# Patient Record
Sex: FEMALE | Race: WHITE | NOT HISPANIC OR LATINO | Employment: OTHER | URBAN - METROPOLITAN AREA
[De-identification: names, ages, dates, MRNs, and addresses within clinical notes are randomized per-mention and may not be internally consistent; named-entity substitution may affect disease eponyms.]

---

## 2017-06-22 ENCOUNTER — HOSPITAL ENCOUNTER (OUTPATIENT)
Facility: MEDICAL CENTER | Age: 57
End: 2017-06-22
Attending: INTERNAL MEDICINE
Payer: COMMERCIAL

## 2017-06-22 ENCOUNTER — OFFICE VISIT (OUTPATIENT)
Dept: MEDICAL GROUP | Facility: MEDICAL CENTER | Age: 57
End: 2017-06-22
Payer: COMMERCIAL

## 2017-06-22 VITALS
DIASTOLIC BLOOD PRESSURE: 68 MMHG | WEIGHT: 293 LBS | TEMPERATURE: 96.9 F | BODY MASS INDEX: 50.02 KG/M2 | SYSTOLIC BLOOD PRESSURE: 136 MMHG | HEIGHT: 64 IN | OXYGEN SATURATION: 96 % | HEART RATE: 71 BPM

## 2017-06-22 DIAGNOSIS — F11.20 NARCOTIC DEPENDENCE (HCC): ICD-10-CM

## 2017-06-22 DIAGNOSIS — Z99.81 ON HOME OXYGEN THERAPY: ICD-10-CM

## 2017-06-22 DIAGNOSIS — Z79.01 CHRONIC ANTICOAGULATION: ICD-10-CM

## 2017-06-22 DIAGNOSIS — Z12.31 ENCOUNTER FOR SCREENING MAMMOGRAM FOR MALIGNANT NEOPLASM OF BREAST: ICD-10-CM

## 2017-06-22 DIAGNOSIS — J44.9 CHRONIC OBSTRUCTIVE PULMONARY DISEASE, UNSPECIFIED COPD TYPE (HCC): ICD-10-CM

## 2017-06-22 DIAGNOSIS — Z12.11 SCREENING FOR MALIGNANT NEOPLASM OF COLON: ICD-10-CM

## 2017-06-22 DIAGNOSIS — Z79.891 CHRONIC USE OF OPIATE FOR THERAPEUTIC PURPOSE: ICD-10-CM

## 2017-06-22 DIAGNOSIS — I10 ESSENTIAL HYPERTENSION: ICD-10-CM

## 2017-06-22 DIAGNOSIS — J96.11 CHRONIC RESPIRATORY FAILURE WITH HYPOXIA (HCC): ICD-10-CM

## 2017-06-22 DIAGNOSIS — Z00.00 HEALTH CARE MAINTENANCE: ICD-10-CM

## 2017-06-22 DIAGNOSIS — I48.91 ATRIAL FIBRILLATION WITH RAPID VENTRICULAR RESPONSE (HCC): ICD-10-CM

## 2017-06-22 DIAGNOSIS — I42.0 DCM (DILATED CARDIOMYOPATHY) (HCC): ICD-10-CM

## 2017-06-22 DIAGNOSIS — M17.0 PRIMARY OSTEOARTHRITIS OF BOTH KNEES: ICD-10-CM

## 2017-06-22 DIAGNOSIS — Z99.3 WHEELCHAIR BOUND: ICD-10-CM

## 2017-06-22 DIAGNOSIS — Z76.89 ENCOUNTER TO ESTABLISH CARE: ICD-10-CM

## 2017-06-22 DIAGNOSIS — I27.20 PULMONARY HYPERTENSION (HCC): ICD-10-CM

## 2017-06-22 DIAGNOSIS — I50.9 CHRONIC CONGESTIVE HEART FAILURE, UNSPECIFIED CONGESTIVE HEART FAILURE TYPE: ICD-10-CM

## 2017-06-22 PROBLEM — G47.33 OSA (OBSTRUCTIVE SLEEP APNEA): Status: ACTIVE | Noted: 2017-06-22

## 2017-06-22 PROBLEM — G47.00 INSOMNIA: Status: ACTIVE | Noted: 2017-06-22

## 2017-06-22 PROCEDURE — 80307 DRUG TEST PRSMV CHEM ANLYZR: CPT

## 2017-06-22 PROCEDURE — 99205 OFFICE O/P NEW HI 60 MIN: CPT | Performed by: INTERNAL MEDICINE

## 2017-06-22 RX ORDER — DIGOXIN 250 MCG
250 TABLET ORAL DAILY
COMMUNITY
End: 2017-06-22 | Stop reason: SDUPTHER

## 2017-06-22 RX ORDER — SPIRONOLACTONE 25 MG/1
25 TABLET ORAL DAILY
Qty: 90 TAB | Refills: 0 | Status: SHIPPED | OUTPATIENT
Start: 2017-06-22 | End: 2017-10-19 | Stop reason: SDUPTHER

## 2017-06-22 RX ORDER — GABAPENTIN 300 MG/1
300 CAPSULE ORAL 2 TIMES DAILY
Qty: 180 CAP | Refills: 0 | Status: SHIPPED | OUTPATIENT
Start: 2017-06-22 | End: 2017-10-19 | Stop reason: SDUPTHER

## 2017-06-22 RX ORDER — DIGOXIN 250 MCG
250 TABLET ORAL DAILY
Qty: 90 TAB | Refills: 0 | Status: SHIPPED | OUTPATIENT
Start: 2017-06-22 | End: 2017-10-19 | Stop reason: SDUPTHER

## 2017-06-22 RX ORDER — SPIRONOLACTONE 25 MG/1
25 TABLET ORAL DAILY
COMMUNITY
End: 2017-06-22 | Stop reason: SDUPTHER

## 2017-06-22 RX ORDER — TRAZODONE HYDROCHLORIDE 50 MG/1
50 TABLET ORAL NIGHTLY
COMMUNITY
End: 2018-08-02

## 2017-06-22 RX ORDER — METOPROLOL TARTRATE 50 MG/1
50 TABLET, FILM COATED ORAL 2 TIMES DAILY
Qty: 180 TAB | Refills: 0 | Status: SHIPPED | OUTPATIENT
Start: 2017-06-22 | End: 2017-10-19 | Stop reason: SDUPTHER

## 2017-06-22 RX ORDER — AMIODARONE HYDROCHLORIDE 200 MG/1
200 TABLET ORAL DAILY
Qty: 90 TAB | Refills: 0 | Status: SHIPPED | OUTPATIENT
Start: 2017-06-22 | End: 2017-10-19 | Stop reason: SDUPTHER

## 2017-06-22 RX ORDER — HYDROCODONE BITARTRATE AND ACETAMINOPHEN 7.5; 325 MG/1; MG/1
1-2 TABLET ORAL EVERY 6 HOURS PRN
COMMUNITY
End: 2018-08-02

## 2017-06-22 NOTE — PROGRESS NOTES
CHIEF COMPLAINT  Chief Complaint   Patient presents with   • Establish Care     med refills    HTN    HPI  Patient is a 57 y.o. female patient who presents today for the following     HYPERTENSION  Meds: metoprolol, 50 mg BID, spironolactone, 25 mg QD, carvedilol, 12.5 mg BID,, taking as prescribed.   Denies:  -  headaches, vision problems, tinnitus.                 -  chest pain/pressure, palpitations, irregular heart beats, exertional, dyspnea, peripheral edema.      - medication side effect: unusual fatigue, slow heartbeat, foot/leg swelling, cough.  Low salt diet: yes  Diet: regular  Exercise: in a wheelchair  BMI: Body mass index is 60.05 kg/(m^2).  FH of HTN: multiple    Afib, on anticoagulation, dilated cardiomyopathy (CHF)  Pulmonary hypertension  Onset: in 2013.    Medications: Amiodarone, 200 mg daily, digitoxin, 250 µg daily, spironolactone 25 mg daily Xarelto 20 mg daily.    Reviewed last echocardiography in our system, from 1/18/13, with EF of 50% and moderate pulmonary hypertension.  She was not followed by cardiology.    COPD, respiratory failure with hypoxia and home oxygen  The patient is a former smoker, has COPD and on home oxygen.  Denies current shortness of breath, cough, wheezing, chest tightness.  She was not seen recently by pulmonology.    Osteoarthritis of knees, wheelchair bound, chronic narcotic use and dependence  The patient has osteoarthritis of both knees for which she is in a wheelchair.  She has been on a Norco, followed up by pain management fire due to no shows.   Denies fever, chills, knee redness or swelling.    OBESITY, Body mass index is 60.05 kg/(m^2).  Onset: since  childhood  Diet: Regular  Exercise: In a wheelchair  No temperature intolerance. No change in hair/skin quality, BMs.   No HTN, buffalo hump, purple striae, flushing.  FH of obesity: multiple (sisters)    Reviewed PMH, PSH, FH, SH, ALL, HCM/IMM, no changes  Reviewed MEDS, no changes    Patient Active Problem  List    Diagnosis Date Noted   • Chronic anticoagulation 01/29/2013     Priority: High   • Atrial fibrillation with rapid ventricular response (CMS-HCC) 01/17/2013     Priority: High   • Migraine 01/22/2012     Priority: High   • CHF (congestive heart failure) (CMS-HCC) 01/29/2013     Priority: Medium   • DCM (dilated cardiomyopathy) (CMS-HCC) 01/29/2013     Priority: Medium   • Morbid obesity (CMS-HCC) 01/22/2012     Priority: Low   • Chronic pain 01/22/2012     Priority: Low   • Essential hypertension 06/22/2017   • Health care maintenance 06/22/2017     CURRENT MEDICATIONS  Current Outpatient Prescriptions   Medication Sig Dispense Refill   • hydrocodone-acetaminophen (NORCO) 7.5-325 MG per tablet Take 1-2 Tabs by mouth every 6 hours as needed.     • METOPROLOL TARTRATE PO Take  by mouth.     • spironolactone (ALDACTONE) 25 MG Tab Take 25 mg by mouth every day.     • digoxin (LANOXIN) 250 MCG Tab Take 250 mcg by mouth every day.     • trazodone (DESYREL) 50 MG Tab Take 50 mg by mouth every evening.     • amiodarone (CORDARONE) 200 MG TABS Take 1 Tab by mouth every day. 30 Tab 0   • gabapentin (NEURONTIN) 300 MG CAPS Take 1 Cap by mouth 2 Times a Day. 60 Cap 0   • albuterol (ACCUNEB) 1.25 MG/3ML nebulizer solution Inhale 6 mL by mouth every four hours as needed for Shortness of Breath. 60 Bullet 0   • alprazolam (XANAX) 0.5 MG TABS Take 1 Tab by mouth every 8 hours. 30 Each 0   • carvedilol (COREG) 12.5 MG TABS Take 1 Tab by mouth 2 times a day, with meals. 60 Tab 0   • digoxin (LANOXIN) 0.25 MG TABS Take 1 Tab by mouth every day at 6 PM. 30 Tab 0   • docusate sodium (COLACE) 100 MG CAPS Take 1 Cap by mouth 2 Times a Day. 60 Cap 0   • omeprazole (PRILOSEC) 20 MG CPDR Take 1 Cap by mouth 1/2 hour before dinner. 30 Tab 0   • rivaroxaban (XARELTO) 20 MG TABS tablet Take 1 Tab by mouth with dinner. 12 Tab 0   • therapeutic multivitamin-minerals (THERAGRAN-M) TABS Take 1 Tab by mouth every day with lunch. 30 Each   "    No current facility-administered medications for this visit.     ALLERGIES  Allergies: Review of patient's allergies indicates no known allergies.  PAST MEDICAL HISTORY  Past Medical History   Diagnosis Date   • Migraine    • Hypertension    • Chronic pain    • Arthritis    • COPD (chronic obstructive pulmonary disease) (CMS-[facility])      SURGICAL HISTORY  She  has past surgical history that includes primary c section; tracheostomy (2/4/2013); and gastrostomy laparoscopic (2/4/2013).  SOCIAL HISTORY  Social History   Substance Use Topics   • Smoking status: Former Smoker -- 1.00 packs/day for 20 years     Types: Cigarettes     Quit date: 01/17/2013   • Smokeless tobacco: Never Used      Comment: 1 ppd   • Alcohol Use: No     Social History     Social History Narrative     FAMILY HISTORY  Family History   Problem Relation Age of Onset   • Cancer Father      lung, smoker   • Diabetes Mother    • Diabetes Maternal Aunt      x4   • Heart Disease Sister    • Hypertension Mother    • Hypertension Sister    • Hyperlipidemia Mother    • Hyperlipidemia Sister    • Psychiatry Neg Hx    • Stroke Neg Hx    • Stroke Neg Hx    • Thyroid Sister      No family status information on file.     ROS   Constitutional: Negative for fever, chills.  HENT: Negative for congestion, sore throat.  Eyes: Negative for blurred vision.   Respiratory: Negative for cough, shortness of breath. And per HPI.  Cardiovascular: Negative for chest pain, palpitations. And per HPI.  Gastrointestinal: Negative for heartburn, nausea, abdominal pain.   Genitourinary: Negative for dysuria.  Musculoskeletal: As above.  Skin: Negative for rash and itching.   Neuro: Negative for dizziness, weakness and headaches.   Endo/Heme/Allergies: Does not bruise/bleed easily.   Psychiatric/Behavioral: Negative for depression, anxiety    PHYSICAL EXAM   Blood pressure 136/68, pulse 71, temperature 36.1 °C (96.9 °F), height 1.626 m (5' 4\"), weight 158.759 kg (350 lb), SpO2 96 " %. Body mass index is 60.05 kg/(m^2).  General:  NAD, well appearing. Obese.   O2 by NC.  HEENT:   NC/AT, PERRLA, EOMI, TMs are clear. Oropharyngeal mucosa is pink,  without lesions;  no cervical / supraclavicular  lymphadenopathy, no thyromegaly.    Cardiovascular: RRR.   No m/r/g. No carotid bruits .      Lungs:   CTAB, no w/r/r, no respiratory distress.  Abdomen: Soft, NT/ND + BS; could not palpate liver or spleen due to obesity  Extremities:  2+ DP and radial pulses bilaterally.  No c/c/e.   Skin:  Warm, dry.  No erythema. No rash.   Neurologic: Alert & oriented x 3. No focal deficits.  Psychiatric:  Affect normal, mood normal, judgment normal.    LABS     Reviewed her last labs:    Lab Results   Component Value Date/Time    CHOLESTEROL, 01/22/2012 06:00 AM     01/22/2012 06:00 AM    HDL 44 01/22/2012 06:00 AM    TRIGLYCERIDES 147 01/22/2012 06:00 AM       Lab Results   Component Value Date/Time    SODIUM 139 01/12/2015 02:00 PM    POTASSIUM 3.6 01/12/2015 02:00 PM    CHLORIDE 92* 01/12/2015 02:00 PM    CO2 37* 01/12/2015 02:00 PM    GLUCOSE 146* 01/12/2015 02:00 PM    BUN 7* 01/12/2015 02:00 PM    CREATININE 0.77 01/12/2015 02:00 PM     Lab Results   Component Value Date/Time    ALKALINE PHOSPHATASE 46 01/12/2015 02:00 PM    AST(SGOT) 28 01/12/2015 02:00 PM    ALT(SGPT) 42 01/12/2015 02:00 PM    TOTAL BILIRUBIN 0.8 01/12/2015 02:00 PM      No results found for: HBA1C  No results found for: TSH  Lab Results   Component Value Date/Time    FREE T-4 1.31 01/17/2013 07:05 AM    FREE T-4 1.01 01/21/2012 04:20 PM       Lab Results   Component Value Date/Time    WBC 9.1 01/12/2015 02:00 PM    RBC 4.23 01/12/2015 02:00 PM    HEMOGLOBIN 13.3 01/12/2015 02:00 PM    HEMATOCRIT 40.7 01/12/2015 02:00 PM    MCV 96.2 01/12/2015 02:00 PM    MCH 31.4 01/12/2015 02:00 PM    MCHC 32.7* 01/12/2015 02:00 PM    MPV 12.4 01/12/2015 02:00 PM    NEUTROPHILS-POLYS 66.5 01/12/2015 02:00 PM    LYMPHOCYTES 21.1* 01/12/2015  02:00 PM    MONOCYTES 8.7 01/12/2015 02:00 PM    EOSINOPHILS 2.2 01/12/2015 02:00 PM    BASOPHILS 0.9 01/12/2015 02:00 PM    HYPOCHROMIA 1+ 06/18/2013 03:58 PM      IMAGING     Reviewed echocardiography from 1/18/13:  Technically difficult study.   Left ventricular ejection fraction is 45% to 50%.  Severely dilated left atrium.  Right ventricular systolic pressure is estimated to be at least 50-55   mmHg consistent with moderate pulmonary hypertension.    ASSESMENT AND PLAN        1. Essential hypertension  Controlled, continue current treatment  - metoprolol (LOPRESSOR) 50 MG Tab; Take 1 Tab by mouth 2 times a day.  Dispense: 180 Tab; Refill: 0    2. Atrial fibrillation with rapid ventricular response (CMS-HCC)  Controlled, continue current treatment  - digoxin (LANOXIN) 250 MCG Tab; Take 1 Tab by mouth every day.  Dispense: 90 Tab; Refill: 0  - REFERRAL TO CARDIOLOGY  - amiodarone (CORDARONE) 200 MG Tab; Take 1 Tab by mouth every day.  Dispense: 90 Tab; Refill: 0  - metoprolol (LOPRESSOR) 50 MG Tab; Take 1 Tab by mouth 2 times a day.  Dispense: 180 Tab; Refill: 0    3. Chronic anticoagulation  - REFERRAL TO CARDIOLOGY    4. DCM (dilated cardiomyopathy) (CMS-[facility])  - REFERRAL TO CARDIOLOGY  - REFERRAL TO HEART FAILURE CLINIC  - digoxin (LANOXIN) 250 MCG Tab; Take 1 Tab by mouth every day.  Dispense: 90 Tab; Refill: 0  - spironolactone (ALDACTONE) 25 MG Tab; Take 1 Tab by mouth every day.  Dispense: 90 Tab; Refill: 0    5. Chronic congestive heart failure, unspecified congestive heart failure type (CMS-HCC)  - REFERRAL TO CARDIOLOGY  - REFERRAL TO HEART FAILURE CLINIC    6. Pulmonary hypertension (CMS-[facility])  As above    7. Chronic obstructive pulmonary disease, unspecified COPD type (CMS-[facility])  8. Chronic respiratory failure with hypoxia (CMS-[facility])  9. On home oxygen therapy  Continue current oxygen at 2 L/m    10. Primary osteoarthritis of both knees  She was not recently seen by orthopedics  - REFERRAL TO  ORTHOPEDICS  - PAIN MANAGEMENT PANEL, SCRN W/ RFLX TO QNT; Future  - gabapentin (NEURONTIN) 300 MG Cap; Take 1 Cap by mouth 2 Times a Day.  Dispense: 180 Cap; Refill: 0    11. Wheelchair bound  Discussed about safety    12. Chronic use of opiate for therapeutic purpose  - REFERRAL TO ORTHOPEDICS  - PAIN MANAGEMENT PANEL, SCRN W/ RFLX TO QNT; Future    13. Narcotic dependence (CMS-HCC)  - PAIN MANAGEMENT PANEL, SCRN W/ RFLX TO QNT; Future    14. BMI 60.0-69.9, adult (CMS-HCC)  - Patient identified as having weight management issue.  Appropriate orders and counseling given.    15. Encounter to establish care  Reviewed PMH, PSH, FH, SH, ALL, MEDS, HCM/IMM.   Advised healthy habits, diet, exercise.      16. Health care maintenance  Release form for old records: signed    17. Screening for malignant neoplasm of colon  - OCCULT BLOOD FECES IMMUNOASSAY; Future    18. Encounter for screening mammogram for malignant neoplasm of breast  - MA-SCREEN MAMMO W/CAD-BILAT    Counseling:   - Smoking:  Nonsmoker    Followup: Return in about 4 weeks (around 7/20/2017) for Short.    All questions are answered.    Time spent 60 minutes face to face, with > 50% spent counseling and coordinating care.    Please note that this dictation was created using voice recognition software, and that there might be errors of alma and possibly content.

## 2017-06-22 NOTE — Clinical Note
[facility]  Tammy Carpenter M.D.  [address]  [place] [address]  Fax: [phone]   Authorization for Release/Disclosure of   Protected Health Information   Name: ANNE BELTRANMORE : 1960 SSN: [id number]   Address:  [place] [address] Phone:    [phone] (home)    I authorize the entity listed below to release/disclose the PHI below to:   [employer] [facility]/Tammy Carpenter M.D. and Tammy Carpenter M.D.   Provider or Entity Name:  Dr Solomon [facility];s   Address   City, [facility], [facility]   Phone:      Fax:     Reason for request: continuity of care   Information to be released:    [  ] LAST COLONOSCOPY,  including any PATH REPORT and follow-up  [  ] LAST FIT/COLOGUARD RESULT [  ] LAST DEXA  [  ] LAST MAMMOGRAM  [  ] LAST PAP  [  ] LAST LABS [  ] RETINA EXAM REPORT  [  ] IMMUNIZATION RECORDS  [ XXX ] Release all info      [  ] Check here and initial the line next to each item to release ALL health information INCLUDING  _____ Care and treatment for drug and / or alcohol abuse  _____ HIV testing, infection status, or AIDS  _____ Genetic Testing    DATES OF SERVICE OR TIME PERIOD TO BE DISCLOSED: _____________  I understand and acknowledge that:  * This Authorization may be revoked at any time by you in writing, except if your health information has already been used or disclosed.  * Your health information that will be used or disclosed as a result of you signing this authorization could be re-disclosed by the recipient. If this occurs, your re-disclosed health information may no longer be protected by State or Federal laws.  * You may refuse to sign this Authorization. Your refusal will not affect your ability to obtain treatment.  * This Authorization becomes effective upon signing and will  on (date) __________.      If no date is indicated, this Authorization will  one (1) year from the signature date.    Name: Anne KEBEDE Blakemore    Signature:   Date:          6/22/2017       PLEASE FAX REQUESTED RECORDS BACK TO: [phone]

## 2017-06-22 NOTE — MR AVS SNAPSHOT
"        Anne YANDY Blakemore   2017 8:20 AM   Office Visit   MRN: [id number]    Department:  [place] [address]   Dept Phone:  [phone]    Description:  Female : 1960   Provider:  Tammy Carpenter M.D.           Reason for Visit     Establish Care med refills       Allergies as of 2017     No Known Allergies      You were diagnosed with     Essential hypertension   [7170113]       Atrial fibrillation with rapid ventricular response (CMS-HCC)   [750665]       Chronic anticoagulation   [036532]       DCM (dilated cardiomyopathy) (CMS-HCC)   [533250]       Chronic congestive heart failure, unspecified congestive heart failure type (CMS-HCC)   [7068893]       Encounter to establish care   [039392]       Health care maintenance   [280868]       BMI 60.0-69.9, adult (CMS-HCC)   [044643]       Encounter for screening mammogram for malignant neoplasm of breast   [188353]       Screening for malignant neoplasm of colon   [8891616]       Wheelchair bound   [345583]       Chronic respiratory failure with hypoxia (CMS-HCC)   [465771]       Chronic obstructive pulmonary disease, unspecified COPD type (CMS-HCC)   [7063218]       On home oxygen therapy   [408277]       Primary osteoarthritis of both knees   [559068]       Chronic use of opiate for therapeutic purpose   [8744339]       Narcotic dependence (CMS-HCC)   [065621]         Vital Signs     Blood Pressure Pulse Temperature Height Weight Body Mass Index    136/68 mmHg 71 36.1 °C (96.9 °F) 1.626 m (5' 4\") 158.759 kg (350 lb) 60.05 kg/m2    Oxygen Saturation Smoking Status                96% Former Smoker          Basic Information     Date Of Birth Sex Race Ethnicity Preferred Language    1960 Female White Non- English      Your appointments     2017  8:00 AM   Established Patient with Tammy Carpenter M.D.   [facility]    [address]  [address]  [place] [address]   "   [phone]           You will be receiving a confirmation call a few days before your appointment from our automated call confirmation system.              Problem List              ICD-10-CM Priority Class Noted - Resolved    Migraine  High  1/22/2012 - Present    Morbid obesity (CMS-HCC) E66.01 Low  1/22/2012 - Present    Chronic pain G89.29 Low  1/22/2012 - Present    Atrial fibrillation with rapid ventricular response (CMS-HCC) I48.91 High  1/17/2013 - Present    Chronic anticoagulation Z79.01 High  1/29/2013 - Present    CHF (congestive heart failure) (CMS-HCC) I50.9 Medium  1/29/2013 - Present    DCM (dilated cardiomyopathy) (CMS-HCC) I42.0 Medium  1/29/2013 - Present    Essential hypertension I10   6/22/2017 - Present    Health care maintenance Z00.00   6/22/2017 - Present    JUAN CARLOS (obstructive sleep apnea) G47.33   6/22/2017 - Present    Insomnia G47.00   6/22/2017 - Present      Health Maintenance        Date Due Completion Dates    IMM DTaP/Tdap/Td Vaccine (1 - Tdap) 2/23/1978 ---    IMM PNEUMOCOCCAL 19-64 (ADULT) MEDIUM RISK SERIES (1 of 1 - PPSV23) 2/23/1978 ---    PAP SMEAR 2/23/1980 ---    MAMMOGRAM 2/23/1999 ---    COLONOSCOPY 2/23/2009 ---            Current Immunizations     No immunizations on file.      Below and/or attached are the medications your provider expects you to take. Review all of your home medications and newly ordered medications with your provider and/or pharmacist. Follow medication instructions as directed by your provider and/or pharmacist. Please keep your medication list with you and share with your provider. Update the information when medications are discontinued, doses are changed, or new medications (including over-the-counter products) are added; and carry medication information at all times in the event of emergency situations     Allergies:  No Known Allergies          Medications  Valid as of: June 22, 2017 -  8:45 AM    Generic Name Brand Name Tablet Size  Instructions for use    Albuterol Sulfate ([employer]) ACCUNEB 1.25 MG/3ML Inhale 6 mL by mouth every four hours as needed for Shortness of Breath.        ALPRAZolam (Tab) XANAX 0.5 MG Take 1 Tab by mouth every 8 hours.        Amiodarone HCl (Tab) CORDARONE 200 MG Take 1 Tab by mouth every day.        Carvedilol (Tab) COREG 12.5 MG Take 1 Tab by mouth 2 times a day, with meals.        Digoxin (Tab) LANOXIN 250 MCG Take 1 Tab by mouth every day at 6 PM.        Digoxin (Tab) LANOXIN 250 MCG Take 250 mcg by mouth every day.        Docusate Sodium (Cap) COLACE 100 MG Take 1 Cap by mouth 2 Times a Day.        Gabapentin (Cap) NEURONTIN 300 MG Take 1 Cap by mouth 2 Times a Day.        Hydrocodone-Acetaminophen (Tab) NORCO 7.5-325 MG Take 1-2 Tabs by mouth every 6 hours as needed.        Multiple Vitamins-Minerals (Tab) THERAGRAN-M  Take 1 Tab by mouth every day with lunch.        Omeprazole (CAPSULE DELAYED RELEASE) PRILOSEC 20 MG Take 1 Cap by mouth 1/2 hour before dinner.        Rivaroxaban (Tab) XARELTO 20 MG Take 1 Tab by mouth with dinner.        Spironolactone (Tab) ALDACTONE 25 MG Take 25 mg by mouth every day.        TraZODone HCl (Tab) DESYREL 50 MG Take 50 mg by mouth every evening.        .                 Medicines prescribed today were sent to:     None      Medication refill instructions:       If your prescription bottle indicates you have medication refills left, it is not necessary to call your provider’s office. Please contact your pharmacy and they will refill your medication.    If your prescription bottle indicates you do not have any refills left, you may request refills at any time through one of the following ways: The online [employer] system (except Urgent Care), by calling your provider’s office, or by asking your pharmacy to contact your provider’s office with a refill request. Medication refills are processed only during regular business hours and may not be available until the next business day.  Your provider may request additional information or to have a follow-up visit with you prior to refilling your medication.   *Please Note: Medication refills are assigned a new Rx number when refilled electronically. Your pharmacy may indicate that no refills were authorized even though a new prescription for the same medication is available at the pharmacy. Please request the medicine by name with the pharmacy before contacting your provider for a refill.        Your To Do List     Future Labs/Procedures Complete By Expires    OCCULT BLOOD FECES IMMUNOASSAY  As directed 6/22/2018    PAIN MANAGEMENT PANEL, SCRN W/ RFLX TO QNT  As directed 6/22/2018    Comments:    Current Meds (name, sig, last dose):   Current outpatient prescriptions:   •  hydrocodone-acetaminophen, 1-2 Tab, Oral, Q6HRS PRN, 6/22/2017  •  METOPROLOL TARTRATE PO, Take  by mouth., 6/22/2017  •  spironolactone, 25 mg, Oral, DAILY  •  digoxin, 250 mcg, Oral, DAILY, 6/22/2017  •  trazodone, 50 mg, Oral, Nightly, 6/22/2017  •  amiodarone, 200 mg, Oral, DAILY, 6/22/2017 at am  •  gabapentin, 300 mg, Oral, BID, 6/22/2017  •  albuterol, 2.5 mg, Inhalation, Q4HRS PRN, not taking  •  alprazolam, 0.5 mg, Oral, Q8HRS, not taking  •  carvedilol, 12.5 mg, Oral, BID WITH MEALS, not taking  •  digoxin, 250 mcg, Oral, DAILY AT 1800  •  docusate sodium, 100 mg, Oral, BID, not taking  •  omeprazole, 20 mg, Oral, AC PM MEAL, not taking  •  rivaroxaban, 20 mg, Oral, PM MEAL, not taking  •  therapeutic multivitamin-minerals, 1 Tab, Oral, DAILY WITH LUNCH, not taking            Referral     A referral request has been sent to our patient care coordination department. Please allow 3-5 business days for us to process this request and contact you either by phone or mail. If you do not hear from us by the 5th business day, please call us at [phone].           MyChart Status: Patient Declined

## 2017-06-24 LAB

## 2017-10-19 DIAGNOSIS — M17.0 PRIMARY OSTEOARTHRITIS OF BOTH KNEES: ICD-10-CM

## 2017-10-19 DIAGNOSIS — I10 ESSENTIAL HYPERTENSION: ICD-10-CM

## 2017-10-19 DIAGNOSIS — I42.0 DCM (DILATED CARDIOMYOPATHY) (HCC): ICD-10-CM

## 2017-10-19 DIAGNOSIS — I48.91 ATRIAL FIBRILLATION WITH RAPID VENTRICULAR RESPONSE (HCC): ICD-10-CM

## 2017-10-19 RX ORDER — GABAPENTIN 300 MG/1
300 CAPSULE ORAL 2 TIMES DAILY
Qty: 180 CAP | Refills: 0 | Status: SHIPPED | OUTPATIENT
Start: 2017-10-19 | End: 2018-01-16 | Stop reason: SDUPTHER

## 2017-10-19 RX ORDER — DIGOXIN 250 MCG
250 TABLET ORAL DAILY
Qty: 90 TAB | Refills: 0 | Status: SHIPPED | OUTPATIENT
Start: 2017-10-19 | End: 2018-01-16 | Stop reason: SDUPTHER

## 2017-10-19 RX ORDER — SPIRONOLACTONE 25 MG/1
25 TABLET ORAL DAILY
Qty: 45 TAB | Refills: 0 | Status: SHIPPED | OUTPATIENT
Start: 2017-10-19 | End: 2017-12-04 | Stop reason: SDUPTHER

## 2017-10-19 RX ORDER — AMIODARONE HYDROCHLORIDE 200 MG/1
200 TABLET ORAL DAILY
Qty: 90 TAB | Refills: 0 | Status: SHIPPED | OUTPATIENT
Start: 2017-10-19 | End: 2018-01-16 | Stop reason: SDUPTHER

## 2017-10-19 RX ORDER — METOPROLOL TARTRATE 50 MG/1
50 TABLET, FILM COATED ORAL 2 TIMES DAILY
Qty: 180 TAB | Refills: 0 | Status: SHIPPED | OUTPATIENT
Start: 2017-10-19 | End: 2018-01-14 | Stop reason: SDUPTHER

## 2017-12-04 DIAGNOSIS — I42.0 DCM (DILATED CARDIOMYOPATHY) (HCC): ICD-10-CM

## 2017-12-04 DIAGNOSIS — I10 ESSENTIAL HYPERTENSION: ICD-10-CM

## 2017-12-04 RX ORDER — SPIRONOLACTONE 25 MG/1
TABLET ORAL
Qty: 30 TAB | Refills: 0 | Status: SHIPPED | OUTPATIENT
Start: 2017-12-04 | End: 2018-01-01 | Stop reason: SDUPTHER

## 2018-01-01 DIAGNOSIS — I42.0 DCM (DILATED CARDIOMYOPATHY) (HCC): ICD-10-CM

## 2018-01-02 RX ORDER — SPIRONOLACTONE 25 MG/1
TABLET ORAL
Qty: 30 TAB | Refills: 0 | Status: SHIPPED | OUTPATIENT
Start: 2018-01-02 | End: 2018-02-03 | Stop reason: SDUPTHER

## 2018-01-14 DIAGNOSIS — I10 ESSENTIAL HYPERTENSION: ICD-10-CM

## 2018-01-14 DIAGNOSIS — I48.91 ATRIAL FIBRILLATION WITH RAPID VENTRICULAR RESPONSE (HCC): ICD-10-CM

## 2018-01-15 RX ORDER — METOPROLOL TARTRATE 50 MG/1
TABLET, FILM COATED ORAL
Qty: 90 TAB | Refills: 0 | Status: SHIPPED | OUTPATIENT
Start: 2018-01-15 | End: 2018-03-19 | Stop reason: SDUPTHER

## 2018-01-16 DIAGNOSIS — M17.0 PRIMARY OSTEOARTHRITIS OF BOTH KNEES: ICD-10-CM

## 2018-01-16 DIAGNOSIS — I48.91 ATRIAL FIBRILLATION WITH RAPID VENTRICULAR RESPONSE (HCC): ICD-10-CM

## 2018-01-16 DIAGNOSIS — I42.0 DCM (DILATED CARDIOMYOPATHY) (HCC): ICD-10-CM

## 2018-01-16 RX ORDER — GABAPENTIN 300 MG/1
CAPSULE ORAL
Qty: 60 CAP | Refills: 0 | Status: SHIPPED | OUTPATIENT
Start: 2018-01-16 | End: 2018-02-17 | Stop reason: SDUPTHER

## 2018-01-16 RX ORDER — DIGOXIN 250 MCG
250 TABLET ORAL DAILY
Qty: 30 TAB | Refills: 0 | Status: SHIPPED | OUTPATIENT
Start: 2018-01-16 | End: 2018-02-17 | Stop reason: SDUPTHER

## 2018-01-16 RX ORDER — AMIODARONE HYDROCHLORIDE 200 MG/1
TABLET ORAL
Qty: 30 TAB | Refills: 0 | Status: SHIPPED | OUTPATIENT
Start: 2018-01-16 | End: 2018-02-17 | Stop reason: SDUPTHER

## 2018-02-03 DIAGNOSIS — I42.0 DCM (DILATED CARDIOMYOPATHY) (HCC): ICD-10-CM

## 2018-02-05 RX ORDER — SPIRONOLACTONE 25 MG/1
TABLET ORAL
Qty: 30 TAB | Refills: 0 | Status: SHIPPED | OUTPATIENT
Start: 2018-02-05 | End: 2018-03-19 | Stop reason: SDUPTHER

## 2018-02-17 DIAGNOSIS — M17.0 PRIMARY OSTEOARTHRITIS OF BOTH KNEES: ICD-10-CM

## 2018-02-17 DIAGNOSIS — I48.91 ATRIAL FIBRILLATION WITH RAPID VENTRICULAR RESPONSE (HCC): ICD-10-CM

## 2018-02-17 DIAGNOSIS — I42.0 DCM (DILATED CARDIOMYOPATHY) (HCC): ICD-10-CM

## 2018-02-20 RX ORDER — AMIODARONE HYDROCHLORIDE 200 MG/1
TABLET ORAL
Qty: 30 TAB | Refills: 0 | Status: SHIPPED | OUTPATIENT
Start: 2018-02-20 | End: 2018-03-19 | Stop reason: SDUPTHER

## 2018-02-20 RX ORDER — DIGOXIN 250 MCG
TABLET ORAL
Qty: 30 TAB | Refills: 0 | Status: SHIPPED | OUTPATIENT
Start: 2018-02-20 | End: 2018-03-19 | Stop reason: SDUPTHER

## 2018-02-20 RX ORDER — GABAPENTIN 300 MG/1
CAPSULE ORAL
Qty: 60 CAP | Refills: 0 | Status: SHIPPED | OUTPATIENT
Start: 2018-02-20 | End: 2018-03-19 | Stop reason: SDUPTHER

## 2018-03-19 DIAGNOSIS — M17.0 PRIMARY OSTEOARTHRITIS OF BOTH KNEES: ICD-10-CM

## 2018-03-19 DIAGNOSIS — I48.91 ATRIAL FIBRILLATION WITH RAPID VENTRICULAR RESPONSE (HCC): ICD-10-CM

## 2018-03-19 DIAGNOSIS — I10 ESSENTIAL HYPERTENSION: ICD-10-CM

## 2018-03-19 DIAGNOSIS — I42.0 DCM (DILATED CARDIOMYOPATHY) (HCC): ICD-10-CM

## 2018-03-19 RX ORDER — SPIRONOLACTONE 25 MG/1
TABLET ORAL
Qty: 30 TAB | Refills: 0 | Status: SHIPPED | OUTPATIENT
Start: 2018-03-19 | End: 2018-04-21 | Stop reason: SDUPTHER

## 2018-03-19 RX ORDER — GABAPENTIN 300 MG/1
CAPSULE ORAL
Qty: 60 CAP | Refills: 0 | Status: SHIPPED | OUTPATIENT
Start: 2018-03-19 | End: 2018-04-21 | Stop reason: SDUPTHER

## 2018-03-19 RX ORDER — DIGOXIN 250 MCG
TABLET ORAL
Qty: 30 TAB | Refills: 0 | Status: SHIPPED | OUTPATIENT
Start: 2018-03-19 | End: 2018-04-21 | Stop reason: SDUPTHER

## 2018-03-19 RX ORDER — METOPROLOL TARTRATE 50 MG/1
TABLET, FILM COATED ORAL
Qty: 60 TAB | Refills: 0 | Status: SHIPPED | OUTPATIENT
Start: 2018-03-19 | End: 2018-04-21 | Stop reason: SDUPTHER

## 2018-03-19 RX ORDER — AMIODARONE HYDROCHLORIDE 200 MG/1
TABLET ORAL
Qty: 30 TAB | Refills: 0 | Status: SHIPPED | OUTPATIENT
Start: 2018-03-19 | End: 2018-04-21 | Stop reason: SDUPTHER

## 2018-04-21 DIAGNOSIS — M17.0 PRIMARY OSTEOARTHRITIS OF BOTH KNEES: ICD-10-CM

## 2018-04-21 DIAGNOSIS — I42.0 DCM (DILATED CARDIOMYOPATHY) (HCC): ICD-10-CM

## 2018-04-21 DIAGNOSIS — I10 ESSENTIAL HYPERTENSION: ICD-10-CM

## 2018-04-21 DIAGNOSIS — I48.91 ATRIAL FIBRILLATION WITH RAPID VENTRICULAR RESPONSE (HCC): ICD-10-CM

## 2018-04-23 RX ORDER — GABAPENTIN 300 MG/1
CAPSULE ORAL
Qty: 60 CAP | Refills: 0 | Status: SHIPPED | OUTPATIENT
Start: 2018-04-23 | End: 2018-05-21 | Stop reason: SDUPTHER

## 2018-04-23 RX ORDER — AMIODARONE HYDROCHLORIDE 200 MG/1
TABLET ORAL
Qty: 30 TAB | Refills: 0 | Status: SHIPPED | OUTPATIENT
Start: 2018-04-23 | End: 2018-05-21 | Stop reason: SDUPTHER

## 2018-04-23 RX ORDER — DIGOXIN 250 MCG
TABLET ORAL
Qty: 30 TAB | Refills: 0 | Status: SHIPPED | OUTPATIENT
Start: 2018-04-23 | End: 2018-05-21 | Stop reason: SDUPTHER

## 2018-04-23 RX ORDER — SPIRONOLACTONE 25 MG/1
TABLET ORAL
Qty: 30 TAB | Refills: 0 | Status: SHIPPED | OUTPATIENT
Start: 2018-04-23 | End: 2018-05-21 | Stop reason: SDUPTHER

## 2018-04-23 RX ORDER — METOPROLOL TARTRATE 50 MG/1
TABLET, FILM COATED ORAL
Qty: 60 TAB | Refills: 0 | Status: SHIPPED | OUTPATIENT
Start: 2018-04-23 | End: 2018-05-21 | Stop reason: SDUPTHER

## 2018-05-21 DIAGNOSIS — I10 ESSENTIAL HYPERTENSION: ICD-10-CM

## 2018-05-21 DIAGNOSIS — M17.0 PRIMARY OSTEOARTHRITIS OF BOTH KNEES: ICD-10-CM

## 2018-05-21 DIAGNOSIS — I48.91 ATRIAL FIBRILLATION WITH RAPID VENTRICULAR RESPONSE (HCC): ICD-10-CM

## 2018-05-21 DIAGNOSIS — I42.0 DCM (DILATED CARDIOMYOPATHY) (HCC): ICD-10-CM

## 2018-05-21 RX ORDER — GABAPENTIN 300 MG/1
CAPSULE ORAL
Qty: 60 CAP | Refills: 0 | Status: SHIPPED | OUTPATIENT
Start: 2018-05-21 | End: 2018-07-24 | Stop reason: SDUPTHER

## 2018-05-21 RX ORDER — AMIODARONE HYDROCHLORIDE 200 MG/1
TABLET ORAL
Qty: 30 TAB | Refills: 0 | Status: SHIPPED | OUTPATIENT
Start: 2018-05-21 | End: 2018-08-02

## 2018-05-21 RX ORDER — METOPROLOL TARTRATE 50 MG/1
TABLET, FILM COATED ORAL
Qty: 60 TAB | Refills: 0 | Status: SHIPPED | OUTPATIENT
Start: 2018-05-21 | End: 2018-07-24 | Stop reason: SDUPTHER

## 2018-05-21 RX ORDER — DIGOXIN 250 MCG
TABLET ORAL
Qty: 30 TAB | Refills: 0 | Status: SHIPPED | OUTPATIENT
Start: 2018-05-21 | End: 2018-07-24 | Stop reason: SDUPTHER

## 2018-05-21 RX ORDER — SPIRONOLACTONE 25 MG/1
TABLET ORAL
Qty: 30 TAB | Refills: 0 | Status: SHIPPED | OUTPATIENT
Start: 2018-05-21 | End: 2018-07-24 | Stop reason: SDUPTHER

## 2018-05-22 RX ORDER — GABAPENTIN 300 MG/1
CAPSULE ORAL
Qty: 60 CAP | Refills: 0 | Status: SHIPPED | OUTPATIENT
Start: 2018-05-22 | End: 2018-08-02

## 2018-05-22 RX ORDER — DIGOXIN 250 MCG
TABLET ORAL
Qty: 30 TAB | Refills: 0 | Status: SHIPPED | OUTPATIENT
Start: 2018-05-22 | End: 2018-08-02

## 2018-05-22 RX ORDER — SPIRONOLACTONE 25 MG/1
TABLET ORAL
Qty: 30 TAB | Refills: 0 | Status: SHIPPED | OUTPATIENT
Start: 2018-05-22 | End: 2019-05-03

## 2018-05-22 RX ORDER — METOPROLOL TARTRATE 50 MG/1
TABLET, FILM COATED ORAL
Qty: 60 TAB | Refills: 0 | Status: SHIPPED | OUTPATIENT
Start: 2018-05-22 | End: 2019-02-26 | Stop reason: SDUPTHER

## 2018-05-22 RX ORDER — AMIODARONE HYDROCHLORIDE 200 MG/1
TABLET ORAL
Qty: 30 TAB | Refills: 0 | Status: SHIPPED | OUTPATIENT
Start: 2018-05-22 | End: 2018-07-24 | Stop reason: SDUPTHER

## 2018-07-24 DIAGNOSIS — I10 ESSENTIAL HYPERTENSION: ICD-10-CM

## 2018-07-24 DIAGNOSIS — I48.91 ATRIAL FIBRILLATION WITH RAPID VENTRICULAR RESPONSE (HCC): ICD-10-CM

## 2018-07-24 DIAGNOSIS — I42.0 DCM (DILATED CARDIOMYOPATHY) (HCC): ICD-10-CM

## 2018-07-24 DIAGNOSIS — M17.0 PRIMARY OSTEOARTHRITIS OF BOTH KNEES: ICD-10-CM

## 2018-07-24 RX ORDER — AMIODARONE HYDROCHLORIDE 200 MG/1
200 TABLET ORAL DAILY
Qty: 7 TAB | Refills: 0 | Status: SHIPPED | OUTPATIENT
Start: 2018-07-24 | End: 2018-08-02 | Stop reason: SDUPTHER

## 2018-07-24 RX ORDER — SPIRONOLACTONE 25 MG/1
25 TABLET ORAL
Qty: 7 TAB | Refills: 0 | Status: SHIPPED | OUTPATIENT
Start: 2018-07-24 | End: 2018-08-02 | Stop reason: SDUPTHER

## 2018-07-24 RX ORDER — GABAPENTIN 300 MG/1
300 CAPSULE ORAL 2 TIMES DAILY
Qty: 14 CAP | Refills: 0 | Status: SHIPPED | OUTPATIENT
Start: 2018-07-24 | End: 2018-08-02 | Stop reason: SDUPTHER

## 2018-07-24 RX ORDER — DIGOXIN 250 MCG
250 TABLET ORAL
Qty: 7 TAB | Refills: 0 | Status: SHIPPED | OUTPATIENT
Start: 2018-07-24 | End: 2018-08-02 | Stop reason: SDUPTHER

## 2018-07-24 RX ORDER — METOPROLOL TARTRATE 50 MG/1
50 TABLET, FILM COATED ORAL 2 TIMES DAILY
Qty: 14 TAB | Refills: 0 | Status: SHIPPED | OUTPATIENT
Start: 2018-07-24 | End: 2018-08-02 | Stop reason: SDUPTHER

## 2018-07-24 NOTE — TELEPHONE ENCOUNTER
Was the patient seen in the last year in this department? Yes     Does patient have an active prescription for medications requested? No     Received Request Via: Patient       Dr. Garcia,     Patient has appointment scheduled 08/02/2018. She is unable to come in sooner due to transportation. Is wondering if she can get one week supply on all medication until she can come in. Patient states she is out of meds. Please advise    Thank you    Alex Ambrose  Medical Assistant

## 2018-08-02 ENCOUNTER — OFFICE VISIT (OUTPATIENT)
Dept: MEDICAL GROUP | Facility: MEDICAL CENTER | Age: 58
End: 2018-08-02
Payer: COMMERCIAL

## 2018-08-02 VITALS
DIASTOLIC BLOOD PRESSURE: 64 MMHG | OXYGEN SATURATION: 90 % | TEMPERATURE: 96.3 F | WEIGHT: 293 LBS | HEART RATE: 70 BPM | HEIGHT: 64 IN | BODY MASS INDEX: 50.02 KG/M2 | SYSTOLIC BLOOD PRESSURE: 118 MMHG

## 2018-08-02 DIAGNOSIS — J96.10 CHRONIC RESPIRATORY FAILURE, UNSPECIFIED WHETHER WITH HYPOXIA OR HYPERCAPNIA (HCC): ICD-10-CM

## 2018-08-02 DIAGNOSIS — Z12.31 ENCOUNTER FOR SCREENING MAMMOGRAM FOR MALIGNANT NEOPLASM OF BREAST: ICD-10-CM

## 2018-08-02 DIAGNOSIS — M17.0 PRIMARY OSTEOARTHRITIS OF BOTH KNEES: ICD-10-CM

## 2018-08-02 DIAGNOSIS — Z99.81 ON HOME OXYGEN THERAPY: ICD-10-CM

## 2018-08-02 DIAGNOSIS — I48.91 ATRIAL FIBRILLATION WITH RAPID VENTRICULAR RESPONSE (HCC): ICD-10-CM

## 2018-08-02 DIAGNOSIS — J44.9 CHRONIC OBSTRUCTIVE PULMONARY DISEASE, UNSPECIFIED COPD TYPE (HCC): ICD-10-CM

## 2018-08-02 DIAGNOSIS — I10 ESSENTIAL HYPERTENSION: ICD-10-CM

## 2018-08-02 DIAGNOSIS — Z12.11 COLON CANCER SCREENING: ICD-10-CM

## 2018-08-02 DIAGNOSIS — Z00.00 HEALTH CARE MAINTENANCE: ICD-10-CM

## 2018-08-02 DIAGNOSIS — I42.0 DCM (DILATED CARDIOMYOPATHY) (HCC): ICD-10-CM

## 2018-08-02 DIAGNOSIS — Z99.3 WHEELCHAIR BOUND: ICD-10-CM

## 2018-08-02 DIAGNOSIS — I27.20 PULMONARY HYPERTENSION (HCC): ICD-10-CM

## 2018-08-02 DIAGNOSIS — Z79.899 HIGH RISK MEDICATION USE: ICD-10-CM

## 2018-08-02 DIAGNOSIS — Z79.899 LONG TERM CURRENT USE OF AMIODARONE: ICD-10-CM

## 2018-08-02 PROCEDURE — 99215 OFFICE O/P EST HI 40 MIN: CPT | Performed by: INTERNAL MEDICINE

## 2018-08-02 RX ORDER — AMIODARONE HYDROCHLORIDE 200 MG/1
200 TABLET ORAL DAILY
Qty: 30 TAB | Refills: 0 | Status: SHIPPED | OUTPATIENT
Start: 2018-08-02 | End: 2018-08-30 | Stop reason: SDUPTHER

## 2018-08-02 RX ORDER — GABAPENTIN 300 MG/1
300 CAPSULE ORAL 2 TIMES DAILY
Qty: 180 CAP | Refills: 0 | Status: SHIPPED | OUTPATIENT
Start: 2018-08-02 | End: 2018-09-30 | Stop reason: SDUPTHER

## 2018-08-02 RX ORDER — METOPROLOL TARTRATE 50 MG/1
50 TABLET, FILM COATED ORAL 2 TIMES DAILY
Qty: 90 TAB | Refills: 0 | Status: SHIPPED | OUTPATIENT
Start: 2018-08-02 | End: 2018-09-15 | Stop reason: SDUPTHER

## 2018-08-02 RX ORDER — SPIRONOLACTONE 25 MG/1
25 TABLET ORAL
Qty: 30 TAB | Refills: 0 | Status: SHIPPED | OUTPATIENT
Start: 2018-08-02 | End: 2018-08-30 | Stop reason: SDUPTHER

## 2018-08-02 RX ORDER — DIGOXIN 250 MCG
250 TABLET ORAL
Qty: 30 TAB | Refills: 0 | Status: SHIPPED | OUTPATIENT
Start: 2018-08-02 | End: 2018-08-30 | Stop reason: SDUPTHER

## 2018-08-02 ASSESSMENT — PATIENT HEALTH QUESTIONNAIRE - PHQ9
SUM OF ALL RESPONSES TO PHQ QUESTIONS 1-9: 9
5. POOR APPETITE OR OVEREATING: 1 - SEVERAL DAYS
CLINICAL INTERPRETATION OF PHQ2 SCORE: 6

## 2018-08-03 NOTE — PROGRESS NOTES
CHIEF COMPLAINT  Chief Complaint   Patient presents with   • Medication Refill     x 5, digozxin, amiodarone, spironolactone, metoprolol, gabapentin     HPI  Patient is a 58 y.o. female patient who presents today for the following     HYPERTENSION  Meds: metoprolol, 50 mg BID, spironolactone, 25 mg QD, taking as prescribed.   Denies: - headaches, vision problems, tinnitus.  - chest pain/pressure, palpitations, irregular heart beats, exertional, dyspnea, peripheral edema.              - medication side effect: unusual fatigue, slow heartbeat, foot/leg swelling.  Low salt diet: yes  Diet: regular  Exercise: in a wheelchair  BMI:  65 kg/(m^2).  FH of HTN: multiple     Afib, on anticoagulation, dilated cardiomyopathy (CHF)  Pulmonary hypertension  Onset: in 2013.   Medications: Amiodarone, 200 mg daily, digitoxin, 250 µg daily, spironolactone 25 mg daily Xarelto 20 mg daily.   Reviewed last echocardiography in our system, from 1/18/13, with EF of 50% and moderate pulmonary hypertension.  She was not followed by cardiology.     COPD, respiratory failure with hypoxia and home oxygen  The patient is a former smoker, has COPD and on home oxygen.  Denies current shortness of breath, cough, wheezing, chest tightness.  She was not seen recently by pulmonology.     Osteoarthritis of knees, wheelchair bound  The patient has osteoarthritis of both knees for which she is in a wheelchair.  She has been on a Norco, followed up by pain management fire due to no shows.   Denies fever, chills, knee redness or swelling.     OBESITY, BMI 65, was 60  Onset: since childhood  Diet: Regular  Exercise: In a wheelchair  No temperature intolerance. No change in hair/skin quality, BMs.   No HTN, buffalo hump, purple striae, flushing.  FH of obesity: multiple (sisters)    Reviewed PMH, PSH, FH, SH, ALL, HCM/IMM  Reviewed MEDS, updated    Patient Active Problem List    Diagnosis Date Noted   • Chronic anticoagulation 01/29/2013     Priority: High    • Atrial fibrillation with rapid ventricular response ([facility]) 01/17/2013     Priority: High   • Migraine 01/22/2012     Priority: High   • CHF (congestive heart failure) ([facility]) 01/29/2013     Priority: Medium   • DCM (dilated cardiomyopathy) ([facility]) 01/29/2013     Priority: Medium   • Morbid obesity ([facility]) 01/22/2012     Priority: Low   • Chronic pain 01/22/2012     Priority: Low   • Essential hypertension 06/22/2017   • Health care maintenance 06/22/2017   • JUAN CARLOS (obstructive sleep apnea) 06/22/2017   • Insomnia 06/22/2017     CURRENT MEDICATIONS  Current Outpatient Prescriptions   Medication Sig Dispense Refill   • amiodarone (CORDARONE) 200 MG Tab Take 1 Tab by mouth every day. 30 Tab 0   • digoxin (LANOXIN) 250 MCG Tab Take 1 Tab by mouth every day. 30 Tab 0   • spironolactone (ALDACTONE) 25 MG Tab Take 1 Tab by mouth every day. 30 Tab 0   • gabapentin (NEURONTIN) 300 MG Cap Take 1 Cap by mouth 2 Times a Day. 180 Cap 0   • metoprolol (LOPRESSOR) 50 MG Tab Take 1 Tab by mouth 2 times a day. 90 Tab 0   • spironolactone (ALDACTONE) 25 MG Tab TAKE 1 TABLET BY MOUTH EVERYDAY 30 Tab 0   • metoprolol (LOPRESSOR) 50 MG Tab TAKE 1 TABLET BY MOUTH TWICE DAILY 60 Tab 0     No current facility-administered medications for this visit.      ALLERGIES  Allergies: Patient has no known allergies.  PAST MEDICAL HISTORY  Past Medical History:   Diagnosis Date   • Arthritis    • Chronic pain    • COPD (chronic obstructive pulmonary disease) ([facility])    • Hypertension    • Migraine      SURGICAL HISTORY  She  has a past surgical history that includes primary c section; tracheostomy (2/4/2013); and gastrostomy laparoscopic (2/4/2013).  SOCIAL HISTORY  Social History   Substance Use Topics   • Smoking status: Former Smoker     Packs/day: 1.00     Years: 20.00     Types: Cigarettes     Quit date: 1/17/2013   • Smokeless tobacco: Never Used      Comment: 1 ppd   • Alcohol use No     Social History     Social History Narrative   • No narrative  "on file     FAMILY HISTORY  Family History   Problem Relation Age of Onset   • Cancer Father         lung, smoker   • Diabetes Mother    • Hypertension Mother    • Hyperlipidemia Mother    • Diabetes Maternal Aunt         x4   • Heart Disease Sister    • Hypertension Sister    • Hyperlipidemia Sister    • Thyroid Sister    • Psychiatry Neg Hx    • Stroke Neg Hx      Family Status   Relation Status   • Fa (Not Specified)   • Mo (Not Specified)   • MAunt (Not Specified)   • Sis (Not Specified)   • Sis (Not Specified)   • Sis (Not Specified)   • Sis (Not Specified)   • Neg Hx (Not Specified)     ROS   Constitutional: Negative for fever, chills.  HENT: Negative for congestion, sore throat.  Eyes: Negative for blurred vision.   Respiratory: Negative for cough.  Cardiovascular: As above.  Gastrointestinal: Negative for heartburn, nausea, abdominal pain.   Genitourinary: Negative for urinary problems.  Musculoskeletal: As above.  Skin: Negative for rash.   Neuro: Negative for dizziness, headaches.   Endo/Heme/Allergies: Does not bruise/bleed easily.   Psychiatric/Behavioral: Negative for depression.    PHYSICAL EXAM   Blood pressure 118/64, pulse 70, temperature (!) 35.7 °C (96.3 °F), height 1.626 m (5' 4\"), weight (!) 173.9 kg (383 lb 6.1 oz), SpO2 90 %. Body mass index is 65.81 kg/m².  General:  NAD, appears chronically sick, older than age, with oxygen by nasal cannula.  Morbid obesity with BMI 65  HEENT:   NC/AT, PERRLA, EOMI, TMs are clear. Oropharyngeal mucosa is pink,  without lesions;  no cervical / supraclavicular  lymphadenopathy, no thyromegaly.    Cardiovascular: RRR.   No m/r/g. No carotid bruits .      Lungs:   CTAB, no w/r/r, no respiratory distress.  Abdomen: Soft, NT/ND + BS; unable to palpate liver/spleen due to morbid obesity.   Extremities:  2+ DP and radial pulses bilaterally.  No c/c/e.   Skin:  Warm, dry.  No erythema. No rash.   Neurologic: Alert & oriented x 3. No focal deficits.  Psychiatric:  " Affect normal, mood normal, judgment normal.    LABS     Labs are reviewed and discussed with a patient  Lab Results   Component Value Date/Time    CHOLSTRLTOT 177 01/22/2012 06:00 AM     01/22/2012 06:00 AM    HDL 44 01/22/2012 06:00 AM    TRIGLYCERIDE 147 01/22/2012 06:00 AM       Lab Results   Component Value Date/Time    SODIUM 139 01/12/2015 02:00 PM    POTASSIUM 3.6 01/12/2015 02:00 PM    CHLORIDE 92 (L) 01/12/2015 02:00 PM    CO2 37 (H) 01/12/2015 02:00 PM    GLUCOSE 146 (H) 01/12/2015 02:00 PM    BUN 7 (L) 01/12/2015 02:00 PM    CREATININE 0.77 01/12/2015 02:00 PM     Lab Results   Component Value Date/Time    ALKPHOSPHAT 46 01/12/2015 02:00 PM    ASTSGOT 28 01/12/2015 02:00 PM    ALTSGPT 42 01/12/2015 02:00 PM    TBILIRUBIN 0.8 01/12/2015 02:00 PM      No results found for: HBA1C  No results found for: TSH  Lab Results   Component Value Date/Time    FREET4 1.31 01/17/2013 07:05 AM    FREET4 1.01 01/21/2012 04:20 PM       Lab Results   Component Value Date/Time    WBC 9.1 01/12/2015 02:00 PM    RBC 4.23 01/12/2015 02:00 PM    HEMOGLOBIN 13.3 01/12/2015 02:00 PM    HEMATOCRIT 40.7 01/12/2015 02:00 PM    MCV 96.2 01/12/2015 02:00 PM    MCH 31.4 01/12/2015 02:00 PM    MCHC 32.7 (L) 01/12/2015 02:00 PM    MPV 12.4 01/12/2015 02:00 PM    NEUTSPOLYS 66.5 01/12/2015 02:00 PM    LYMPHOCYTES 21.1 (L) 01/12/2015 02:00 PM    MONOCYTES 8.7 01/12/2015 02:00 PM    EOSINOPHILS 2.2 01/12/2015 02:00 PM    BASOPHILS 0.9 01/12/2015 02:00 PM    HYPOCHROMIA 1+ 06/18/2013 03:58 PM      IMAGING     Reviewed the last echocardiography from 1/18/13:  Technically difficult study.   Left ventricular ejection fraction is 45% to 50%.  Severely dilated left atrium.  Right ventricular systolic pressure is estimated to be at least 50-55   mmHg consistent with moderate pulmonary hypertension.    ASSESMENT AND PLAN        I saw the patient only once in June 2017; no labs since 2015.    1. Essential hypertension  Controlled, continue  current treatment  - metoprolol (LOPRESSOR) 50 MG Tab; Take 1 Tab by mouth 2 times a day.  Dispense: 90 Tab; Refill: 0  - COMP METABOLIC PANEL; Future    2. Atrial fibrillation with rapid ventricular response (HCC)  Asymptomatic, continue current treatment, given referral to cardiology.    - REFERRAL TO CARDIOLOGY  - amiodarone (CORDARONE) 200 MG Tab; Take 1 Tab by mouth every day.  Dispense: 30 Tab; Refill: 0  - digoxin (LANOXIN) 250 MCG Tab; Take 1 Tab by mouth every day.  Dispense: 30 Tab; Refill: 0  - metoprolol (LOPRESSOR) 50 MG Tab; Take 1 Tab by mouth 2 times a day.  Dispense: 90 Tab; Refill: 0    3. Long term current use of amiodarone  - REFERRAL TO CARDIOLOGY  - TSH; Future  - TSH; Future  4. High risk medication use  - REFERRAL TO CARDIOLOGY  Long-term amiodarone use, pending TSH study, referred to cardiology    5. DCM (dilated cardiomyopathy) ([facility])  Reviewed the last echocardiography; continue current treatment, and establish with cardiology  - digoxin (LANOXIN) 250 MCG Tab; Take 1 Tab by mouth every day.  Dispense: 30 Tab; Refill: 0  - spironolactone (ALDACTONE) 25 MG Tab; Take 1 Tab by mouth every day.  Dispense: 30 Tab; Refill: 0    6. Pulmonary hypertension (HCC)  As above    7. Chronic obstructive pulmonary disease, unspecified COPD type ([facility])  She has not been evaluated recently, no medications, on oxygen  - REFERRAL TO PULMONOLOGY  8. Chronic respiratory failure, unspecified whether with hypoxia or hypercapnia ([facility])  - REFERRAL TO PULMONOLOGY  9. On home oxygen therapy  As #7, #8  - REFERRAL TO PULMONOLOGY    10. Primary osteoarthritis of both knees  She is in a wheelchair.  Need further evaluation.  - gabapentin (NEURONTIN) 300 MG Cap; Take 1 Cap by mouth 2 Times a Day.  Dispense: 180 Cap; Refill: 0    11. Wheelchair bound  Discussed about safety advised low calorie diet weight loss    12. BMI 60.0-69.9, adult ([facility])  - Patient identified as having weight management issue.  Appropriate orders and  counseling given.    13. Encounter for screening mammogram for malignant neoplasm of breas  - MA-SCREEN MAMMO W/CAD-BILAT; Future    14. Colon cancer screening  - OCCULT BLOOD FECES IMMUNOASSAY (FIT); Future    15. Health care maintenance  As above    Counseling:   - Smoking:  Nonsmoker    Time spent 40 minutes face to face, with > 50% spent counseling and coordinating care.    Followup: Return in about 4 weeks (around 8/30/2018) for with labs.    All questions are answered.    Please note that this dictation was created using voice recognition software, and that there might be errors of alma and possibly content.

## 2018-08-20 ENCOUNTER — APPOINTMENT (OUTPATIENT)
Dept: RADIOLOGY | Facility: MEDICAL CENTER | Age: 58
End: 2018-08-20
Attending: INTERNAL MEDICINE
Payer: COMMERCIAL

## 2018-08-24 ENCOUNTER — APPOINTMENT (OUTPATIENT)
Dept: RADIOLOGY | Facility: MEDICAL CENTER | Age: 58
End: 2018-08-24
Attending: INTERNAL MEDICINE
Payer: COMMERCIAL

## 2018-08-30 DIAGNOSIS — I48.91 ATRIAL FIBRILLATION WITH RAPID VENTRICULAR RESPONSE (HCC): ICD-10-CM

## 2018-08-30 DIAGNOSIS — I42.0 DCM (DILATED CARDIOMYOPATHY) (HCC): ICD-10-CM

## 2018-08-30 RX ORDER — SPIRONOLACTONE 25 MG/1
TABLET ORAL
Qty: 90 TAB | Refills: 1 | Status: SHIPPED | OUTPATIENT
Start: 2018-08-30 | End: 2019-02-20 | Stop reason: SDUPTHER

## 2018-08-30 RX ORDER — AMIODARONE HYDROCHLORIDE 200 MG/1
TABLET ORAL
Qty: 90 TAB | Refills: 1 | Status: SHIPPED | OUTPATIENT
Start: 2018-08-30 | End: 2019-02-20 | Stop reason: SDUPTHER

## 2018-08-30 RX ORDER — DIGOXIN 250 MCG
TABLET ORAL
Qty: 90 TAB | Refills: 1 | Status: SHIPPED | OUTPATIENT
Start: 2018-08-30 | End: 2019-02-20 | Stop reason: SDUPTHER

## 2018-09-15 DIAGNOSIS — I10 ESSENTIAL HYPERTENSION: ICD-10-CM

## 2018-09-15 DIAGNOSIS — I48.91 ATRIAL FIBRILLATION WITH RAPID VENTRICULAR RESPONSE (HCC): ICD-10-CM

## 2018-09-17 RX ORDER — METOPROLOL TARTRATE 50 MG/1
TABLET, FILM COATED ORAL
Qty: 180 TAB | Refills: 1 | Status: SHIPPED | OUTPATIENT
Start: 2018-09-17 | End: 2019-05-03

## 2018-09-30 DIAGNOSIS — M17.0 PRIMARY OSTEOARTHRITIS OF BOTH KNEES: ICD-10-CM

## 2018-10-01 RX ORDER — GABAPENTIN 300 MG/1
CAPSULE ORAL
Qty: 180 CAP | Refills: 0 | Status: SHIPPED | OUTPATIENT
Start: 2018-10-01 | End: 2019-02-02 | Stop reason: SDUPTHER

## 2019-02-02 DIAGNOSIS — M17.0 PRIMARY OSTEOARTHRITIS OF BOTH KNEES: ICD-10-CM

## 2019-02-04 RX ORDER — GABAPENTIN 300 MG/1
CAPSULE ORAL
Qty: 180 CAP | Refills: 0 | Status: SHIPPED | OUTPATIENT
Start: 2019-02-04 | End: 2019-05-03 | Stop reason: SDUPTHER

## 2019-02-20 DIAGNOSIS — I42.0 DCM (DILATED CARDIOMYOPATHY) (HCC): ICD-10-CM

## 2019-02-20 DIAGNOSIS — I48.91 ATRIAL FIBRILLATION WITH RAPID VENTRICULAR RESPONSE (HCC): ICD-10-CM

## 2019-02-20 RX ORDER — SPIRONOLACTONE 25 MG/1
25 TABLET ORAL
Qty: 30 TAB | Refills: 0 | Status: SHIPPED | OUTPATIENT
Start: 2019-02-20 | End: 2019-03-28 | Stop reason: SDUPTHER

## 2019-02-20 RX ORDER — AMIODARONE HYDROCHLORIDE 200 MG/1
200 TABLET ORAL
Qty: 30 TAB | Refills: 0 | Status: SHIPPED | OUTPATIENT
Start: 2019-02-20 | End: 2019-03-28 | Stop reason: SDUPTHER

## 2019-02-20 RX ORDER — DIGOXIN 250 MCG
250 TABLET ORAL
Qty: 30 TAB | Refills: 0 | Status: SHIPPED | OUTPATIENT
Start: 2019-02-20 | End: 2019-03-28 | Stop reason: SDUPTHER

## 2019-02-26 DIAGNOSIS — I48.91 ATRIAL FIBRILLATION WITH RAPID VENTRICULAR RESPONSE (HCC): ICD-10-CM

## 2019-02-26 DIAGNOSIS — I10 ESSENTIAL HYPERTENSION: ICD-10-CM

## 2019-02-26 RX ORDER — METOPROLOL TARTRATE 50 MG/1
50 TABLET, FILM COATED ORAL 2 TIMES DAILY
Qty: 60 TAB | Refills: 0 | Status: SHIPPED | OUTPATIENT
Start: 2019-02-26 | End: 2019-03-28 | Stop reason: SDUPTHER

## 2019-03-28 DIAGNOSIS — I42.0 DCM (DILATED CARDIOMYOPATHY) (HCC): ICD-10-CM

## 2019-03-28 DIAGNOSIS — I48.91 ATRIAL FIBRILLATION WITH RAPID VENTRICULAR RESPONSE (HCC): ICD-10-CM

## 2019-03-28 DIAGNOSIS — I10 ESSENTIAL HYPERTENSION: ICD-10-CM

## 2019-03-28 RX ORDER — METOPROLOL TARTRATE 50 MG/1
TABLET, FILM COATED ORAL
Qty: 60 TAB | Refills: 0 | Status: SHIPPED | OUTPATIENT
Start: 2019-03-28 | End: 2019-05-03 | Stop reason: SDUPTHER

## 2019-03-28 RX ORDER — DIGOXIN 250 MCG
TABLET ORAL
Qty: 30 TAB | Refills: 0 | Status: SHIPPED | OUTPATIENT
Start: 2019-03-28 | End: 2019-05-03 | Stop reason: SDUPTHER

## 2019-03-28 RX ORDER — AMIODARONE HYDROCHLORIDE 200 MG/1
TABLET ORAL
Qty: 30 TAB | Refills: 0 | Status: SHIPPED | OUTPATIENT
Start: 2019-03-28 | End: 2019-05-03 | Stop reason: SDUPTHER

## 2019-03-28 RX ORDER — SPIRONOLACTONE 25 MG/1
TABLET ORAL
Qty: 30 TAB | Refills: 0 | Status: SHIPPED | OUTPATIENT
Start: 2019-03-28 | End: 2019-05-03 | Stop reason: SDUPTHER

## 2019-05-03 ENCOUNTER — OFFICE VISIT (OUTPATIENT)
Dept: MEDICAL GROUP | Facility: MEDICAL CENTER | Age: 59
End: 2019-05-03
Payer: MEDICARE

## 2019-05-03 VITALS
TEMPERATURE: 96.6 F | HEART RATE: 69 BPM | HEIGHT: 64 IN | SYSTOLIC BLOOD PRESSURE: 122 MMHG | DIASTOLIC BLOOD PRESSURE: 88 MMHG | RESPIRATION RATE: 16 BRPM | OXYGEN SATURATION: 90 % | BODY MASS INDEX: 50.02 KG/M2 | WEIGHT: 293 LBS

## 2019-05-03 DIAGNOSIS — I48.91 ATRIAL FIBRILLATION WITH RAPID VENTRICULAR RESPONSE (HCC): ICD-10-CM

## 2019-05-03 DIAGNOSIS — Z99.81 SUPPLEMENTAL OXYGEN DEPENDENT: ICD-10-CM

## 2019-05-03 DIAGNOSIS — E66.01 MORBID OBESITY WITH BMI OF 60.0-69.9, ADULT (HCC): ICD-10-CM

## 2019-05-03 DIAGNOSIS — J43.9 PULMONARY EMPHYSEMA, UNSPECIFIED EMPHYSEMA TYPE (HCC): ICD-10-CM

## 2019-05-03 DIAGNOSIS — I50.9 CONGESTIVE HEART FAILURE, UNSPECIFIED HF CHRONICITY, UNSPECIFIED HEART FAILURE TYPE (HCC): ICD-10-CM

## 2019-05-03 DIAGNOSIS — M72.0 DUPUYTREN'S CONTRACTURE OF LEFT HAND: ICD-10-CM

## 2019-05-03 DIAGNOSIS — G89.29 OTHER CHRONIC PAIN: ICD-10-CM

## 2019-05-03 DIAGNOSIS — Z12.31 SCREENING MAMMOGRAM, ENCOUNTER FOR: ICD-10-CM

## 2019-05-03 DIAGNOSIS — I10 ESSENTIAL HYPERTENSION: ICD-10-CM

## 2019-05-03 DIAGNOSIS — Z12.11 COLON CANCER SCREENING: ICD-10-CM

## 2019-05-03 DIAGNOSIS — I42.0 DCM (DILATED CARDIOMYOPATHY) (HCC): ICD-10-CM

## 2019-05-03 DIAGNOSIS — Z79.899 LONG TERM CURRENT USE OF AMIODARONE: ICD-10-CM

## 2019-05-03 DIAGNOSIS — M17.0 PRIMARY OSTEOARTHRITIS OF BOTH KNEES: ICD-10-CM

## 2019-05-03 PROBLEM — Z00.00 HEALTH CARE MAINTENANCE: Status: RESOLVED | Noted: 2017-06-22 | Resolved: 2019-05-03

## 2019-05-03 PROCEDURE — 99214 OFFICE O/P EST MOD 30 MIN: CPT | Performed by: PHYSICIAN ASSISTANT

## 2019-05-03 RX ORDER — AMIODARONE HYDROCHLORIDE 200 MG/1
200 TABLET ORAL
Qty: 90 TAB | Refills: 1 | Status: SHIPPED | OUTPATIENT
Start: 2019-05-03 | End: 2019-10-23 | Stop reason: SDUPTHER

## 2019-05-03 RX ORDER — METOPROLOL TARTRATE 50 MG/1
50 TABLET, FILM COATED ORAL 2 TIMES DAILY
Qty: 180 TAB | Refills: 1 | Status: SHIPPED | OUTPATIENT
Start: 2019-05-03 | End: 2019-10-23 | Stop reason: SDUPTHER

## 2019-05-03 RX ORDER — GABAPENTIN 300 MG/1
300 CAPSULE ORAL 2 TIMES DAILY
Qty: 180 CAP | Refills: 1 | Status: SHIPPED | OUTPATIENT
Start: 2019-05-03 | End: 2019-10-23 | Stop reason: SDUPTHER

## 2019-05-03 RX ORDER — DIGOXIN 250 MCG
250 TABLET ORAL
Qty: 90 TAB | Refills: 1 | Status: SHIPPED | OUTPATIENT
Start: 2019-05-03 | End: 2019-10-23 | Stop reason: SDUPTHER

## 2019-05-03 RX ORDER — ALBUTEROL SULFATE 2.5 MG/3ML
2.5 SOLUTION RESPIRATORY (INHALATION) EVERY 4 HOURS PRN
Qty: 30 BULLET | Refills: 5 | Status: SHIPPED | OUTPATIENT
Start: 2019-05-03 | End: 2020-08-23

## 2019-05-03 RX ORDER — SPIRONOLACTONE 25 MG/1
25 TABLET ORAL
Qty: 90 TAB | Refills: 1 | Status: SHIPPED | OUTPATIENT
Start: 2019-05-03 | End: 2019-10-23 | Stop reason: SDUPTHER

## 2019-05-03 ASSESSMENT — PATIENT HEALTH QUESTIONNAIRE - PHQ9: CLINICAL INTERPRETATION OF PHQ2 SCORE: 0

## 2019-05-03 NOTE — ASSESSMENT & PLAN NOTE
Chronic history of COPD.  Currently on 5 L of oxygen daily when she is active or 3 L when she is sitting.  Denies any recent exacerbation.  Requesting supply of nebulizer machine.  Also refill her nebulizer medication.  No recent exacerbation.

## 2019-05-03 NOTE — ASSESSMENT & PLAN NOTE
Has chronic pain in bilateral knees secondary to osteoarthritis.  The past she was on narcotics for her condition.  No data in .  We have not provided her any pain management medication.  She would like to establish with a new pain management group.  She would like to become more active.

## 2019-05-03 NOTE — ASSESSMENT & PLAN NOTE
This is a 59-year-old female who is here today to discuss several chronic medical conditions.  First for the past couple months she has been having pain of her left hand.  Has bumps on her palm.  Denies any issues with flexing or extending her fingers.  She states that her fingers are numb.

## 2019-05-03 NOTE — ASSESSMENT & PLAN NOTE
Also has history of CHF.  Last echocardiogram was in 2013.  Requesting refill of medication such as spironolactone and digoxin.

## 2019-05-03 NOTE — ASSESSMENT & PLAN NOTE
Chronic condition.  Looking forward to possibly be more active in the near future.  Would like to lose weight.

## 2019-05-03 NOTE — PROGRESS NOTES
Subjective:   Cheryl D Blakemore is a 59 y.o. female here today for left hand nodules for couple months, atrial fibrillation, CHF, pulmonary emphysema, chronic pain secondary to bilateral knee pain and morbid obesity.  Plus to discuss healthcare maintenance.    Dupuytren's contracture of left hand  This is a 59-year-old female who is here today to discuss several chronic medical conditions.  First for the past couple months she has been having pain of her left hand.  Has bumps on her palm.  Denies any issues with flexing or extending her fingers.  She states that her fingers are numb.    Atrial fibrillation with rapid ventricular response  Has a chronic history of atrial fibrillation.  Does not follow with cardiology.  Currently is on amiodarone 200 mg.  Takes the medication daily.  Has not run out of her medication.  Is delinquents seeing her PCP.  Requesting refill of medication.  Denies any chest pain or shortness of breath today.    CHF (congestive heart failure)  Also has history of CHF.  Last echocardiogram was in 2013.  Requesting refill of medication such as spironolactone and digoxin.    Pulmonary emphysema (HCC)  Chronic history of COPD.  Currently on 5 L of oxygen daily when she is active or 3 L when she is sitting.  Denies any recent exacerbation.  Requesting supply of nebulizer machine.  Also refill her nebulizer medication.  No recent exacerbation.    Chronic pain  Has chronic pain in bilateral knees secondary to osteoarthritis.  The past she was on narcotics for her condition.  No data in .  We have not provided her any pain management medication.  She would like to establish with a new pain management group.  She would like to become more active.    Morbid obesity with BMI of 60.0-69.9, adult (HCC)  Chronic condition.  Looking forward to possibly be more active in the near future.  Would like to lose weight.       Current medicines (including changes today)  Current Outpatient Prescriptions  "  Medication Sig Dispense Refill   • amiodarone (CORDARONE) 200 MG Tab Take 1 Tab by mouth every day. 90 Tab 1   • digoxin (LANOXIN) 250 MCG Tab Take 1 Tab by mouth every day. 90 Tab 1   • spironolactone (ALDACTONE) 25 MG Tab Take 1 Tab by mouth every day. 90 Tab 1   • metoprolol (LOPRESSOR) 50 MG Tab Take 1 Tab by mouth 2 times a day. TAKE 1 TABLET BY MOUTH TWICE A  Tab 1   • gabapentin (NEURONTIN) 300 MG Cap Take 1 Cap by mouth 2 Times a Day. 180 Cap 1   • Nebulizers (COMPRESSOR/NEBULIZER) Misc 1 Each by Does not apply route Once for 1 dose. 1 Each 0   • albuterol (PROVENTIL) 2.5mg/3ml Nebu Soln solution for nebulization 3 mL by Nebulization route every four hours as needed for Shortness of Breath. 30 Bullet 5     No current facility-administered medications for this visit.      She  has a past medical history of Arthritis; Chronic pain; COPD (chronic obstructive pulmonary disease) (HCC); Hypertension; and Migraine.    Social History and Family History were reviewed and updated.    ROS   No chest pain, no shortness of breath, no abdominal pain and all other systems were reviewed and are negative.       Objective:     /88 (BP Location: Right arm, Patient Position: Sitting, BP Cuff Size: Adult)   Pulse 69   Temp 35.9 °C (96.6 °F) (Temporal)   Resp 16   Ht 1.626 m (5' 4\")   Wt (!) 163.3 kg (360 lb)   SpO2 90%  Body mass index is 61.79 kg/m².   Physical Exam:  Constitutional: Alert, no distress.  Wearing nasal cannula.  Sitting in a wheelchair.  Obese.  Skin: Warm, dry, good turgor, no rashes in visible areas.  Eye: Equal, round and reactive, conjunctiva clear, lids normal.  ENMT: Lips without lesions, good dentition, oropharynx clear.  Neck: Trachea midline, no masses.   Lymph: No cervical or supraclavicular lymphadenopathy  Respiratory: Unlabored respiratory effort, lungs clear to auscultation, no wheezes, no ronchi.  Cardiovascular: Regular rate and rhythm, no edema.  Musculoskeletal: Left hand " with good range of motion.  On several of the palmar aspects there are hard lesions.  No tenderness.  Psych: Alert and oriented x3, normal affect and mood.        Assessment and Plan:   The following treatment plan was discussed    1. Dupuytren's contracture of left hand  Acute, new onset condition.  Appears to be nodules of the left hand but no contracture noted with the fingers.  Referred to hand surgery and renal orthopedic clinic for evaluation.  - REFERRAL TO HAND SURGERY    2. Atrial fibrillation with rapid ventricular response (HCC)  Chronic condition.  Stable.  Prescribed amiodarone, digoxin and metoprolol.  Referred to cardiology to establish care.  - REFERRAL TO CARDIOLOGY  - amiodarone (CORDARONE) 200 MG Tab; Take 1 Tab by mouth every day.  Dispense: 90 Tab; Refill: 1  - digoxin (LANOXIN) 250 MCG Tab; Take 1 Tab by mouth every day.  Dispense: 90 Tab; Refill: 1  - metoprolol (LOPRESSOR) 50 MG Tab; Take 1 Tab by mouth 2 times a day. TAKE 1 TABLET BY MOUTH TWICE A DAY  Dispense: 180 Tab; Refill: 1    3. Long term current use of amiodarone  Ordered TSH regarding her chronic use of amiodarone.  - TSH WITH REFLEX TO FT4; Future    4. Congestive heart failure, unspecified HF chronicity, unspecified heart failure type (HCC)  Chronic condition.  Stable.  No recent echocardiogram.  Referred to cardiology for evaluation.  - REFERRAL TO CARDIOLOGY    5. DCM (dilated cardiomyopathy) ([facility])  Chronic condition.  Stable.  Refer to cardiology for evaluation.  Prescribed digoxin and spironolactone.  - digoxin (LANOXIN) 250 MCG Tab; Take 1 Tab by mouth every day.  Dispense: 90 Tab; Refill: 1  - spironolactone (ALDACTONE) 25 MG Tab; Take 1 Tab by mouth every day.  Dispense: 90 Tab; Refill: 1    6. Essential hypertension  Chronic condition.  Stable.  Renewed metoprolol.  Referred to cardiology.  - REFERRAL TO CARDIOLOGY  - metoprolol (LOPRESSOR) 50 MG Tab; Take 1 Tab by mouth 2 times a day. TAKE 1 TABLET BY MOUTH TWICE A DAY   Dispense: 180 Tab; Refill: 1  - Comp Metabolic Panel; Future    7. Pulmonary emphysema, unspecified emphysema type (HCC)  Chronic condition.  Stable.  Referred to pulmonology to establish care.  We will send a prescription for nebulizer to [facility].  Prescribed albuterol nebulizer to use as directed.  - REFERRAL TO PULMONOLOGY  - Nebulizers (COMPRESSOR/NEBULIZER) Misc; 1 Each by Does not apply route Once for 1 dose.  Dispense: 1 Each; Refill: 0  - albuterol (PROVENTIL) 2.5mg/3ml Nebu Soln solution for nebulization; 3 mL by Nebulization route every four hours as needed for Shortness of Breath.  Dispense: 30 Bullet; Refill: 5    8. Supplemental oxygen dependent  Chronic use.  Continue oxygen supplementation.  Referred to pulmonology to establish care.  - REFERRAL TO PULMONOLOGY    9. Other chronic pain  Chronic condition secondary to arthritis bilateral knees.  Refer to pain management for evaluation and treatment.  - REFERRAL TO PAIN MANAGEMENT    10. Primary osteoarthritis of both knees  Chronic condition.  Renewed gabapentin as directed.  Refer to pain management for evaluation.  She is not a candidate for surgery.  Possibly may need orthopedic evaluation anyway.  - gabapentin (NEURONTIN) 300 MG Cap; Take 1 Cap by mouth 2 Times a Day.  Dispense: 180 Cap; Refill: 1    11. Morbid obesity with BMI of 60.0-69.9, adult (HCC)  Chronic condition.  Will monitor.  - obesity counseling performed    12. Colon cancer screening  Ordered FIT DNA test.  Signed requisition form.  - COLOGUARD (FIT DNA)    13. Screening mammogram, encounter for  Ordered mammogram.  - MA-SCREENING MAMMO BILAT W/TOMOSYNTHESIS W/CAD; Future      Followup: Return in about 3 months (around 8/3/2019) for Appointment made with PCP.    Please note that this dictation was created using voice recognition software. I have made every reasonable attempt to correct obvious errors, but I expect that there are errors of grammar and possibly content that I  did not discover before finalizing the note.

## 2019-05-03 NOTE — ASSESSMENT & PLAN NOTE
Has a chronic history of atrial fibrillation.  Does not follow with cardiology.  Currently is on amiodarone 200 mg.  Takes the medication daily.  Has not run out of her medication.  Is delinquents seeing her PCP.  Requesting refill of medication.  Denies any chest pain or shortness of breath today.

## 2019-05-24 ENCOUNTER — APPOINTMENT (OUTPATIENT)
Dept: RADIOLOGY | Facility: MEDICAL CENTER | Age: 59
End: 2019-05-24
Attending: PHYSICIAN ASSISTANT
Payer: COMMERCIAL

## 2019-10-23 DIAGNOSIS — I48.91 ATRIAL FIBRILLATION WITH RAPID VENTRICULAR RESPONSE (HCC): ICD-10-CM

## 2019-10-23 DIAGNOSIS — I10 ESSENTIAL HYPERTENSION: ICD-10-CM

## 2019-10-23 DIAGNOSIS — M17.0 PRIMARY OSTEOARTHRITIS OF BOTH KNEES: ICD-10-CM

## 2019-10-23 DIAGNOSIS — I42.0 DCM (DILATED CARDIOMYOPATHY) (HCC): ICD-10-CM

## 2019-10-23 RX ORDER — METOPROLOL TARTRATE 50 MG/1
TABLET, FILM COATED ORAL
Qty: 60 TAB | Refills: 0 | Status: SHIPPED | OUTPATIENT
Start: 2019-10-23 | End: 2019-11-22 | Stop reason: SDUPTHER

## 2019-10-23 RX ORDER — DIGOXIN 250 MCG
TABLET ORAL
Qty: 30 TAB | Refills: 0 | Status: SHIPPED | OUTPATIENT
Start: 2019-10-23 | End: 2019-11-27 | Stop reason: SDUPTHER

## 2019-10-23 RX ORDER — GABAPENTIN 300 MG/1
CAPSULE ORAL
Qty: 60 CAP | Refills: 0 | Status: SHIPPED | OUTPATIENT
Start: 2019-10-23 | End: 2019-11-22 | Stop reason: SDUPTHER

## 2019-10-23 RX ORDER — SPIRONOLACTONE 25 MG/1
TABLET ORAL
Qty: 30 TAB | Refills: 0 | Status: SHIPPED | OUTPATIENT
Start: 2019-10-23 | End: 2019-11-22 | Stop reason: SDUPTHER

## 2019-10-23 RX ORDER — AMIODARONE HYDROCHLORIDE 200 MG/1
TABLET ORAL
Qty: 30 TAB | Refills: 0 | Status: SHIPPED | OUTPATIENT
Start: 2019-10-23 | End: 2019-11-22 | Stop reason: SDUPTHER

## 2019-11-22 DIAGNOSIS — I42.0 DCM (DILATED CARDIOMYOPATHY) (HCC): ICD-10-CM

## 2019-11-22 DIAGNOSIS — I10 ESSENTIAL HYPERTENSION: ICD-10-CM

## 2019-11-22 DIAGNOSIS — I48.91 ATRIAL FIBRILLATION WITH RAPID VENTRICULAR RESPONSE (HCC): ICD-10-CM

## 2019-11-22 DIAGNOSIS — M17.0 PRIMARY OSTEOARTHRITIS OF BOTH KNEES: ICD-10-CM

## 2019-11-25 RX ORDER — METOPROLOL TARTRATE 50 MG/1
TABLET, FILM COATED ORAL
Qty: 60 TAB | Refills: 0 | Status: SHIPPED | OUTPATIENT
Start: 2019-11-25 | End: 2020-01-20

## 2019-11-25 RX ORDER — GABAPENTIN 300 MG/1
CAPSULE ORAL
Qty: 60 CAP | Refills: 0 | Status: SHIPPED | OUTPATIENT
Start: 2019-11-25 | End: 2019-12-19

## 2019-11-25 RX ORDER — SPIRONOLACTONE 25 MG/1
TABLET ORAL
Qty: 30 TAB | Refills: 0 | Status: SHIPPED | OUTPATIENT
Start: 2019-11-25 | End: 2020-01-02

## 2019-11-25 RX ORDER — AMIODARONE HYDROCHLORIDE 200 MG/1
TABLET ORAL
Qty: 30 TAB | Refills: 0 | Status: SHIPPED | OUTPATIENT
Start: 2019-11-25 | End: 2019-12-19

## 2019-11-27 DIAGNOSIS — I42.0 DCM (DILATED CARDIOMYOPATHY) (HCC): ICD-10-CM

## 2019-11-27 DIAGNOSIS — I48.91 ATRIAL FIBRILLATION WITH RAPID VENTRICULAR RESPONSE (HCC): ICD-10-CM

## 2019-11-27 RX ORDER — DIGOXIN 250 MCG
TABLET ORAL
Qty: 30 TAB | Refills: 0 | Status: SHIPPED | OUTPATIENT
Start: 2019-11-27 | End: 2019-12-19

## 2019-12-19 DIAGNOSIS — M17.0 PRIMARY OSTEOARTHRITIS OF BOTH KNEES: ICD-10-CM

## 2019-12-19 DIAGNOSIS — I48.91 ATRIAL FIBRILLATION WITH RAPID VENTRICULAR RESPONSE (HCC): ICD-10-CM

## 2019-12-19 DIAGNOSIS — I42.0 DCM (DILATED CARDIOMYOPATHY) (HCC): ICD-10-CM

## 2019-12-19 RX ORDER — AMIODARONE HYDROCHLORIDE 200 MG/1
TABLET ORAL
Qty: 30 TAB | Refills: 0 | Status: SHIPPED | OUTPATIENT
Start: 2019-12-19 | End: 2019-12-30

## 2019-12-19 RX ORDER — DIGOXIN 250 MCG
TABLET ORAL
Qty: 30 TAB | Refills: 0 | Status: SHIPPED | OUTPATIENT
Start: 2019-12-19 | End: 2020-01-27

## 2019-12-19 RX ORDER — GABAPENTIN 300 MG/1
CAPSULE ORAL
Qty: 60 CAP | Refills: 0 | Status: SHIPPED | OUTPATIENT
Start: 2019-12-19 | End: 2020-01-27

## 2019-12-30 DIAGNOSIS — I48.91 ATRIAL FIBRILLATION WITH RAPID VENTRICULAR RESPONSE (HCC): ICD-10-CM

## 2019-12-31 DIAGNOSIS — I42.0 DCM (DILATED CARDIOMYOPATHY) (HCC): ICD-10-CM

## 2019-12-31 RX ORDER — AMIODARONE HYDROCHLORIDE 200 MG/1
TABLET ORAL
Qty: 30 TAB | Refills: 0 | Status: SHIPPED | OUTPATIENT
Start: 2019-12-31 | End: 2020-02-26

## 2020-01-02 RX ORDER — SPIRONOLACTONE 25 MG/1
TABLET ORAL
Qty: 30 TAB | Refills: 0 | Status: SHIPPED | OUTPATIENT
Start: 2020-01-02 | End: 2020-01-24

## 2020-01-18 DIAGNOSIS — I48.91 ATRIAL FIBRILLATION WITH RAPID VENTRICULAR RESPONSE (HCC): ICD-10-CM

## 2020-01-18 DIAGNOSIS — I10 ESSENTIAL HYPERTENSION: ICD-10-CM

## 2020-01-20 RX ORDER — METOPROLOL TARTRATE 50 MG/1
TABLET, FILM COATED ORAL
Qty: 60 TAB | Refills: 0 | Status: SHIPPED | OUTPATIENT
Start: 2020-01-20 | End: 2020-02-26

## 2020-01-24 DIAGNOSIS — I42.0 DCM (DILATED CARDIOMYOPATHY) (HCC): ICD-10-CM

## 2020-01-24 RX ORDER — SPIRONOLACTONE 25 MG/1
TABLET ORAL
Qty: 15 TAB | Refills: 0 | Status: SHIPPED | OUTPATIENT
Start: 2020-01-24 | End: 2020-02-27

## 2020-01-27 DIAGNOSIS — M17.0 PRIMARY OSTEOARTHRITIS OF BOTH KNEES: ICD-10-CM

## 2020-01-27 DIAGNOSIS — I48.91 ATRIAL FIBRILLATION WITH RAPID VENTRICULAR RESPONSE (HCC): ICD-10-CM

## 2020-01-27 DIAGNOSIS — I42.0 DCM (DILATED CARDIOMYOPATHY) (HCC): ICD-10-CM

## 2020-01-27 RX ORDER — DIGOXIN 250 MCG
TABLET ORAL
Qty: 30 TAB | Refills: 0 | Status: SHIPPED | OUTPATIENT
Start: 2020-01-27 | End: 2020-02-26

## 2020-01-27 RX ORDER — GABAPENTIN 300 MG/1
CAPSULE ORAL
Qty: 60 CAP | Refills: 0 | Status: SHIPPED | OUTPATIENT
Start: 2020-01-27 | End: 2020-02-21

## 2020-02-21 DIAGNOSIS — M17.0 PRIMARY OSTEOARTHRITIS OF BOTH KNEES: ICD-10-CM

## 2020-02-21 RX ORDER — GABAPENTIN 300 MG/1
CAPSULE ORAL
Qty: 60 CAP | Refills: 0 | Status: SHIPPED | OUTPATIENT
Start: 2020-02-21 | End: 2020-04-03 | Stop reason: SDUPTHER

## 2020-02-26 DIAGNOSIS — I48.91 ATRIAL FIBRILLATION WITH RAPID VENTRICULAR RESPONSE (HCC): ICD-10-CM

## 2020-02-26 DIAGNOSIS — I42.0 DCM (DILATED CARDIOMYOPATHY) (HCC): ICD-10-CM

## 2020-02-26 DIAGNOSIS — I10 ESSENTIAL HYPERTENSION: ICD-10-CM

## 2020-02-26 RX ORDER — AMIODARONE HYDROCHLORIDE 200 MG/1
TABLET ORAL
Qty: 30 TAB | Refills: 0 | Status: SHIPPED | OUTPATIENT
Start: 2020-02-26 | End: 2020-04-03 | Stop reason: SDUPTHER

## 2020-02-26 RX ORDER — METOPROLOL TARTRATE 50 MG/1
TABLET, FILM COATED ORAL
Qty: 60 TAB | Refills: 0 | Status: SHIPPED | OUTPATIENT
Start: 2020-02-26 | End: 2020-04-03 | Stop reason: SDUPTHER

## 2020-02-26 RX ORDER — DIGOXIN 250 MCG
TABLET ORAL
Qty: 30 TAB | Refills: 0 | Status: SHIPPED | OUTPATIENT
Start: 2020-02-26 | End: 2020-04-03 | Stop reason: SDUPTHER

## 2020-02-27 DIAGNOSIS — I42.0 DCM (DILATED CARDIOMYOPATHY) (HCC): ICD-10-CM

## 2020-02-27 RX ORDER — SPIRONOLACTONE 25 MG/1
TABLET ORAL
Qty: 30 TAB | Refills: 0 | Status: SHIPPED | OUTPATIENT
Start: 2020-02-27 | End: 2020-03-18

## 2020-03-18 DIAGNOSIS — I42.0 DCM (DILATED CARDIOMYOPATHY) (HCC): ICD-10-CM

## 2020-03-18 DIAGNOSIS — I48.91 ATRIAL FIBRILLATION WITH RAPID VENTRICULAR RESPONSE (HCC): ICD-10-CM

## 2020-03-18 DIAGNOSIS — I10 ESSENTIAL HYPERTENSION: ICD-10-CM

## 2020-03-18 RX ORDER — SPIRONOLACTONE 25 MG/1
TABLET ORAL
Qty: 15 TAB | Refills: 0 | Status: SHIPPED | OUTPATIENT
Start: 2020-03-18 | End: 2020-04-01

## 2020-03-20 RX ORDER — METOPROLOL TARTRATE 50 MG/1
TABLET, FILM COATED ORAL
Qty: 60 TAB | Refills: 0 | OUTPATIENT
Start: 2020-03-20

## 2020-03-20 RX ORDER — AMIODARONE HYDROCHLORIDE 200 MG/1
TABLET ORAL
Qty: 30 TAB | Refills: 0 | OUTPATIENT
Start: 2020-03-20

## 2020-04-01 DIAGNOSIS — I42.0 DCM (DILATED CARDIOMYOPATHY) (HCC): ICD-10-CM

## 2020-04-01 RX ORDER — SPIRONOLACTONE 25 MG/1
TABLET ORAL
Qty: 15 TAB | Refills: 0 | Status: SHIPPED
Start: 2020-04-01 | End: 2020-04-03 | Stop reason: SDUPTHER

## 2020-04-02 DIAGNOSIS — M17.0 PRIMARY OSTEOARTHRITIS OF BOTH KNEES: ICD-10-CM

## 2020-04-02 RX ORDER — GABAPENTIN 300 MG/1
300 CAPSULE ORAL
Qty: 60 CAP | Refills: 0 | Status: CANCELLED | OUTPATIENT
Start: 2020-04-02

## 2020-04-02 NOTE — TELEPHONE ENCOUNTER
Phone Number Called: [phone] (home)     Call outcome: Spoke to patient regarding message below.    Message: Patient has scheduled a virtual visit for tomorrow

## 2020-04-03 ENCOUNTER — OFFICE VISIT (OUTPATIENT)
Dept: MEDICAL GROUP | Age: 60
End: 2020-04-03
Payer: MEDICARE

## 2020-04-03 VITALS
WEIGHT: 293 LBS | OXYGEN SATURATION: 90 % | HEART RATE: 72 BPM | HEIGHT: 64 IN | TEMPERATURE: 97.7 F | BODY MASS INDEX: 50.02 KG/M2

## 2020-04-03 DIAGNOSIS — R53.83 FATIGUE, UNSPECIFIED TYPE: ICD-10-CM

## 2020-04-03 DIAGNOSIS — I42.0 DCM (DILATED CARDIOMYOPATHY) (HCC): ICD-10-CM

## 2020-04-03 DIAGNOSIS — Z11.59 NEED FOR HEPATITIS C SCREENING TEST: ICD-10-CM

## 2020-04-03 DIAGNOSIS — J44.9 CHRONIC OBSTRUCTIVE PULMONARY DISEASE, UNSPECIFIED COPD TYPE (HCC): ICD-10-CM

## 2020-04-03 DIAGNOSIS — Z12.11 SCREENING FOR MALIGNANT NEOPLASM OF COLON: ICD-10-CM

## 2020-04-03 DIAGNOSIS — Z79.01 CHRONIC ANTICOAGULATION: ICD-10-CM

## 2020-04-03 DIAGNOSIS — I27.20 PULMONARY HYPERTENSION (HCC): ICD-10-CM

## 2020-04-03 DIAGNOSIS — I10 ESSENTIAL HYPERTENSION: ICD-10-CM

## 2020-04-03 DIAGNOSIS — R73.01 IFG (IMPAIRED FASTING GLUCOSE): ICD-10-CM

## 2020-04-03 DIAGNOSIS — I48.91 ATRIAL FIBRILLATION WITH RAPID VENTRICULAR RESPONSE (HCC): ICD-10-CM

## 2020-04-03 DIAGNOSIS — Z99.81 ON HOME OXYGEN THERAPY: ICD-10-CM

## 2020-04-03 DIAGNOSIS — E55.9 HYPOVITAMINOSIS D: ICD-10-CM

## 2020-04-03 DIAGNOSIS — J96.11 CHRONIC RESPIRATORY FAILURE WITH HYPOXIA (HCC): ICD-10-CM

## 2020-04-03 DIAGNOSIS — M17.0 PRIMARY OSTEOARTHRITIS OF BOTH KNEES: ICD-10-CM

## 2020-04-03 DIAGNOSIS — E78.5 DYSLIPIDEMIA: ICD-10-CM

## 2020-04-03 PROCEDURE — 99214 OFFICE O/P EST MOD 30 MIN: CPT | Mod: 95,CR | Performed by: INTERNAL MEDICINE

## 2020-04-03 RX ORDER — METOPROLOL TARTRATE 50 MG/1
TABLET, FILM COATED ORAL
Qty: 180 TAB | Refills: 0 | Status: SHIPPED | OUTPATIENT
Start: 2020-04-03 | End: 2020-06-28

## 2020-04-03 RX ORDER — AMIODARONE HYDROCHLORIDE 200 MG/1
200 TABLET ORAL
Qty: 90 TAB | Refills: 0 | Status: SHIPPED | OUTPATIENT
Start: 2020-04-03 | End: 2020-06-28

## 2020-04-03 RX ORDER — SPIRONOLACTONE 25 MG/1
25 TABLET ORAL
Qty: 90 TAB | Refills: 0 | Status: SHIPPED | OUTPATIENT
Start: 2020-04-03 | End: 2020-06-30 | Stop reason: SDUPTHER

## 2020-04-03 RX ORDER — GABAPENTIN 300 MG/1
CAPSULE ORAL
Qty: 180 CAP | Refills: 0 | Status: SHIPPED | OUTPATIENT
Start: 2020-04-03 | End: 2020-06-28

## 2020-04-03 RX ORDER — DIGOXIN 250 MCG
250 TABLET ORAL
Qty: 90 TAB | Refills: 0 | Status: SHIPPED | OUTPATIENT
Start: 2020-04-03 | End: 2020-06-28

## 2020-04-03 ASSESSMENT — PATIENT HEALTH QUESTIONNAIRE - PHQ9: CLINICAL INTERPRETATION OF PHQ2 SCORE: 0

## 2020-04-03 NOTE — PROGRESS NOTES
Telemedicine Visit: Established Patient     This encounter was conducted via Zoom .   Verbal consent was obtained. Patient's identity was verified.    Subjective:   CC: med refill  She has not gone outside of her house for almost a year    Cheryl D Blakemore is a 60 y.o. female presenting for evaluation and management of:    Hypertension  Meds: metoprolol, 50 mg BID, spironolactone, 25 mg QD, taking as prescribed.   Denies: - headaches, vision problems, tinnitus.  - chest pain/pressure, palpitations, irregular heart beats, exertional, dyspnea, peripheral edema.              - medication side effect: unusual fatigue, slow heartbeat, foot/leg swelling.  Low salt diet: yes  Diet: regular  Exercise: in a wheelchair  BMI:  64  FH of HTN: multiple    Imaging  CXR 1/12/2015  Stable cardiac enlargement, left hemidiaphragm elevation vascular congestion     Afib, on anticoagulation, dilated cardiomyopathy (CHF)  Pulmonary hypertension  Onset: in 2013.   Medications: Amiodarone, 200 mg daily, digitoxin, 250 µg daily, spironolactone 25 mg daily Xarelto 20 mg daily.   Reviewed last echocardiography in our system, from 1/18/13, with EF of 50% and moderate pulmonary hypertension.  She was not followed by cardiology.     COPD, respiratory failure with hypoxia and home oxygen  The patient is a former smoker, has COPD and on home oxygen.  Denies current shortness of breath, cough, wheezing, chest tightness.  She was not seen recently by pulmonology.     Osteoarthritis of knees, wheelchair bound  The patient has osteoarthritis of both knees for which she is in a wheelchair.  She has been on a Norco, followed up by pain management fire due to no shows.   Denies fever, chills, knee redness or swelling.    IFG  The patient had elevated FBG.  No polydipsia, polyphagia, polyuria.  No abdominal pain, weight loss, fatigue.    Dyslipidemia  The patient abnormal lipid panel, no medications.     Hypovitaminosis D  The patient had low vitamin D  level.  Vitamin D supplement: no.    OBESITY, BMI 64  Onset: since childhood  Diet: Regular  Exercise: In a wheelchair  No temperature intolerance. No change in hair/skin quality, BMs.   No HTN, buffalo hump, purple striae, flushing.  FH of obesity: multiple (sisters)    ROS:     - Constitutional:  Negative for fever, chills.    - HEENT:  Negative for headaches, vision changes, hearing changes.    - Respiratory: Negative for cough, sputum production, chest congestion.    - Cardiovascular:Negative for chest pain, palpitations.    - Gastrointestinal: Negative for heartburn, nausea, vomiting, abdominal pain.    - Genitourinary: Negative for dysuria.    - Musculoskeletal: Negative for significant back pain.    - Skin: Negative for rash.     - Neurological: Negative for dizziness, headaches.     - Endo/Heme/Allergies: Does not bruise/bleed easily.     - Psychiatric/Behavioral: Negative for depression.          - NOTE: All other systems reviewed and are negative, except as in HPI.      Patient Active Problem List    Diagnosis Date Noted   • Atrial fibrillation with rapid ventricular response ([facility]) 01/17/2013     Priority: High   • Migraine 01/22/2012     Priority: High   • CHF (congestive heart failure) ([facility]) 01/29/2013     Priority: Medium   • DCM (dilated cardiomyopathy) ([facility]) 01/29/2013     Priority: Medium   • Morbid obesity with BMI of 60.0-69.9, adult ([facility]) 01/22/2012     Priority: Low   • Chronic pain 01/22/2012     Priority: Low   • Dupuytren's contracture of left hand 05/03/2019   • Pulmonary emphysema ([facility]) 05/03/2019   • Supplemental oxygen dependent 05/03/2019   • Long term current use of amiodarone 05/03/2019   • Essential hypertension 06/22/2017   • JUAN CARLOS (obstructive sleep apnea) 06/22/2017   • Insomnia 06/22/2017      Allergies:Patient has no known allergies.    Current Outpatient Medications   Medication Sig Dispense Refill   • spironolactone (ALDACTONE) 25 MG Tab TAKE 1 TABLET BY MOUTH EVERY DAY 15 Tab 0  "  • digoxin (LANOXIN) 250 MCG Tab TAKE 1 TABLET BY MOUTH EVERY DAY 30 Tab 0   • metoprolol (LOPRESSOR) 50 MG Tab TAKE 1 TABLET BY MOUTH TWICE A DAY 60 Tab 0   • amiodarone (CORDARONE) 200 MG Tab TAKE 1 TABLET BY MOUTH EVERY DAY 30 Tab 0   • gabapentin (NEURONTIN) 300 MG Cap TAKE 1 CAPSULE BY MOUTH TWICE A DAY 60 Cap 0   • albuterol (PROVENTIL) 2.5mg/3ml Nebu Soln solution for nebulization 3 mL by Nebulization route every four hours as needed for Shortness of Breath. 30 Bullet 5     No current facility-administered medications for this visit.        Social History     Tobacco Use   • Smoking status: Former Smoker     Packs/day: 1.00     Years: 20.00     Pack years: 20.00     Types: Cigarettes     Last attempt to quit: 2013     Years since quittin.2   • Smokeless tobacco: Never Used   • Tobacco comment: 1 ppd   Substance Use Topics   • Alcohol use: No     Alcohol/week: 0.0 oz   • Drug use: Yes     Types: Marijuana     Social History     Social History Narrative   • Not on file       Family History   Problem Relation Age of Onset   • Cancer Father         lung, smoker   • Diabetes Mother    • Hypertension Mother    • Hyperlipidemia Mother    • Diabetes Maternal Aunt         x4   • Heart Disease Sister    • Hypertension Sister    • Hyperlipidemia Sister    • Thyroid Sister    • Psychiatric Illness Neg Hx    • Stroke Neg Hx           Objective:   Vitals obtained by patient:  Pulse 72   Temperature 36.5 °C (97.7 °F)   Height 1.626 m (5' 4\")   Weight (Abnormal) 170.1 kg (375 lb)   Oxygen Saturation 90%   Body Mass Index 64.37 kg/m²     Physical Exam:  Constitutional: Alert, no distress, well-groomed.  Skin: No rashes in visible areas.  Eye: Round. Conjunctiva clear, lids normal. No icterus.   ENMT: Lips pink without lesions, good dentition, moist mucous membranes. Phonation normal.  Neck: No masses, no thyromegaly. Moves freely without pain.  CV: Pulse as reported by patient  Respiratory: Unlabored " respiratory effort, no cough or audible wheeze  Psych: Alert and oriented x3, normal affect and mood.     Labs:     Reviewed and discussed:    Lab Results   Component Value Date/Time    CHOLSTRLTOT 177 01/22/2012 06:00 AM     01/22/2012 06:00 AM    HDL 44 01/22/2012 06:00 AM    TRIGLYCERIDE 147 01/22/2012 06:00 AM       Lab Results   Component Value Date/Time    SODIUM 139 01/12/2015 02:00 PM    POTASSIUM 3.6 01/12/2015 02:00 PM    CHLORIDE 92 (L) 01/12/2015 02:00 PM    CO2 37 (H) 01/12/2015 02:00 PM    GLUCOSE 146 (H) 01/12/2015 02:00 PM    BUN 7 (L) 01/12/2015 02:00 PM    CREATININE 0.77 01/12/2015 02:00 PM     Lab Results   Component Value Date/Time    ALKPHOSPHAT 46 01/12/2015 02:00 PM    ASTSGOT 28 01/12/2015 02:00 PM    ALTSGPT 42 01/12/2015 02:00 PM    TBILIRUBIN 0.8 01/12/2015 02:00 PM      Lab Results   Component Value Date/Time    FREET4 1.31 01/17/2013 07:05 AM    FREET4 1.01 01/21/2012 04:20 PM     Lab Results   Component Value Date/Time    WBC 9.1 01/12/2015 02:00 PM    RBC 4.23 01/12/2015 02:00 PM    HEMOGLOBIN 13.3 01/12/2015 02:00 PM    HEMATOCRIT 40.7 01/12/2015 02:00 PM    MCV 96.2 01/12/2015 02:00 PM    MCH 31.4 01/12/2015 02:00 PM    MCHC 32.7 (L) 01/12/2015 02:00 PM    MPV 12.4 01/12/2015 02:00 PM    NEUTSPOLYS 66.5 01/12/2015 02:00 PM    LYMPHOCYTES 21.1 (L) 01/12/2015 02:00 PM    MONOCYTES 8.7 01/12/2015 02:00 PM    EOSINOPHILS 2.2 01/12/2015 02:00 PM    BASOPHILS 0.9 01/12/2015 02:00 PM    HYPOCHROMIA 1+ 06/18/2013 03:58 PM      Imaging:     Reviewed and discussed per HPI.    Assessment and Plan:   The following treatment plan was discussed:     1. Essential hypertension  Controlled, continue current treatment  - metoprolol (LOPRESSOR) 50 MG Tab; TAKE 1 TABLET BY MOUTH TWICE A DAY  Dispense: 180 Tab; Refill: 0  2. Atrial fibrillation with rapid ventricular response (HCC)  - digoxin (LANOXIN) 250 MCG Tab; Take 1 Tab by mouth every day.  Dispense: 90 Tab; Refill: 0  - metoprolol  (LOPRESSOR) 50 MG Tab; TAKE 1 TABLET BY MOUTH TWICE A DAY  Dispense: 180 Tab; Refill: 0  - amiodarone (CORDARONE) 200 MG Tab; Take 1 Tab by mouth every day.  Dispense: 90 Tab; Refill: 0  3. Chronic anticoagulation  4. DCM (dilated cardiomyopathy) ([facility])  - spironolactone (ALDACTONE) 25 MG Tab; Take 1 Tab by mouth every day.  Dispense: 90 Tab; Refill: 0  - digoxin (LANOXIN) 250 MCG Tab; Take 1 Tab by mouth every day.  Dispense: 90 Tab; Refill: 0  5. Pulmonary hypertension ([facility])  As above    6. Chronic obstructive pulmonary disease, unspecified COPD type ([facility])  7. Chronic respiratory failure with hypoxia ([facility])  8. On home oxygen therapy  Stable, continue current treatment and pulmonology follow-up    9. Primary osteoarthritis of both knees  Controlled, continue current treatment  - gabapentin (NEURONTIN) 300 MG Cap; TAKE 1 CAPSULE BY MOUTH TWICE A DAY  Dispense: 180 Cap; Refill: 0    10. IFG (impaired fasting glucose)  Discussed about risk to develop DM.   Advised low carb diet, exercise, watch for WT.   - Comp Metabolic Panel; Future  - HEMOGLOBIN A1C; Future    11. Fatigue, unspecified type  Pending labs  - CBC WITH DIFFERENTIAL; Future  - TSH; Future    12. Dyslipidemia  Pending labs  - Comp Metabolic Panel; Future  - Lipid Profile; Future    13. Hypovitaminosis D  Advised 2000 units vitamin D daily, OTC  - VITAMIN D,25 HYDROXY; Future    14. BMI 60.0-69.9, adult ([facility])  - OBESITY COUNSELING (No Charge): Patient identified as having weight management issue.  Appropriate orders and counseling given.    15. Need for hepatitis C screening test  - HEP C VIRUS ANTIBODY; Future    16. Screening for malignant neoplasm of colon  - COLOGUARD (FIT DNA)    Follow-up: in 3 months with labs

## 2020-04-13 NOTE — TELEPHONE ENCOUNTER
Phone Number Called: [phone] (home)       Call outcome: Spoke to patient regarding message below.    Message: Pt stated she already received her medication and spoke with Dr. Garcia.

## 2020-06-28 DIAGNOSIS — I42.0 DCM (DILATED CARDIOMYOPATHY) (HCC): ICD-10-CM

## 2020-06-28 DIAGNOSIS — I10 ESSENTIAL HYPERTENSION: ICD-10-CM

## 2020-06-28 DIAGNOSIS — I48.91 ATRIAL FIBRILLATION WITH RAPID VENTRICULAR RESPONSE (HCC): ICD-10-CM

## 2020-06-28 DIAGNOSIS — M17.0 PRIMARY OSTEOARTHRITIS OF BOTH KNEES: ICD-10-CM

## 2020-06-28 RX ORDER — DIGOXIN 250 MCG
TABLET ORAL
Qty: 90 TAB | Refills: 0 | Status: SHIPPED | OUTPATIENT
Start: 2020-06-28 | End: 2020-09-27

## 2020-06-28 RX ORDER — AMIODARONE HYDROCHLORIDE 200 MG/1
TABLET ORAL
Qty: 90 TAB | Refills: 0 | Status: SHIPPED | OUTPATIENT
Start: 2020-06-28 | End: 2020-09-27

## 2020-06-28 RX ORDER — GABAPENTIN 300 MG/1
CAPSULE ORAL
Qty: 180 CAP | Refills: 0 | Status: SHIPPED | OUTPATIENT
Start: 2020-06-28 | End: 2020-09-27

## 2020-06-28 RX ORDER — METOPROLOL TARTRATE 50 MG/1
TABLET, FILM COATED ORAL
Qty: 180 TAB | Refills: 0 | Status: SHIPPED | OUTPATIENT
Start: 2020-06-28 | End: 2020-09-27

## 2020-06-30 DIAGNOSIS — I42.0 DCM (DILATED CARDIOMYOPATHY) (HCC): ICD-10-CM

## 2020-06-30 RX ORDER — SPIRONOLACTONE 25 MG/1
25 TABLET ORAL
Qty: 90 TAB | Refills: 0 | Status: SHIPPED | OUTPATIENT
Start: 2020-06-30 | End: 2020-09-27

## 2020-09-02 ENCOUNTER — TELEPHONE (OUTPATIENT)
Dept: MEDICAL GROUP | Age: 60
End: 2020-09-02

## 2020-09-02 NOTE — TELEPHONE ENCOUNTER
1. Caller Name: Anne                        Call Back Number: [phone]      How would the patient prefer to be contacted with a response: Phone call do NOT leave a detailed message    Patient changed insurances and needs a new script for her oxygen. The alarm keeps going off and they require a new script.

## 2020-09-03 ENCOUNTER — APPOINTMENT (OUTPATIENT)
Dept: MEDICAL GROUP | Age: 60
End: 2020-09-03
Payer: MEDICARE

## 2020-09-04 ENCOUNTER — TELEMEDICINE (OUTPATIENT)
Dept: MEDICAL GROUP | Age: 60
End: 2020-09-04
Payer: MEDICARE

## 2020-09-04 VITALS — HEIGHT: 64 IN | TEMPERATURE: 98.1 F | BODY MASS INDEX: 50.02 KG/M2 | WEIGHT: 293 LBS

## 2020-09-04 DIAGNOSIS — I48.91 ATRIAL FIBRILLATION WITH RAPID VENTRICULAR RESPONSE (HCC): ICD-10-CM

## 2020-09-04 DIAGNOSIS — I27.20 PULMONARY HYPERTENSION (HCC): ICD-10-CM

## 2020-09-04 DIAGNOSIS — Z99.81 ON HOME OXYGEN THERAPY: ICD-10-CM

## 2020-09-04 DIAGNOSIS — J44.9 CHRONIC OBSTRUCTIVE PULMONARY DISEASE, UNSPECIFIED COPD TYPE (HCC): ICD-10-CM

## 2020-09-04 DIAGNOSIS — I10 ESSENTIAL HYPERTENSION: ICD-10-CM

## 2020-09-04 DIAGNOSIS — Z79.01 CHRONIC ANTICOAGULATION: ICD-10-CM

## 2020-09-04 DIAGNOSIS — Z79.899 LONG TERM CURRENT USE OF AMIODARONE: ICD-10-CM

## 2020-09-04 DIAGNOSIS — E66.01 MORBID OBESITY WITH BMI OF 60.0-69.9, ADULT (HCC): ICD-10-CM

## 2020-09-04 DIAGNOSIS — I42.0 DCM (DILATED CARDIOMYOPATHY) (HCC): ICD-10-CM

## 2020-09-04 DIAGNOSIS — J96.11 CHRONIC RESPIRATORY FAILURE WITH HYPOXIA (HCC): ICD-10-CM

## 2020-09-04 PROCEDURE — 99214 OFFICE O/P EST MOD 30 MIN: CPT | Mod: 95,CR | Performed by: INTERNAL MEDICINE

## 2020-09-04 NOTE — PROGRESS NOTES
Telemedicine Visit: Established Patient     This Remote Face to Face encounter was conducted via Zoom. Given the importance of social distancing and other strategies recommended to reduce the risk of COVID-19 transmission, I am providing medical care to this patient via audio/video visit in place of an in person visit at the request of the patient. Verbal consent to telehealth, risks, benefits, and consequences were discussed. Patient retains the right to withdraw at any time. All existing confidentiality protections apply. The patient has access to all transmitted medical information. No dissemination of any patient images or information to other entities without further written consent.    CHIEF COMPLAINT     Chief Complaint   Patient presents with   • Other     oxygen machine COPD     HPI  Cheryl D Blakemore is a 60 y.o. female who presents today for the following     Hypertension  Meds: metoprolol, 50 mg BID, spironolactone, 25 mg QD, taking as prescribed.   Denies: - headaches, vision problems, tinnitus.  - chest pain/pressure, palpitations, irregular heart beats, exertional, dyspnea, peripheral edema.              - medication side effect: unusual fatigue, slow heartbeat, foot/leg swelling.  Low salt diet: yes  Diet: regular  Exercise: in a wheelchair  BMI:  61  FH of HTN: multiple     Imaging  CXR 1/12/2015  Stable cardiac enlargement, left hemidiaphragm elevation vascular congestion     Afib, on anticoagulation, dilated cardiomyopathy (CHF)  Pulmonary hypertension  Onset: in 2013.   Medications: Amiodarone, 200 mg daily, digitoxin, 250 µg daily, spironolactone 25 mg daily Xarelto 20 mg daily.   Reviewed last echocardiography in our system, from 1/18/13, with EF of 50% and moderate pulmonary hypertension.  She was not followed by cardiology.    Echocardiography, 1/18/2013  CONCLUSIONS   Technically difficult study.   Left ventricular ejection fraction is 45% to 50%.   Severely dilated left atrium.   Right  ventricular systolic pressure is estimated to be at least 50-55   mmHg consistent with moderate pulmonary hypertension.      COPD, respiratory failure with hypoxia and home oxygen  The patient is a former smoker, has COPD and on home oxygen.  Denies current shortness of breath, cough, wheezing, chest tightness.  She was not seen recently by pulmonology.     Osteoarthritis of knees, wheelchair bound  The patient has osteoarthritis of both knees for which she is in a wheelchair.  She has been on a Norco, followed up by pain management fire due to no shows.   Denies fever, chills, knee redness or swelling.     OBESITY, BMI 61  Onset: since childhood  Diet: Regular  Exercise: In a wheelchair    Reviewed PMH, PSH, FH, SH, ALL, HCM/IMM, no changes  Reviewed MEDS, no changes    Patient Active Problem List    Diagnosis Date Noted   • Atrial fibrillation with rapid ventricular response ([facility]) 01/17/2013     Priority: High   • Migraine 01/22/2012     Priority: High   • CHF (congestive heart failure) ([facility]) 01/29/2013     Priority: Medium   • DCM (dilated cardiomyopathy) ([facility]) 01/29/2013     Priority: Medium   • Morbid obesity with BMI of 60.0-69.9, adult ([facility]) 01/22/2012     Priority: Low   • Chronic pain 01/22/2012     Priority: Low   • Pulmonary hypertension ([facility]) 04/03/2020   • Chronic respiratory failure with hypoxia ([facility]) 04/03/2020   • Dupuytren's contracture of left hand 05/03/2019   • Chronic obstructive pulmonary disease ([facility]) 05/03/2019   • On home oxygen therapy 05/03/2019   • Long term current use of amiodarone 05/03/2019   • Essential hypertension 06/22/2017   • JUAN CARLOS (obstructive sleep apnea) 06/22/2017   • Insomnia 06/22/2017     CURRENT MEDICATIONS  Current Outpatient Medications   Medication Sig Dispense Refill   • albuterol (PROVENTIL) 2.5mg/3ml Nebu Soln solution for nebulization 3 ML BY NEBULIZATION ROUTE EVERY FOUR HOURS AS NEEDED FOR SHORTNESS OF BREATH. 75 mL 0   • spironolactone (ALDACTONE) 25 MG Tab Take  1 Tab by mouth every day. 90 Tab 0   • digoxin (LANOXIN) 250 MCG Tab TAKE 1 TABLET BY MOUTH EVERY DAY 90 Tab 0   • amiodarone (CORDARONE) 200 MG Tab TAKE 1 TABLET BY MOUTH EVERY DAY 90 Tab 0   • metoprolol (LOPRESSOR) 50 MG Tab TAKE 1 TABLET BY MOUTH TWICE A  Tab 0   • gabapentin (NEURONTIN) 300 MG Cap TAKE 1 CAPSULE BY MOUTH TWICE A  Cap 0     No current facility-administered medications for this visit.      ALLERGIES  Allergies: Patient has no known allergies.  PAST MEDICAL HISTORY  Past Medical History:   Diagnosis Date   • Arthritis    • Chronic pain    • COPD (chronic obstructive pulmonary disease) (HCC)    • Hypertension    • Migraine      SURGICAL HISTORY  She  has a past surgical history that includes primary c section; tracheostomy (2013); and gastrostomy laparoscopic (2013).  SOCIAL HISTORY  Social History     Tobacco Use   • Smoking status: Former Smoker     Packs/day: 1.00     Years: 20.00     Pack years: 20.00     Types: Cigarettes     Quit date: 2013     Years since quittin.6   • Smokeless tobacco: Never Used   • Tobacco comment: 1 ppd   Substance Use Topics   • Alcohol use: No     Alcohol/week: 0.0 oz   • Drug use: Yes     Types: Marijuana     Social History     Social History Narrative   • Not on file     FAMILY HISTORY  Family History   Problem Relation Age of Onset   • Cancer Father         lung, smoker   • Diabetes Mother    • Hypertension Mother    • Hyperlipidemia Mother    • Diabetes Maternal Aunt         x4   • Heart Disease Sister    • Hypertension Sister    • Hyperlipidemia Sister    • Thyroid Sister    • Psychiatric Illness Neg Hx    • Stroke Neg Hx      Family Status   Relation Name Status   • Fa  (Not Specified)   • Mo  Alive   • MAunt  (Not Specified)   • Sis  (Not Specified)   • Sis  (Not Specified)   • Sis  (Not Specified)   • Sis  (Not Specified)   • Neg Hx  (Not Specified)       ROS   Constitutional: Negative for fever, chills, fatigue.  HENT: Negative  "for congestion, sore throat.  Eyes: Negative for vision problems.   Respiratory: as above  Cardiovascular: Negative for chest pain, palpitations.   Gastrointestinal: Negative for heartburn, nausea, abdominal pain.   Genitourinary: Negative for dysuria.  Musculoskeletal: Negative for significant myalgia, back and joint pain.   Skin: Negative for rash.   Neuro: Negative for dizziness, weakness and headaches.   Endo/Heme/Allergies: Does not bruise/bleed easily.   Psychiatric/Behavioral: Negative for depression.    Objective   Vitals obtained by patient:  Temperature 36.7 °C (98.1 °F)   Height 1.626 m (5' 4\")   Weight (Abnormal) 163.3 kg (360 lb)   Body Mass Index 61.79 kg/m²   Physical Exam:  Constitutional: Alert, no distress, well-groomed.  Skin: No rash in visible areas.  Eye: Round. Conjunctiva clear, lids normal.  ENMT: Lips pink without lesions, good dentition. Phonation normal.  Neck: No visible masses or thyromegaly. Moves freely without pain.  CV: no peripheral cyanosis, tachycardia.  Respiratory: Unlabored respiratory effort, no cough or audible wheezing.  Psych: Alert and oriented x3, normal affect and mood.     Labs     Labs are reviewed and discussed with a patient  Lab Results   Component Value Date/Time    CHOLSTRLTOT 177 01/22/2012 06:00 AM     01/22/2012 06:00 AM    HDL 44 01/22/2012 06:00 AM    TRIGLYCERIDE 147 01/22/2012 06:00 AM       Lab Results   Component Value Date/Time    SODIUM 139 01/12/2015 02:00 PM    POTASSIUM 3.6 01/12/2015 02:00 PM    CHLORIDE 92 (L) 01/12/2015 02:00 PM    CO2 37 (H) 01/12/2015 02:00 PM    GLUCOSE 146 (H) 01/12/2015 02:00 PM    BUN 7 (L) 01/12/2015 02:00 PM    CREATININE 0.77 01/12/2015 02:00 PM     Lab Results   Component Value Date/Time    ALKPHOSPHAT 46 01/12/2015 02:00 PM    ASTSGOT 28 01/12/2015 02:00 PM    ALTSGPT 42 01/12/2015 02:00 PM    TBILIRUBIN 0.8 01/12/2015 02:00 PM      No results found for: HBA1C  No results found for: TSH  Lab Results "   Component Value Date/Time    FREET4 1.31 01/17/2013 07:05 AM    FREET4 1.01 01/21/2012 04:20 PM       Lab Results   Component Value Date/Time    WBC 9.1 01/12/2015 02:00 PM    RBC 4.23 01/12/2015 02:00 PM    HEMOGLOBIN 13.3 01/12/2015 02:00 PM    HEMATOCRIT 40.7 01/12/2015 02:00 PM    MCV 96.2 01/12/2015 02:00 PM    MCH 31.4 01/12/2015 02:00 PM    MCHC 32.7 (L) 01/12/2015 02:00 PM    MPV 12.4 01/12/2015 02:00 PM    NEUTSPOLYS 66.5 01/12/2015 02:00 PM    LYMPHOCYTES 21.1 (L) 01/12/2015 02:00 PM    MONOCYTES 8.7 01/12/2015 02:00 PM    EOSINOPHILS 2.2 01/12/2015 02:00 PM    BASOPHILS 0.9 01/12/2015 02:00 PM    HYPOCHROMIA 1+ 06/18/2013 03:58 PM      Imaging      Reviewed and discussed per HPI    Assessment and Plan     Cheryl D Blakemore is a 60 y.o. female    1. Essential hypertension  2. Atrial fibrillation with rapid ventricular response (HCC)  3. Chronic anticoagulation  4. Long term current use of amiodarone  5. DCM (dilated cardiomyopathy) ([facility])  6. Pulmonary hypertension (HCC)  - REFERRAL TO PULMONARY AND SLEEP MEDICINE General Pulmonary  Stable cardiovascular conditions, continue current treatment and cardiology follow-up    7. Chronic obstructive pulmonary disease, unspecified COPD type ([facility])  Continue oxygen at home, referred to pulmonology  - REFERRAL TO PULMONARY AND SLEEP MEDICINE General Pulmonary  8. Chronic respiratory failure with hypoxia ([facility])  - REFERRAL TO PULMONARY AND SLEEP MEDICINE General Pulmonary  9. On home oxygen therapy  - REFERRAL TO PULMONARY AND SLEEP MEDICINE General Pulmonary    10. Morbid obesity with BMI of 60.0-69.9, adult ([facility])  Advised low-calorie diet, exercise as much as possible      Follow-up: As needed

## 2020-09-25 DIAGNOSIS — I42.0 DCM (DILATED CARDIOMYOPATHY) (HCC): ICD-10-CM

## 2020-09-25 DIAGNOSIS — I48.91 ATRIAL FIBRILLATION WITH RAPID VENTRICULAR RESPONSE (HCC): ICD-10-CM

## 2020-09-25 DIAGNOSIS — I10 ESSENTIAL HYPERTENSION: ICD-10-CM

## 2020-09-25 DIAGNOSIS — M17.0 PRIMARY OSTEOARTHRITIS OF BOTH KNEES: ICD-10-CM

## 2020-09-27 RX ORDER — DIGOXIN 250 MCG
TABLET ORAL
Qty: 90 TAB | Refills: 0 | Status: SHIPPED | OUTPATIENT
Start: 2020-09-27 | End: 2020-12-26

## 2020-09-27 RX ORDER — GABAPENTIN 300 MG/1
CAPSULE ORAL
Qty: 180 CAP | Refills: 0 | Status: SHIPPED | OUTPATIENT
Start: 2020-09-27 | End: 2020-12-26

## 2020-09-27 RX ORDER — METOPROLOL TARTRATE 50 MG/1
TABLET, FILM COATED ORAL
Qty: 180 TAB | Refills: 0 | Status: SHIPPED | OUTPATIENT
Start: 2020-09-27 | End: 2020-12-26

## 2020-09-27 RX ORDER — AMIODARONE HYDROCHLORIDE 200 MG/1
TABLET ORAL
Qty: 90 TAB | Refills: 0 | Status: SHIPPED | OUTPATIENT
Start: 2020-09-27 | End: 2020-12-26

## 2020-09-27 RX ORDER — SPIRONOLACTONE 25 MG/1
TABLET ORAL
Qty: 90 TAB | Refills: 0 | Status: SHIPPED | OUTPATIENT
Start: 2020-09-27 | End: 2020-12-26

## 2020-12-25 DIAGNOSIS — I48.91 ATRIAL FIBRILLATION WITH RAPID VENTRICULAR RESPONSE (HCC): ICD-10-CM

## 2020-12-25 DIAGNOSIS — I10 ESSENTIAL HYPERTENSION: ICD-10-CM

## 2020-12-25 DIAGNOSIS — I42.0 DCM (DILATED CARDIOMYOPATHY) (HCC): ICD-10-CM

## 2020-12-26 DIAGNOSIS — M17.0 PRIMARY OSTEOARTHRITIS OF BOTH KNEES: ICD-10-CM

## 2020-12-26 RX ORDER — SPIRONOLACTONE 25 MG/1
TABLET ORAL
Qty: 90 TAB | Refills: 4 | Status: SHIPPED | OUTPATIENT
Start: 2020-12-26 | End: 2021-10-29

## 2020-12-26 RX ORDER — GABAPENTIN 300 MG/1
CAPSULE ORAL
Qty: 180 CAP | Refills: 4 | Status: SHIPPED | OUTPATIENT
Start: 2020-12-26 | End: 2021-10-29

## 2020-12-26 RX ORDER — METOPROLOL TARTRATE 50 MG/1
TABLET, FILM COATED ORAL
Qty: 180 TAB | Refills: 4 | Status: SHIPPED | OUTPATIENT
Start: 2020-12-26 | End: 2021-10-29

## 2020-12-26 RX ORDER — AMIODARONE HYDROCHLORIDE 200 MG/1
TABLET ORAL
Qty: 90 TAB | Refills: 4 | Status: SHIPPED | OUTPATIENT
Start: 2020-12-26 | End: 2021-10-29

## 2020-12-26 RX ORDER — DIGOXIN 250 MCG
TABLET ORAL
Qty: 90 TAB | Refills: 4 | Status: SHIPPED | OUTPATIENT
Start: 2020-12-26 | End: 2021-10-29

## 2020-12-27 DIAGNOSIS — J43.9 PULMONARY EMPHYSEMA, UNSPECIFIED EMPHYSEMA TYPE (HCC): ICD-10-CM

## 2020-12-28 RX ORDER — ALBUTEROL SULFATE 2.5 MG/3ML
SOLUTION RESPIRATORY (INHALATION)
Qty: 75 ML | Refills: 0 | Status: SHIPPED | OUTPATIENT
Start: 2020-12-28 | End: 2021-10-15 | Stop reason: SDUPTHER

## 2020-12-28 NOTE — TELEPHONE ENCOUNTER
Received request via: Pharmacy    Was the patient seen in the last year in this department? Yes    Does the patient have an active prescription (recently filled or refills available) for medication(s) requested? No     Last OV 9/4/20.

## 2021-03-15 DIAGNOSIS — Z23 NEED FOR VACCINATION: ICD-10-CM

## 2021-10-15 DIAGNOSIS — J43.9 PULMONARY EMPHYSEMA, UNSPECIFIED EMPHYSEMA TYPE (HCC): ICD-10-CM

## 2021-10-15 RX ORDER — ALBUTEROL SULFATE 2.5 MG/3ML
SOLUTION RESPIRATORY (INHALATION)
Qty: 75 ML | Refills: 0 | Status: SHIPPED | OUTPATIENT
Start: 2021-10-15 | End: 2021-10-29

## 2021-10-28 ENCOUNTER — HOSPITAL ENCOUNTER (INPATIENT)
Facility: MEDICAL CENTER | Age: 61
LOS: 11 days | DRG: 291 | End: 2021-11-09
Attending: EMERGENCY MEDICINE | Admitting: INTERNAL MEDICINE
Payer: MEDICARE

## 2021-10-28 ENCOUNTER — APPOINTMENT (OUTPATIENT)
Dept: RADIOLOGY | Facility: MEDICAL CENTER | Age: 61
DRG: 291 | End: 2021-10-28
Attending: EMERGENCY MEDICINE
Payer: MEDICARE

## 2021-10-28 DIAGNOSIS — J96.22 ACUTE ON CHRONIC RESPIRATORY FAILURE WITH HYPOXIA AND HYPERCAPNIA (HCC): ICD-10-CM

## 2021-10-28 DIAGNOSIS — J96.21 ACUTE ON CHRONIC RESPIRATORY FAILURE WITH HYPOXIA AND HYPERCAPNIA (HCC): ICD-10-CM

## 2021-10-28 DIAGNOSIS — R33.9 URINARY RETENTION: ICD-10-CM

## 2021-10-28 DIAGNOSIS — I50.9 ACUTE CONGESTIVE HEART FAILURE, UNSPECIFIED HEART FAILURE TYPE (HCC): ICD-10-CM

## 2021-10-28 DIAGNOSIS — J96.22 ACUTE ON CHRONIC RESPIRATORY FAILURE WITH HYPERCAPNIA (HCC): ICD-10-CM

## 2021-10-28 DIAGNOSIS — I50.9 ACUTE ON CHRONIC CONGESTIVE HEART FAILURE, UNSPECIFIED HEART FAILURE TYPE (HCC): ICD-10-CM

## 2021-10-28 DIAGNOSIS — E66.01 MORBID OBESITY WITH BMI OF 60.0-69.9, ADULT (HCC): ICD-10-CM

## 2021-10-28 DIAGNOSIS — I48.91 ATRIAL FIBRILLATION, UNSPECIFIED TYPE (HCC): ICD-10-CM

## 2021-10-28 DIAGNOSIS — J96.11 CHRONIC RESPIRATORY FAILURE WITH HYPOXIA (HCC): ICD-10-CM

## 2021-10-28 DIAGNOSIS — G89.29 OTHER CHRONIC PAIN: ICD-10-CM

## 2021-10-28 LAB
ALBUMIN SERPL BCP-MCNC: 4.2 G/DL (ref 3.2–4.9)
ALBUMIN/GLOB SERPL: 1.1 G/DL
ALP SERPL-CCNC: 66 U/L (ref 30–99)
ALT SERPL-CCNC: 13 U/L (ref 2–50)
ANION GAP SERPL CALC-SCNC: 10 MMOL/L (ref 7–16)
AST SERPL-CCNC: 10 U/L (ref 12–45)
BASOPHILS # BLD AUTO: 0.3 % (ref 0–1.8)
BASOPHILS # BLD: 0.03 K/UL (ref 0–0.12)
BILIRUB SERPL-MCNC: 0.9 MG/DL (ref 0.1–1.5)
BUN SERPL-MCNC: 12 MG/DL (ref 8–22)
CALCIUM SERPL-MCNC: 9 MG/DL (ref 8.4–10.2)
CHLORIDE SERPL-SCNC: 80 MMOL/L (ref 96–112)
CO2 SERPL-SCNC: 36 MMOL/L (ref 20–33)
CREAT SERPL-MCNC: 0.82 MG/DL (ref 0.5–1.4)
EKG IMPRESSION: NORMAL
EOSINOPHIL # BLD AUTO: 0.04 K/UL (ref 0–0.51)
EOSINOPHIL NFR BLD: 0.4 % (ref 0–6.9)
ERYTHROCYTE [DISTWIDTH] IN BLOOD BY AUTOMATED COUNT: 44.2 FL (ref 35.9–50)
FLUAV RNA SPEC QL NAA+PROBE: NEGATIVE
FLUBV RNA SPEC QL NAA+PROBE: NEGATIVE
GLOBULIN SER CALC-MCNC: 3.9 G/DL (ref 1.9–3.5)
GLUCOSE SERPL-MCNC: 192 MG/DL (ref 65–99)
HCT VFR BLD AUTO: 40.2 % (ref 37–47)
HGB BLD-MCNC: 12.3 G/DL (ref 12–16)
IMM GRANULOCYTES # BLD AUTO: 0.04 K/UL (ref 0–0.11)
IMM GRANULOCYTES NFR BLD AUTO: 0.4 % (ref 0–0.9)
LACTATE BLD-SCNC: 2.5 MMOL/L (ref 0.5–2)
LYMPHOCYTES # BLD AUTO: 2 K/UL (ref 1–4.8)
LYMPHOCYTES NFR BLD: 17.8 % (ref 22–41)
MCH RBC QN AUTO: 29.7 PG (ref 27–33)
MCHC RBC AUTO-ENTMCNC: 30.6 G/DL (ref 33.6–35)
MCV RBC AUTO: 97.1 FL (ref 81.4–97.8)
MONOCYTES # BLD AUTO: 0.98 K/UL (ref 0–0.85)
MONOCYTES NFR BLD AUTO: 8.7 % (ref 0–13.4)
NEUTROPHILS # BLD AUTO: 8.15 K/UL (ref 2–7.15)
NEUTROPHILS NFR BLD: 72.4 % (ref 44–72)
NRBC # BLD AUTO: 0 K/UL
NRBC BLD-RTO: 0 /100 WBC
NT-PROBNP SERPL IA-MCNC: 1361 PG/ML (ref 0–125)
PLATELET # BLD AUTO: 157 K/UL (ref 164–446)
PMV BLD AUTO: 12 FL (ref 9–12.9)
POTASSIUM SERPL-SCNC: 4.7 MMOL/L (ref 3.6–5.5)
PROT SERPL-MCNC: 8.1 G/DL (ref 6–8.2)
RBC # BLD AUTO: 4.14 M/UL (ref 4.2–5.4)
RSV RNA SPEC QL NAA+PROBE: NEGATIVE
SARS-COV-2 RNA RESP QL NAA+PROBE: NOTDETECTED
SODIUM SERPL-SCNC: 126 MMOL/L (ref 135–145)
SPECIMEN SOURCE: NORMAL
TROPONIN T SERPL-MCNC: 12 NG/L (ref 6–19)
WBC # BLD AUTO: 11.2 K/UL (ref 4.8–10.8)

## 2021-10-28 PROCEDURE — 94660 CPAP INITIATION&MGMT: CPT

## 2021-10-28 PROCEDURE — 93005 ELECTROCARDIOGRAM TRACING: CPT

## 2021-10-28 PROCEDURE — 84484 ASSAY OF TROPONIN QUANT: CPT

## 2021-10-28 PROCEDURE — 84145 PROCALCITONIN (PCT): CPT

## 2021-10-28 PROCEDURE — 87077 CULTURE AEROBIC IDENTIFY: CPT

## 2021-10-28 PROCEDURE — 85025 COMPLETE CBC W/AUTO DIFF WBC: CPT

## 2021-10-28 PROCEDURE — 93005 ELECTROCARDIOGRAM TRACING: CPT | Performed by: EMERGENCY MEDICINE

## 2021-10-28 PROCEDURE — 87040 BLOOD CULTURE FOR BACTERIA: CPT

## 2021-10-28 PROCEDURE — 80053 COMPREHEN METABOLIC PANEL: CPT

## 2021-10-28 PROCEDURE — 87186 SC STD MICRODIL/AGAR DIL: CPT

## 2021-10-28 PROCEDURE — 83880 ASSAY OF NATRIURETIC PEPTIDE: CPT

## 2021-10-28 PROCEDURE — 0241U HCHG SARS-COV-2 COVID-19 NFCT DS RESP RNA 4 TRGT MIC: CPT

## 2021-10-28 PROCEDURE — 71045 X-RAY EXAM CHEST 1 VIEW: CPT

## 2021-10-28 PROCEDURE — C9803 HOPD COVID-19 SPEC COLLECT: HCPCS | Performed by: EMERGENCY MEDICINE

## 2021-10-28 PROCEDURE — 94760 N-INVAS EAR/PLS OXIMETRY 1: CPT

## 2021-10-28 PROCEDURE — 99285 EMERGENCY DEPT VISIT HI MDM: CPT

## 2021-10-28 PROCEDURE — 87150 DNA/RNA AMPLIFIED PROBE: CPT

## 2021-10-28 PROCEDURE — 83605 ASSAY OF LACTIC ACID: CPT

## 2021-10-28 ASSESSMENT — PULMONARY FUNCTION TESTS: EPAP_CMH2O: 18

## 2021-10-29 PROBLEM — E87.1 HYPONATREMIA: Status: ACTIVE | Noted: 2021-10-29

## 2021-10-29 PROBLEM — J96.21 ACUTE ON CHRONIC RESPIRATORY FAILURE WITH HYPOXIA AND HYPERCAPNIA (HCC): Status: ACTIVE | Noted: 2021-10-29

## 2021-10-29 PROBLEM — D72.829 LEUCOCYTOSIS: Status: ACTIVE | Noted: 2021-10-29

## 2021-10-29 PROBLEM — J44.1 CHRONIC OBSTRUCTIVE PULMONARY DISEASE WITH ACUTE EXACERBATION (HCC): Status: ACTIVE | Noted: 2019-05-03

## 2021-10-29 PROBLEM — J96.22 ACUTE ON CHRONIC RESPIRATORY FAILURE WITH HYPOXIA AND HYPERCAPNIA (HCC): Status: ACTIVE | Noted: 2021-10-29

## 2021-10-29 PROBLEM — R73.9 HYPERGLYCEMIA: Status: ACTIVE | Noted: 2021-10-29

## 2021-10-29 LAB
ALBUMIN SERPL BCP-MCNC: 3.9 G/DL (ref 3.2–4.9)
ALBUMIN/GLOB SERPL: 1 G/DL
ALP SERPL-CCNC: 54 U/L (ref 30–99)
ALT SERPL-CCNC: 15 U/L (ref 2–50)
ANION GAP SERPL CALC-SCNC: 11 MMOL/L (ref 7–16)
APPEARANCE UR: ABNORMAL
AST SERPL-CCNC: 23 U/L (ref 12–45)
BACTERIA #/AREA URNS HPF: ABNORMAL /HPF
BASE EXCESS BLDA CALC-SCNC: 14 MMOL/L (ref -4–3)
BILIRUB SERPL-MCNC: 0.9 MG/DL (ref 0.1–1.5)
BILIRUB UR QL STRIP.AUTO: NEGATIVE
BODY TEMPERATURE: ABNORMAL CENTIGRADE
BUN SERPL-MCNC: 13 MG/DL (ref 8–22)
CALCIUM SERPL-MCNC: 9.1 MG/DL (ref 8.4–10.2)
CHLORIDE SERPL-SCNC: 80 MMOL/L (ref 96–112)
CO2 SERPL-SCNC: 38 MMOL/L (ref 20–33)
COLOR UR: YELLOW
CREAT SERPL-MCNC: 0.75 MG/DL (ref 0.5–1.4)
DIGOXIN SERPL-MCNC: 1 NG/ML (ref 0.8–2)
EPI CELLS #/AREA URNS HPF: ABNORMAL /HPF
GLOBULIN SER CALC-MCNC: 4 G/DL (ref 1.9–3.5)
GLUCOSE BLD-MCNC: 127 MG/DL (ref 65–99)
GLUCOSE BLD-MCNC: 132 MG/DL (ref 65–99)
GLUCOSE BLD-MCNC: 146 MG/DL (ref 65–99)
GLUCOSE BLD-MCNC: 165 MG/DL (ref 65–99)
GLUCOSE SERPL-MCNC: 142 MG/DL (ref 65–99)
GLUCOSE UR STRIP.AUTO-MCNC: NEGATIVE MG/DL
HCO3 BLDA-SCNC: 45 MMOL/L (ref 17–25)
KETONES UR STRIP.AUTO-MCNC: NEGATIVE MG/DL
LACTATE BLD-SCNC: 2.2 MMOL/L (ref 0.5–2)
LACTATE BLD-SCNC: 3.2 MMOL/L (ref 0.5–2)
LEUKOCYTE ESTERASE UR QL STRIP.AUTO: NEGATIVE
MICRO URNS: ABNORMAL
NITRITE UR QL STRIP.AUTO: NEGATIVE
PCO2 BLDA: 100.5 MMHG (ref 26–37)
PH BLDA: 7.27 [PH] (ref 7.4–7.5)
PH UR STRIP.AUTO: 6 [PH] (ref 5–8)
PO2 BLDA: 156.9 MMHG (ref 64–87)
POTASSIUM SERPL-SCNC: 5.5 MMOL/L (ref 3.6–5.5)
PROCALCITONIN SERPL-MCNC: 0.03 NG/ML
PROCALCITONIN SERPL-MCNC: 0.05 NG/ML
PROT SERPL-MCNC: 7.9 G/DL (ref 6–8.2)
PROT UR QL STRIP: 30 MG/DL
RBC # URNS HPF: ABNORMAL /HPF
RBC UR QL AUTO: ABNORMAL
SAO2 % BLDA: 98.7 % (ref 93–99)
SODIUM SERPL-SCNC: 129 MMOL/L (ref 135–145)
SP GR UR STRIP.AUTO: 1.01
WBC #/AREA URNS HPF: ABNORMAL /HPF

## 2021-10-29 PROCEDURE — 303105 HCHG CATHETER EXTRA

## 2021-10-29 PROCEDURE — 82962 GLUCOSE BLOOD TEST: CPT | Mod: 91

## 2021-10-29 PROCEDURE — 51702 INSERT TEMP BLADDER CATH: CPT

## 2021-10-29 PROCEDURE — 87086 URINE CULTURE/COLONY COUNT: CPT

## 2021-10-29 PROCEDURE — A9270 NON-COVERED ITEM OR SERVICE: HCPCS | Performed by: INTERNAL MEDICINE

## 2021-10-29 PROCEDURE — 94660 CPAP INITIATION&MGMT: CPT

## 2021-10-29 PROCEDURE — 96375 TX/PRO/DX INJ NEW DRUG ADDON: CPT

## 2021-10-29 PROCEDURE — 700101 HCHG RX REV CODE 250: Performed by: INTERNAL MEDICINE

## 2021-10-29 PROCEDURE — 770020 HCHG ROOM/CARE - TELE (206)

## 2021-10-29 PROCEDURE — 96372 THER/PROPH/DIAG INJ SC/IM: CPT

## 2021-10-29 PROCEDURE — 36415 COLL VENOUS BLD VENIPUNCTURE: CPT

## 2021-10-29 PROCEDURE — 96374 THER/PROPH/DIAG INJ IV PUSH: CPT

## 2021-10-29 PROCEDURE — 83605 ASSAY OF LACTIC ACID: CPT

## 2021-10-29 PROCEDURE — 84145 PROCALCITONIN (PCT): CPT

## 2021-10-29 PROCEDURE — 99291 CRITICAL CARE FIRST HOUR: CPT | Mod: AI | Performed by: INTERNAL MEDICINE

## 2021-10-29 PROCEDURE — 94760 N-INVAS EAR/PLS OXIMETRY 1: CPT

## 2021-10-29 PROCEDURE — 700102 HCHG RX REV CODE 250 W/ 637 OVERRIDE(OP): Performed by: INTERNAL MEDICINE

## 2021-10-29 PROCEDURE — 80162 ASSAY OF DIGOXIN TOTAL: CPT

## 2021-10-29 PROCEDURE — 700111 HCHG RX REV CODE 636 W/ 250 OVERRIDE (IP): Performed by: EMERGENCY MEDICINE

## 2021-10-29 PROCEDURE — 82803 BLOOD GASES ANY COMBINATION: CPT

## 2021-10-29 PROCEDURE — 99233 SBSQ HOSP IP/OBS HIGH 50: CPT | Performed by: INTERNAL MEDICINE

## 2021-10-29 PROCEDURE — 36600 WITHDRAWAL OF ARTERIAL BLOOD: CPT

## 2021-10-29 PROCEDURE — 81001 URINALYSIS AUTO W/SCOPE: CPT

## 2021-10-29 PROCEDURE — 700111 HCHG RX REV CODE 636 W/ 250 OVERRIDE (IP): Performed by: INTERNAL MEDICINE

## 2021-10-29 PROCEDURE — 96376 TX/PRO/DX INJ SAME DRUG ADON: CPT

## 2021-10-29 PROCEDURE — 80053 COMPREHEN METABOLIC PANEL: CPT

## 2021-10-29 RX ORDER — FUROSEMIDE 10 MG/ML
40 INJECTION INTRAMUSCULAR; INTRAVENOUS ONCE
Status: COMPLETED | OUTPATIENT
Start: 2021-10-29 | End: 2021-10-29

## 2021-10-29 RX ORDER — DEXTROSE MONOHYDRATE 25 G/50ML
50 INJECTION, SOLUTION INTRAVENOUS
Status: DISCONTINUED | OUTPATIENT
Start: 2021-10-29 | End: 2021-11-09 | Stop reason: HOSPADM

## 2021-10-29 RX ORDER — CEFAZOLIN SODIUM IN 0.9 % NACL 2 G/100 ML
2 PLASTIC BAG, INJECTION (ML) INTRAVENOUS EVERY 8 HOURS
Status: DISCONTINUED | OUTPATIENT
Start: 2021-10-29 | End: 2021-10-31

## 2021-10-29 RX ORDER — METOPROLOL TARTRATE 50 MG/1
50 TABLET, FILM COATED ORAL 2 TIMES DAILY
Status: ON HOLD | COMMUNITY
End: 2021-11-09

## 2021-10-29 RX ORDER — GUAIFENESIN/DEXTROMETHORPHAN 100-10MG/5
10 SYRUP ORAL EVERY 6 HOURS PRN
Status: DISCONTINUED | OUTPATIENT
Start: 2021-10-29 | End: 2021-11-09 | Stop reason: HOSPADM

## 2021-10-29 RX ORDER — DIGOXIN 250 MCG
250 TABLET ORAL
Status: DISCONTINUED | OUTPATIENT
Start: 2021-10-29 | End: 2021-11-09 | Stop reason: HOSPADM

## 2021-10-29 RX ORDER — IPRATROPIUM BROMIDE AND ALBUTEROL SULFATE 2.5; .5 MG/3ML; MG/3ML
3 SOLUTION RESPIRATORY (INHALATION)
Status: DISCONTINUED | OUTPATIENT
Start: 2021-10-29 | End: 2021-10-29

## 2021-10-29 RX ORDER — FUROSEMIDE 10 MG/ML
40 INJECTION INTRAMUSCULAR; INTRAVENOUS
Status: DISCONTINUED | OUTPATIENT
Start: 2021-10-29 | End: 2021-11-03

## 2021-10-29 RX ORDER — AZITHROMYCIN 250 MG/1
500 TABLET, FILM COATED ORAL DAILY
Status: COMPLETED | OUTPATIENT
Start: 2021-10-29 | End: 2021-10-31

## 2021-10-29 RX ORDER — SPIRONOLACTONE 25 MG/1
25 TABLET ORAL DAILY
Status: ON HOLD | COMMUNITY
End: 2021-11-16

## 2021-10-29 RX ORDER — ONDANSETRON 4 MG/1
4 TABLET, ORALLY DISINTEGRATING ORAL EVERY 4 HOURS PRN
Status: DISCONTINUED | OUTPATIENT
Start: 2021-10-29 | End: 2021-11-09 | Stop reason: HOSPADM

## 2021-10-29 RX ORDER — LABETALOL HYDROCHLORIDE 5 MG/ML
10 INJECTION, SOLUTION INTRAVENOUS EVERY 4 HOURS PRN
Status: DISCONTINUED | OUTPATIENT
Start: 2021-10-29 | End: 2021-11-09 | Stop reason: HOSPADM

## 2021-10-29 RX ORDER — AMIODARONE HYDROCHLORIDE 200 MG/1
200 TABLET ORAL DAILY
Status: ON HOLD | COMMUNITY
End: 2021-11-16

## 2021-10-29 RX ORDER — SPIRONOLACTONE 25 MG/1
25 TABLET ORAL
Status: DISCONTINUED | OUTPATIENT
Start: 2021-10-29 | End: 2021-11-05

## 2021-10-29 RX ORDER — POTASSIUM CHLORIDE 20 MEQ/1
20 TABLET, EXTENDED RELEASE ORAL 2 TIMES DAILY
Status: DISCONTINUED | OUTPATIENT
Start: 2021-10-29 | End: 2021-11-03

## 2021-10-29 RX ORDER — IPRATROPIUM BROMIDE AND ALBUTEROL SULFATE 2.5; .5 MG/3ML; MG/3ML
3 SOLUTION RESPIRATORY (INHALATION)
Status: DISCONTINUED | OUTPATIENT
Start: 2021-10-30 | End: 2021-11-01

## 2021-10-29 RX ORDER — ENALAPRILAT 1.25 MG/ML
1.25 INJECTION INTRAVENOUS EVERY 6 HOURS PRN
Status: DISCONTINUED | OUTPATIENT
Start: 2021-10-29 | End: 2021-11-09 | Stop reason: HOSPADM

## 2021-10-29 RX ORDER — GABAPENTIN 300 MG/1
300 CAPSULE ORAL 2 TIMES DAILY
Status: ON HOLD | COMMUNITY
End: 2021-11-16

## 2021-10-29 RX ORDER — SENNOSIDES 8.6 MG
650 CAPSULE ORAL 2 TIMES DAILY
COMMUNITY

## 2021-10-29 RX ORDER — ONDANSETRON 2 MG/ML
4 INJECTION INTRAMUSCULAR; INTRAVENOUS EVERY 4 HOURS PRN
Status: DISCONTINUED | OUTPATIENT
Start: 2021-10-29 | End: 2021-11-09 | Stop reason: HOSPADM

## 2021-10-29 RX ORDER — ACETAMINOPHEN 325 MG/1
650 TABLET ORAL EVERY 6 HOURS PRN
Status: DISCONTINUED | OUTPATIENT
Start: 2021-10-29 | End: 2021-11-09 | Stop reason: HOSPADM

## 2021-10-29 RX ORDER — CLONIDINE HYDROCHLORIDE 0.1 MG/1
0.1 TABLET ORAL EVERY 6 HOURS PRN
Status: DISCONTINUED | OUTPATIENT
Start: 2021-10-29 | End: 2021-11-09 | Stop reason: HOSPADM

## 2021-10-29 RX ORDER — DIGOXIN 250 MCG
250 TABLET ORAL DAILY
Status: ON HOLD | COMMUNITY
End: 2021-11-16

## 2021-10-29 RX ORDER — ALBUTEROL SULFATE 2.5 MG/3ML
2.5 SOLUTION RESPIRATORY (INHALATION) EVERY 4 HOURS PRN
Status: ON HOLD | COMMUNITY
End: 2021-11-09

## 2021-10-29 RX ORDER — AMIODARONE HYDROCHLORIDE 200 MG/1
200 TABLET ORAL
Status: DISCONTINUED | OUTPATIENT
Start: 2021-10-29 | End: 2021-11-09 | Stop reason: HOSPADM

## 2021-10-29 RX ORDER — AMOXICILLIN 250 MG
2 CAPSULE ORAL 2 TIMES DAILY
Status: DISCONTINUED | OUTPATIENT
Start: 2021-10-29 | End: 2021-11-09 | Stop reason: HOSPADM

## 2021-10-29 RX ORDER — METOPROLOL TARTRATE 50 MG/1
50 TABLET, FILM COATED ORAL TWICE DAILY
Status: DISCONTINUED | OUTPATIENT
Start: 2021-10-29 | End: 2021-11-05

## 2021-10-29 RX ORDER — BISACODYL 10 MG
10 SUPPOSITORY, RECTAL RECTAL
Status: DISCONTINUED | OUTPATIENT
Start: 2021-10-29 | End: 2021-11-09 | Stop reason: HOSPADM

## 2021-10-29 RX ORDER — METHYLPREDNISOLONE SODIUM SUCCINATE 40 MG/ML
40 INJECTION, POWDER, LYOPHILIZED, FOR SOLUTION INTRAMUSCULAR; INTRAVENOUS EVERY 6 HOURS
Status: DISCONTINUED | OUTPATIENT
Start: 2021-10-29 | End: 2021-10-29

## 2021-10-29 RX ORDER — POLYETHYLENE GLYCOL 3350 17 G/17G
1 POWDER, FOR SOLUTION ORAL
Status: DISCONTINUED | OUTPATIENT
Start: 2021-10-29 | End: 2021-11-09 | Stop reason: HOSPADM

## 2021-10-29 RX ADMIN — METHYLPREDNISOLONE SODIUM SUCCINATE 40 MG: 40 INJECTION, POWDER, FOR SOLUTION INTRAMUSCULAR; INTRAVENOUS at 06:53

## 2021-10-29 RX ADMIN — FUROSEMIDE 40 MG: 10 INJECTION, SOLUTION INTRAMUSCULAR; INTRAVENOUS at 17:09

## 2021-10-29 RX ADMIN — METOPROLOL TARTRATE 50 MG: 50 TABLET, FILM COATED ORAL at 17:09

## 2021-10-29 RX ADMIN — CEFAZOLIN 2 G: 1 INJECTION, POWDER, FOR SOLUTION INTRAVENOUS at 22:51

## 2021-10-29 RX ADMIN — METHYLPREDNISOLONE SODIUM SUCCINATE 40 MG: 40 INJECTION, POWDER, FOR SOLUTION INTRAMUSCULAR; INTRAVENOUS at 17:09

## 2021-10-29 RX ADMIN — IPRATROPIUM BROMIDE AND ALBUTEROL SULFATE 3 ML: .5; 2.5 SOLUTION RESPIRATORY (INHALATION) at 13:39

## 2021-10-29 RX ADMIN — ENOXAPARIN SODIUM 40 MG: 40 INJECTION SUBCUTANEOUS at 06:53

## 2021-10-29 RX ADMIN — POTASSIUM CHLORIDE 20 MEQ: 20 TABLET, EXTENDED RELEASE ORAL at 06:54

## 2021-10-29 RX ADMIN — AZITHROMYCIN MONOHYDRATE 500 MG: 250 TABLET ORAL at 06:54

## 2021-10-29 RX ADMIN — POTASSIUM CHLORIDE 20 MEQ: 20 TABLET, EXTENDED RELEASE ORAL at 17:09

## 2021-10-29 RX ADMIN — FUROSEMIDE 40 MG: 10 INJECTION, SOLUTION INTRAMUSCULAR; INTRAVENOUS at 01:06

## 2021-10-29 RX ADMIN — METHYLPREDNISOLONE SODIUM SUCCINATE 40 MG: 40 INJECTION, POWDER, FOR SOLUTION INTRAMUSCULAR; INTRAVENOUS at 13:40

## 2021-10-29 RX ADMIN — INSULIN HUMAN 2 UNITS: 100 INJECTION, SOLUTION PARENTERAL at 20:20

## 2021-10-29 ASSESSMENT — FIBROSIS 4 INDEX
FIB4 SCORE: 2.35
FIB4 SCORE: 2.35

## 2021-10-29 ASSESSMENT — COGNITIVE AND FUNCTIONAL STATUS - GENERAL
SUGGESTED CMS G CODE MODIFIER MOBILITY: CL
MOVING FROM LYING ON BACK TO SITTING ON SIDE OF FLAT BED: A LOT
DRESSING REGULAR UPPER BODY CLOTHING: A LOT
TOILETING: A LITTLE
DRESSING REGULAR LOWER BODY CLOTHING: A LOT
WALKING IN HOSPITAL ROOM: A LOT
SUGGESTED CMS G CODE MODIFIER DAILY ACTIVITY: CK
MOBILITY SCORE: 11
MOVING TO AND FROM BED TO CHAIR: A LOT
DAILY ACTIVITIY SCORE: 15
EATING MEALS: A LITTLE
STANDING UP FROM CHAIR USING ARMS: A LOT
TURNING FROM BACK TO SIDE WHILE IN FLAT BAD: A LOT
CLIMB 3 TO 5 STEPS WITH RAILING: TOTAL
PERSONAL GROOMING: A LITTLE
HELP NEEDED FOR BATHING: A LOT

## 2021-10-29 ASSESSMENT — LIFESTYLE VARIABLES
HAVE PEOPLE ANNOYED YOU BY CRITICIZING YOUR DRINKING: NO
HAVE YOU EVER FELT YOU SHOULD CUT DOWN ON YOUR DRINKING: NO
TOTAL SCORE: 0
EVER FELT BAD OR GUILTY ABOUT YOUR DRINKING: NO
TOTAL SCORE: 0
HOW MANY TIMES IN THE PAST YEAR HAVE YOU HAD 5 OR MORE DRINKS IN A DAY: 0
EVER HAD A DRINK FIRST THING IN THE MORNING TO STEADY YOUR NERVES TO GET RID OF A HANGOVER: NO
AVERAGE NUMBER OF DAYS PER WEEK YOU HAVE A DRINK CONTAINING ALCOHOL: 0
ON A TYPICAL DAY WHEN YOU DRINK ALCOHOL HOW MANY DRINKS DO YOU HAVE: 0
ALCOHOL_USE: NO
TOTAL SCORE: 0
CONSUMPTION TOTAL: NEGATIVE

## 2021-10-29 ASSESSMENT — ENCOUNTER SYMPTOMS
FOCAL WEAKNESS: 1
SHORTNESS OF BREATH: 1
SPUTUM PRODUCTION: 1
COUGH: 1

## 2021-10-29 ASSESSMENT — PULMONARY FUNCTION TESTS
EPAP_CMH2O: 18

## 2021-10-29 ASSESSMENT — COPD QUESTIONNAIRES
COPD SCREENING SCORE: 7
DURING THE PAST 4 WEEKS HOW MUCH DID YOU FEEL SHORT OF BREATH: MOST  OR ALL OF THE TIME
HAVE YOU SMOKED AT LEAST 100 CIGARETTES IN YOUR ENTIRE LIFE: YES
DO YOU EVER COUGH UP ANY MUCUS OR PHLEGM?: NO/ONLY WITH OCCASIONAL COLDS OR INFECTIONS

## 2021-10-29 ASSESSMENT — PATIENT HEALTH QUESTIONNAIRE - PHQ9
1. LITTLE INTEREST OR PLEASURE IN DOING THINGS: NOT AT ALL
SUM OF ALL RESPONSES TO PHQ9 QUESTIONS 1 AND 2: 0
2. FEELING DOWN, DEPRESSED, IRRITABLE, OR HOPELESS: NOT AT ALL

## 2021-10-29 NOTE — ASSESSMENT & PLAN NOTE
-Multifactorial in patient with acute on chronic systolic heart failure as well as COPD exacerbation  -Covid was negative  -continue bipap at night  Echocardiogram reviewed, EF is noted to be 55% no mention of diastolic function.  Continue lasix

## 2021-10-29 NOTE — PROGRESS NOTES
Pulmonary Progress Note    Date of admission  10/28/2021    Chief Complaint  61 y.o. female admitted 10/28/2021 with brought in via [facility] on cpap, was found to be at 70% RA with tacypenia. PT states that it started about two hours ago     Hospital Course  62 yo female with afib, dilated cardiomyopathy last ECHO in 2013 with moderate pulmonary hypertension and on amiodarone, hypoxic resp failure unclear cause - chart has COPD but no PFTS and no emphysema on CT, OA of both knees and wheel chair bound as unable to walk due to her knees, morbid obesity with BMI of 61.  Presented with worsenin SOB for 2 days  + cough with yellow sputum  COVID negative   Elevated pro BNP  CXR unable to see LLL but that is also area of atelectasis seen before and she also has pulm edema  Lasix given and also placed on CPAP  Mckeon placed with 1600 cc output with improvement  1/4 blood culture + staph likely contaminant  Initially admitted to icu due to acute CHF but this resolved with diuresis while in the ER    Interval Problem Update  Reviewed last 24 hour events:  As above    Review of Systems  Review of Systems   Constitutional: Positive for malaise/fatigue.   Respiratory: Positive for cough, sputum production and shortness of breath.    Neurological: Positive for focal weakness.        Vital Signs for last 24 hours   Temp:  [36.2 °C (97.2 °F)-36.3 °C (97.3 °F)] 36.2 °C (97.2 °F)  Pulse:  [] 87  Resp:  [14-28] 18  BP: (113-183)/() 149/79  SpO2:  [90 %-100 %] 99 %    Hemodynamic parameters for last 24 hours       Respiratory Information for the last 24 hours       Physical Exam   Physical Exam  Constitutional:       Appearance: She is obese. She is ill-appearing.   HENT:      Head: Normocephalic and atraumatic.      Mouth/Throat:      Mouth: Mucous membranes are dry.   Eyes:      Extraocular Movements: Extraocular movements intact.      Pupils: Pupils are equal, round, and reactive to light.   Cardiovascular:      Rate and  Rhythm: Normal rate.   Pulmonary:      Comments: Diminished but = BS  Abdominal:      General: Bowel sounds are normal. There is distension.      Palpations: Abdomen is soft.      Comments: Enlarged and swollen panus   Musculoskeletal:      Right lower leg: Edema present.      Left lower leg: Edema present.   Neurological:      General: No focal deficit present.      Mental Status: She is alert and oriented to person, place, and time.   Psychiatric:         Mood and Affect: Mood normal.         Behavior: Behavior normal.         Medications  Current Facility-Administered Medications   Medication Dose Route Frequency Provider Last Rate Last Admin   • amiodarone (Cordarone) tablet 200 mg  200 mg Oral QDAY Rudi Morales D.O.       • digoxin (LANOXIN) tablet 250 mcg  250 mcg Oral QDAY Rudi Morales D.O.       • metoprolol tartrate (LOPRESSOR) tablet 50 mg  50 mg Oral TWICE DAILY BRAYDON ContiO.   50 mg at 10/29/21 1709   • spironolactone (ALDACTONE) tablet 25 mg  25 mg Oral QDAY Rudi Morales D.O.       • senna-docusate (PERICOLACE or SENOKOT S) 8.6-50 MG per tablet 2 Tablet  2 Tablet Oral BID Rudi Morales D.O.        And   • polyethylene glycol/lytes (MIRALAX) PACKET 1 Packet  1 Packet Oral QDAY PRN Rudi Morales D.O.        And   • magnesium hydroxide (MILK OF MAGNESIA) suspension 30 mL  30 mL Oral QDAY PRN Rudi Morales D.O.        And   • bisacodyl (DULCOLAX) suppository 10 mg  10 mg Rectal QDAY PRN Rudi Morales D.O.       • enoxaparin (LOVENOX) inj 40 mg  40 mg Subcutaneous DAILY BRAYDON ContiO.   40 mg at 10/29/21 0653   • acetaminophen (Tylenol) tablet 650 mg  650 mg Oral Q6HRS PRN Rudi Morales D.O.       • cloNIDine (CATAPRES) tablet 0.1 mg  0.1 mg Oral Q6HRS PRN Rudi Morales D.O.       • enalaprilat (Vasotec) injection 1.25 mg 1 mL  1.25 mg Intravenous Q6HRS PRN BRAYDON ContiO.       • labetalol (NORMODYNE/TRANDATE) injection 10 mg  10 mg Intravenous Q4HRS PRN  Rudi Morales D.O.       • ondansetron (ZOFRAN) syringe/vial injection 4 mg  4 mg Intravenous Q4HRS PRN Rudi Morales D.O.       • ondansetron (ZOFRAN ODT) dispertab 4 mg  4 mg Oral Q4HRS PRN Rudi Morales D.O.       • guaiFENesin dextromethorphan (ROBITUSSIN DM) 100-10 MG/5ML syrup 10 mL  10 mL Oral Q6HRS PRN Rudi Morales D.O.       • insulin regular (HumuLIN R,NovoLIN R) injection  2-9 Units Subcutaneous 4X/DAY ACHS Rudi Morales D.O.        And   • glucose 4 g chewable tablet 16 g  16 g Oral Q15 MIN PRN Rudi Morales D.O.        And   • dextrose 50% (D50W) injection 50 mL  50 mL Intravenous Q15 MIN PRN Rudi Morales D.O.       • furosemide (LASIX) injection 40 mg  40 mg Intravenous BID DIURETIC BRAYDON ContiOMaranda   40 mg at 10/29/21 1709   • potassium chloride SA (Kdur) tablet 20 mEq  20 mEq Oral BID BRAYDON ContiOMaranda   20 mEq at 10/29/21 1709   • Respiratory Therapy Consult   Nebulization Continuous RT Rudi Morales D.O.       • ipratropium-albuterol (DUONEB) nebulizer solution  3 mL Nebulization Q4HRS (RT) BRAYDON ContiOMaranda   3 mL at 10/29/21 1339   • azithromycin (ZITHROMAX) tablet 500 mg  500 mg Oral DAILY BRAYDON ContiO.   500 mg at 10/29/21 0654       Fluids    Intake/Output Summary (Last 24 hours) at 10/29/2021 1730  Last data filed at 10/29/2021 0800  Gross per 24 hour   Intake --   Output 600 ml   Net -600 ml       Laboratory  Recent Labs     10/29/21  0030   [id number] 7.27*   [id number] 100.5*   [id number] 156.9*   [id number] 98.7   ARTHCO3 45*   ARTBE 14*         Recent Labs     10/28/21  2143 10/29/21  1303   SODIUM 126* 129*   POTASSIUM 4.7 5.5   CHLORIDE 80* 80*   CO2 36* 38*   BUN 12 13   CREATININE 0.82 0.75   CALCIUM 9.0 9.1     Recent Labs     10/28/21  2143 10/29/21  1303   ALTSGPT 13 15   ASTSGOT 10* 23   ALKPHOSPHAT 66 54   TBILIRUBIN 0.9 0.9   GLUCOSE 192* 142*     Recent Labs     10/28/21  2143 10/29/21  1303   WBC 11.2*  --    NEUTSPOLYS 72.40*  --     LYMPHOCYTES 17.80*  --    MONOCYTES 8.70  --    EOSINOPHILS 0.40  --    BASOPHILS 0.30  --    ASTSGOT 10* 23   ALTSGPT 13 15   ALKPHOSPHAT 66 54   TBILIRUBIN 0.9 0.9     Recent Labs     10/28/21  2143   RBC 4.14*   HEMOGLOBIN 12.3   HEMATOCRIT 40.2   PLATELETCT 157*       Imaging  CXR personally reviewed LLL atelectasis vs infiltrate. Enlarged heart and def. Cardiomegaly and pulm edema    Assessment/Plan  Acute on chronic congestive heart failure (HCC)- (present on admission)  Assessment & Plan  Repeat ECHO  Mckeon for strict I and O  Fluid restriction  Cardiac diet  Troponin negative  Lasix bid, spironolactone    Leucocytosis  Assessment & Plan  Likely reactive but continue azithromycin  LLL opacity likely atelectasis which she has had before  Follow up final of blood cxs  1/4 + suggestive of contaminant    Chronic obstructive pulmonary disease with acute exacerbation (HCC)- (present on admission)  Assessment & Plan  Ex smoker  Presented with hypercapneic resp failure more consistent with obesity hypoventilation syndrome   No PFTs  Unclear if actual dx  As heart failure seems more significant will stop steroids    Essential hypertension- (present on admission)  Assessment & Plan  Restart home meds    AF (atrial fibrillation) (HCC)- (present on admission)  Assessment & Plan  On digoxin and amiodarone  Assume not anticoagulated due to high risk of fall       VTE:  Lovenox  Ulcer: Not Indicated  Lines: Mckeon Catheter  Ongoing indication addressed    I have performed a physical exam and reviewed and updated ROS and Plan today (10/29/2021). In review of yesterday's note (10/28/2021), there are no changes except as documented above.     Discussed patient condition and risk of morbidity and/or mortality with  Bedside RN and patient.

## 2021-10-29 NOTE — ASSESSMENT & PLAN NOTE
-No recent echocardiogram, most recent was in 2013 with an ejection fraction of 45%   --on I/Os, daily weight, fluid restriction

## 2021-10-29 NOTE — FLOWSHEET NOTE
10/28/21 2144   Events/Summary/Plan   Events/Summary/Plan BIPAP 23/18 started   Skin Inspection Respiratory Device Intact   Vital Signs   Pulse 73  (afib)   Respiration (!) 21   Pulse Oximetry 90 %   $ Pulse Oximetry (Spot Check) Yes   Respiratory Assessment   Respiratory Pattern Within Normal Limits   Level of Consciousness Alert   Chest Exam   Work Of Breathing / Effort Accessory Muscle Use;Tachypnea   Breath Sounds   RUL Breath Sounds Crackles;Diminished   RML Breath Sounds Crackles;Diminished   RLL Breath Sounds Diminished   CECILE Breath Sounds Crackles;Diminished   LLL Breath Sounds Diminished   Oxygen   FiO2% 50 %   O2 Delivery Device BIPAP;CPAP   Non-Invasive Ventilation [employer]   Nocturnal CPAP or BIPAP BIPAP - [facility]   $ System Evaluation Yes   Settings (If Known) 23/18   FiO2 or LPM 50

## 2021-10-29 NOTE — FLOWSHEET NOTE
10/28/21 2230   Events/Summary/Plan   Events/Summary/Plan Off BIPAP and on nasal cannula   Skin Inspection Respiratory Device Intact   Vital Signs   $ Pulse Oximetry (Spot Check) Yes   Respiratory Assessment   Level of Consciousness Alert   Chest Exam   Work Of Breathing / Effort Within Normal Limits   Secretions   Cough Non Productive   Oxygen   O2 (LPM) 5   O2 Delivery Device Nasal Cannula   Non-Invasive Ventilation [employer]   FiO2 or LPM 5

## 2021-10-29 NOTE — ED PROVIDER NOTES
ED Provider Note    CHIEF COMPLAINT  Chief Complaint   Patient presents with   • Respiratory Distress     brought in via [facility] on cpap, was found to be at 70% RA with tacypenia. PT states that it started about two hours ago        HPI  Cheryl D Blakemore is a 61 y.o. female with a past medical history of A. fib,  emphysema, CHF, and chronic pain who presents for evaluation of respiratory distress.  Patient's daughter-in-law called the ambulance around 8 PM tonight after patient was noted to be short of breath since approximately 1 PM this afternoon.  Patient normally uses an oxygen concentrator 24 hours a day and anywhere from 3 to 5 L however today she felt like it needed to be higher.  She is found apparently at 70% on room air with tachypnea by EMS however it is unclear why she was on room air.  Patient notes that she is unable to sleep flat due to increasing respiratory distress in that position.    REVIEW OF SYSTEMS  Constitutional: No fevers or chills  Skin: No cyanosis.  HEENT: No sore throat, runny nose, sores, trouble swallowing, trouble speaking.  Neck: No neck pain, stiffness, or masses.  Chest: No pain   Pulm: No cough, wheezing, stridor, or pain with inspiration/expiration  Gastrointestinal: No nausea, vomiting, diarrhea, or abdominal pain.  Genitourinary: No dysuria or hematuria  Musculoskeletal: No pain, or new weakness  Neurologic: No sensory or motor changes. No confusion or disorientation.  Heme: No bleeding or bruising problems.   Immuno: No hx of recurrent infections    PAST FAM HISTORY  Family History   Problem Relation Age of Onset   • Cancer Father         lung, smoker   • Diabetes Mother    • Hypertension Mother    • Hyperlipidemia Mother    • Diabetes Maternal Aunt         x4   • Heart Disease Sister    • Hypertension Sister    • Hyperlipidemia Sister    • Thyroid Sister    • Psychiatric Illness Neg Hx    • Stroke Neg Hx        PAST MEDICAL HISTORY   has a past medical history of Arthritis,  "Chronic pain, COPD (chronic obstructive pulmonary disease) (HCC), Hypertension, and Migraine.    SOCIAL HISTORY  Social History     Tobacco Use   • Smoking status: Former Smoker     Packs/day: 1.00     Years: 20.00     Pack years: 20.00     Types: Cigarettes     Quit date: 2013     Years since quittin.7   • Smokeless tobacco: Never Used   • Tobacco comment: 1 ppd   Substance and Sexual Activity   • Alcohol use: No     Alcohol/week: 0.0 oz   • Drug use: Yes     Types: Marijuana   • Sexual activity: Not on file       SURGICAL HISTORY   has a past surgical history that includes primary c section; tracheostomy (2013); and gastrostomy laparoscopic (2013).    CURRENT MEDICATIONS  Digoxin 250 mcg tablet daily, amiodarone 200 mg tablet daily, spironolactone 25 mg tablet daily, metoprolol tartrate 50 mg tablet twice a day, gabapentin 300 mg capsule twice a day  ALLERGIES  No Known Allergies    PHYSICAL EXAM  VITAL SIGNS: /73   Pulse 85   Temp 36.3 °C (97.3 °F) (Axillary)   Resp 18   Ht 1.626 m (5' 4\")   Wt (!) 163 kg (360 lb)   SpO2 98%   BMI 61.79 kg/m²    Gen: Appears tired.  On BiPAP.    HEENT: No signs of trauma, Bilateral external ears normal, Nose normal. Conjunctiva normal, Non-icteric.    Cardiovascular: Regular rate and rhythm.  Capillary refill less than 3 seconds to all extremities, 2+ distal pulses.  Bilateral 3+ pitting edema to the pretibial, foot dorsum, and ankle region.  Thorax & Lungs: Distant lung sounds bilaterally.  Mild tachypnea, otherwise no increased work of breathing, No auscultate able wheezing. Bilateral chest rise  Abdomen: Morbidly obese with large pannus. bowel sounds distant, soft, No apparent tenderness  Skin: Warm, Dry  Extremities: Intact distal pulses to dorsalis pedis and radial arteries.  Neurologic: Alert , no facial droop      LABS  Results for orders placed or performed during the hospital encounter of 10/28/21   Troponin   Result Value Ref Range    " Troponin T 12 6 - 19 ng/L   CBC with Differential   Result Value Ref Range    WBC 11.2 (H) 4.8 - 10.8 K/uL    RBC 4.14 (L) 4.20 - 5.40 M/uL    Hemoglobin 12.3 12.0 - 16.0 g/dL    Hematocrit 40.2 37.0 - 47.0 %    MCV 97.1 81.4 - 97.8 fL    MCH 29.7 27.0 - 33.0 pg    MCHC 30.6 (L) 33.6 - 35.0 g/dL    RDW 44.2 35.9 - 50.0 fL    Platelet Count 157 (L) 164 - 446 K/uL    MPV 12.0 9.0 - 12.9 fL    Neutrophils-Polys 72.40 (H) 44.00 - 72.00 %    Lymphocytes 17.80 (L) 22.00 - 41.00 %    Monocytes 8.70 0.00 - 13.40 %    Eosinophils 0.40 0.00 - 6.90 %    Basophils 0.30 0.00 - 1.80 %    Immature Granulocytes 0.40 0.00 - 0.90 %    Nucleated RBC 0.00 /100 WBC    Neutrophils (Absolute) 8.15 (H) 2.00 - 7.15 K/uL    Lymphs (Absolute) 2.00 1.00 - 4.80 K/uL    Monos (Absolute) 0.98 (H) 0.00 - 0.85 K/uL    Eos (Absolute) 0.04 0.00 - 0.51 K/uL    Baso (Absolute) 0.03 0.00 - 0.12 K/uL    Immature Granulocytes (abs) 0.04 0.00 - 0.11 K/uL    NRBC (Absolute) 0.00 K/uL   Comp Metabolic Panel   Result Value Ref Range    Sodium 126 (L) 135 - 145 mmol/L    Potassium 4.7 3.6 - 5.5 mmol/L    Chloride 80 (L) 96 - 112 mmol/L    Co2 36 (H) 20 - 33 mmol/L    Anion Gap 10.0 7.0 - 16.0    Glucose 192 (H) 65 - 99 mg/dL    Bun 12 8 - 22 mg/dL    Creatinine 0.82 0.50 - 1.40 mg/dL    Calcium 9.0 8.4 - 10.2 mg/dL    AST(SGOT) 10 (L) 12 - 45 U/L    ALT(SGPT) 13 2 - 50 U/L    Alkaline Phosphatase 66 30 - 99 U/L    Total Bilirubin 0.9 0.1 - 1.5 mg/dL    Albumin 4.2 3.2 - 4.9 g/dL    Total Protein 8.1 6.0 - 8.2 g/dL    Globulin 3.9 (H) 1.9 - 3.5 g/dL    A-G Ratio 1.1 g/dL   Lactic acid (lactate): Repeat if initial lactic acid result is greater than 2   Result Value Ref Range    Lactic Acid 3.2 (H) 0.5 - 2.0 mmol/L   URINALYSIS    Specimen: Urine   Result Value Ref Range    Color Yellow     Character Hazy (A)     Specific Gravity 1.015 <1.035    Ph 6.0 5.0 - 8.0    Glucose Negative Negative mg/dL    Ketones Negative Negative mg/dL    Protein 30 (A) Negative mg/dL     Bilirubin Negative Negative    Nitrite Negative Negative    Leukocyte Esterase Negative Negative    Occult Blood Trace (A) Negative    Micro Urine Req Microscopic    proBrain Natriuretic Peptide, NT   Result Value Ref Range    NT-proBNP 1361 (H) 0 - 125 pg/mL   LACTIC ACID   Result Value Ref Range    Lactic Acid 2.5 (H) 0.5 - 2.0 mmol/L   ESTIMATED GFR   Result Value Ref Range    GFR If African American >60 >60 mL/min/1.73 m 2    GFR If Non African American >60 >60 mL/min/1.73 m 2   COV-2, FLU A/B, AND RSV BY PCR (2-4 HOURS CEPHEID): Collect NP swab in VTM    Specimen: Respirate   Result Value Ref Range    Influenza virus A RNA Negative Negative    Influenza virus B, PCR Negative Negative    RSV, PCR Negative Negative    SARS-CoV-2 by PCR NotDetected     SARS-CoV-2 Source Nasal Swab    ABG - LAB   Result Value Ref Range    Ph 7.27 (LL) 7.40 - 7.50    Pco2 100.5 (HH) 26.0 - 37.0 mmHg    Po2 156.9 (H) 64.0 - 87.0 mmHg    O2 Saturation 98.7 93.0 - 99.0 %    Hco3 45 (H) 17 - 25 mmol/L    Base Excess 14 (H) -4 - 3 mmol/L    Body Temp see below Centigrade   DIGOXIN   Result Value Ref Range    Digoxin 1.0 0.8 - 2.0 ng/mL   PROCALCITONIN   Result Value Ref Range    Procalcitonin 0.03 <0.25 ng/mL   URINE MICROSCOPIC (W/UA)   Result Value Ref Range    WBC 0-2 /hpf    RBC 2-5 (A) /hpf    Bacteria Few (A) None /hpf    Epithelial Cells Few Few /hpf   EKG   Result Value Ref Range    Report       [facility] Emergency Dept.    Test Date:  2021-10-28  Pt Name:    CHERYL BLAKEMORE             Department: [facility]  MRN:        [id number]                      Room:       -ROOM 1  Gender:     Female                       Technician: SIMEON  :        [id number]                   Requested By:ER TRIAGE PROTOCOL  Order #:    [id number]                    Konrad MD:    Measurements  Intervals                                Axis  Rate:       74                           P:  KS:                                      QRS:         -38  QRSD:       97                           T:          134  QT:         380  QTc:        422    Interpretive Statements  Atrial fibrillation  Abnormal R-wave progression, late transition  Inferior infarct, old  Lateral leads are also involved  Baseline wander in lead(s) V6  Compared to ECG 01/12/2015 15:20:28  Myocardial infarct finding now present  T-wave abnormality no longer present  Possible ischemia no longer present  Ventricular premature  complex(es) no longer present         RADIOLOGY  DX-CHEST-PORTABLE (1 VIEW)   Final Result         1.  Pulmonary edema and/or infiltrates.   2.  Cardiomegaly      EC-ECHOCARDIOGRAM COMPLETE W/O CONT    (Results Pending)     Critical Care Note  Upon my evaluation, this patient had high probability of imminent and life-threatening deterioration due to hypoxemic and hypercarbic acute on chronic respiratory failure, congestive heart failure with pulmonary edema, which required my direct attention, intervention, and personal management. I personally provided 85 minutes of critical care time exclusive of time spent on separately billable procedures. Time includes review of laboratory data, radiology results, discussion with consultants, and monitoring for potential decompensation.     COURSE & MEDICAL DECISION MAKING  Patient arrives for evaluation of what appears to be acute on chronic respiratory failure.  Unclear why patient was on room air at home as she is on several liters of pulmonary oxygen 24 hours a day.  She notes no recent fevers or chills and no chest pain.  She is noted to be in atrial fibrillation and she notes that she is always in this rhythm.    12 AM  Patient appears very somnolent and is difficult to wake only waking for brief amounts of time opening her eyes and looking at the speaker.  She mumbles and reply.  We will get an ABG out of concern for possible hypercarbia.    12:59 AM  Patient's ABG is concerning for hypercarbic respiratory failure which  appears to be acute given her acidosis.  I did reevaluate the patient at bedside and she does have her eyes open and is speaking but still appears very sedated.  She states understanding of the need for BiPAP at this point.  We will also give Lasix empirically for what looks to be fluid overload given her hyponatremia and lower extremity edema in the setting of pulmonary edema.  Patient was discussed with the hospitalist will admit for further treatment and evaluation.      FINAL IMPRESSION  1. Acute congestive heart failure, unspecified heart failure type (HCC)    2. Acute on chronic respiratory failure with hypercapnia (HCC)    3. Chronic respiratory failure with hypoxia (HCC)        Electronically signed by: Darwin Boland M.D., 10/28/2021 10:16 PM

## 2021-10-29 NOTE — ED NOTES
Assumed care of pt-awake, w/o c/o. In no apparent distress on bipap 40%, moniter continues afib, pure wick in place, call light in reach, denies needs. reoriented to place and plan of care

## 2021-10-29 NOTE — ED NOTES
Labs drawn,Mckeon cath inserted- with output. Pt partial linen chg due to urinary in continence with subsequent transeint need for bipap. Returned to 4L n/c with treatment and steriod given as previously ordered, rt to bedside

## 2021-10-29 NOTE — ED NOTES
Pt switched to hospital bed call light in reach. Pt voided and needed to be cleaned. Pts linens changed, and pt was repositioned. Call light returned to Pt.     Report to Ashley

## 2021-10-29 NOTE — ED NOTES
Med rec completed per Pt's pharmacies [facility] ([phone]) and [facility] (called 24 hour location at [phone], Pt fills at [facility] on [address] [facility]).  Pt unable to participate in interview at this time. Attempted to contact Pt's daughter-in-law Sherron at number listed in emergency contacts ([phone]) and received no answer; left voicemail with request for call back. Unable to assess Pt's allergies. Unable to verify times of last doses or recent over-the-counter medication usage.  Per [facility] and [facility], no oral antibiotics dispensed within last 30 days.

## 2021-10-29 NOTE — ASSESSMENT & PLAN NOTE
-Continue RT protocol, breathing treatment.   --  Never had PFT in the past   --  Continue RT protocol and breathing treatment, melvin

## 2021-10-29 NOTE — ASSESSMENT & PLAN NOTE
-Currently rate controlled   --home amiodarone, digoxin and metoprolol  Not on anti-coagulation, understand the risk and benefit of not being anticoagulated, including stroke

## 2021-10-29 NOTE — FLOWSHEET NOTE
10/29/21 1336   Events/Summary/Plan   Events/Summary/Plan SVN given   Vital Signs   Pulse 99  (post 120)   Respiration 20  (post 20)   Pulse Oximetry 92 %   $ Pulse Oximetry (Spot Check) Yes   Respiratory Assessment   Level of Consciousness Alert   Chest Exam   Work Of Breathing / Effort Shallow   Breath Sounds   RUL Breath Sounds Diminished  (expiratory wheezes after svn)   RML Breath Sounds Diminished   RLL Breath Sounds Diminished   CECILE Breath Sounds Diminished   LLL Breath Sounds Diminished   Oxygen   O2 (LPM) 4   O2 Delivery Device Nasal Cannula

## 2021-10-29 NOTE — ED NOTES
Aiden from Lab called with critical result of + blood culture for gram + cocci at 1514. Critical lab result read back to Aiden.   Dr. Smith notified of critical lab result at 1534.  Critical lab result read back by Dr. Smith.

## 2021-10-29 NOTE — FLOWSHEET NOTE
10/29/21 1217   Events/Summary/Plan   Events/Summary/Plan bipap off   Skin Inspection Respiratory Device Intact   Vital Signs   $ Pulse Oximetry (Spot Check) Yes   Oxygen   O2 (LPM) 3   O2 Delivery Device Nasal Cannula

## 2021-10-29 NOTE — ASSESSMENT & PLAN NOTE
-Continue home metoprolol and aldactone   -Start as needed labetalol, clonidine and enalapril  -Adjust as needed

## 2021-10-29 NOTE — RESPIRATORY CARE
Respiratory Therapy Update    Interdisciplinary Plan of Care-Goals (Indications)  Bronchodilator Indications: History / Diagnosis (10/29/21 0200)      Cough: Non Productive (10/28/21 2230)       Results for BLAKEMORE, CHERYL D (MRN [id number]) as of 10/29/2021 02:58   Ref. Range 10/29/2021 00:30   Ph Latest Ref Range: 7.40 - 7.50  7.27 (LL)   Pco2 Latest Ref Range: 26.0 - 37.0 mmHg 100.5 (HH)   Po2 Latest Ref Range: 64.0 - 87.0 mmHg 156.9 (H)   Hco3 Latest Ref Range: 17 - 25 mmol/L 45 (H)   Base Excess Latest Ref Range: -4 - 3 mmol/L 14 (H)   O2 Saturation Latest Ref Range: 93.0 - 99.0 % 98.7   Body Temp Latest Units: Centigrade see below       FiO2%: 45 % (10/29/21 0111)  O2 (LPM): 5 (10/29/21 0030)   Patient was on bipap from 2145 to 2230 w/ABG @ 0030 on nasal cannula then placed back on bipap @ 0111    Breath Sounds  RUL Breath Sounds: Crackles;Diminished (10/28/21 2144)  RML Breath Sounds: Crackles;Diminished (10/28/21 2144)  RLL Breath Sounds: Diminished (10/28/21 2144)  CECILE Breath Sounds: Crackles;Diminished (10/28/21 2144)  LLL Breath Sounds: Diminished (10/28/21 2144)    BIPAP @ 23/18 with rate 12 Events/Summary/Plan  Assessing for bronchodilator Q4, COPD/CHF exacerbation

## 2021-10-29 NOTE — ED NOTES
0550- pt resting with eyes closed, pt roused to tactile stimuli. Pt states she doesn't think she can take oral meds at this time.   0600-Alerted that pt had attempted to get out of bed, found sitting at the end of bed on the floor, denies injury. 4 person lift to get pt back to bed, pt re-oriented to pure-wick, call light in reach. Pt educated on using call light for assistance.

## 2021-10-29 NOTE — ED NOTES
Pt becoming increasing lethargic. ABG completed. Results were critical CO2. ERP updated, pt placed back on Bipap. Pt was also provided with IV lasix. Pure-wick put in place for urination at this time. Pt denies any other questions or concerns at this time.

## 2021-10-29 NOTE — ASSESSMENT & PLAN NOTE
At 122, it could be secondary to hypervolemia, will continue Lasix  --Urine studies reviewed, call nephrology  Dr Shen for assistance as patient sodium is 132 on 11/5/2021

## 2021-10-30 ENCOUNTER — APPOINTMENT (OUTPATIENT)
Dept: CARDIOLOGY | Facility: MEDICAL CENTER | Age: 61
DRG: 291 | End: 2021-10-30
Attending: INTERNAL MEDICINE
Payer: MEDICARE

## 2021-10-30 PROBLEM — B95.8 BACTEREMIA DUE TO STAPHYLOCOCCUS: Status: ACTIVE | Noted: 2021-10-30

## 2021-10-30 PROBLEM — R78.81 BACTEREMIA DUE TO STAPHYLOCOCCUS: Status: ACTIVE | Noted: 2021-10-30

## 2021-10-30 LAB
ANION GAP SERPL CALC-SCNC: 5 MMOL/L (ref 7–16)
BASOPHILS # BLD AUTO: 0.1 % (ref 0–1.8)
BASOPHILS # BLD: 0.01 K/UL (ref 0–0.12)
BUN SERPL-MCNC: 16 MG/DL (ref 8–22)
CALCIUM SERPL-MCNC: 9.6 MG/DL (ref 8.4–10.2)
CHLORIDE SERPL-SCNC: 83 MMOL/L (ref 96–112)
CO2 SERPL-SCNC: 46 MMOL/L (ref 20–33)
CREAT SERPL-MCNC: 0.92 MG/DL (ref 0.5–1.4)
EOSINOPHIL # BLD AUTO: 0 K/UL (ref 0–0.51)
EOSINOPHIL NFR BLD: 0 % (ref 0–6.9)
ERYTHROCYTE [DISTWIDTH] IN BLOOD BY AUTOMATED COUNT: 45.1 FL (ref 35.9–50)
GLUCOSE BLD-MCNC: 110 MG/DL (ref 65–99)
GLUCOSE BLD-MCNC: 112 MG/DL (ref 65–99)
GLUCOSE BLD-MCNC: 129 MG/DL (ref 65–99)
GLUCOSE BLD-MCNC: 150 MG/DL (ref 65–99)
GLUCOSE SERPL-MCNC: 149 MG/DL (ref 65–99)
HCT VFR BLD AUTO: 38.7 % (ref 37–47)
HGB BLD-MCNC: 12 G/DL (ref 12–16)
IMM GRANULOCYTES # BLD AUTO: 0.04 K/UL (ref 0–0.11)
IMM GRANULOCYTES NFR BLD AUTO: 0.4 % (ref 0–0.9)
LV EJECT FRACT  99904: 55
LV EJECT FRACT MOD 4C 99902: 25.06
LYMPHOCYTES # BLD AUTO: 0.91 K/UL (ref 1–4.8)
LYMPHOCYTES NFR BLD: 10.2 % (ref 22–41)
MCH RBC QN AUTO: 29.9 PG (ref 27–33)
MCHC RBC AUTO-ENTMCNC: 31 G/DL (ref 33.6–35)
MCV RBC AUTO: 96.5 FL (ref 81.4–97.8)
MONOCYTES # BLD AUTO: 0.41 K/UL (ref 0–0.85)
MONOCYTES NFR BLD AUTO: 4.6 % (ref 0–13.4)
NEUTROPHILS # BLD AUTO: 7.56 K/UL (ref 2–7.15)
NEUTROPHILS NFR BLD: 84.7 % (ref 44–72)
NRBC # BLD AUTO: 0 K/UL
NRBC BLD-RTO: 0 /100 WBC
NT-PROBNP SERPL IA-MCNC: 1003 PG/ML (ref 0–125)
PLATELET # BLD AUTO: 155 K/UL (ref 164–446)
PMV BLD AUTO: 13.1 FL (ref 9–12.9)
POTASSIUM SERPL-SCNC: 4.5 MMOL/L (ref 3.6–5.5)
RBC # BLD AUTO: 4.01 M/UL (ref 4.2–5.4)
SODIUM SERPL-SCNC: 134 MMOL/L (ref 135–145)
WBC # BLD AUTO: 8.9 K/UL (ref 4.8–10.8)

## 2021-10-30 PROCEDURE — 700111 HCHG RX REV CODE 636 W/ 250 OVERRIDE (IP): Performed by: INTERNAL MEDICINE

## 2021-10-30 PROCEDURE — 93306 TTE W/DOPPLER COMPLETE: CPT | Mod: 26 | Performed by: INTERNAL MEDICINE

## 2021-10-30 PROCEDURE — 87040 BLOOD CULTURE FOR BACTERIA: CPT | Mod: 91

## 2021-10-30 PROCEDURE — A9270 NON-COVERED ITEM OR SERVICE: HCPCS | Performed by: INTERNAL MEDICINE

## 2021-10-30 PROCEDURE — 3E02340 INTRODUCTION OF INFLUENZA VACCINE INTO MUSCLE, PERCUTANEOUS APPROACH: ICD-10-PCS | Performed by: HOSPITALIST

## 2021-10-30 PROCEDURE — 85025 COMPLETE CBC W/AUTO DIFF WBC: CPT

## 2021-10-30 PROCEDURE — 90686 IIV4 VACC NO PRSV 0.5 ML IM: CPT | Performed by: INTERNAL MEDICINE

## 2021-10-30 PROCEDURE — 94640 AIRWAY INHALATION TREATMENT: CPT

## 2021-10-30 PROCEDURE — 80048 BASIC METABOLIC PNL TOTAL CA: CPT

## 2021-10-30 PROCEDURE — 94760 N-INVAS EAR/PLS OXIMETRY 1: CPT

## 2021-10-30 PROCEDURE — 90471 IMMUNIZATION ADMIN: CPT

## 2021-10-30 PROCEDURE — 700101 HCHG RX REV CODE 250: Performed by: INTERNAL MEDICINE

## 2021-10-30 PROCEDURE — 99233 SBSQ HOSP IP/OBS HIGH 50: CPT | Performed by: INTERNAL MEDICINE

## 2021-10-30 PROCEDURE — 770020 HCHG ROOM/CARE - TELE (206)

## 2021-10-30 PROCEDURE — 700102 HCHG RX REV CODE 250 W/ 637 OVERRIDE(OP): Performed by: INTERNAL MEDICINE

## 2021-10-30 PROCEDURE — 83880 ASSAY OF NATRIURETIC PEPTIDE: CPT

## 2021-10-30 PROCEDURE — 82962 GLUCOSE BLOOD TEST: CPT

## 2021-10-30 PROCEDURE — 93306 TTE W/DOPPLER COMPLETE: CPT

## 2021-10-30 RX ORDER — NYSTATIN 100000 [USP'U]/G
POWDER TOPICAL 3 TIMES DAILY
Status: COMPLETED | OUTPATIENT
Start: 2021-10-30 | End: 2021-11-06

## 2021-10-30 RX ADMIN — IPRATROPIUM BROMIDE AND ALBUTEROL SULFATE 3 ML: .5; 2.5 SOLUTION RESPIRATORY (INHALATION) at 14:36

## 2021-10-30 RX ADMIN — SENNOSIDES AND DOCUSATE SODIUM 2 TABLET: 50; 8.6 TABLET ORAL at 05:27

## 2021-10-30 RX ADMIN — METOPROLOL TARTRATE 50 MG: 50 TABLET, FILM COATED ORAL at 18:11

## 2021-10-30 RX ADMIN — CEFAZOLIN 2 G: 1 INJECTION, POWDER, FOR SOLUTION INTRAVENOUS at 21:21

## 2021-10-30 RX ADMIN — POTASSIUM CHLORIDE 20 MEQ: 20 TABLET, EXTENDED RELEASE ORAL at 05:26

## 2021-10-30 RX ADMIN — CEFAZOLIN 2 G: 1 INJECTION, POWDER, FOR SOLUTION INTRAVENOUS at 05:25

## 2021-10-30 RX ADMIN — SENNOSIDES AND DOCUSATE SODIUM 2 TABLET: 50; 8.6 TABLET ORAL at 18:08

## 2021-10-30 RX ADMIN — SPIRONOLACTONE 25 MG: 25 TABLET ORAL at 01:46

## 2021-10-30 RX ADMIN — AMIODARONE HYDROCHLORIDE 200 MG: 200 TABLET ORAL at 01:46

## 2021-10-30 RX ADMIN — INFLUENZA A VIRUS A/VICTORIA/2570/2019 IVR-215 (H1N1) ANTIGEN (FORMALDEHYDE INACTIVATED), INFLUENZA A VIRUS A/TASMANIA/503/2020 IVR-221 (H3N2) ANTIGEN (FORMALDEHYDE INACTIVATED), INFLUENZA B VIRUS B/PHUKET/3073/2013 ANTIGEN (FORMALDEHYDE INACTIVATED), AND INFLUENZA B VIRUS B/WASHINGTON/02/2019 ANTIGEN (FORMALDEHYDE INACTIVATED) 0.5 ML: 15; 15; 15; 15 INJECTION, SUSPENSION INTRAMUSCULAR at 22:04

## 2021-10-30 RX ADMIN — IPRATROPIUM BROMIDE AND ALBUTEROL SULFATE 3 ML: .5; 2.5 SOLUTION RESPIRATORY (INHALATION) at 07:02

## 2021-10-30 RX ADMIN — NYSTATIN: 100000 POWDER TOPICAL at 18:07

## 2021-10-30 RX ADMIN — METOPROLOL TARTRATE 50 MG: 50 TABLET, FILM COATED ORAL at 05:27

## 2021-10-30 RX ADMIN — IPRATROPIUM BROMIDE AND ALBUTEROL SULFATE 3 ML: .5; 2.5 SOLUTION RESPIRATORY (INHALATION) at 20:50

## 2021-10-30 RX ADMIN — IPRATROPIUM BROMIDE AND ALBUTEROL SULFATE 3 ML: .5; 2.5 SOLUTION RESPIRATORY (INHALATION) at 09:59

## 2021-10-30 RX ADMIN — ENOXAPARIN SODIUM 40 MG: 40 INJECTION SUBCUTANEOUS at 05:27

## 2021-10-30 RX ADMIN — NYSTATIN: 100000 POWDER TOPICAL at 14:42

## 2021-10-30 RX ADMIN — DIGOXIN 250 MCG: 250 TABLET ORAL at 01:46

## 2021-10-30 RX ADMIN — FUROSEMIDE 40 MG: 10 INJECTION, SOLUTION INTRAMUSCULAR; INTRAVENOUS at 05:22

## 2021-10-30 RX ADMIN — CEFAZOLIN 2 G: 1 INJECTION, POWDER, FOR SOLUTION INTRAVENOUS at 14:44

## 2021-10-30 RX ADMIN — POTASSIUM CHLORIDE 20 MEQ: 20 TABLET, EXTENDED RELEASE ORAL at 18:07

## 2021-10-30 RX ADMIN — AZITHROMYCIN MONOHYDRATE 500 MG: 250 TABLET ORAL at 05:26

## 2021-10-30 RX ADMIN — FUROSEMIDE 40 MG: 10 INJECTION, SOLUTION INTRAMUSCULAR; INTRAVENOUS at 16:24

## 2021-10-30 ASSESSMENT — ENCOUNTER SYMPTOMS
SHORTNESS OF BREATH: 1
ORTHOPNEA: 1
WEAKNESS: 1

## 2021-10-30 ASSESSMENT — PAIN DESCRIPTION - PAIN TYPE: TYPE: CHRONIC PAIN

## 2021-10-30 NOTE — PROGRESS NOTES
Report received from Maddy PORTILLO. Pt up to unit, AOx4 on 5L NC. Admit profile completed by Deisy PHELPS RN.

## 2021-10-30 NOTE — ASSESSMENT & PLAN NOTE
Repeat ECHO  Mckeon for strict I and O  Fluid restriction  Cardiac diet  Troponin negative  Lasix bid, spironolactone

## 2021-10-30 NOTE — ASSESSMENT & PLAN NOTE
Likely reactive but continue azithromycin  LLL opacity likely atelectasis which she has had before  Follow up final of blood cxs  1/4 + suggestive of contaminant

## 2021-10-30 NOTE — PROGRESS NOTES
"Hospital Medicine Daily Progress Note    Date of Service  10/30/2021    Chief Complaint  Cheryl D Blakemore is a 61 y.o. female admitted 10/28/2021 with shortness of breath    Hospital Course  Per notes, \"62 yo female with afib, dilated cardiomyopathy last ECHO in 2013 with moderate pulmonary hypertension and on amiodarone, hypoxic resp failure unclear cause - chart has COPD but no PFTS and no emphysema on CT, OA of both knees and wheel chair bound as unable to walk due to her knees, morbid obesity with BMI of 61.  Presented with worsenin SOB for 2 days  + cough with yellow sputum  COVID negative   Elevated pro BNP  CXR unable to see LLL but that is also area of atelectasis seen before and she also has pulm edema  Lasix given and also placed on CPAP  Mckeon placed with 1600 cc output with improvement  On admission patient had 1 out of 4 blood cultures positive for staph, thought to be a contaminant.  Beta found to have 2-2 blood cultures positive.  Started on cefazolin.  Echocardiogram ordered.  Infectious disease consulted. Initially admitted to icu due to acute CHF but this resolved with diuresis while in the ER, so patient was transferred to telemetry service after.\"      Interval Problem Update  Still not back to baseline, respiratory status has improved.    We will get PT/OT and concerns for patient significant weakness.  Is wheelchair-bound at home.  Blood cultures +2/2, repeat cultures today.  Discussed with ID.     I have personally seen and examined the patient at bedside. I discussed the plan of care with patient, bedside RN and charge RN.    Consultants/Specialty  critical care and infectious disease    Code Status  Full Code    Disposition  Patient is not medically cleared.   Anticipate discharge to to home with close outpatient follow-up.  I have placed the appropriate orders for post-discharge needs.    Review of Systems  Review of Systems   Constitutional: Positive for malaise/fatigue.   Respiratory: " Positive for shortness of breath.    Cardiovascular: Positive for orthopnea and leg swelling.   Neurological: Positive for weakness.   All other systems reviewed and are negative.       Physical Exam  Temp:  [36.7 °C (98 °F)-37 °C (98.6 °F)] 36.8 °C (98.2 °F)  Pulse:  [] 84  Resp:  [14-27] 18  BP: (112-153)/() 112/64  SpO2:  [90 %-99 %] 90 %    Physical Exam  Vitals and nursing note reviewed.   Constitutional:       Appearance: Normal appearance. She is obese. She is not ill-appearing.   Cardiovascular:      Rate and Rhythm: Normal rate and regular rhythm.      Pulses: Normal pulses.      Heart sounds: Normal heart sounds.   Pulmonary:      Effort: Pulmonary effort is normal.      Breath sounds: Normal breath sounds.   Abdominal:      General: Abdomen is flat. Bowel sounds are normal.      Palpations: Abdomen is soft.   Musculoskeletal:      Right lower leg: No edema.      Left lower leg: No edema.   Neurological:      General: No focal deficit present.      Mental Status: She is alert and oriented to person, place, and time. Mental status is at baseline.         Fluids    Intake/Output Summary (Last 24 hours) at 10/30/2021 1337  Last data filed at 10/30/2021 1300  Gross per 24 hour   Intake 560 ml   Output 5900 ml   Net -5340 ml       Laboratory  Recent Labs     10/28/21  2143 10/30/21  0216   WBC 11.2* 8.9   RBC 4.14* 4.01*   HEMOGLOBIN 12.3 12.0   HEMATOCRIT 40.2 38.7   MCV 97.1 96.5   MCH 29.7 29.9   MCHC 30.6* 31.0*   RDW 44.2 45.1   PLATELETCT 157* 155*   MPV 12.0 13.1*     Recent Labs     10/28/21  2143 10/29/21  1303 10/30/21  0441   SODIUM 126* 129* 134*   POTASSIUM 4.7 5.5 4.5   CHLORIDE 80* 80* 83*   CO2 36* 38* 46*   GLUCOSE 192* 142* 149*   BUN 12 13 16   CREATININE 0.82 0.75 0.92   CALCIUM 9.0 9.1 9.6                   Imaging  DX-CHEST-PORTABLE (1 VIEW)   Final Result         1.  Pulmonary edema and/or infiltrates.   2.  Cardiomegaly      EC-ECHOCARDIOGRAM COMPLETE W/O CONT    (Results  Pending)        Assessment/Plan  Bacteremia due to Staphylococcus- (present on admission)  Assessment & Plan  Blood cultures positive on admission, initially thought to be contaminant.  2-2 cultures positive now, MRSA negative by PCR, repeat cultures on 10/30  ID consulted, follow-up on recommendations  Follow-up echocardiogram  Continue cefazolin    Hyperglycemia- (present on admission)  Assessment & Plan  -I am unsure how much the patient will be eating especially since she will be on BiPAP overnight however she will also be started on IV steroids I do anticipate her glucose to worsen, therefore I will start insulin sliding scale  -Adjust as needed    Hyponatremia- (present on admission)  Assessment & Plan  -Mild, due to fluid overload  -Asymptomatic, continue monitor    Acute on chronic respiratory failure with hypoxia and hypercapnia (HCC)- (present on admission)  Assessment & Plan  -Multifactorial in patient with acute on chronic systolic heart failure as well as COPD exacerbation  -Covid was negative  -Patient is requiring BiPAP, she does have acute encephalopathy when not on BiPAP, continue BiPAP overnight, reevaluate in the morning  -Continue IV Lasix, patient is not on Lasix at home  -FU on echocardiogram  -Continue IV steroids  -Heart respiratory care per protocol  -I did discuss CODE STATUS with family who was at bedside, they do not know of any advanced directive or desire other than full code.  Patient may also benefit from palliative care consult.  -PT/OT    Chronic obstructive pulmonary disease with acute exacerbation (HCC)- (present on admission)  Assessment & Plan  -Patient did have a productive cough, obtain procalcitonin  -For now, start azithromycin, depending on procalcitonin results may need to adjust antibiotics  -Start IV steroids  -Continue BiPAP  -Start respiratory care per protocol    Essential hypertension- (present on admission)  Assessment & Plan  -Continue home metoprolol  -Start as  needed labetalol, clonidine and enalapril  -Adjust as needed    Acute on chronic congestive heart failure (HCC)- (present on admission)  Assessment & Plan  -No recent echocardiogram, most recent was in 2013 with an ejection fraction of 45%  -Would anticipate this having worsened  -Continue with IV Lasix, monitor daily  -Obtain echocardiogram  -BNP is greater than 1300, no previous BNP  -Troponin is normal, I do not think this is an acute cardiac event    AF (atrial fibrillation) ([facility])- (present on admission)  Assessment & Plan  -Currently rate controlled  -Continue home amiodarone, digoxin and metoprolol  -Monitor on telemetry    Morbid obesity with BMI of 60.0-69.9, adult (HCC)- (present on admission)  Assessment & Plan  -Chronic, would certainly benefit from diet and exercise adjustment at discharge       VTE prophylaxis: enoxaparin ppx    I have performed a physical exam and reviewed and updated ROS and Plan today (10/30/2021). In review of yesterday's note (10/29/2021), there are no changes except as documented above.       Normal rate, regular rhythm.  Heart sounds S1, S2.  No murmurs, rubs or gallops.

## 2021-10-30 NOTE — PROGRESS NOTES
4 Eyes Skin Assessment Completed by BANDAR Brown and BANDAR Carl.    Head WDL  Ears WDL  Nose WDL  Mouth WDL  Neck WDL  Breast/Chest Redness, Blanching, Rash and Excoriation  Shoulder Blades WDL  Spine WDL  (R) Arm/Elbow/Hand WDL  (L) Arm/Elbow/Hand WDL  Abdomen Redness, Blanching and Rash  Groin Redness and Excoriation  Scrotum/Coccyx/Buttocks WDL  (R) Leg WDL  (L) Leg WDL  (R) Heel/Foot/Toe WDL  (L) Heel/Foot/Toe WDL          Devices In Places Tele Box      Interventions In Place InterDry    Possible Skin Injury No    Pictures Uploaded Into Epic N/A  Wound Consult Placed N/A  RN Wound Prevention Protocol Ordered No

## 2021-10-30 NOTE — PROGRESS NOTES
Received report from BANDAR Mcgovern. Safety precautions in place. Bed locked and low. Offered assist. Call light and belongings within reach.

## 2021-10-30 NOTE — DIETARY
NUTRITION SERVICES: BMI - Pt with BMI >40 (=Body mass index is 66.72 kg/m².), Class III obesity. Weight taken via bed scale and is -4L fluids per I/O.  Weight loss counseling not appropriate in acute care setting. RECOMMEND - If appropriate at DC please refer to outpatient nutrition services for weight management.

## 2021-10-30 NOTE — PROGRESS NOTES
Skin care provided to pt. All skin folds cleansed and dried - interdry in place. Wound consult ordered.

## 2021-10-30 NOTE — PROGRESS NOTES
Telemetry Shift Summary     Rhythm a-fib/flutter  HR Range 70s-80s  Ectopy R-PVC/bigem/couplet  Measurements ---/0.08/---           Normal Values  Rhythm SR  HR Range    Measurements 0.12-0.20 / 0.06-0.10  / 0.30-0.52

## 2021-10-30 NOTE — HOSPITAL COURSE
"Per notes, \"60 yo female with afib, dilated cardiomyopathy last ECHO in 2013 with moderate pulmonary hypertension and on amiodarone, hypoxic resp failure unclear cause - chart has COPD but no PFTS and no emphysema on CT, OA of both knees and wheel chair bound as unable to walk due to her knees, morbid obesity with BMI of 61.  Presented with worsenin SOB for 2 days  + ed pro BNP  CXR unacough with yellow sputum  COVID negative   Elevatble to see LLL but that is also area of atelectasis seen before and she also has pulm edema  Lasix given and also placed on CPAP  Mckeon placed with 1600 cc output with improvement  1/4 blood culture + staph likely contaminant  Initially admitted to icu due to acute CHF but this resolved with diuresis while in the ER\"    This is a 61-year-old female with a past medical history significant for obstructive sleep apnea on BiPAP, atrial fibrillation, secondary hypercoagulable state, hypertension, morbid obesity with Body mass index is 64.07 kg/m²., chronic respiratory failure on 3 to 4 L of oxygen, COPD and systolic CHF with EF of 45 to 50%, wheelchair bound  brought into ER on 10/20/2021 by EMS after transported to be hypoxic.    She is being admitted in the hospital for acute hypoxic and hypercarbic respiratory failure. She was recently admitted to the ICU, requiring BiPAP, in the hospital she has been treated for COPD and CHF exacerbation. Blood culture on 10/28 growing staph epi, thought to be contaminant, repeat blood culture on 10/30/2021 no growth to date. ID was consulted, thought to be a contamination; thus antibiotics were discontinued.    "

## 2021-10-30 NOTE — WOUND TEAM
Wound team consulted for MASD to pannus and underneath breasts. Picture reviewed. Nystatin powder ordered for application. Interdry cloths also ordered for application. No advanced wound care needed otherwise. Wound team signing off.

## 2021-10-30 NOTE — FLOWSHEET NOTE
10/30/21 0707   Events/Summary/Plan   Events/Summary/Plan tx given   Skin Inspection Respiratory Device Intact   Vital Signs   Pulse 88   Respiration 16   Pulse Oximetry 97 %   $ Pulse Oximetry (Spot Check) Yes   Respiratory Assessment   Level of Consciousness Alert   Breath Sounds   RUL Breath Sounds Clear   RML Breath Sounds Clear;Diminished   RLL Breath Sounds Diminished   CECILE Breath Sounds Clear   LLL Breath Sounds Diminished   Oxygen   O2 (LPM) 2   O2 Delivery Device Silicone Nasal Cannula

## 2021-10-30 NOTE — ASSESSMENT & PLAN NOTE
Ex smoker  Presented with hypercapneic resp failure more consistent with obesity hypoventilation syndrome   No PFTs  Unclear if actual dx  As heart failure seems more significant will stop steroids

## 2021-10-30 NOTE — CARE PLAN
The patient is Watcher - Medium risk of patient condition declining or worsening    Shift Goals  Clinical Goals: decrease O2 needs to baseline  Patient Goals: IS, deep breathing  Family Goals: N/A    Progress made toward(s) clinical / shift goals:    Problem: Care Map:  Admission Optimal Outcome for the Heart Failure Patient  Goal: Admission:  Optimal Care of the heart failure patient  Outcome: Progressing     Problem: Knowledge Deficit - Standard  Goal: Patient and family/care givers will demonstrate understanding of plan of care, disease process/condition, diagnostic tests and medications  Outcome: Progressing     Problem: Knowledge Deficit - COPD  Goal: Patient/significant other demonstrates understanding of disease process, utilization of the Action Plan, medications and discharge instruction  Outcome: Progressing     Problem: Risk for Infection - COPD  Goal: Patient will remain free from signs and symptoms of infection  Outcome: Progressing     Problem: Ineffective Airway Clearance  Goal: Patient will maintain patent airway with clear/clearing breath sounds  Outcome: Progressing       Patient is not progressing towards the following goals:      Problem: Skin Integrity  Goal: Skin integrity is maintained or improved  Outcome: Not Progressing   Redness and rash under skin folds. Interdry placed.      Problem: Infection - Standard  Goal: Patient will remain free from infection  Outcome: Not Progressing   2nd bottle blood culture positive. IV Ancef ordered.

## 2021-10-30 NOTE — FLOWSHEET NOTE
10/30/21 1001   Events/Summary/Plan   Events/Summary/Plan tx given   Vital Signs   Pulse Oximetry 97 %   $ Pulse Oximetry (Spot Check) Yes   Respiratory Assessment   Level of Consciousness Alert   Oxygen   O2 (LPM) 2   O2 Delivery Device Silicone Nasal Cannula

## 2021-10-30 NOTE — ASSESSMENT & PLAN NOTE
Likely contamination , echocardiogram showed EF of 55%, no vegetation noted  Unable to determine diastolic funsction

## 2021-10-31 LAB
ANION GAP SERPL CALC-SCNC: 11 MMOL/L (ref 7–16)
BACTERIA UR CULT: NORMAL
BUN SERPL-MCNC: 22 MG/DL (ref 8–22)
CALCIUM SERPL-MCNC: 9.3 MG/DL (ref 8.4–10.2)
CHLORIDE SERPL-SCNC: 86 MMOL/L (ref 96–112)
CO2 SERPL-SCNC: 42 MMOL/L (ref 20–33)
CREAT SERPL-MCNC: 1.15 MG/DL (ref 0.5–1.4)
ERYTHROCYTE [DISTWIDTH] IN BLOOD BY AUTOMATED COUNT: 47.4 FL (ref 35.9–50)
GLUCOSE BLD-MCNC: 113 MG/DL (ref 65–99)
GLUCOSE BLD-MCNC: 124 MG/DL (ref 65–99)
GLUCOSE BLD-MCNC: 156 MG/DL (ref 65–99)
GLUCOSE BLD-MCNC: 94 MG/DL (ref 65–99)
GLUCOSE SERPL-MCNC: 102 MG/DL (ref 65–99)
HCT VFR BLD AUTO: 41.3 % (ref 37–47)
HGB BLD-MCNC: 12.5 G/DL (ref 12–16)
MAGNESIUM SERPL-MCNC: 2.2 MG/DL (ref 1.5–2.5)
MCH RBC QN AUTO: 29.8 PG (ref 27–33)
MCHC RBC AUTO-ENTMCNC: 30.3 G/DL (ref 33.6–35)
MCV RBC AUTO: 98.3 FL (ref 81.4–97.8)
PLATELET # BLD AUTO: 144 K/UL (ref 164–446)
PMV BLD AUTO: 12 FL (ref 9–12.9)
POTASSIUM SERPL-SCNC: 4.3 MMOL/L (ref 3.6–5.5)
RBC # BLD AUTO: 4.2 M/UL (ref 4.2–5.4)
SIGNIFICANT IND 70042: NORMAL
SITE SITE: NORMAL
SODIUM SERPL-SCNC: 139 MMOL/L (ref 135–145)
SOURCE SOURCE: NORMAL
WBC # BLD AUTO: 11.3 K/UL (ref 4.8–10.8)

## 2021-10-31 PROCEDURE — 97162 PT EVAL MOD COMPLEX 30 MIN: CPT

## 2021-10-31 PROCEDURE — 700101 HCHG RX REV CODE 250: Performed by: INTERNAL MEDICINE

## 2021-10-31 PROCEDURE — A9270 NON-COVERED ITEM OR SERVICE: HCPCS | Performed by: INTERNAL MEDICINE

## 2021-10-31 PROCEDURE — 700111 HCHG RX REV CODE 636 W/ 250 OVERRIDE (IP): Performed by: INTERNAL MEDICINE

## 2021-10-31 PROCEDURE — 99232 SBSQ HOSP IP/OBS MODERATE 35: CPT | Performed by: INTERNAL MEDICINE

## 2021-10-31 PROCEDURE — 700102 HCHG RX REV CODE 250 W/ 637 OVERRIDE(OP): Performed by: INTERNAL MEDICINE

## 2021-10-31 PROCEDURE — 82962 GLUCOSE BLOOD TEST: CPT | Mod: 91

## 2021-10-31 PROCEDURE — 99223 1ST HOSP IP/OBS HIGH 75: CPT | Performed by: INTERNAL MEDICINE

## 2021-10-31 PROCEDURE — 94640 AIRWAY INHALATION TREATMENT: CPT

## 2021-10-31 PROCEDURE — 94760 N-INVAS EAR/PLS OXIMETRY 1: CPT

## 2021-10-31 PROCEDURE — 770001 HCHG ROOM/CARE - MED/SURG/GYN PRIV*

## 2021-10-31 PROCEDURE — 80048 BASIC METABOLIC PNL TOTAL CA: CPT

## 2021-10-31 PROCEDURE — 83735 ASSAY OF MAGNESIUM: CPT

## 2021-10-31 PROCEDURE — 700105 HCHG RX REV CODE 258: Performed by: INTERNAL MEDICINE

## 2021-10-31 PROCEDURE — 85027 COMPLETE CBC AUTOMATED: CPT

## 2021-10-31 RX ADMIN — VANCOMYCIN HYDROCHLORIDE 3 G: 500 INJECTION, POWDER, LYOPHILIZED, FOR SOLUTION INTRAVENOUS at 11:55

## 2021-10-31 RX ADMIN — METOPROLOL TARTRATE 50 MG: 50 TABLET, FILM COATED ORAL at 06:12

## 2021-10-31 RX ADMIN — FUROSEMIDE 40 MG: 10 INJECTION, SOLUTION INTRAMUSCULAR; INTRAVENOUS at 06:12

## 2021-10-31 RX ADMIN — FUROSEMIDE 40 MG: 10 INJECTION, SOLUTION INTRAMUSCULAR; INTRAVENOUS at 17:22

## 2021-10-31 RX ADMIN — POTASSIUM CHLORIDE 20 MEQ: 20 TABLET, EXTENDED RELEASE ORAL at 17:21

## 2021-10-31 RX ADMIN — AMIODARONE HYDROCHLORIDE 200 MG: 200 TABLET ORAL at 02:27

## 2021-10-31 RX ADMIN — NYSTATIN: 100000 POWDER TOPICAL at 17:21

## 2021-10-31 RX ADMIN — NYSTATIN: 100000 POWDER TOPICAL at 11:55

## 2021-10-31 RX ADMIN — ENOXAPARIN SODIUM 40 MG: 40 INJECTION SUBCUTANEOUS at 06:12

## 2021-10-31 RX ADMIN — IPRATROPIUM BROMIDE AND ALBUTEROL SULFATE 3 ML: .5; 2.5 SOLUTION RESPIRATORY (INHALATION) at 06:44

## 2021-10-31 RX ADMIN — DIGOXIN 250 MCG: 250 TABLET ORAL at 02:27

## 2021-10-31 RX ADMIN — SPIRONOLACTONE 25 MG: 25 TABLET ORAL at 02:28

## 2021-10-31 RX ADMIN — IPRATROPIUM BROMIDE AND ALBUTEROL SULFATE 3 ML: .5; 2.5 SOLUTION RESPIRATORY (INHALATION) at 11:16

## 2021-10-31 RX ADMIN — INSULIN HUMAN 2 UNITS: 100 INJECTION, SOLUTION PARENTERAL at 20:27

## 2021-10-31 RX ADMIN — AZITHROMYCIN MONOHYDRATE 500 MG: 250 TABLET ORAL at 06:12

## 2021-10-31 RX ADMIN — POTASSIUM CHLORIDE 20 MEQ: 20 TABLET, EXTENDED RELEASE ORAL at 06:12

## 2021-10-31 RX ADMIN — METOPROLOL TARTRATE 50 MG: 50 TABLET, FILM COATED ORAL at 17:22

## 2021-10-31 RX ADMIN — CEFAZOLIN 2 G: 1 INJECTION, POWDER, FOR SOLUTION INTRAVENOUS at 06:21

## 2021-10-31 RX ADMIN — NYSTATIN: 100000 POWDER TOPICAL at 06:15

## 2021-10-31 ASSESSMENT — COGNITIVE AND FUNCTIONAL STATUS - GENERAL
CLIMB 3 TO 5 STEPS WITH RAILING: TOTAL
MOBILITY SCORE: 9
TURNING FROM BACK TO SIDE WHILE IN FLAT BAD: UNABLE
MOVING FROM LYING ON BACK TO SITTING ON SIDE OF FLAT BED: UNABLE
SUGGESTED CMS G CODE MODIFIER MOBILITY: CM
WALKING IN HOSPITAL ROOM: A LOT
STANDING UP FROM CHAIR USING ARMS: A LITTLE
MOVING TO AND FROM BED TO CHAIR: UNABLE

## 2021-10-31 ASSESSMENT — GAIT ASSESSMENTS
DISTANCE (FEET): 2
ASSISTIVE DEVICE: FRONT WHEEL WALKER
GAIT LEVEL OF ASSIST: MINIMAL ASSIST

## 2021-10-31 ASSESSMENT — ENCOUNTER SYMPTOMS
WEAKNESS: 1
ORTHOPNEA: 1
SHORTNESS OF BREATH: 1

## 2021-10-31 NOTE — THERAPY
Physical Therapy   Initial Evaluation     Patient Name: Cheryl D Blakemore  Age:  61 y.o., Sex:  female  Medical Record #: [id number]  Today's Date: 10/31/2021     Precautions  Precautions: Fall Risk    Assessment  Patient is 61 y.o. female presenting with shortness of breath and acute on chronic respiratory failure; PMH significant for COPD. PT mobilized as detailed below in chart, demonstrating limited activity tolerance, functional balance/strength, and limited safe mobility. Pt requiring Rick for bed mobility and transfers and fatigues quickly. At this time pt would benefit from post acute rehab placement prior to returning home. Will continue to follow.   Plan    Recommend Physical Therapy 3 times per week until therapy goals are met for the following treatments:  Bed Mobility, Equipment, Gait Training, Manual Therapy, Neuro Re-Education / Balance, Self Care/Home Evaluation, Therapeutic Activities and Therapeutic Exercises    DC Equipment Recommendations: Unable to determine at this time  Discharge Recommendations: Recommend post-acute placement for additional physical therapy services prior to discharge home       Subjective    Pt greeted in bed and agreeable to PT session .     Objective       10/31/21 0850   Prior Living Situation   Prior Services Home-Independent   Housing / Facility Mobile Home   Steps Into Home 0  (ramp)   Steps In Home 0   Equipment Owned 4-Wheel Walker;Front-Wheel Walker;Wheelchair  (manual w/c )   Lives with - Patient's Self Care Capacity Other (Comments)  (currently lives with a roommate, but they will be moving out)   Comments pts son and DIL to move in with pt and they will be able to assist as needed   Prior Level of Functional Mobility   Bed Mobility Independent   Transfer Status Independent   Ambulation Independent   Distance Ambulation (Feet)   (household with walker, wheelchair for community)   Assistive Devices Used Front-Wheel Walker   Wheelchair Independent   History of Falls    History of Falls Yes   Cognition    Cognition / Consciousness WDL   Level of Consciousness Alert   Comments flat affect, requires increased time and encouragement for mobility    Passive ROM Lower Body   Passive ROM Lower Body WDL   Active ROM Lower Body    Active ROM Lower Body  WDL   Strength Lower Body   Lower Body Strength  X   Comments BLEs grossly deconditioned   Sensation Lower Body   Lower Extremity Sensation   WDL   Lower Body Muscle Tone   Lower Body Muscle Tone  WDL   Neurological Concerns   Neurological Concerns No   Coordination Lower Body    Coordination Lower Body  WDL   Balance Assessment   Sitting Balance (Static) Fair   Sitting Balance (Dynamic) Fair -   Standing Balance (Static) Fair -   Standing Balance (Dynamic) Fair -   Weight Shift Sitting Fair   Weight Shift Standing Fair   Comments w/ FWW   Gait Analysis   Gait Level Of Assist Minimal Assist   Assistive Device Front Wheel Walker   Distance (Feet) 2   # of Times Distance was Traveled 1   Deviation Bradykinetic;Step To;Increased Base Of Support   # of Stairs Climbed 0   Weight Bearing Status FWB   Comments limited by fatigue    Bed Mobility    Supine to Sit Minimal Assist   Sit to Supine   (up on commode post session )   Scooting Minimal Assist   Rolling Minimal Assist to Rt.   Comments w/ bed features    Functional Mobility   Sit to Stand Minimal Assist   Bed, Chair, Wheelchair Transfer Minimal Assist   Toilet Transfers Minimal Assist   Transfer Method Stand Step   Mobility bed mobility > sitting EOB > transfer to commode    Comments w/ FWW   Activity Tolerance   Sitting in Chair up on commode post session   Sitting Edge of Bed 10mins   Standing <5mins    Comments limited by fatigue    Patient / Family Goals    Patient / Family Goal #1 None stated today.    Short Term Goals    Short Term Goal # 1 Pt will transition supine <> sit without bed features and spv within 6 visits in order to improve functional mobility.    Short Term Goal # 2 Pt  will transition sit <> stand with FWW and spv within 6 visits in order to progress functional mobility.    Short Term Goal # 3 Pt will ambulate 100 ft with FWW and spv within 6 visits so she can access her home environment.    Education Group   Education Provided Role of Physical Therapist;Gait Training   Role of Physical Therapist Patient Response Patient;Acceptance;Explanation;Demonstration;Verbal Demonstration   Gait Training Patient Response Patient;Acceptance;Explanation;Demonstration;Verbal Demonstration;Action Demonstration   Problem List    Problems Impaired Bed Mobility;Impaired Transfers;Impaired Ambulation;Impaired Balance;Decreased Activity Tolerance

## 2021-10-31 NOTE — PROGRESS NOTES
Telemetry Shift Summary    Rhythm: afib   HR: 70-80  Ectopy:  f-pvc, r-coup, r-big    Measurements for strip printed 10/31/2021 at 1149  HR 75  - / 0.08 / -    Normal Values  Rhythm: SR  HR:   Measurements: 0.12-0.20 / 0.06-0.10 / 0.30-0.52

## 2021-10-31 NOTE — PROGRESS NOTES
Bedside report received from Shaniqua PORTILLO. Patient is in bed and stable. Bed alarm is on. Bed locked and in lowest position, upper side rails up, call light in reach, whiteboard updated. POC discussed and pt verbalizes understanding.    COVID-19 surge in effect.

## 2021-10-31 NOTE — PROGRESS NOTES
Telemetry Shift Summary    Rhythm: afib/aflutter  HR: 70-90  Ectopy: o-pvc, r-coup, r-big    Measurements for strip printed 10/30/2021 at 1410  HR 91  - / 0.08 / -    Normal Values  Rhythm: SR  HR:   Measurements: 0.12-0.20 / 0.06-0.10 / 0.30-0.52
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
## 2021-10-31 NOTE — CONSULTS
INFECTIOUS DISEASES INPATIENT CONSULT NOTE     Date of Service: 10/31/2021    Consult Requested By: Sina Moreno M.D.    Reason for Consultation: Staph bacteremia    History of Present Illness:   Cheryl D Blakemore is a 61 y.o. femal  admitted 10/28/2021 for increased dyspnea. Known O2-dependent COPD and CHF. BNP over 1000   Baseline O2 3-5L depending on activity level. No sick contacts or new animal exposures. No travel. No PM or AICD  Symptoms started couple of days prior to admission. Started on BiPAP and was initially with AMS/nonverbal. + somnolent when removed. + cough  Productive of yellow sputum  Started on vanco/ancef. Completed 3 days azithro  Infectious Diseases consulted for antibiotic recommendation and management    Review Of Systems:  No current fever or cough. O2 back to baseline. AMS resolved  All other systems are reviewed and are negative     PMH:   Past Medical History:   Diagnosis Date   • Arthritis    • Chronic pain    • COPD (chronic obstructive pulmonary disease) (HCC)    • Hypertension    • Migraine          PSH:  Past Surgical History:   Procedure Laterality Date   • TRACHEOSTOMY  2013    Performed by Flavio Vela M.D. at [facility]   • GASTROSTOMY LAPAROSCOPIC  2013    Performed by Flavio Vela M.D. at [facility]   • PRIMARY C SECTION             FAMILY HX:  Family History   Problem Relation Age of Onset   • Cancer Father         lung, smoker   • Diabetes Mother    • Hypertension Mother    • Hyperlipidemia Mother    • Diabetes Maternal Aunt         x4   • Heart Disease Sister    • Hypertension Sister    • Hyperlipidemia Sister    • Thyroid Sister    • Psychiatric Illness Neg Hx    • Stroke Neg Hx        SOCIAL HX:  Social History     Socioeconomic History   • Marital status: Single     Spouse name: Not on file   • Number of children: Not on file   • Years of education: Not on file   • Highest education level: Not on file    Occupational History   • Not on file   Tobacco Use   • Smoking status: Former Smoker     Packs/day: 1.00     Years: 20.00     Pack years: 20.00     Types: Cigarettes     Quit date: 2013     Years since quittin.7   • Smokeless tobacco: Never Used   • Tobacco comment: 1 ppd   Substance and Sexual Activity   • Alcohol use: No     Alcohol/week: 0.0 oz   • Drug use: Yes     Types: Marijuana   • Sexual activity: Not on file   Other Topics Concern   • Not on file   Social History Narrative   • Not on file     Social Determinants of Health     Financial Resource Strain:    • Difficulty of Paying Living Expenses:    Food Insecurity:    • Worried About Running Out of Food in the Last Year:    • Ran Out of Food in the Last Year:    Transportation Needs:    • Lack of Transportation (Medical):    • Lack of Transportation (Non-Medical):    Physical Activity:    • Days of Exercise per Week:    • Minutes of Exercise per Session:    Stress:    • Feeling of Stress :    Social Connections:    • Frequency of Communication with Friends and Family:    • Frequency of Social Gatherings with Friends and Family:    • Attends [employer] Services:    • Active Member of Clubs or Organizations:    • Attends Club or Organization Meetings:    • Marital Status:    Intimate Partner Violence:    • Fear of Current or Ex-Partner:    • Emotionally Abused:    • Physically Abused:    • Sexually Abused:      Social History     Tobacco Use   Smoking Status Former Smoker   • Packs/day: 1.00   • Years: 20.00   • Pack years: 20.00   • Types: Cigarettes   • Quit date: 2013   • Years since quittin.7   Smokeless Tobacco Never Used   Tobacco Comment    1 ppd     Social History     Substance and Sexual Activity   Alcohol Use No   • Alcohol/week: 0.0 oz   2 [place] terriers    Allergies/Intolerances:  No Known Allergies      Other Current Medications:    Current Facility-Administered Medications:   •  nystatin (MYCOSTATIN) powder, , Topical, TID,  Sina Moreno M.D., Given at 10/31/21 1155  •  amiodarone (Cordarone) tablet 200 mg, 200 mg, Oral, QDAY, BRAYDON ContiO., 200 mg at 10/31/21 0227  •  digoxin (LANOXIN) tablet 250 mcg, 250 mcg, Oral, QDAY, BRAYDON ContiO., 250 mcg at 10/31/21 0227  •  metoprolol tartrate (LOPRESSOR) tablet 50 mg, 50 mg, Oral, TWICE DAILY, BRAYDON ContiO., 50 mg at 10/31/21 0612  •  spironolactone (ALDACTONE) tablet 25 mg, 25 mg, Oral, QDAY, YANDY Conti.O., 25 mg at 10/31/21 0228  •  senna-docusate (PERICOLACE or SENOKOT S) 8.6-50 MG per tablet 2 Tablet, 2 Tablet, Oral, BID, 2 Tablet at 10/30/21 1808 **AND** polyethylene glycol/lytes (MIRALAX) PACKET 1 Packet, 1 Packet, Oral, QDAY PRN **AND** magnesium hydroxide (MILK OF MAGNESIA) suspension 30 mL, 30 mL, Oral, QDAY PRN **AND** bisacodyl (DULCOLAX) suppository 10 mg, 10 mg, Rectal, QDAY PRN, YANDY Conti.O.  •  enoxaparin (LOVENOX) inj 40 mg, 40 mg, Subcutaneous, DAILY, BRAYDON ContiO., 40 mg at 10/31/21 0612  •  acetaminophen (Tylenol) tablet 650 mg, 650 mg, Oral, Q6HRS PRN, BRAYDON ContiO.  •  cloNIDine (CATAPRES) tablet 0.1 mg, 0.1 mg, Oral, Q6HRS PRN, BRAYDON ContiO.  •  enalaprilat (Vasotec) injection 1.25 mg 1 mL, 1.25 mg, Intravenous, Q6HRS PRN, BRAYDON ContiO.  •  labetalol (NORMODYNE/TRANDATE) injection 10 mg, 10 mg, Intravenous, Q4HRS PRN, BRAYDON ContiO.  •  ondansetron (ZOFRAN) syringe/vial injection 4 mg, 4 mg, Intravenous, Q4HRS PRN, BRAYDON ContiO.  •  ondansetron (ZOFRAN ODT) dispertab 4 mg, 4 mg, Oral, Q4HRS PRN, BRAYDON ContiO.  •  guaiFENesin dextromethorphan (ROBITUSSIN DM) 100-10 MG/5ML syrup 10 mL, 10 mL, Oral, Q6HRS PRN, BRAYDON ContiO.  •  insulin regular (HumuLIN R,NovoLIN R) injection, 2-9 Units, Subcutaneous, 4X/DAY ACHS, 2 Units at 10/29/21 2020 **AND** POC blood glucose manual result, , , Q AC AND BEDTIME(S) **AND** NOTIFY MD and PharmD, , , Once **AND** glucose 4 g chewable  "tablet 16 g, 16 g, Oral, Q15 MIN PRN **AND** dextrose 50% (D50W) injection 50 mL, 50 mL, Intravenous, Q15 MIN PRN, Rudi Morales D.O.  •  furosemide (LASIX) injection 40 mg, 40 mg, Intravenous, BID DIURETIC, Rudi Morales D.O., 40 mg at 10/31/21 0612  •  potassium chloride SA (Kdur) tablet 20 mEq, 20 mEq, Oral, BID, Rudi Morales D.O., 20 mEq at 10/31/21 0612  •  Respiratory Therapy Consult, , Nebulization, Continuous RT, Rudi Morales D.O.  •  ipratropium-albuterol (DUONEB) nebulizer solution, 3 mL, Nebulization, 4X/DAY (RT), Grace Grissom M.D., 3 mL at 10/31/21 1116      Most Recent Vital Signs:  /70   Pulse 73   Temp 36.7 °C (98.1 °F) (Oral)   Resp 20   Ht 1.626 m (5' 4\")   Wt (!) 176 kg (388 lb 10.7 oz)   SpO2 97%   BMI 66.72 kg/m²   Temp  Av.8 °C (98.2 °F)  Min: 36.2 °C (97.2 °F)  Max: 37 °C (98.6 °F)    Physical Exam:  General: well-appearing, well nourished no acute distress Pale  HEENT: NCAT, PERRLA, sclera anicteric, PERRL, EOMI,  no oral lesions  Neck: supple, no JVD  Chest: CTAB, unlabored. Decreased No audible wheezing  Cardiac: RRR,  no m/r/g   Abdomen: + bowel sounds, soft, non-tender, non-distended  Extremities: No cyanosis, clubbing. + edema  Skin: no rashes +ecchymosis  Neuro: Alert and oriented times 3, Speech fluent CN intact CANDELARIO  Psych: Mood normal Affect normal    Pertinent Lab Results:  Recent Labs     10/28/21  2143 10/30/21  0216 10/31/21  0226   WBC 11.2* 8.9 11.3*      Recent Labs     10/28/21  2143 10/30/21  0216 10/31/21  0226   HEMOGLOBIN 12.3 12.0 12.5   HEMATOCRIT 40.2 38.7 41.3   MCV 97.1 96.5 98.3*   MCH 29.7 29.9 29.8   PLATELETCT 157* 155* 144*         Recent Labs     10/29/21  1303 10/30/21  0441 10/31/21  0226   SODIUM 129* 134* 139   POTASSIUM 5.5 4.5 4.3   CHLORIDE 80* 83* 86*   CO2 38* 46* 42*   CREATININE 0.75 0.92 1.15        Recent Labs     10/28/21  2143 10/29/21  1303   ALBUMIN 4.2 3.9        Pertinent Micro:  Results     Procedure " "Component Value Units Date/Time    BLOOD CULTURE [[id number]]  (Abnormal) Collected: 10/28/21 2143    Order Status: Completed Specimen: Blood from Peripheral Updated: 10/31/21 1117     Significant Indicator POS     Source BLD     Site PERIPHERAL     Culture Result Growth detected by Bactec instrument. 10/29/2021  19:03      Coagulase-negative Staphylococcus species  Susceptibilities in progress      Narrative:      CALL  [facility]  [facility] tel. [phone],  CALLED  MED tel. [phone] 10/29/2021, 19:13, RB PERF. RESULTS CALLED TO:RN  [id number]  Per Hospital Policy: Only change Specimen Src: to \"Line\" if  specified by physician order.  Left Forearm/Arm    BLOOD CULTURE [[id number]]  (Abnormal) Collected: 10/28/21 2302    Order Status: Completed Specimen: Blood from Peripheral Updated: 10/31/21 1112     Significant Indicator POS     Source BLD     Site PERIPHERAL     Culture Result Growth detected by Bactec instrument. 10/29/2021  15:14  Blood culture testing and Gram stain, if indicated, are  performed at [facility], [address] [place] [address].  Positive blood cultures are  sent to [facility], [address] [place] [address], for organism identification and  susceptibility testing.  Negative for Staphylococcus aureus and MRSA by PCR. Correlate  ongoing need for antibiotics with clinical condition.  Further report to follow.        Staphylococcus epidermidis  Susceptibilities in progress      Narrative:      CALL  [facility]  [facility] tel. [phone],  CALLED  [facility] tel. [phone] 10/29/2021, 15:22, RB PERF. RESULTS CALLED TO:  [id number]  Per Hospital Policy: Only change Specimen Src: to \"Line\" if  specified by physician order.  Left AC    BLOOD CULTURE [[id number]] Collected: 10/30/21 1602    Order Status: Completed Specimen: Blood from Peripheral Updated: 10/31/21 0827     Significant Indicator NEG     Source BLD     Site PERIPHERAL     Culture Result No Growth  Note: Blood cultures are " "incubated for 5 days and  are monitored continuously.Positive blood cultures  are called to the RN and reported as soon as  they are identified.      Narrative:      Per Hospital Policy: Only change Specimen Src: to \"Line\" if  specified by physician order.  Left Hand    BLOOD CULTURE [[id number]] Collected: 10/30/21 1602    Order Status: Completed Specimen: Blood from Peripheral Updated: 10/31/21 0827     Significant Indicator NEG     Source BLD     Site PERIPHERAL     Culture Result No Growth  Note: Blood cultures are incubated for 5 days and  are monitored continuously.Positive blood cultures  are called to the RN and reported as soon as  they are identified.      Narrative:      Per Hospital Policy: Only change Specimen Src: to \"Line\" if  specified by physician order.  Right Hand    URINE CULTURE(NEW) [[id number]] Collected: 10/29/21 0332    Order Status: Completed Specimen: Urine Updated: 10/31/21 0728     Significant Indicator NEG     Source UR     Site -     Culture Result Usual urogenital saira >100,000 cfu/mL    Narrative:      Indication for culture:->Evaluation for sepsis without a  clear source of infection  Indication for culture:->Evaluation for sepsis without a    URINALYSIS [[id number]]  (Abnormal) Collected: 10/29/21 0332    Order Status: Completed Specimen: Urine Updated: 10/29/21 0352     Color Yellow     Character Hazy     Specific [place] 1.015     Ph 6.0     Glucose Negative mg/dL      Ketones Negative mg/dL      Protein 30 mg/dL      Bilirubin Negative     Nitrite Negative     Leukocyte Esterase Negative     Occult Blood Trace     Micro Urine Req Microscopic    Narrative:      Indication for culture:->Evaluation for sepsis without a  clear source of infection    COV-2, FLU A/B, AND RSV BY PCR (2-4 HOURS CEPHEID): Collect NP swab in VTM [[id number]] Collected: 10/28/21 2256    Order Status: Completed Specimen: Respirate Updated: 10/28/21 2340     Influenza virus A RNA Negative     Influenza virus B, " "PCR Negative     RSV, PCR Negative     SARS-CoV-2 by PCR NotDetected     Comment: PATIENTS: Important information regarding your results and instructions can  be found at [email or web address]   \"After your  Covid-19 Test\"    [facility] providers: PLEASE REFER TO DE-ESCALATION AND RETESTING PROTOCOL  on [email or web address]    **The [employer] GeneXpert Xpress SARS-CoV-2 RT-PCR Test has been made  available for use under the Emergency Use Authorization (EUA) only.          SARS-CoV-2 Source Nasal Swab    Narrative:      Have you been in close contact with a person who is suspected  or known to be positive for COVID-19 within the last 30 days  (e.g. last seen that person < 30 days ago)->Unknown        Blood Culture   Date Value Ref Range Status   01/31/2013   Final    Blood culture testing and Gram stain, if indicated, are  performed at [facility], [address] [place] [address].  Positive blood cultures are  sent to [facility], [address] [place] [address], for organism identification and  susceptibility testing.  No growth after 5 days of incubation.    Procal 0.05    Studies:  CXR hazy bilateral infiltrates-no significant change as compared to CXR from 2013-poor inspiration  IMPRESSION:     CHF exacerbation  COPD exacerbation  Pseudobacteremia      PLAN:   Pseudobacteremia  Initial blood cxs with staph epi  Repeat neg  No cardiac device or central line  Repeat cxs neg  DC vanco/cefazolin    COPD exacerbation  Bronchitis  Back to baseline  COVID and flu neg  Completed course azithro    CHF exacerbation  Diuresis per PCT  Elevated BNP    WIll sign off. Please reconsult if needed      Plan of care discussed with GEE Moreno M.D.. Will continue to follow    Jerrica Cueto M.D.    "

## 2021-10-31 NOTE — PROGRESS NOTES
Telemetry Shift Summary     Rhythm Atrial Fibrillation/Flutter  HR Range 63-80  Ectopy  r-oPVCs  Measurements .-/.08/.-              Normal Values  Rhythm SR  HR Range    Measurements 0.12-0.20 / 0.06-0.10  / 0.30-0.52

## 2021-10-31 NOTE — FLOWSHEET NOTE
10/31/21 1116   Events/Summary/Plan   Events/Summary/Plan SVN given   Vital Signs   Pulse 66   Respiration (!) 24   Respiratory Assessment   Level of Consciousness Alert   Breath Sounds   RUL Breath Sounds Diminished;Clear   RML Breath Sounds Diminished;Clear   RLL Breath Sounds Diminished   CECILE Breath Sounds Diminished;Clear   LLL Breath Sounds Diminished   Oxygen   O2 (LPM) 2   O2 Delivery Device Silicone Nasal Cannula

## 2021-10-31 NOTE — PROGRESS NOTES
"Hospital Medicine Daily Progress Note    Date of Service  10/31/2021    Chief Complaint  Cheryl D Blakemore is a 61 y.o. female admitted 10/28/2021 with shortness of breath    Hospital Course  Per notes, \"62 yo female with afib, dilated cardiomyopathy last ECHO in 2013 with moderate pulmonary hypertension and on amiodarone, hypoxic resp failure unclear cause - chart has COPD but no PFTS and no emphysema on CT, OA of both knees and wheel chair bound as unable to walk due to her knees, morbid obesity with BMI of 61.  Presented with worsenin SOB for 2 days  + cough with yellow sputum  COVID negative   Elevated pro BNP  CXR unable to see LLL but that is also area of atelectasis seen before and she also has pulm edema  Lasix given and also placed on CPAP  Mckeon placed with 1600 cc output with improvement  On admission patient had 1 out of 4 blood cultures positive for staph, thought to be a contaminant.  Beta found to have 2-2 blood cultures positive.  Started on cefazolin.  Echocardiogram ordered.  Infectious disease consulted. Initially admitted to icu due to acute CHF but this resolved with diuresis while in the ER, so patient was transferred to telemetry service after.\"      Interval Problem Update  10/30: we will get PT/OT and concerns for patient significant weakness.  Is wheelchair-bound at home.  Blood cultures +2/2, repeat cultures today.  Discussed with ID.     10/31: PT/OT recommending postacute care placement.  Blood cultures appear to be a contaminant 2 out of 2 with staph epidermidis, repeat cultures negative.  ID has signed off, no need for antibiotics.  Patient is medically cleared for SNF, discussed with case management in IDT rounds.    I have personally seen and examined the patient at bedside. I discussed the plan of care with patient, bedside RN and charge RN.    Consultants/Specialty  critical care and infectious disease    Code Status  Full Code    Disposition  Patient is medically cleared. "   Anticipate discharge to to skilled nursing facility.  I have placed the appropriate orders for post-discharge needs.    Review of Systems  Review of Systems   Constitutional: Positive for malaise/fatigue.   Respiratory: Positive for shortness of breath.    Cardiovascular: Positive for orthopnea and leg swelling.   Neurological: Positive for weakness.   All other systems reviewed and are negative.       Physical Exam  Temp:  [36.7 °C (98.1 °F)-36.9 °C (98.5 °F)] 36.7 °C (98.1 °F)  Pulse:  [62-98] 73  Resp:  [18-24] 20  BP: (109-133)/(46-93) 133/70  SpO2:  [94 %-98 %] 97 %    Physical Exam  Vitals and nursing note reviewed.   Constitutional:       Appearance: Normal appearance. She is obese. She is not ill-appearing.   Cardiovascular:      Rate and Rhythm: Normal rate and regular rhythm.      Pulses: Normal pulses.      Heart sounds: Normal heart sounds.   Pulmonary:      Effort: Pulmonary effort is normal.      Breath sounds: Normal breath sounds.   Abdominal:      General: Abdomen is flat. Bowel sounds are normal.      Palpations: Abdomen is soft.   Musculoskeletal:      Right lower leg: No edema.      Left lower leg: No edema.   Neurological:      General: No focal deficit present.      Mental Status: She is alert and oriented to person, place, and time. Mental status is at baseline.         Fluids    Intake/Output Summary (Last 24 hours) at 10/31/2021 1440  Last data filed at 10/31/2021 1335  Gross per 24 hour   Intake 600 ml   Output 3750 ml   Net -3150 ml       Laboratory  Recent Labs     10/28/21  2143 10/30/21  0216 10/31/21  0226   WBC 11.2* 8.9 11.3*   RBC 4.14* 4.01* 4.20   HEMOGLOBIN 12.3 12.0 12.5   HEMATOCRIT 40.2 38.7 41.3   MCV 97.1 96.5 98.3*   MCH 29.7 29.9 29.8   MCHC 30.6* 31.0* 30.3*   RDW 44.2 45.1 47.4   PLATELETCT 157* 155* 144*   MPV 12.0 13.1* 12.0     Recent Labs     10/29/21  1303 10/30/21  0441 10/31/21  0226   SODIUM 129* 134* 139   POTASSIUM 5.5 4.5 4.3   CHLORIDE 80* 83* 86*   CO2 38*  46* 42*   GLUCOSE 142* 149* 102*   BUN 13 16 22   CREATININE 0.75 0.92 1.15   CALCIUM 9.1 9.6 9.3                   Imaging  EC-ECHOCARDIOGRAM COMPLETE W/O CONT   Final Result      DX-CHEST-PORTABLE (1 VIEW)   Final Result         1.  Pulmonary edema and/or infiltrates.   2.  Cardiomegaly           Assessment/Plan  Bacteremia due to Staphylococcus- (present on admission)  Assessment & Plan  Blood cultures positive on admission, initially thought to be contaminant.  2-2 cultures positive now with staph epi repeat cultures negative.  Likely contaminant  ID consulted, no need for antibiotics  Follow-up echocardiogram    Hyperglycemia- (present on admission)  Assessment & Plan  -I am unsure how much the patient will be eating especially since she will be on BiPAP overnight however she will also be started on IV steroids I do anticipate her glucose to worsen, therefore I will start insulin sliding scale  -Adjust as needed    Hyponatremia- (present on admission)  Assessment & Plan  -Mild, due to fluid overload  -Asymptomatic, continue monitor    Acute on chronic respiratory failure with hypoxia and hypercapnia (HCC)- (present on admission)  Assessment & Plan  -Multifactorial in patient with acute on chronic systolic heart failure as well as COPD exacerbation  -Covid was negative  -Patient is requiring BiPAP, she does have acute encephalopathy when not on BiPAP, continue BiPAP overnight, reevaluate in the morning  -Continue IV Lasix, patient is not on Lasix at home  -FU on echocardiogram  -Continue IV steroids  -Heart respiratory care per protocol  -I did discuss CODE STATUS with family who was at bedside, they do not know of any advanced directive or desire other than full code.  Patient may also benefit from palliative care consult.  -PT/OT    Chronic obstructive pulmonary disease with acute exacerbation (HCC)- (present on admission)  Assessment & Plan  -Patient did have a productive cough, obtain procalcitonin  -For  now, start azithromycin, depending on procalcitonin results may need to adjust antibiotics  -Start IV steroids  -Continue BiPAP  -Start respiratory care per protocol    Essential hypertension- (present on admission)  Assessment & Plan  -Continue home metoprolol  -Start as needed labetalol, clonidine and enalapril  -Adjust as needed    Acute on chronic congestive heart failure (HCC)- (present on admission)  Assessment & Plan  -No recent echocardiogram, most recent was in 2013 with an ejection fraction of 45%  -Would anticipate this having worsened  -Continue with IV Lasix, monitor daily  -Obtain echocardiogram  -BNP is greater than 1300, no previous BNP  -Troponin is normal, I do not think this is an acute cardiac event    AF (atrial fibrillation) (HCC)- (present on admission)  Assessment & Plan  -Currently rate controlled  -Continue home amiodarone, digoxin and metoprolol  -Monitor on telemetry    Morbid obesity with BMI of 60.0-69.9, adult (HCC)- (present on admission)  Assessment & Plan  -Chronic, would certainly benefit from diet and exercise adjustment at discharge       VTE prophylaxis: enoxaparin ppx    I have performed a physical exam and reviewed and updated ROS and Plan today (10/31/2021). In review of yesterday's note (10/30/2021), there are no changes except as documented above.

## 2021-10-31 NOTE — FLOWSHEET NOTE
10/31/21 0644   Events/Summary/Plan   Events/Summary/Plan SVN given   Vital Signs   Pulse 87   Respiration 20   Pulse Oximetry 98 %   $ Pulse Oximetry (Spot Check) Yes   Respiratory Assessment   Level of Consciousness Alert   Chest Exam   Work Of Breathing / Effort Within Normal Limits   Breath Sounds   RUL Breath Sounds Diminished;Fine Crackles   RML Breath Sounds Diminished;Fine Crackles   RLL Breath Sounds Diminished   CECILE Breath Sounds Diminished;Fine Crackles   LLL Breath Sounds Diminished   Oxygen   O2 (LPM) 2   O2 Delivery Device Silicone Nasal Cannula

## 2021-10-31 NOTE — PROGRESS NOTES
"Pharmacy Vancomycin Kinetics Note for 10/31/2021     61 y.o. female on Vancomycin day #  1     Vancomycin Indication (AUC Dosing): Bacteremia  Vancomycin Indication (Two level/Trough based Dosing):      Provider specified end date: 11/14/21    Active Antibiotics (From admission, onward)    Ordered     Ordering Provider       Sun Oct 31, 2021  9:52 AM    10/31/21 0952  vancomycin (VANCOCIN) 1,500 mg in  mL IVPB  (vancomycin (VANCOCIN) IV (LD + Maintenance))  EVERY 18 HOURS         Jerrica Cueto M.D.    10/31/21 0952  vancomycin (VANCOCIN) 3 g in  mL IVPB  (vancomycin (VANCOCIN) IV (LD + Maintenance))  ONCE         ALONDRA Jerome Oct 31, 2021  9:17 AM    10/31/21 0917  MD Alert...Vancomycin per Pharmacy  PHARMACY TO DOSE        Question:  Indication(s) for vancomycin?  Answer:  Staphylococcus aureus bacteremia    Jerrica Cueto M.D.          Dosing Weight: 176 kg (388 lb 0.2 oz)      Admission History: Admitted on 10/28/2021 for Acute on chronic respiratory failure with hypoxia and hypercapnia (HCC) [J96.21, J96.22]  Pertinent history: Morbidly obese patient wheelchair bound admitted with staph bacteremia, recent cultures (+) poss staph sp. Negative for MRSA by PCR, Vancomycin ordered by ID    Allergies:     Patient has no known allergies.     Pertinent cultures to date:     Results     Procedure Component Value Units Date/Time    BLOOD CULTURE [[id number]] Collected: 10/30/21 1602    Order Status: Completed Specimen: Blood from Peripheral Updated: 10/31/21 0827     Significant Indicator NEG     Source BLD     Site PERIPHERAL     Culture Result No Growth  Note: Blood cultures are incubated for 5 days and  are monitored continuously.Positive blood cultures  are called to the RN and reported as soon as  they are identified.      Narrative:      Per Hospital Policy: Only change Specimen Src: to \"Line\" if  specified by physician order.  Left Hand    BLOOD CULTURE [[id number]] " "Collected: 10/30/21 1602    Order Status: Completed Specimen: Blood from Peripheral Updated: 10/31/21 0827     Significant Indicator NEG     Source BLD     Site PERIPHERAL     Culture Result No Growth  Note: Blood cultures are incubated for 5 days and  are monitored continuously.Positive blood cultures  are called to the RN and reported as soon as  they are identified.      Narrative:      Per Hospital Policy: Only change Specimen Src: to \"Line\" if  specified by physician order.  Right Hand    URINE CULTURE(NEW) [[id number]] Collected: 10/29/21 0332    Order Status: Completed Specimen: Urine Updated: 10/31/21 0728     Significant Indicator NEG     Source UR     Site -     Culture Result Usual urogenital saira >100,000 cfu/mL    Narrative:      Indication for culture:->Evaluation for sepsis without a  clear source of infection  Indication for culture:->Evaluation for sepsis without a    BLOOD CULTURE [[id number]]  (Abnormal) Collected: 10/28/21 2302    Order Status: Completed Specimen: Blood from Peripheral Updated: 10/29/21 2126     Significant Indicator POS     Source BLD     Site PERIPHERAL     Culture Result Growth detected by Bactec instrument. 10/29/2021  15:14  Gram Stain: Gram positive cocci: Possible Staphylococcus sp.  Blood culture testing and Gram stain, if indicated, are  performed at [facility], [address] [place] [address].  Positive blood cultures are  sent to [facility], [address] [place] [address], for organism identification and  susceptibility testing.  Negative for Staphylococcus aureus and MRSA by PCR. Correlate  ongoing need for antibiotics with clinical condition.  Further report to follow.      Narrative:      CALL  [facility]  [facility] tel. [phone],  CALLED  [facility] tel. [phone] 10/29/2021, 15:22, RB PERF. RESULTS CALLED TO:  [phone]  Per Hospital Policy: Only change Specimen Src: to \"Line\" if  specified by physician order.  Left AC    BLOOD " "CULTURE [[id number]]  (Abnormal) Collected: 10/28/21 2143    Order Status: Completed Specimen: Blood from Peripheral Updated: 10/29/21 1913     Significant Indicator POS     Source BLD     Site PERIPHERAL     Culture Result Growth detected by Bactec instrument. 10/29/2021  19:03  Gram Stain:  Gram positive cocci: Possible Staphylococcus  sp.      Narrative:      CALL  [facility]  [facility] tel. [phone],  CALLED  MED tel. [phone] 10/29/2021, 19:13, RB PERF. RESULTS CALLED TO:RN  [phone]  Per Hospital Policy: Only change Specimen Src: to \"Line\" if  specified by physician order.  Left Forearm/Arm    URINALYSIS [[id number]]  (Abnormal) Collected: 10/29/21 0332    Order Status: Completed Specimen: Urine Updated: 10/29/21 0352     Color Yellow     Character Hazy     Specific [place] 1.015     Ph 6.0     Glucose Negative mg/dL      Ketones Negative mg/dL      Protein 30 mg/dL      Bilirubin Negative     Nitrite Negative     Leukocyte Esterase Negative     Occult Blood Trace     Micro Urine Req Microscopic    Narrative:      Indication for culture:->Evaluation for sepsis without a  clear source of infection    COV-2, FLU A/B, AND RSV BY PCR (2-4 HOURS CEPHEID): Collect NP swab in VTM [[id number]] Collected: 10/28/21 2256    Order Status: Completed Specimen: Respirate Updated: 10/28/21 2340     Influenza virus A RNA Negative     Influenza virus B, PCR Negative     RSV, PCR Negative     SARS-CoV-2 by PCR NotDetected     Comment: PATIENTS: Important information regarding your results and instructions can  be found at [email or web address]   \"After your  Covid-19 Test\"    [facility] providers: PLEASE REFER TO DE-ESCALATION AND RETESTING PROTOCOL  on [email or web address]    **The [employer] GeneXpert Xpress SARS-CoV-2 RT-PCR Test has been made  available for use under the Emergency Use Authorization (EUA) only.          SARS-CoV-2 Source Nasal Swab    Narrative:      Have you been in close contact with a person who is " "suspected  or known to be positive for COVID-19 within the last 30 days  (e.g. last seen that person < 30 days ago)->Unknown          Labs:     Estimated Creatinine Clearance: 82.5 mL/min (by C-G formula based on SCr of 1.15 mg/dL).  Recent Labs     10/28/21  2143 10/30/21  0216 10/31/21  0226   WBC 11.2* 8.9 11.3*   NEUTSPOLYS 72.40* 84.70*  --      Recent Labs     10/28/21  2143 10/29/21  1303 10/30/21  0441 10/31/21  0226   BUN 12 13 16 22   CREATININE 0.82 0.75 0.92 1.15   ALBUMIN 4.2 3.9  --   --        Intake/Output Summary (Last 24 hours) at 10/31/2021 0954  Last data filed at 10/31/2021 0905  Gross per 24 hour   Intake 600 ml   Output 6050 ml   Net -5450 ml      /69   Pulse 62   Temp 36.9 °C (98.4 °F) (Oral)   Resp 18   Ht 1.626 m (5' 4\")   Wt (!) 176 kg (388 lb 10.7 oz)   SpO2 97%  Temp (24hrs), Av.9 °C (98.4 °F), Min:36.8 °C (98.2 °F), Max:36.9 °C (98.5 °F)      List concerns for Vancomycin clearance:     Nephrotoxic drugs;Obesity    Pharmacokinetics    AUC kinetics:   Ke (hr ^-1): 0.0729 hr^-1  Half life: 9.51 hr  Clearance: 4.442  Estimated TDD: Vancomycin clearance could not be calculated.  Select an AUC 24hr goal.  Estimated Dose: TDD could not be calculated.  See above for more information.  Estimated interval: 11.5    Two level kinetics:   Ke (hr ^-1):    Half life:    Vd:    Calculated AUC:   mg·hr/L    Trough kinetics:   No results for input(s): VANCOTROUGH, VANCOPEAK, VANCORANDOM in the last 72 hours.    A/P:     -  Vancomycin dose: LD 3000 mg IV x 1 followed by 1500 mg IV every 18 hours.    -  Next vancomycin level(s):    -  @ 1400   -11/3 Vt @ 0430     -  Predicted vancomycin AUC from initial AUC test calculator: 450 mg·hr/L                Narendra Reilly, RP  "

## 2021-10-31 NOTE — DISCHARGE PLANNING
Received Choice form at 1437  Agency/Facility Name: Local [place] [facility]  Referral sent per Choice form @ 1447

## 2021-10-31 NOTE — PROGRESS NOTES
With pt permission, spoke to pt's son Eduard via phone. Provided Eduard with updates regarding POC, including IV abx and diuresis. Also discussed with Eduard discharge plan to send pt to SNF vs. Rehab. Eduard verbalized understanding. All questions answered at this time.

## 2021-11-01 LAB
ANION GAP SERPL CALC-SCNC: 12 MMOL/L (ref 7–16)
BACTERIA BLD CULT: ABNORMAL
BUN SERPL-MCNC: 21 MG/DL (ref 8–22)
CALCIUM SERPL-MCNC: 9.1 MG/DL (ref 8.4–10.2)
CHLORIDE SERPL-SCNC: 83 MMOL/L (ref 96–112)
CO2 SERPL-SCNC: 40 MMOL/L (ref 20–33)
CREAT SERPL-MCNC: 1.11 MG/DL (ref 0.5–1.4)
ERYTHROCYTE [DISTWIDTH] IN BLOOD BY AUTOMATED COUNT: 47.7 FL (ref 35.9–50)
GLUCOSE BLD-MCNC: 117 MG/DL (ref 65–99)
GLUCOSE BLD-MCNC: 124 MG/DL (ref 65–99)
GLUCOSE BLD-MCNC: 87 MG/DL (ref 65–99)
GLUCOSE BLD-MCNC: 99 MG/DL (ref 65–99)
GLUCOSE SERPL-MCNC: 100 MG/DL (ref 65–99)
HCT VFR BLD AUTO: 41.5 % (ref 37–47)
HGB BLD-MCNC: 12.4 G/DL (ref 12–16)
MAGNESIUM SERPL-MCNC: 2 MG/DL (ref 1.5–2.5)
MCH RBC QN AUTO: 29.5 PG (ref 27–33)
MCHC RBC AUTO-ENTMCNC: 29.9 G/DL (ref 33.6–35)
MCV RBC AUTO: 98.8 FL (ref 81.4–97.8)
PLATELET # BLD AUTO: 123 K/UL (ref 164–446)
PMV BLD AUTO: 11.8 FL (ref 9–12.9)
POTASSIUM SERPL-SCNC: 4.4 MMOL/L (ref 3.6–5.5)
RBC # BLD AUTO: 4.2 M/UL (ref 4.2–5.4)
SIGNIFICANT IND 70042: ABNORMAL
SIGNIFICANT IND 70042: ABNORMAL
SITE SITE: ABNORMAL
SITE SITE: ABNORMAL
SODIUM SERPL-SCNC: 135 MMOL/L (ref 135–145)
SOURCE SOURCE: ABNORMAL
SOURCE SOURCE: ABNORMAL
WBC # BLD AUTO: 9.6 K/UL (ref 4.8–10.8)

## 2021-11-01 PROCEDURE — 94640 AIRWAY INHALATION TREATMENT: CPT

## 2021-11-01 PROCEDURE — 94760 N-INVAS EAR/PLS OXIMETRY 1: CPT

## 2021-11-01 PROCEDURE — 700101 HCHG RX REV CODE 250: Performed by: INTERNAL MEDICINE

## 2021-11-01 PROCEDURE — 97535 SELF CARE MNGMENT TRAINING: CPT

## 2021-11-01 PROCEDURE — A9270 NON-COVERED ITEM OR SERVICE: HCPCS | Performed by: INTERNAL MEDICINE

## 2021-11-01 PROCEDURE — 83735 ASSAY OF MAGNESIUM: CPT

## 2021-11-01 PROCEDURE — 770001 HCHG ROOM/CARE - MED/SURG/GYN PRIV*

## 2021-11-01 PROCEDURE — 700111 HCHG RX REV CODE 636 W/ 250 OVERRIDE (IP): Performed by: INTERNAL MEDICINE

## 2021-11-01 PROCEDURE — 99232 SBSQ HOSP IP/OBS MODERATE 35: CPT | Performed by: INTERNAL MEDICINE

## 2021-11-01 PROCEDURE — 80048 BASIC METABOLIC PNL TOTAL CA: CPT

## 2021-11-01 PROCEDURE — 700102 HCHG RX REV CODE 250 W/ 637 OVERRIDE(OP): Performed by: INTERNAL MEDICINE

## 2021-11-01 PROCEDURE — 97165 OT EVAL LOW COMPLEX 30 MIN: CPT

## 2021-11-01 PROCEDURE — 85027 COMPLETE CBC AUTOMATED: CPT

## 2021-11-01 PROCEDURE — 82962 GLUCOSE BLOOD TEST: CPT | Mod: 91

## 2021-11-01 RX ORDER — IPRATROPIUM BROMIDE AND ALBUTEROL SULFATE 2.5; .5 MG/3ML; MG/3ML
3 SOLUTION RESPIRATORY (INHALATION)
Status: DISCONTINUED | OUTPATIENT
Start: 2021-11-01 | End: 2021-11-09 | Stop reason: HOSPADM

## 2021-11-01 RX ADMIN — NYSTATIN: 100000 POWDER TOPICAL at 18:14

## 2021-11-01 RX ADMIN — METOPROLOL TARTRATE 50 MG: 50 TABLET, FILM COATED ORAL at 18:27

## 2021-11-01 RX ADMIN — NYSTATIN: 100000 POWDER TOPICAL at 12:06

## 2021-11-01 RX ADMIN — POTASSIUM CHLORIDE 20 MEQ: 20 TABLET, EXTENDED RELEASE ORAL at 06:42

## 2021-11-01 RX ADMIN — AMIODARONE HYDROCHLORIDE 200 MG: 200 TABLET ORAL at 01:53

## 2021-11-01 RX ADMIN — DIGOXIN 250 MCG: 250 TABLET ORAL at 01:53

## 2021-11-01 RX ADMIN — METOPROLOL TARTRATE 50 MG: 50 TABLET, FILM COATED ORAL at 06:43

## 2021-11-01 RX ADMIN — IPRATROPIUM BROMIDE AND ALBUTEROL SULFATE 3 ML: .5; 2.5 SOLUTION RESPIRATORY (INHALATION) at 07:26

## 2021-11-01 RX ADMIN — SENNOSIDES AND DOCUSATE SODIUM 2 TABLET: 50; 8.6 TABLET ORAL at 18:15

## 2021-11-01 RX ADMIN — FUROSEMIDE 40 MG: 10 INJECTION, SOLUTION INTRAMUSCULAR; INTRAVENOUS at 16:46

## 2021-11-01 RX ADMIN — ENOXAPARIN SODIUM 40 MG: 40 INJECTION SUBCUTANEOUS at 06:43

## 2021-11-01 RX ADMIN — SPIRONOLACTONE 25 MG: 25 TABLET ORAL at 01:53

## 2021-11-01 RX ADMIN — POTASSIUM CHLORIDE 20 MEQ: 20 TABLET, EXTENDED RELEASE ORAL at 18:15

## 2021-11-01 RX ADMIN — NYSTATIN: 100000 POWDER TOPICAL at 06:45

## 2021-11-01 RX ADMIN — GUAIFENESIN SYRUP AND DEXTROMETHORPHAN 10 ML: 100; 10 SYRUP ORAL at 22:52

## 2021-11-01 RX ADMIN — FUROSEMIDE 40 MG: 10 INJECTION, SOLUTION INTRAMUSCULAR; INTRAVENOUS at 06:43

## 2021-11-01 ASSESSMENT — COGNITIVE AND FUNCTIONAL STATUS - GENERAL
SUGGESTED CMS G CODE MODIFIER DAILY ACTIVITY: CK
TOILETING: A LOT
DRESSING REGULAR LOWER BODY CLOTHING: A LOT
DAILY ACTIVITIY SCORE: 17
HELP NEEDED FOR BATHING: A LOT
DRESSING REGULAR UPPER BODY CLOTHING: A LITTLE

## 2021-11-01 ASSESSMENT — ENCOUNTER SYMPTOMS
WEAKNESS: 1
ORTHOPNEA: 1
SHORTNESS OF BREATH: 1

## 2021-11-01 ASSESSMENT — PATIENT HEALTH QUESTIONNAIRE - PHQ9
SUM OF ALL RESPONSES TO PHQ9 QUESTIONS 1 AND 2: 0
1. LITTLE INTEREST OR PLEASURE IN DOING THINGS: NOT AT ALL
SUM OF ALL RESPONSES TO PHQ9 QUESTIONS 1 AND 2: 0
1. LITTLE INTEREST OR PLEASURE IN DOING THINGS: NOT AT ALL
2. FEELING DOWN, DEPRESSED, IRRITABLE, OR HOPELESS: NOT AT ALL

## 2021-11-01 ASSESSMENT — ACTIVITIES OF DAILY LIVING (ADL): TOILETING: INDEPENDENT

## 2021-11-01 ASSESSMENT — PAIN DESCRIPTION - PAIN TYPE: TYPE: CHRONIC PAIN

## 2021-11-01 ASSESSMENT — GAIT ASSESSMENTS: DISTANCE (FEET): 10

## 2021-11-01 NOTE — PROGRESS NOTES
COVID-19 surge in effect.    Bedside report received from Shaniqua PORTILLO. Patient is in bed and stable. Bed locked and in lowest position, upper side rails up, call light in reach, whiteboard updated. POC discussed and pt verbalizes understanding.

## 2021-11-01 NOTE — CONSULTS
"Reason for PC Consult: Advance Care Planning    Consulted by: Dr. Moreno    Assessment:  General: 62 y/o female from home with worsening SOB. Pt with known hx of dilated cardiomyopathy, pulmonary HTN, COPD, OA of both knees, morbid obesity with BMI 61. New ECHO with EF 55%, but overall difficult study to interpret. Pt initially required BiPap but had improvement with aggressive diuresis, now on 2LPM O2. Plan is for SNF prior to returning home.     Prior PC Consults: n/a    Social: Pt resides with her son Jin and his wife Sherron. His friend Tex also lives in the home and helps with cooking and housekeeping. She tells PC she manages all ADLs like bathing/hygiene. She has another son Haresh who is incarcerated locally and a son Kane who is in [place]. Her mom Jammie is also in [place]. She has several grandchildren.    Consults: ID    Dyspnea: No- improved from admission  Last BM: 11/01/21-    Pain: No-    Depression: No-    Dementia: No;       Spiritual:  Is Adventism or spirituality important for coping with this illness? No-    Has a  or spiritual provider visit been requested? No    Palliative Performance Scale: 60%    Advance Directive: None- completed today noting her wishes 2, 3, and 5   DPOA: No-  She appointed her son Jin first and mom Jammie second (Jammie added to the Kardex)  POLST: No- Completed reflecting her wishes for DNR, selective focused treatment and trial TF only if aimed at getting her better     Code Status: Full-  Discussed in detail and she states that she \"never want to have the tube down my throat again.\" She shared with this RN her prior experience, and long course of rehabilitation. PC explained the role of CPR/intubation and she tells PC that she would not want either of those if needed.    Outcome:  PC RN met with Anne at bedside and explained the role of PC to help with discussions about the current medical situation and goals moving forward.. She shared with this RN her " "family/social history, as well as discussed her prior healthcare experience following her hospitalization for \"pneumonia\" in 2013. She spoke of having to learn how to \"walk and eat again\" and the length of time it took for this to take place. She denied questions about her current hospitalization and medical situation and eager to move on to SNF for therapies. She denies other services in the home like HH or MOW and also denied needing any other equipment/care at home.     Explored pt's values, beliefs, and preferences in order to identify GOC. She tells PC that she has not completed an AD but does have some thoughts and wishes related to her medical care. Her AD was completed reflecting her wishes for a focus on her QoL, choosing statements 2, 3, and 5. She appointed her son Jin first and mom Jammie second. In the course of discussion, Anne expressed not wanting intubation. This was discussed in detail including the process of CPR/intubation, etc. A POLST was completed reflecting her wishes for DNR, selective focused treatment and trial TF as noted above. The documents were reviewed with Anne and initialed/signed in the appropriate areas. PC explained the notary process and she expressed understanding. Anne denied any questions at this time.    While talking to Anne, PC RN provided therapeutic communication, including open ended questions, reflective listening, and normalization of thought and feelings throughout encounter, as well as reinforced her goals of care. PC contact information provided to Anne and encouraged to call with any questions or concerns. PC offered to call Jin to provide updates but she states that she'll provide him with this RN's number to call should he have any questions.     MD reviewed and signed POLST; original to pt's chart for time of DC. Notary request sent.     Recommendations: I do not recommend an ethics or hospice consult at this time because not warranted at this " time.    Updated: MD    Plan: document completion; to SNF when medically cleared    Thank you for allowing Palliative Care to support this patient and family. Contact [phone] for additional assistance, change in patient status, or with any questions/concerns.

## 2021-11-01 NOTE — FLOWSHEET NOTE
11/01/21 0727   Events/Summary/Plan   Events/Summary/Plan SVN given   Vital Signs   Pulse 74   Respiration 20   Pulse Oximetry 97 %   $ Pulse Oximetry (Spot Check) Yes   Respiratory Assessment   Respiratory Pattern Within Normal Limits   Level of Consciousness Alert   Chest Exam   Work Of Breathing / Effort Shallow   Breath Sounds   RUL Breath Sounds Diminished;Fine Crackles   RML Breath Sounds Diminished;Clear   RLL Breath Sounds Diminished   CECILE Breath Sounds Diminished;Expiratory Wheezes   LLL Breath Sounds Diminished   Secretions   Cough Weak;Non Productive   How Sputum Obtained Cough on Request   Oxygen   O2 (LPM) 2   O2 Delivery Device Silicone Nasal Cannula

## 2021-11-01 NOTE — DISCHARGE PLANNING
Agency[facility]Facility Name: [facility]   Outcome: DPA left a voicemail for admissions regarding referral.    Agency/Facility Name: [facility]   Spoke To: Stella   Outcome: Per Stella she will review referral and call back DPA.     Agency/Facility Name: [facility]   Spoke To: Stella   Outcome: Per Stella shw is having issues with her computer and is unable to open referral that were sent from yesterday. As soon as she's able to review she will update DPA.

## 2021-11-02 LAB
ANION GAP SERPL CALC-SCNC: 13 MMOL/L (ref 7–16)
BUN SERPL-MCNC: 22 MG/DL (ref 8–22)
CALCIUM SERPL-MCNC: 9.2 MG/DL (ref 8.4–10.2)
CHLORIDE SERPL-SCNC: 86 MMOL/L (ref 96–112)
CO2 SERPL-SCNC: 36 MMOL/L (ref 20–33)
CREAT SERPL-MCNC: 1.13 MG/DL (ref 0.5–1.4)
ERYTHROCYTE [DISTWIDTH] IN BLOOD BY AUTOMATED COUNT: 46.5 FL (ref 35.9–50)
GLUCOSE BLD-MCNC: 103 MG/DL (ref 65–99)
GLUCOSE BLD-MCNC: 113 MG/DL (ref 65–99)
GLUCOSE BLD-MCNC: 118 MG/DL (ref 65–99)
GLUCOSE BLD-MCNC: 79 MG/DL (ref 65–99)
GLUCOSE BLD-MCNC: 91 MG/DL (ref 65–99)
GLUCOSE SERPL-MCNC: 102 MG/DL (ref 65–99)
HCT VFR BLD AUTO: 39.9 % (ref 37–47)
HGB BLD-MCNC: 12.5 G/DL (ref 12–16)
MCH RBC QN AUTO: 30.2 PG (ref 27–33)
MCHC RBC AUTO-ENTMCNC: 31.3 G/DL (ref 33.6–35)
MCV RBC AUTO: 96.4 FL (ref 81.4–97.8)
PLATELET # BLD AUTO: 129 K/UL (ref 164–446)
PMV BLD AUTO: 12.2 FL (ref 9–12.9)
POTASSIUM SERPL-SCNC: 4.5 MMOL/L (ref 3.6–5.5)
RBC # BLD AUTO: 4.14 M/UL (ref 4.2–5.4)
SODIUM SERPL-SCNC: 135 MMOL/L (ref 135–145)
WBC # BLD AUTO: 8.9 K/UL (ref 4.8–10.8)

## 2021-11-02 PROCEDURE — 85027 COMPLETE CBC AUTOMATED: CPT

## 2021-11-02 PROCEDURE — 99232 SBSQ HOSP IP/OBS MODERATE 35: CPT | Performed by: HOSPITALIST

## 2021-11-02 PROCEDURE — A9270 NON-COVERED ITEM OR SERVICE: HCPCS | Performed by: INTERNAL MEDICINE

## 2021-11-02 PROCEDURE — 700102 HCHG RX REV CODE 250 W/ 637 OVERRIDE(OP): Performed by: INTERNAL MEDICINE

## 2021-11-02 PROCEDURE — 770001 HCHG ROOM/CARE - MED/SURG/GYN PRIV*

## 2021-11-02 PROCEDURE — 82962 GLUCOSE BLOOD TEST: CPT

## 2021-11-02 PROCEDURE — 700111 HCHG RX REV CODE 636 W/ 250 OVERRIDE (IP): Performed by: INTERNAL MEDICINE

## 2021-11-02 PROCEDURE — 80048 BASIC METABOLIC PNL TOTAL CA: CPT

## 2021-11-02 PROCEDURE — 36415 COLL VENOUS BLD VENIPUNCTURE: CPT

## 2021-11-02 RX ORDER — BENZONATATE 100 MG/1
100 CAPSULE ORAL 3 TIMES DAILY PRN
Status: DISCONTINUED | OUTPATIENT
Start: 2021-11-02 | End: 2021-11-09 | Stop reason: HOSPADM

## 2021-11-02 RX ADMIN — FUROSEMIDE 40 MG: 10 INJECTION, SOLUTION INTRAMUSCULAR; INTRAVENOUS at 05:55

## 2021-11-02 RX ADMIN — GUAIFENESIN SYRUP AND DEXTROMETHORPHAN 10 ML: 100; 10 SYRUP ORAL at 07:21

## 2021-11-02 RX ADMIN — FUROSEMIDE 40 MG: 10 INJECTION, SOLUTION INTRAMUSCULAR; INTRAVENOUS at 17:40

## 2021-11-02 RX ADMIN — POTASSIUM CHLORIDE 20 MEQ: 20 TABLET, EXTENDED RELEASE ORAL at 17:40

## 2021-11-02 RX ADMIN — POTASSIUM CHLORIDE 20 MEQ: 20 TABLET, EXTENDED RELEASE ORAL at 05:55

## 2021-11-02 RX ADMIN — GUAIFENESIN SYRUP AND DEXTROMETHORPHAN 10 ML: 100; 10 SYRUP ORAL at 19:38

## 2021-11-02 RX ADMIN — NYSTATIN: 100000 POWDER TOPICAL at 12:00

## 2021-11-02 RX ADMIN — DIGOXIN 250 MCG: 250 TABLET ORAL at 01:59

## 2021-11-02 RX ADMIN — METOPROLOL TARTRATE 50 MG: 50 TABLET, FILM COATED ORAL at 17:40

## 2021-11-02 RX ADMIN — METOPROLOL TARTRATE 50 MG: 50 TABLET, FILM COATED ORAL at 06:04

## 2021-11-02 RX ADMIN — SENNOSIDES AND DOCUSATE SODIUM 2 TABLET: 50; 8.6 TABLET ORAL at 05:55

## 2021-11-02 RX ADMIN — NYSTATIN: 100000 POWDER TOPICAL at 21:58

## 2021-11-02 RX ADMIN — SPIRONOLACTONE 25 MG: 25 TABLET ORAL at 02:00

## 2021-11-02 RX ADMIN — ENOXAPARIN SODIUM 40 MG: 40 INJECTION SUBCUTANEOUS at 06:02

## 2021-11-02 RX ADMIN — AMIODARONE HYDROCHLORIDE 200 MG: 200 TABLET ORAL at 02:00

## 2021-11-02 RX ADMIN — NYSTATIN: 100000 POWDER TOPICAL at 05:55

## 2021-11-02 ASSESSMENT — ENCOUNTER SYMPTOMS
BLURRED VISION: 0
FEVER: 0
NAUSEA: 0
ABDOMINAL PAIN: 0
MYALGIAS: 1
ORTHOPNEA: 1
PALPITATIONS: 1
COUGH: 0
WEAKNESS: 1
PHOTOPHOBIA: 0
HEMOPTYSIS: 0
CHILLS: 0
SHORTNESS OF BREATH: 1
VOMITING: 0
NERVOUS/ANXIOUS: 1
HEADACHES: 0
SORE THROAT: 0
HEARTBURN: 0

## 2021-11-02 ASSESSMENT — PATIENT HEALTH QUESTIONNAIRE - PHQ9
SUM OF ALL RESPONSES TO PHQ9 QUESTIONS 1 AND 2: 0
2. FEELING DOWN, DEPRESSED, IRRITABLE, OR HOPELESS: NOT AT ALL
1. LITTLE INTEREST OR PLEASURE IN DOING THINGS: NOT AT ALL

## 2021-11-02 ASSESSMENT — PAIN DESCRIPTION - PAIN TYPE
TYPE: ACUTE PAIN
TYPE: ACUTE PAIN
TYPE: CHRONIC PAIN

## 2021-11-02 NOTE — CARE PLAN
Problem: Care Map:  Day Before Discharge Optimal Outcome for the Heart Failure Patient  Goal: Day Before Discharge:  Optimal Care of the heart failure patient  Outcome: Progressing     Problem: Care Map:  Day of Discharge Optimal Outcome for the Heart Failure Patient  Goal: Day of Discharge:  Optimal Care of the heart failure patient  Outcome: Progressing     Problem: Knowledge Deficit - Standard  Goal: Patient and family/care givers will demonstrate understanding of plan of care, disease process/condition, diagnostic tests and medications  Outcome: Progressing     Problem: Knowledge Deficit - COPD  Goal: Patient/significant other demonstrates understanding of disease process, utilization of the Action Plan, medications and discharge instruction  Outcome: Progressing     Problem: Risk for Infection - COPD  Goal: Patient will remain free from signs and symptoms of infection  Outcome: Progressing     Problem: Nutrition - Advanced  Goal: Patient will display progressive weight gain toward goal have adequate food and fluid intake  Outcome: Progressing     Problem: Ineffective Airway Clearance  Goal: Patient will maintain patent airway with clear/clearing breath sounds  Outcome: Progressing     Problem: Impaired Gas Exchange  Goal: Patient will demonstrate improved ventilation and adequate oxygenation and participate in treatment regimen within the level of ability/situation.  Outcome: Progressing     Problem: Risk for Aspiration  Goal: Patient's risk for aspiration will be absent or decrease  Outcome: Progressing     Problem: Self Care  Goal: Patient will have the ability to perform ADLs independently or with assistance (bathe, groom, dress, toilet and feed)  Outcome: Progressing     Problem: Skin Integrity  Goal: Skin integrity is maintained or improved  Outcome: Progressing     Problem: Fall Risk  Goal: Patient will remain free from falls  Outcome: Progressing     Problem: Infection - Standard  Goal: Patient will  remain free from infection  Outcome: Progressing         The patient is Stable - Low risk of patient condition declining or worsening    Shift Goals  Clinical Goals: Monitor for s/s of fluid overload  Patient Goals: Sleep we;;  Family Goals: no family present    Progress made toward(s) clinical / shift goals:      Patient is not progressing towards the following goals:

## 2021-11-02 NOTE — DISCHARGE PLANNING
Agency/Facility Name: [facility]   Spoke To: Sada   Outcome: Per Sada pt is accepted. Pt will need a biaratric bed and at the moment not one is available. Per Sada she is not sure when one will be available.   LSW Notified

## 2021-11-02 NOTE — DISCHARGE PLANNING
Anticipated Discharge Disposition: SNF    Action: Patient discussed in IDDR. Patient is medically clear to dc to SNF. Patient has only been accepted at [facility] but they do not know when a bed will be available. All other SNFs hae declined due to insurance and due to patient needing a ronaldo bed. LSW escalated patient to [facility] leadership.     Barriers to Discharge: bed availability, ronaldo bed    Plan: LSW to f/u on bed availability

## 2021-11-02 NOTE — PROGRESS NOTES
"Hospital Medicine Daily Progress Note    Date of Service  11/1/2021    Chief Complaint  Cheryl D Blakemore is a 61 y.o. female admitted 10/28/2021 with shortness of breath    Hospital Course  Per notes, \"62 yo female with afib, dilated cardiomyopathy last ECHO in 2013 with moderate pulmonary hypertension and on amiodarone, hypoxic resp failure unclear cause - chart has COPD but no PFTS and no emphysema on CT, OA of both knees and wheel chair bound as unable to walk due to her knees, morbid obesity with BMI of 61.  Presented with worsenin SOB for 2 days  + cough with yellow sputum  COVID negative   Elevated pro BNP  CXR unable to see LLL but that is also area of atelectasis seen before and she also has pulm edema  Lasix given and also placed on CPAP  Mckeon placed with 1600 cc output with improvement  On admission patient had 1 out of 4 blood cultures positive for staph, thought to be a contaminant.  Beta found to have 2-2 blood cultures positive.  Started on cefazolin.  Echocardiogram ordered.  Infectious disease consulted. Initially admitted to icu due to acute CHF but this resolved with diuresis while in the ER, so patient was transferred to telemetry service after.\"      Interval Problem Update  10/30: we will get PT/OT and concerns for patient significant weakness.  Is wheelchair-bound at home.  Blood cultures +2/2, repeat cultures today.  Discussed with ID.     10/31: PT/OT recommending postacute care placement.  Blood cultures appear to be a contaminant 2 out of 2 with staph epidermidis, repeat cultures negative.  ID has signed off, no need for antibiotics.  Patient is medically cleared for SNF, discussed with case management in IDT rounds.    11/1: No complaints at time of examination.  Medically cleared, pending placement, discussed with case management.  Palliative care met with patient, CODE STATUS adjusted.    I have personally seen and examined the patient at bedside. I discussed the plan of care with " patient, bedside RN and charge RN.    Consultants/Specialty  critical care and infectious disease    Code Status  DNAR/DNI    Disposition  Patient is medically cleared.   Anticipate discharge to to skilled nursing facility.  I have placed the appropriate orders for post-discharge needs.    Review of Systems  Review of Systems   Constitutional: Positive for malaise/fatigue.   Respiratory: Positive for shortness of breath.    Cardiovascular: Positive for orthopnea and leg swelling.   Neurological: Positive for weakness.   All other systems reviewed and are negative.       Physical Exam  Temp:  [36.3 °C (97.4 °F)-36.8 °C (98.2 °F)] 36.8 °C (98.2 °F)  Pulse:  [74-83] 76  Resp:  [18-20] 18  BP: (105-136)/(48-76) 118/71  SpO2:  [93 %-97 %] 94 %    Physical Exam  Vitals and nursing note reviewed.   Constitutional:       Appearance: Normal appearance. She is obese. She is not ill-appearing.   Cardiovascular:      Rate and Rhythm: Normal rate and regular rhythm.      Pulses: Normal pulses.      Heart sounds: Normal heart sounds.   Pulmonary:      Effort: Pulmonary effort is normal.      Breath sounds: Normal breath sounds.   Abdominal:      General: Abdomen is flat. Bowel sounds are normal.      Palpations: Abdomen is soft.   Musculoskeletal:      Right lower leg: No edema.      Left lower leg: No edema.   Neurological:      General: No focal deficit present.      Mental Status: She is alert and oriented to person, place, and time. Mental status is at baseline.         Fluids    Intake/Output Summary (Last 24 hours) at 11/1/2021 1952  Last data filed at 11/1/2021 1800  Gross per 24 hour   Intake 1200 ml   Output 2150 ml   Net -950 ml       Laboratory  Recent Labs     10/30/21  0216 10/31/21  0226 11/01/21  0426   WBC 8.9 11.3* 9.6   RBC 4.01* 4.20 4.20   HEMOGLOBIN 12.0 12.5 12.4   HEMATOCRIT 38.7 41.3 41.5   MCV 96.5 98.3* 98.8*   MCH 29.9 29.8 29.5   MCHC 31.0* 30.3* 29.9*   RDW 45.1 47.4 47.7   PLATELETCT 155* 144* 123*    MPV 13.1* 12.0 11.8     Recent Labs     10/30/21  0441 10/31/21  0226 11/01/21  0426   SODIUM 134* 139 135   POTASSIUM 4.5 4.3 4.4   CHLORIDE 83* 86* 83*   CO2 46* 42* 40*   GLUCOSE 149* 102* 100*   BUN 16 22 21   CREATININE 0.92 1.15 1.11   CALCIUM 9.6 9.3 9.1                   Imaging  EC-ECHOCARDIOGRAM COMPLETE W/O CONT   Final Result      DX-CHEST-PORTABLE (1 VIEW)   Final Result         1.  Pulmonary edema and/or infiltrates.   2.  Cardiomegaly           Assessment/Plan  Bacteremia due to Staphylococcus- (present on admission)  Assessment & Plan  Blood cultures positive on admission, initially thought to be contaminant.  2-2 cultures positive now with staph epi repeat cultures negative.  Likely contaminant  ID consulted, no need for antibiotics  Follow-up echocardiogram    Hyperglycemia- (present on admission)  Assessment & Plan  -I am unsure how much the patient will be eating especially since she will be on BiPAP overnight however she will also be started on IV steroids I do anticipate her glucose to worsen, therefore I will start insulin sliding scale  -Adjust as needed    Hyponatremia- (present on admission)  Assessment & Plan  -Mild, due to fluid overload  -Asymptomatic, continue monitor    Acute on chronic respiratory failure with hypoxia and hypercapnia (HCC)- (present on admission)  Assessment & Plan  -Multifactorial in patient with acute on chronic systolic heart failure as well as COPD exacerbation  -Covid was negative  -Patient is requiring BiPAP, she does have acute encephalopathy when not on BiPAP, continue BiPAP overnight, reevaluate in the morning  -Continue IV Lasix, patient is not on Lasix at home  -FU on echocardiogram  -Heart respiratory care per protocol  Palliative care met with patient, CODE STATUS changed to DNR/DNI.  POLST in chart.     Chronic obstructive pulmonary disease with acute exacerbation (HCC)- (present on admission)  Assessment & Plan  -Patient did have a productive cough,  obtain procalcitonin  -For now, start azithromycin, depending on procalcitonin results may need to adjust antibiotics  -Start IV steroids  -Continue BiPAP  -Start respiratory care per protocol    Essential hypertension- (present on admission)  Assessment & Plan  -Continue home metoprolol  -Start as needed labetalol, clonidine and enalapril  -Adjust as needed    Acute on chronic congestive heart failure (HCC)- (present on admission)  Assessment & Plan  -No recent echocardiogram, most recent was in 2013 with an ejection fraction of 45%  -Would anticipate this having worsened  -Continue with IV Lasix, monitor daily  -Obtain echocardiogram  -BNP is greater than 1300, no previous BNP  -Troponin is normal, I do not think this is an acute cardiac event  -Stable    AF (atrial fibrillation) (HCC)- (present on admission)  Assessment & Plan  -Currently rate controlled  -Continue home amiodarone, digoxin and metoprolol  -Monitor on telemetry    Morbid obesity with BMI of 60.0-69.9, adult (HCC)- (present on admission)  Assessment & Plan  -Chronic, would certainly benefit from diet and exercise adjustment at discharge       VTE prophylaxis: enoxaparin ppx    I have performed a physical exam and reviewed and updated ROS and Plan today (11/1/2021). In review of yesterday's note (10/31/2021), there are no changes except as documented above.

## 2021-11-02 NOTE — PROGRESS NOTES
Bedside report received from Екатерина PORTILLO and chapo PORTILLO and  assumed Pt's cares. Call light within reach. Safety measures in place.

## 2021-11-02 NOTE — THERAPY
Occupational Therapy   Initial Evaluation     Patient Name: Cheryl D Blakemore  Age:  61 y.o., Sex:  female  Medical Record #: [id number]  Today's Date: 11/1/2021     Precautions  Precautions: (P) Fall Risk    Assessment  Patient is 61 y.o. female admitted with respiratory distress. Hx of Afib,morbid obesity,CHF,chronic pain, home O2. Pt presents A&Ox3, agreeable to OOB activity. O2@2L NC, pete in place. Shows general weakness, impaired balance, decreased activity tolerance for ADL's. Walks from bed to chair with fww, close Spv. Seated grooming, feeding with Spv, set-up. Lives in mobile home; roommate is son's friend. Son and dtr-in-law live in [facility]. Pt is not at her baseline level; OT will continue to follow while in house.          Plan  Recommend Occupational Therapy 3 times per week until therapy goals are met for the following treatments:  Neuro Re-Education / Balance, Self Care/Activities of Daily Living and Therapeutic Activities.    DC Equipment Recommendations: (P) Unable to determine at this time  Discharge Recommendations: (P) Recommend post-acute placement for additional occupational therapy services prior to discharge home      11/01/21 1240   Prior Living Situation   Prior Services Home-Independent   Housing / Facility Mobile Home   Steps Into Home 0   Steps In Home 0   Bathroom Set up Bathtub / Shower Combination;Shower Chair;Grab Bars   Equipment Owned Front-Wheel Walker;4-Wheel Walker;Wheelchair;Tub / Shower Seat;Grab Bar(s) In Tub / Shower;Oxygen;Ramp   Lives with - Patient's Self Care Capacity Unrelated Adult  (son's friend)   Comments son and dtr-in-law in town   Prior Level of ADL Function   Self Feeding Independent   Grooming / Hygiene Independent   Bathing Independent   Dressing Independent   Toileting Independent   Prior Level of IADL Function   Medication Management Independent   Laundry Requires Assist   Kitchen Mobility Independent   Finances Unable To Determine At This Time   Home  Management Requires Assist   Shopping Requires Assist   Prior Level Of Mobility Independent With Device in Home   Driving / Transportation Relatives / Others Provide Transportation   Occupation (Pre-Hospital Vocational) Retired Due To Disability   Leisure Interests Television   History of Falls   History of Falls Yes   Precautions   Precautions Fall Risk   Vitals   O2 (LPM) 2   O2 Delivery Device Silicone Nasal Cannula   Pain 0 - 10 Group   Therapist Pain Assessment Nurse Notified;0   Cognition    Cognition / Consciousness WDL   Level of Consciousness Alert   Passive ROM Upper Body   Passive ROM Upper Body WDL   Active ROM Upper Body   Active ROM Upper Body  WDL   Strength Upper Body   Upper Body Strength  X   Comments generalized weakness    Sensation Upper Body   Upper Extremity Sensation  WDL   Upper Body Muscle Tone   Upper Body Muscle Tone  WDL   Coordination Upper Body   Coordination WDL   Balance Assessment   Sitting Balance (Static) Fair +   Sitting Balance (Dynamic) Fair   Standing Balance (Static) Fair   Standing Balance (Dynamic) Fair -   Weight Shift Sitting Fair   Weight Shift Standing Fair   Comments fww   Bed Mobility    Supine to Sit Supervised   Scooting Supervised   ADL Assessment   Eating Independent   Grooming Supervision;Seated   Upper Body Dressing Minimal Assist   Toileting Total Assist   How much help from another person does the patient currently need...   Putting on and taking off regular lower body clothing? 2   Bathing (including washing, rinsing, and drying)? 2   Toileting, which includes using a toilet, bedpan, or urinal? 2   Putting on and taking off regular upper body clothing? 3   Taking care of personal grooming such as brushing teeth? 4   Eating meals? 4   6 Clicks Daily Activity Score 17   Functional Mobility   Sit to Stand Supervised   Bed, Chair, Wheelchair Transfer Minimal Assist   Transfer Method Stand Step   Mobility EOB>chair   Distance (Feet) 10   # of Times Distance was  "Traveled 1   Activity Tolerance   Sitting in Chair >1hr   Sitting Edge of Bed 12   Standing 6   Patient / Family Goals   Patient / Family Goal #1 to home after rehab    Short Term Goals   Short Term Goal # 1 pt will perform ADL transfers with Sup within 5 days   Short Term Goal # 2 pt will perform toileting in br with Brady within 5 days    Short Term Goal # 3 pt will groom at sink, standing 6-8\", with Sup within 5 days      "

## 2021-11-02 NOTE — PROGRESS NOTES
"Received bedside report from Night RN. Awake and alert. On nasal cannula at 2 lpm. Mckeon in place for urinary retention. /54   Pulse 91   Temp 36.6 °C (97.9 °F) (Oral)   Resp 20   Ht 1.626 m (5' 4\")   Wt (!) 176 kg (388 lb 10.7 oz)   SpO2 92%   BMI 66.72 kg/m²     "

## 2021-11-03 LAB
ANION GAP SERPL CALC-SCNC: 2 MMOL/L (ref 7–16)
BUN SERPL-MCNC: 19 MG/DL (ref 8–22)
CALCIUM SERPL-MCNC: 8.8 MG/DL (ref 8.4–10.2)
CHLORIDE SERPL-SCNC: 85 MMOL/L (ref 96–112)
CO2 SERPL-SCNC: 43 MMOL/L (ref 20–33)
CREAT SERPL-MCNC: 0.93 MG/DL (ref 0.5–1.4)
GLUCOSE BLD-MCNC: 104 MG/DL (ref 65–99)
GLUCOSE BLD-MCNC: 83 MG/DL (ref 65–99)
GLUCOSE BLD-MCNC: 85 MG/DL (ref 65–99)
GLUCOSE BLD-MCNC: 98 MG/DL (ref 65–99)
GLUCOSE SERPL-MCNC: 98 MG/DL (ref 65–99)
POTASSIUM SERPL-SCNC: 4.5 MMOL/L (ref 3.6–5.5)
SODIUM SERPL-SCNC: 130 MMOL/L (ref 135–145)

## 2021-11-03 PROCEDURE — 700111 HCHG RX REV CODE 636 W/ 250 OVERRIDE (IP): Performed by: INTERNAL MEDICINE

## 2021-11-03 PROCEDURE — 700102 HCHG RX REV CODE 250 W/ 637 OVERRIDE(OP): Performed by: INTERNAL MEDICINE

## 2021-11-03 PROCEDURE — A9270 NON-COVERED ITEM OR SERVICE: HCPCS | Performed by: INTERNAL MEDICINE

## 2021-11-03 PROCEDURE — 36415 COLL VENOUS BLD VENIPUNCTURE: CPT

## 2021-11-03 PROCEDURE — A9270 NON-COVERED ITEM OR SERVICE: HCPCS | Performed by: HOSPITALIST

## 2021-11-03 PROCEDURE — 700102 HCHG RX REV CODE 250 W/ 637 OVERRIDE(OP): Performed by: HOSPITALIST

## 2021-11-03 PROCEDURE — 97530 THERAPEUTIC ACTIVITIES: CPT

## 2021-11-03 PROCEDURE — 80048 BASIC METABOLIC PNL TOTAL CA: CPT

## 2021-11-03 PROCEDURE — 97116 GAIT TRAINING THERAPY: CPT

## 2021-11-03 PROCEDURE — 99232 SBSQ HOSP IP/OBS MODERATE 35: CPT | Performed by: HOSPITALIST

## 2021-11-03 PROCEDURE — 82962 GLUCOSE BLOOD TEST: CPT

## 2021-11-03 PROCEDURE — 770006 HCHG ROOM/CARE - MED/SURG/GYN SEMI*

## 2021-11-03 PROCEDURE — 83036 HEMOGLOBIN GLYCOSYLATED A1C: CPT

## 2021-11-03 RX ORDER — ACETAZOLAMIDE 250 MG/1
250 TABLET ORAL
Status: DISCONTINUED | OUTPATIENT
Start: 2021-11-03 | End: 2021-11-03

## 2021-11-03 RX ADMIN — GUAIFENESIN SYRUP AND DEXTROMETHORPHAN 10 ML: 100; 10 SYRUP ORAL at 09:12

## 2021-11-03 RX ADMIN — NYSTATIN: 100000 POWDER TOPICAL at 12:12

## 2021-11-03 RX ADMIN — NYSTATIN: 100000 POWDER TOPICAL at 06:00

## 2021-11-03 RX ADMIN — GUAIFENESIN SYRUP AND DEXTROMETHORPHAN 10 ML: 100; 10 SYRUP ORAL at 16:45

## 2021-11-03 RX ADMIN — BENZONATATE 100 MG: 100 CAPSULE ORAL at 00:30

## 2021-11-03 RX ADMIN — METOPROLOL TARTRATE 50 MG: 50 TABLET, FILM COATED ORAL at 06:00

## 2021-11-03 RX ADMIN — FUROSEMIDE 40 MG: 10 INJECTION, SOLUTION INTRAMUSCULAR; INTRAVENOUS at 16:48

## 2021-11-03 RX ADMIN — POTASSIUM CHLORIDE 20 MEQ: 20 TABLET, EXTENDED RELEASE ORAL at 06:00

## 2021-11-03 RX ADMIN — AMIODARONE HYDROCHLORIDE 200 MG: 200 TABLET ORAL at 03:13

## 2021-11-03 RX ADMIN — ONDANSETRON 4 MG: 2 INJECTION INTRAMUSCULAR; INTRAVENOUS at 21:17

## 2021-11-03 RX ADMIN — BENZONATATE 100 MG: 100 CAPSULE ORAL at 13:17

## 2021-11-03 RX ADMIN — GUAIFENESIN SYRUP AND DEXTROMETHORPHAN 10 ML: 100; 10 SYRUP ORAL at 03:03

## 2021-11-03 RX ADMIN — METOPROLOL TARTRATE 50 MG: 50 TABLET, FILM COATED ORAL at 17:28

## 2021-11-03 RX ADMIN — NYSTATIN: 100000 POWDER TOPICAL at 17:28

## 2021-11-03 RX ADMIN — POTASSIUM CHLORIDE 20 MEQ: 20 TABLET, EXTENDED RELEASE ORAL at 17:28

## 2021-11-03 RX ADMIN — DIGOXIN 250 MCG: 250 TABLET ORAL at 03:13

## 2021-11-03 RX ADMIN — ENOXAPARIN SODIUM 40 MG: 40 INJECTION SUBCUTANEOUS at 06:00

## 2021-11-03 ASSESSMENT — PAIN DESCRIPTION - PAIN TYPE
TYPE: ACUTE PAIN

## 2021-11-03 ASSESSMENT — ENCOUNTER SYMPTOMS
SHORTNESS OF BREATH: 1
NAUSEA: 0
NERVOUS/ANXIOUS: 1
HEARTBURN: 0
HEADACHES: 0
BLURRED VISION: 0
MYALGIAS: 1
COUGH: 0
PALPITATIONS: 0
ABDOMINAL PAIN: 0
ORTHOPNEA: 1
HEMOPTYSIS: 0
SORE THROAT: 0
WEAKNESS: 1
VOMITING: 0
PHOTOPHOBIA: 0

## 2021-11-03 ASSESSMENT — COGNITIVE AND FUNCTIONAL STATUS - GENERAL
MOVING FROM LYING ON BACK TO SITTING ON SIDE OF FLAT BED: A LITTLE
SUGGESTED CMS G CODE MODIFIER MOBILITY: CL
CLIMB 3 TO 5 STEPS WITH RAILING: TOTAL
TURNING FROM BACK TO SIDE WHILE IN FLAT BAD: A LOT
STANDING UP FROM CHAIR USING ARMS: A LITTLE
MOBILITY SCORE: 13
MOVING TO AND FROM BED TO CHAIR: A LOT
WALKING IN HOSPITAL ROOM: A LOT

## 2021-11-03 ASSESSMENT — GAIT ASSESSMENTS
DISTANCE (FEET): 7
DEVIATION: SHUFFLED GAIT;INCREASED BASE OF SUPPORT
ASSISTIVE DEVICE: FRONT WHEEL WALKER
GAIT LEVEL OF ASSIST: MINIMAL ASSIST

## 2021-11-03 NOTE — RESPIRATORY CARE
COPD EDUCATION by COPD CLINICAL EDUCATOR  11/3/2021 at 1:43 PM by Tori Boland RRT     Patient reviewed by COPD education team. Patient does have a history or diagnosis of COPD, NO PFT on file and no respiratory medications used, patient is a former smoker.  A Short intervention was completed with this patient covering: What is COPD (how the lungs work), daily medications rescue and maintenance, breathing techniques, infection prevention and oxygen safety were covered in detail.  A comprehensive packet including information about COPD, treatments, and oxygen safety was given.           COPD Screen  COPD Risk Screening  Do you have a history of COPD?: Yes  Do you have a Pulmonologist?: No (Pt has been to [facility] many years ago)  COPD Population Screener  During the past 4 weeks, how much did you feel short of breath?: Most  or all of the time  Do you ever cough up any mucus or phlegm?: No/only with occasional colds or infections  In the past 12 months, you do less than you used to because of your breathing problems: Agree  Have you smoked at least 100 cigarettes in your entire life?: Yes  How old are you?: 60+  COPD Screening Score: 7    COPD Assessment  COPD Clinical Specialists ONLY  COPD Education Initiated: Yes--Short Intervention (Pt does not have a PFT on file is not sure if has every had one, pt BMI 66.72, is always short of breath does not use any respiratory inhalers, is not very active, pt pending placement)  DME Company: Unknown  DME Equipment Type: concerns for patient significant weakness.  Is wheelchair-bound at home pending SNF  Physician Name: MAURICIO MCLEAN* - encourage follow up  Referrals Initiated:  (Pt has Medicare HMO, [facility] information given, as well as consult Palliative)  Is this a COPD exacerbation patient?: No    Meds to Beds  Would the patient like to opt in for Bedside Medication Delivery at Discharge?: Yes, interested     MY COPD ACTION PLAN      "It is recommended that patients and physicians /healthcare providers complete this action plan together. This plan should be discussed at each physician visit and updated as needed.    The green, yellow and red zones show groups of symptoms of COPD. This list of symptoms is not comprehensive, and you may experience other symptoms. In the \"Actions\" column, your healthcare provider has recommended actions for you to take based on your symptoms.    Patient Name: Cheryl D Blakemore   YOB: 1960   Last Updated on:     Green Zone:  I am doing well today Actions   •  Usual activitiy and exercise level •  Take daily medications   •  Usual amounts of cough and phlegm/mucus •  Use oxygen as prescribed   •  Sleep well at night •  Continue regular exercise/diet plan   •  Appetite is good •  At all times avoid cigarette smoke, inhaled irritants     Daily Medications (these medications are taken every day):                Yellow Zone:  I am having a bad day or a COPD flare Actions   •  More breathless than usual •  Continue daily medications   •  I have less energy for my daily activities •  Use quick relief inhaler as ordered   •  Increased or thicker phlegm/mucus •  Use oxygen as prescribed   •  Using quick relief inhaler/nebulizer more often •  Get plenty of rest   •  Swelling of ankles more than usual •  Use pursed lip breathing   •  More coughing than usual •  At all times avoid cigarette smoke, inhaled irritants   •  I feel like I have a \"chest cold\"   •  Poor sleep and my symptoms woke me up   •  My appetite is not good   •  My medicine is not helping    •  Call provider immediately if symptoms don’t improve     Continue daily medications, add rescue medications:               Medications to be used during a flare up, (as Discussed with Provider):              Red Zone:  I need urgent medical care Actions   •  Severe shortness of breath even at rest •  Call 911 or seek medical care immediately   •  Not able " to do any activity because of breathing    •  Fever or shaking chills    •  Feeling confused or very drowsy     •  Chest pains    •  Coughing up blood

## 2021-11-03 NOTE — PROGRESS NOTES
Hospital Medicine Daily Progress Note    Date of Service  11/2/2021    Chief Complaint  Cheryl D Blakemore is a 61 y.o. female admitted 10/28/2021 with shortness of breath    Hospital Cours      Interval Problem Update  10/30: we will get PT/OT and concerns for patient significant weakness.  Is wheelchair-bound at home.  Blood cultures +2/2, repeat cultures today.  Discussed with ID.     10/31: PT/OT recommending postacute care placement.  Blood cultures appear to be a contaminant 2 out of 2 with staph epidermidis, repeat cultures negative.  ID has signed off, no need for antibiotics.  Patient is medically cleared for SNF, discussed with case management in IDT rounds.    11/1: No complaints at time of examination.  Medically cleared, pending placement, discussed with case management.  Palliative care met with patient, CODE STATUS adjusted.    I have personally seen and examined the patient at bedside. I discussed the plan of care with patient, bedside RN and charge RN.      11/02:  No acute events overnight, laying in a bed ,d enied any complain  Medically cleared to be discharged  Pending placement    Consultants/Specialty  critical care and infectious disease    Code Status  DNAR/DNI    Disposition  Patient is medically cleared.   Anticipate discharge to to skilled nursing facility.  I have placed the appropriate orders for post-discharge needs.    Review of Systems  Review of Systems   Constitutional: Positive for malaise/fatigue. Negative for chills and fever.   HENT: Negative for congestion and sore throat.    Eyes: Negative for blurred vision and photophobia.   Respiratory: Positive for shortness of breath. Negative for cough and hemoptysis.    Cardiovascular: Positive for palpitations, orthopnea and leg swelling. Negative for chest pain.   Gastrointestinal: Negative for abdominal pain, heartburn, nausea and vomiting.   Genitourinary: Negative for dysuria and urgency.   Musculoskeletal: Positive for myalgias.    Neurological: Positive for weakness (generalized ). Negative for headaches.   Psychiatric/Behavioral: The patient is nervous/anxious.         Physical Exam  Temp:  [36.5 °C (97.7 °F)-36.9 °C (98.4 °F)] 36.5 °C (97.7 °F)  Pulse:  [69-91] 69  Resp:  [20] 20  BP: (104-118)/(54-72) 104/58  SpO2:  [91 %-94 %] 91 %    Physical Exam  Vitals and nursing note reviewed.   Constitutional:       Appearance: She is obese. She is not ill-appearing.   Eyes:      Extraocular Movements: Extraocular movements intact.   Cardiovascular:      Rate and Rhythm: Normal rate.   Pulmonary:      Effort: Pulmonary effort is normal. No respiratory distress.      Breath sounds: Normal breath sounds.   Abdominal:      General: Bowel sounds are normal. There is no distension.      Palpations: Abdomen is soft. There is no mass.   Musculoskeletal:      Cervical back: Neck supple.      Right lower leg: No edema.      Left lower leg: No edema.   Neurological:      Mental Status: She is alert and oriented to person, place, and time.      Cranial Nerves: No cranial nerve deficit.         Fluids    Intake/Output Summary (Last 24 hours) at 11/2/2021 1922  Last data filed at 11/2/2021 1400  Gross per 24 hour   Intake 1020 ml   Output 2500 ml   Net -1480 ml       Laboratory  Recent Labs     10/31/21  0226 11/01/21  0426 11/02/21  0210   WBC 11.3* 9.6 8.9   RBC 4.20 4.20 4.14*   HEMOGLOBIN 12.5 12.4 12.5   HEMATOCRIT 41.3 41.5 39.9   MCV 98.3* 98.8* 96.4   MCH 29.8 29.5 30.2   MCHC 30.3* 29.9* 31.3*   RDW 47.4 47.7 46.5   PLATELETCT 144* 123* 129*   MPV 12.0 11.8 12.2     Recent Labs     10/31/21  0226 11/01/21  0426 11/02/21  0210   SODIUM 139 135 135   POTASSIUM 4.3 4.4 4.5   CHLORIDE 86* 83* 86*   CO2 42* 40* 36*   GLUCOSE 102* 100* 102*   BUN 22 21 22   CREATININE 1.15 1.11 1.13   CALCIUM 9.3 9.1 9.2                   Imaging  EC-ECHOCARDIOGRAM COMPLETE W/O CONT   Final Result      DX-CHEST-PORTABLE (1 VIEW)   Final Result         1.  Pulmonary edema  and/or infiltrates.   2.  Cardiomegaly           Assessment/Plan  Bacteremia due to Staphylococcus- (present on admission)  Assessment & Plan  Blood cultures positive on admission, initially thought to be contaminant.  2-2 cultures positive now with staph epi repeat cultures negative.  Likely contaminant  ID consulted, no need for antibiotics  Follow-up echocardiogram    Leucocytosis  Assessment & Plan  resolved    Hyperglycemia- (present on admission)  Assessment & Plan  -I am unsure how much the patient will be eating especially since she will be on BiPAP overnight however she will also be started on IV steroids I do anticipate her glucose to worsen, therefore I will start insulin sliding scale  -Adjust as needed    Hyponatremia- (present on admission)  Assessment & Plan  rewsolved    Acute on chronic respiratory failure with hypoxia and hypercapnia (HCC)- (present on admission)  Assessment & Plan  -Multifactorial in patient with acute on chronic systolic heart failure as well as COPD exacerbation  -Covid was negative  -Patient is requiring BiPAP, she does have acute encephalopathy when not on BiPAP, continue BiPAP overnight, reevaluate in the morning  -Continue IV Lasix, patient is not on Lasix at home  -FU on echocardiogram  -Heart respiratory care per protocol  Palliative care met with patient, CODE STATUS changed to DNR/DNI.  POLST in chart.     Chronic obstructive pulmonary disease with acute exacerbation (HCC)- (present on admission)  Assessment & Plan  -Patient did have a productive cough, obtain procalcitonin  -For now, start azithromycin, depending on procalcitonin results may need to adjust antibiotics  -Start IV steroids  -Continue BiPAP  -Start respiratory care per protocol    Essential hypertension- (present on admission)  Assessment & Plan  -Continue home metoprolol  -Start as needed labetalol, clonidine and enalapril  -Adjust as needed    Acute on chronic congestive heart failure (HCC)- (present on  admission)  Assessment & Plan  -No recent echocardiogram, most recent was in 2013 with an ejection fraction of 45%   --on I/Os, daily weight, fluid restriction     AF (atrial fibrillation) ([facility])- (present on admission)  Assessment & Plan  -Currently rate controlled   --home amiodarone, digoxin and metoprolol      Morbid obesity with BMI of 60.0-69.9, adult ([facility])- (present on admission)  Assessment & Plan  -Chronic, would certainly benefit from diet and exercise adjustment at discharge       VTE prophylaxis: enoxaparin ppx

## 2021-11-03 NOTE — PROGRESS NOTES
Report to BANDAR Gibbs and BANDAR Willingham.     Pt is &Ox4, flat affect, on 2L nc, non-tele, and up x1-2 to bedside commode, x2 to get out of bed to chair.     Uinversal fall and safety precautions in place, call light within reach, pt has no current needs. Care transferred.

## 2021-11-03 NOTE — THERAPY
Physical Therapy   Daily Treatment     Patient Name: Cheryl D Blakemore  Age:  61 y.o., Sex:  female  Medical Record #: [id number]  Today's Date: 11/3/2021     Precautions  Precautions: Fall Risk    Assessment    Pt agreeable for PT, can be self limiting, only tolerating standing ~45 sec before needing to sit down.  Pt currently is unable to care for self and will benefit from further therapy at SNF prior to DC home.    Plan    Continue current treatment plan.    DC Equipment Recommendations: Unable to determine at this time  Discharge Recommendations: Recommend post-acute placement for additional physical therapy services prior to discharge home       11/03/21 1235   Cognition    Level of Consciousness Alert   Comments Oriented x4, encouragement needed to mobilize   Other Treatments   Other Treatments Provided while standing had pt work on upright posture and standing toleranace   Balance   Sitting Balance (Static) Fair +   Sitting Balance (Dynamic) Fair   Standing Balance (Static) Fair   Standing Balance (Dynamic) Fair -   Weight Shift Sitting Fair   Weight Shift Standing Fair   Skilled Intervention Postural Facilitation;Sequencing;Tactile Cuing;Verbal Cuing   Comments stdg with FWW   Gait Analysis   Gait Level Of Assist Minimal Assist   Assistive Device Front Wheel Walker   Distance (Feet) 7   # of Times Distance was Traveled 1   Deviation Shuffled Gait;Increased Base Of Support   Bed Mobility    Supine to Sit Supervised  (elevated HOB, utilized bed rail)   Functional Mobility   Sit to Stand   (SBA)   Bed, Chair, Wheelchair Transfer   (SBA)   Transfer Method Stand Step  (with FWW)   Activity Tolerance   Sitting in Chair > 1 hr   Standing 45 sec only   Short Term Goals    Short Term Goal # 1 Pt will transition supine <> sit without bed features and spv within 6 visits in order to improve functional mobility.    Goal Outcome # 1 Progressing as expected   Short Term Goal # 2 Pt will transition sit <> stand with FWW and  spv within 6 visits in order to progress functional mobility.    Goal Outcome # 2 Progressing as expected   Short Term Goal # 3 Pt will ambulate 100 ft with FWW and spv within 6 visits so she can access her home environment.    Goal Outcome # 3 Progressing slower than expected

## 2021-11-03 NOTE — PROGRESS NOTES
Hospital Medicine Daily Progress Note    Date of Service  11/3/2021    Chief Complaint  Cheryl D Blakemore is a 61 y.o. female admitted 10/28/2021 with shortness of breath    Hospital Cours      Interval Problem Update  10/30: we will get PT/OT and concerns for patient significant weakness.  Is wheelchair-bound at home.  Blood cultures +2/2, repeat cultures today.  Discussed with ID.     10/31: PT/OT recommending postacute care placement.  Blood cultures appear to be a contaminant 2 out of 2 with staph epidermidis, repeat cultures negative.  ID has signed off, no need for antibiotics.  Patient is medically cleared for SNF, discussed with case management in IDT rounds.    11/1: No complaints at time of examination.  Medically cleared, pending placement, discussed with case management.  Palliative care met with patient, CODE STATUS adjusted.    I have personally seen and examined the patient at bedside. I discussed the plan of care with patient, bedside RN and charge RN.    11/02:  No acute events overnight, laying in a bed ,d enied any complain  Medically cleared to be discharged  Pending placement    11/03:  No acute events overnight, laying in bed, denies any complaint.  Patient is medically cleared and she has an occipital heart discharge pending their ability.    --Vital signs has been stable  --Patient is noted to have elevated bicarbonate 43, likely 2/2 contraction alkalosis . Hold lasix  This is likely secondary to obesity hypoventilation syndrome  Plan of care has been explained to the patient, bedside nurse, charge nurse,  and pharmacy     consultants/Specialty  critical care and infectious disease    Code Status  DNAR/DNI    Disposition  Patient is medically cleared.   Anticipate discharge to to skilled nursing facility.  I have placed the appropriate orders for post-discharge needs.    Review of Systems  Review of Systems   Constitutional: Positive for malaise/fatigue.   HENT: Negative for  congestion and sore throat.    Eyes: Negative for blurred vision and photophobia.   Respiratory: Positive for shortness of breath. Negative for cough and hemoptysis.    Cardiovascular: Positive for orthopnea and leg swelling. Negative for chest pain and palpitations.   Gastrointestinal: Negative for abdominal pain, heartburn, nausea and vomiting.   Genitourinary: Negative for dysuria and urgency.   Musculoskeletal: Positive for myalgias.   Neurological: Positive for weakness (generalized ). Negative for headaches.   Psychiatric/Behavioral: The patient is nervous/anxious.         Physical Exam  Temp:  [36.8 °C (98.3 °F)-37.3 °C (99.1 °F)] 36.8 °C (98.3 °F)  Pulse:  [] 60  Resp:  [18-20] 18  BP: ()/(52-70) 106/57  SpO2:  [89 %-99 %] 96 %    Physical Exam  Vitals and nursing note reviewed.   Constitutional:       Appearance: She is obese. She is not ill-appearing.   Eyes:      Extraocular Movements: Extraocular movements intact.   Cardiovascular:      Rate and Rhythm: Normal rate.   Pulmonary:      Effort: Pulmonary effort is normal. No respiratory distress.      Breath sounds: Normal breath sounds.   Abdominal:      General: Bowel sounds are normal. There is no distension.      Palpations: Abdomen is soft. There is no mass.   Musculoskeletal:      Cervical back: Neck supple.      Right lower leg: No edema.      Left lower leg: No edema.   Neurological:      Mental Status: She is alert and oriented to person, place, and time.      Cranial Nerves: No cranial nerve deficit.         Fluids    Intake/Output Summary (Last 24 hours) at 11/3/2021 1348  Last data filed at 11/3/2021 1348  Gross per 24 hour   Intake 830 ml   Output 2350 ml   Net -1520 ml       Laboratory  Recent Labs     11/01/21  0426 11/02/21  0210   WBC 9.6 8.9   RBC 4.20 4.14*   HEMOGLOBIN 12.4 12.5   HEMATOCRIT 41.5 39.9   MCV 98.8* 96.4   MCH 29.5 30.2   MCHC 29.9* 31.3*   RDW 47.7 46.5   PLATELETCT 123* 129*   MPV 11.8 12.2     Recent Labs      11/01/21  0426 11/02/21  0210 11/03/21  0507   SODIUM 135 135 130*   POTASSIUM 4.4 4.5 4.5   CHLORIDE 83* 86* 85*   CO2 40* 36* 43*   GLUCOSE 100* 102* 98   BUN 21 22 19   CREATININE 1.11 1.13 0.93   CALCIUM 9.1 9.2 8.8                   Imaging  EC-ECHOCARDIOGRAM COMPLETE W/O CONT   Final Result      DX-CHEST-PORTABLE (1 VIEW)   Final Result         1.  Pulmonary edema and/or infiltrates.   2.  Cardiomegaly           Assessment/Plan  Bacteremia due to Staphylococcus- (present on admission)  Assessment & Plan  Continue statin, echocardiogram showed EF of 45%, no vegetation noted    Leucocytosis  Assessment & Plan  resolved    Hyperglycemia- (present on admission)  Assessment & Plan  Continue insulin sliding scale, hypoglycemic  Protocol     Hyponatremia- (present on admission)  Assessment & Plan  At 130 , he developed oriented, answering questions appropriately.  Patient is on Lasix continue IV daily likely secondary to acute iatrogenic diuresis/hypovolemia  Monitor    Acute on chronic respiratory failure with hypoxia and hypercapnia (HCC)- (present on admission)  Assessment & Plan  -Multifactorial in patient with acute on chronic systolic heart failure as well as COPD exacerbation  -Covid was negative  -Patient is requiring BiPAP, she does have acute encephalopathy when not on BiPAP, continue BiPAP overnight, reevaluate in the morning  Echocardiogram reviewed, EF is noted to be 55% no mention of diastolic function.  We will hold the Lasix as patient noted to have contraction alkalosis    Chronic obstructive pulmonary disease with acute exacerbation (HCC)- (present on admission)  Assessment & Plan  -Continue RT protocol, breathing treatment.  She is currently requiring 2 L of oxygen    Essential hypertension- (present on admission)  Assessment & Plan  -Continue home metoprolol  -Start as needed labetalol, clonidine and enalapril  -Adjust as needed    AF (atrial fibrillation) (HCC)- (present on admission)  Assessment  & Plan  -Currently rate controlled   --home amiodarone, digoxin and metoprolol      Morbid obesity with BMI of 60.0-69.9, adult (HCC)- (present on admission)  Assessment & Plan  -Chronic, would certainly benefit from diet and exercise adjustment at discharge       VTE prophylaxis: enoxaparin ppx

## 2021-11-03 NOTE — PROGRESS NOTES
Received report from BANDAR Willingham. Pt down to room 209-1 via w/c, assisted to bed with fww. Pt oriented to room and updated on plan of care. Safety precautions in place. Pt educated to call for assistance.

## 2021-11-03 NOTE — PROGRESS NOTES
Report received from BANDAR Humphreys. Pt is resting comfortably, safety precautions in place, and call light within reach. No needs at this time.     Covid-19 surge in effect.

## 2021-11-03 NOTE — PROGRESS NOTES
Lab called with critical value of CO2 43. Dr. Gonzalez notified via volate at 0603, responded at 0604. No new orders as of now.

## 2021-11-03 NOTE — PALLIATIVE CARE
Palliative Care follow-up  Met with pt and [facility]. Pt had Advanced directive notarized. Copy to be scanned into EMR. Original and one copy per pt request provided to pt.     Updated: Dr Dawson    Plan: Palliative RN remains available for pt and family needs.     Thank you for allowing Palliative Care to participate in this patient's care. Please feel free to call [phone] with any questions or concerns.

## 2021-11-04 LAB
ANION GAP SERPL CALC-SCNC: 3 MMOL/L (ref 7–16)
BACTERIA BLD CULT: NORMAL
BACTERIA BLD CULT: NORMAL
BUN SERPL-MCNC: 20 MG/DL (ref 8–22)
CALCIUM SERPL-MCNC: 8.9 MG/DL (ref 8.4–10.2)
CHLORIDE SERPL-SCNC: 84 MMOL/L (ref 96–112)
CO2 SERPL-SCNC: 44 MMOL/L (ref 20–33)
CREAT SERPL-MCNC: 1.03 MG/DL (ref 0.5–1.4)
EST. AVERAGE GLUCOSE BLD GHB EST-MCNC: 108 MG/DL
GLUCOSE BLD-MCNC: 100 MG/DL (ref 65–99)
GLUCOSE BLD-MCNC: 107 MG/DL (ref 65–99)
GLUCOSE BLD-MCNC: 147 MG/DL (ref 65–99)
GLUCOSE BLD-MCNC: 161 MG/DL (ref 65–99)
GLUCOSE SERPL-MCNC: 143 MG/DL (ref 65–99)
HBA1C MFR BLD: 5.4 % (ref 4–5.6)
POTASSIUM SERPL-SCNC: 5.1 MMOL/L (ref 3.6–5.5)
SIGNIFICANT IND 70042: NORMAL
SIGNIFICANT IND 70042: NORMAL
SITE SITE: NORMAL
SITE SITE: NORMAL
SODIUM SERPL-SCNC: 131 MMOL/L (ref 135–145)
SOURCE SOURCE: NORMAL
SOURCE SOURCE: NORMAL

## 2021-11-04 PROCEDURE — 700102 HCHG RX REV CODE 250 W/ 637 OVERRIDE(OP): Performed by: INTERNAL MEDICINE

## 2021-11-04 PROCEDURE — 36415 COLL VENOUS BLD VENIPUNCTURE: CPT

## 2021-11-04 PROCEDURE — 99232 SBSQ HOSP IP/OBS MODERATE 35: CPT | Performed by: HOSPITALIST

## 2021-11-04 PROCEDURE — 97535 SELF CARE MNGMENT TRAINING: CPT

## 2021-11-04 PROCEDURE — 94640 AIRWAY INHALATION TREATMENT: CPT

## 2021-11-04 PROCEDURE — 94760 N-INVAS EAR/PLS OXIMETRY 1: CPT

## 2021-11-04 PROCEDURE — 700105 HCHG RX REV CODE 258: Performed by: HOSPITALIST

## 2021-11-04 PROCEDURE — 700102 HCHG RX REV CODE 250 W/ 637 OVERRIDE(OP): Performed by: HOSPITALIST

## 2021-11-04 PROCEDURE — 700111 HCHG RX REV CODE 636 W/ 250 OVERRIDE (IP): Performed by: INTERNAL MEDICINE

## 2021-11-04 PROCEDURE — 97116 GAIT TRAINING THERAPY: CPT

## 2021-11-04 PROCEDURE — 82962 GLUCOSE BLOOD TEST: CPT

## 2021-11-04 PROCEDURE — 700101 HCHG RX REV CODE 250: Performed by: INTERNAL MEDICINE

## 2021-11-04 PROCEDURE — A9270 NON-COVERED ITEM OR SERVICE: HCPCS | Performed by: HOSPITALIST

## 2021-11-04 PROCEDURE — 80048 BASIC METABOLIC PNL TOTAL CA: CPT

## 2021-11-04 PROCEDURE — A9270 NON-COVERED ITEM OR SERVICE: HCPCS | Performed by: INTERNAL MEDICINE

## 2021-11-04 PROCEDURE — 770006 HCHG ROOM/CARE - MED/SURG/GYN SEMI*

## 2021-11-04 RX ORDER — CHLORDIAZEPOXIDE HYDROCHLORIDE 25 MG/1
25 CAPSULE, GELATIN COATED ORAL EVERY 6 HOURS
Status: DISCONTINUED | OUTPATIENT
Start: 2021-11-05 | End: 2021-11-04

## 2021-11-04 RX ORDER — ACETAZOLAMIDE 250 MG/1
250 TABLET ORAL
Status: DISCONTINUED | OUTPATIENT
Start: 2021-11-04 | End: 2021-11-07

## 2021-11-04 RX ORDER — CHLORDIAZEPOXIDE HYDROCHLORIDE 25 MG/1
50 CAPSULE, GELATIN COATED ORAL EVERY 6 HOURS
Status: DISCONTINUED | OUTPATIENT
Start: 2021-11-04 | End: 2021-11-04

## 2021-11-04 RX ORDER — TAMSULOSIN HYDROCHLORIDE 0.4 MG/1
0.4 CAPSULE ORAL
Status: DISCONTINUED | OUTPATIENT
Start: 2021-11-04 | End: 2021-11-09 | Stop reason: HOSPADM

## 2021-11-04 RX ORDER — SODIUM CHLORIDE 9 MG/ML
INJECTION, SOLUTION INTRAVENOUS CONTINUOUS
Status: DISCONTINUED | OUTPATIENT
Start: 2021-11-04 | End: 2021-11-05

## 2021-11-04 RX ADMIN — ACETAZOLAMIDE 250 MG: 250 TABLET ORAL at 10:00

## 2021-11-04 RX ADMIN — IPRATROPIUM BROMIDE AND ALBUTEROL SULFATE 3 ML: .5; 2.5 SOLUTION RESPIRATORY (INHALATION) at 04:55

## 2021-11-04 RX ADMIN — NYSTATIN: 100000 POWDER TOPICAL at 05:16

## 2021-11-04 RX ADMIN — ACETAZOLAMIDE 250 MG: 250 TABLET ORAL at 16:21

## 2021-11-04 RX ADMIN — NYSTATIN: 100000 POWDER TOPICAL at 12:19

## 2021-11-04 RX ADMIN — SODIUM CHLORIDE: 9 INJECTION, SOLUTION INTRAVENOUS at 15:21

## 2021-11-04 RX ADMIN — ENOXAPARIN SODIUM 40 MG: 40 INJECTION SUBCUTANEOUS at 05:16

## 2021-11-04 RX ADMIN — METOPROLOL TARTRATE 50 MG: 50 TABLET, FILM COATED ORAL at 05:16

## 2021-11-04 RX ADMIN — INSULIN HUMAN 2 UNITS: 100 INJECTION, SOLUTION PARENTERAL at 20:10

## 2021-11-04 RX ADMIN — TAMSULOSIN HYDROCHLORIDE 0.4 MG: 0.4 CAPSULE ORAL at 09:17

## 2021-11-04 RX ADMIN — NYSTATIN: 100000 POWDER TOPICAL at 18:00

## 2021-11-04 RX ADMIN — GUAIFENESIN SYRUP AND DEXTROMETHORPHAN 10 ML: 100; 10 SYRUP ORAL at 19:52

## 2021-11-04 RX ADMIN — ACETAMINOPHEN 650 MG: 325 TABLET, FILM COATED ORAL at 12:27

## 2021-11-04 ASSESSMENT — COGNITIVE AND FUNCTIONAL STATUS - GENERAL
DAILY ACTIVITIY SCORE: 16
HELP NEEDED FOR BATHING: A LOT
PERSONAL GROOMING: A LITTLE
DRESSING REGULAR LOWER BODY CLOTHING: A LOT
TOILETING: A LOT
SUGGESTED CMS G CODE MODIFIER MOBILITY: CK
WALKING IN HOSPITAL ROOM: A LOT
CLIMB 3 TO 5 STEPS WITH RAILING: TOTAL
MOBILITY SCORE: 15
STANDING UP FROM CHAIR USING ARMS: A LITTLE
DRESSING REGULAR UPPER BODY CLOTHING: A LITTLE
MOVING TO AND FROM BED TO CHAIR: A LITTLE
MOVING FROM LYING ON BACK TO SITTING ON SIDE OF FLAT BED: A LITTLE
TURNING FROM BACK TO SIDE WHILE IN FLAT BAD: A LITTLE
SUGGESTED CMS G CODE MODIFIER DAILY ACTIVITY: CK

## 2021-11-04 ASSESSMENT — ENCOUNTER SYMPTOMS
ABDOMINAL PAIN: 0
BLURRED VISION: 0
HEMOPTYSIS: 0
COUGH: 0
PALPITATIONS: 0
HEARTBURN: 0
ORTHOPNEA: 0
CHILLS: 0
NAUSEA: 0
MYALGIAS: 1
PHOTOPHOBIA: 0
SHORTNESS OF BREATH: 1
WEAKNESS: 1
NERVOUS/ANXIOUS: 1
SORE THROAT: 0
HEADACHES: 0
VOMITING: 0

## 2021-11-04 ASSESSMENT — PAIN DESCRIPTION - PAIN TYPE
TYPE: ACUTE PAIN
TYPE: ACUTE PAIN

## 2021-11-04 ASSESSMENT — FIBROSIS 4 INDEX: FIB4 SCORE: 2.85

## 2021-11-04 ASSESSMENT — GAIT ASSESSMENTS
DEVIATION: SHUFFLED GAIT;INCREASED BASE OF SUPPORT
GAIT LEVEL OF ASSIST: MINIMAL ASSIST
DISTANCE (FEET): 7
ASSISTIVE DEVICE: FRONT WHEEL WALKER

## 2021-11-04 NOTE — THERAPY
Occupational Therapy  Daily Treatment     Patient Name: Cheryl D Blakemore  Age:  61 y.o., Sex:  female  Medical Record #: [id number]  Today's Date: 11/4/2021     Precautions: (P) Fall Risk  Comments: (P) quick to desat    Assessment    Pt pleasant and agreeable to therapy but progressing slower than expected. Pt reports she feels she is approaching baseline, but feels weaker than normal. Today pt required min A for seated G/H, mod A for LB dressing, and refused OOB activity d/t fatigue (despite max encouragement and education on benefits of OOB activity). Pt's O2 sats dropped to mid 80s on room air seated EOB while brushing teeth but returned to mid 90s within 2 min with oxymask and cues for deep breathing techniques. Will continue to follow for skilled OT.     Plan    Continue current treatment plan.    DC Equipment Recommendations: (P) Unable to determine at this time  Discharge Recommendations: (P) Anticipate that the patient will have no further occupational therapy needs after discharge from the hospital       11/04/21 1333   Precautions   Precautions Fall Risk   Comments quick to desat   Cognition    Comments delayed, max cues for mobility   Balance   Sitting Balance (Static) Fair +   Sitting Balance (Dynamic) Fair   Weight Shift Sitting Fair   Skilled Intervention Verbal Cuing;Tactile Cuing;Sequencing   Comments EOB only, refused standing tasks   Bed Mobility    Supine to Sit Supervised   Sit to Supine Supervised   Skilled Intervention Verbal Cuing   Activities of Daily Living   Grooming Minimal Assist;Seated   Lower Body Dressing Minimal Assist   Skilled Intervention Verbal Cuing;Tactile Cuing;Sequencing   Comments max encouragement to use ambulate to the toilet throughout the day. Pt's O2 sats dropped with G/H seated EOB    How much help from another person does the patient currently need...   Putting on and taking off regular lower body clothing? 2   Bathing (including washing, rinsing, and drying)? 2  "  Toileting, which includes using a toilet, bedpan, or urinal? 2   Putting on and taking off regular upper body clothing? 3   Taking care of personal grooming such as brushing teeth? 3   Eating meals? 4   6 Clicks Daily Activity Score 16   Functional Mobility   Sit to Stand Refused   Bed, Chair, Wheelchair Transfer Refused   Toilet Transfers Refused   Activity Tolerance   Sitting Edge of Bed 20 min   Standing NT   Comments quick to desat   Patient / Family Goals   Patient / Family Goal #1 to home after rehab    Short Term Goals   Short Term Goal # 1 pt will perform ADL transfers with Sup within 5 days   Goal Outcome # 1 Progressing as expected   Short Term Goal # 2 pt will perform toileting in br with Brady within 5 days    Goal Outcome # 2 Progressing slower than expected   Short Term Goal # 3 pt will groom at sink, standing 6-8\", with Sup within 5 days    Goal Outcome # 3 Progressing slower than expected   Education Group   Education Provided Pathology of bedrest   Role of Occupational Therapist Patient Response Patient;Acceptance;Explanation;Verbal Demonstration   Pathology of Bedrest Patient Response Patient;Acceptance;Explanation;Reinforcement Needed   Anticipated Discharge Equipment and Recommendations   DC Equipment Recommendations Unable to determine at this time   Discharge Recommendations Anticipate that the patient will have no further occupational therapy needs after discharge from the hospital   Interdisciplinary Plan of Care Collaboration   IDT Collaboration with  Nursing   Patient Position at End of Therapy In Bed;Call Light within Reach;Tray Table within Reach;Phone within Reach   Collaboration Comments Ot tx and recs   Session Information   Date / Session Number  11/4 2 (2/3, 11/7)   Priority 2         "

## 2021-11-04 NOTE — PROGRESS NOTES
COVID 19 surge in effect.     Received report from night shift RN, assumed care of pt. Latricia VSS. Pt currently on 4L O2 via face mask due to desaturation with coughing. Mckeon in place, draining clear yellow urine. Updated on plan of care and answered any questions. Safety precautions in place, pt educated to call for assistance.

## 2021-11-04 NOTE — PROGRESS NOTES
Hospital Medicine Daily Progress Note    Date of Service  11/4/2021    Chief Complaint  Cheryl D Blakemore is a 61 y.o. female admitted 10/28/2021 with shortness of breath    Hospital Cours      Interval Problem Update  10/30: we will get PT/OT and concerns for patient significant weakness.  Is wheelchair-bound at home.  Blood cultures +2/2, repeat cultures today.  Discussed with ID.     10/31: PT/OT recommending postacute care placement.  Blood cultures appear to be a contaminant 2 out of 2 with staph epidermidis, repeat cultures negative.  ID has signed off, no need for antibiotics.  Patient is medically cleared for SNF, discussed with case management in IDT rounds.    11/1: No complaints at time of examination.  Medically cleared, pending placement, discussed with case management.  Palliative care met with patient, CODE STATUS adjusted.    I have personally seen and examined the patient at bedside. I discussed the plan of care with patient, bedside RN and charge RN.    11/02:  No acute events overnight, laying in a bed ,d enied any complain  Medically cleared to be discharged  Pending placement    11/03:  No acute events overnight, laying in bed, denies any complaint.  Patient is medically cleared and she has an occipital heart discharge pending their ability.    --Vital signs has been stable  --Patient is noted to have elevated bicarbonate 43, likely 2/2 contraction alkalosis . Hold lasix  This is likely secondary to obesity hypoventilation syndrome  Plan of care has been explained to the patient, bedside nurse, charge nurse,  and pharmacy     11/04: N no acute events overnight, laying in bed, denies any complaint. Patient noted to have elevated HCO3, likely secondary to contraction alkalosis  -- mild liane   -- will stop fluid restriction   Repeat bmp at am  Pt recs post-scute      consultants/Specialty  critical care and infectious disease    Code Status  DNAR/DNI    Disposition  Patient is medically  cleared.   Anticipate discharge to to skilled nursing facility.  I have placed the appropriate orders for post-discharge needs.    Review of Systems  Review of Systems   Constitutional: Positive for malaise/fatigue. Negative for chills.   HENT: Negative for congestion and sore throat.    Eyes: Negative for blurred vision and photophobia.   Respiratory: Positive for shortness of breath. Negative for cough and hemoptysis.    Cardiovascular: Positive for leg swelling. Negative for chest pain, palpitations and orthopnea.   Gastrointestinal: Negative for abdominal pain, heartburn, nausea and vomiting.   Genitourinary: Negative for dysuria and urgency.   Musculoskeletal: Positive for myalgias.   Neurological: Positive for weakness (generalized ). Negative for headaches.   Psychiatric/Behavioral: The patient is nervous/anxious.         Physical Exam  Temp:  [36.2 °C (97.1 °F)-37.2 °C (98.9 °F)] 36.3 °C (97.3 °F)  Pulse:  [75-86] 77  Resp:  [16-21] 18  BP: ()/(45-67) 103/67  SpO2:  [64 %-98 %] 95 %    Physical Exam  Vitals and nursing note reviewed.   Constitutional:       Appearance: She is obese. She is not ill-appearing.   HENT:      Head: Normocephalic and atraumatic.   Eyes:      Extraocular Movements: Extraocular movements intact.      Pupils: Pupils are equal, round, and reactive to light.   Cardiovascular:      Rate and Rhythm: Normal rate.   Pulmonary:      Effort: Pulmonary effort is normal. No respiratory distress.      Breath sounds: Normal breath sounds.   Abdominal:      General: Bowel sounds are normal. There is no distension.      Palpations: Abdomen is soft. There is no mass.   Musculoskeletal:      Cervical back: Neck supple.      Right lower leg: No edema.      Left lower leg: No edema.   Neurological:      Mental Status: She is alert and oriented to person, place, and time.      Cranial Nerves: No cranial nerve deficit.   Psychiatric:         Mood and Affect: Mood normal.          Fluids    Intake/Output Summary (Last 24 hours) at 11/4/2021 1512  Last data filed at 11/4/2021 0900  Gross per 24 hour   Intake 580 ml   Output 3150 ml   Net -2570 ml       Laboratory  Recent Labs     11/02/21  0210   WBC 8.9   RBC 4.14*   HEMOGLOBIN 12.5   HEMATOCRIT 39.9   MCV 96.4   MCH 30.2   MCHC 31.3*   RDW 46.5   PLATELETCT 129*   MPV 12.2     Recent Labs     11/02/21  0210 11/03/21  0507 11/04/21  0908   SODIUM 135 130* 131*   POTASSIUM 4.5 4.5 5.1   CHLORIDE 86* 85* 84*   CO2 36* 43* 44*   GLUCOSE 102* 98 143*   BUN 22 19 20   CREATININE 1.13 0.93 1.03   CALCIUM 9.2 8.8 8.9                   Imaging  EC-ECHOCARDIOGRAM COMPLETE W/O CONT   Final Result      DX-CHEST-PORTABLE (1 VIEW)   Final Result         1.  Pulmonary edema and/or infiltrates.   2.  Cardiomegaly           Assessment/Plan  Bacteremia due to Staphylococcus- (present on admission)  Assessment & Plan  Likely contamination , echocardiogram showed EF of 45%, no vegetation noted    Leucocytosis  Assessment & Plan  resolved    Hyperglycemia- (present on admission)  Assessment & Plan  Continue insulin sliding scale, hypoglycemic  Protocol     Hyponatremia- (present on admission)  Assessment & Plan  At 131, she is alert and oriented, answering questions appropriately.    -- Patient is on Lasix continue IV daily likely secondary to acute iatrogenic diuresis/hypovolemia; stop lasix  Monitor    Acute on chronic respiratory failure with hypoxia and hypercapnia (HCC)- (present on admission)  Assessment & Plan  -Multifactorial in patient with acute on chronic systolic heart failure as well as COPD exacerbation  -Covid was negative  -Patient is requiring BiPAP, she does have acute encephalopathy when not on BiPAP, continue BiPAP overnight, reevaluate in the morning  Echocardiogram reviewed, EF is noted to be 55% no mention of diastolic function.  We will hold the Lasix as patient noted to have contraction alkalosis    Chronic obstructive pulmonary  disease with acute exacerbation (HCC)- (present on admission)  Assessment & Plan  -Continue RT protocol, breathing treatment.  She is currently requiring 2 L of oxygen    Essential hypertension- (present on admission)  Assessment & Plan  -Continue home metoprolol  -Start as needed labetalol, clonidine and enalapril  -Adjust as needed    AF (atrial fibrillation) (HCC)- (present on admission)  Assessment & Plan  -Currently rate controlled   --home amiodarone, digoxin and metoprolol      Morbid obesity with BMI of 60.0-69.9, adult (HCC)- (present on admission)  Assessment & Plan  -Chronic, would certainly benefit from diet and exercise adjustment at discharge       VTE prophylaxis: enoxaparin ppx    I have performed a physical exam and reviewed and updated ROS and Plan today (11/4/2021). In review of yesterday's note (11/3/2021), there are no changes except as documented above.

## 2021-11-04 NOTE — PROGRESS NOTES
COVID 19 surge in effect     1930: Received report from Day shift RN. Pt is laying in bed, A+OX4 , showing no signs of distress. Pt denies pain medication. POC discussed  VSS on 2L of o2. Mckeon in place with active order. Fall precautions in place.  Call light and belongings at bedside and within reach. All questions answered at this time. Rounding in place

## 2021-11-04 NOTE — CARE PLAN
The patient is Stable - Low risk of patient condition declining or worsening    Shift Goals  Clinical Goals: monitor fluid restriction  Patient Goals: rest  Family Goals: D/C to SNF    Progress made toward(s) clinical / shift goals:  Monitoring I&O this shift, comfort measures in place.     Patient is not progressing towards the following goals:

## 2021-11-04 NOTE — PROGRESS NOTES
Cristine from lab called with critical result of CO2 at 44. Results read back. Dr. Dawson notified of critical result.

## 2021-11-04 NOTE — DISCHARGE PLANNING
Anticipated Discharge Disposition: SNF- [facility]     Action: LSW's first day with pt on LSW's assignment. SNF referrals placed on 10/31. [facility]     Epic showing that pt has been declined by-   [facility]-Non-contracted insurance provider  [facility]-  Unable to accommodate bariatric  [facility]- Unable to accommodate bariatric  [facility]- Non-contracted insurance provider  [facility]- Non-contracted insurance provider  [facility]- Unable to accommodate bariatric  [facility]- Non-contracted insurance provider    No PASRR in system. LSW able to complete.   PASRR#- [id number]    NAZARIO, H[facility] and they were not sure when beds would be available. LSW called [facility]. [facility] does not have any beds available and unsure when a bed will become available. Sada informed LSW that [facility] can take pt. LSW informed Sada that Epic showing they declined. [facility] stated that they can start insurance auth. LSW requested insurance auth to be initiated.     Per bedside RNJose pt appropriate for bariatric wheelchair transport.     Barriers to Discharge: SNF bed availability, Non-contracted insurance provider, unable to accommodate bariatric bed     Plan: Await insurance auth completion for [facility], [facility] to continue to follow for d/c needs, LSW to provide pt an update     Addendum 1004  LSW met with pt at bedside to provide d/c plan update. Pt was informed that [facility] has submitted for insurance authorization. LSW ensured pt that update will be provided as soon as one is available.   Pt had no further questions or concerns.

## 2021-11-04 NOTE — THERAPY
Physical Therapy   Daily Treatment     Patient Name: Cheryl D Blakemore  Age:  61 y.o., Sex:  female  Medical Record #: [id number]  Today's Date: 11/4/2021     Precautions  Precautions: (P) Fall Risk    Assessment    Pt is progressing, however, continues to demonstrate with poor activity tolerance. Pt is only able to tolerate about 5-7 ft of ambulation at a time and requires significant rest breaks. Pt was able to demonstrate with slightly improved activity tolerance as the pt was able to increase total distance ambulated to 10 to 12 ft. Pt was provided with education on deep breathing, chest expansion, energy conservation techniques, and pacing. Pt will continue to benefit from skilled PT while in house, with recommendation for post acute therapy services as pt is unable to demonstrate baseline mobility and safe functional activity tolerance.     Plan    Continue current treatment plan.    DC Equipment Recommendations: (P) Unable to determine at this time  Discharge Recommendations: (P) Recommend post-acute placement for additional physical therapy services prior to discharge home    Objective       11/04/21 1023   Precautions   Precautions Fall Risk   Vitals   O2 (LPM) 4   O2 Delivery Device Oxymask   Pain 0 - 10 Group   Therapist Pain Assessment Nurse Notified;0   Cognition    Cognition / Consciousness WDL   Level of Consciousness Alert   Standing Lower Body Exercises   Standing Lower Body Exercises Yes   Marching 1 set of 10   Other Treatments   Other Treatments Provided pt provided with education on conservation techniques, deep breathing, chest expansion, and pacing to improve activity tolerance    Neurological Concerns   Neurological Concerns No   Balance   Sitting Balance (Static) Fair +   Sitting Balance (Dynamic) Fair   Standing Balance (Static) Fair   Standing Balance (Dynamic) Fair -   Weight Shift Sitting Fair   Weight Shift Standing Fair   Skilled Intervention Verbal Cuing;Postural  Facilitation;Facilitation   Comments w/fww   Gait Analysis   Gait Level Of Assist Minimal Assist  (CGA)   Assistive Device Front Wheel Walker   Distance (Feet) 7   # of Times Distance was Traveled 2   Deviation Shuffled Gait;Increased Base Of Support   Weight Bearing Status FWB   Skilled Intervention Verbal Cuing;Postural Facilitation;Facilitation   Comments limited in distance due to fatigue and reports of lightheadedness    Bed Mobility    Supine to Sit Supervised   Scooting Supervised   Skilled Intervention Verbal Cuing   Comments w/HOB elevated and rails up    Functional Mobility   Sit to Stand Supervised  (SBA)   Bed, Chair, Wheelchair Transfer Minimal Assist  (CGA)   Transfer Method Stand Step   Mobility EOB, sit<>stand, marching, short distance steps to chair    Skilled Intervention Verbal Cuing;Tactile Cuing   Comments w/fww   How much difficulty does the patient currently have...   Turning over in bed (including adjusting bedclothes, sheets and blankets)? 3   Sitting down on and standing up from a chair with arms (e.g., wheelchair, bedside commode, etc.) 3   Moving from lying on back to sitting on the side of the bed? 3   How much help from another person does the patient currently need...   Moving to and from a bed to a chair (including a wheelchair)? 3   Need to walk in a hospital room? 2   Climbing 3-5 steps with a railing? 1   6 clicks Mobility Score 15   Activity Tolerance   Sitting in Chair 10 mins   Sitting Edge of Bed 8 mins   Standing 2 mins x 3    Comments limited due to fatigue and lightheadedness    Patient / Family Goals    Patient / Family Goal #1 None stated today.    Short Term Goals    Short Term Goal # 1 Pt will transition supine <> sit without bed features and spv within 6 visits in order to improve functional mobility.    Goal Outcome # 1 Progressing as expected   Short Term Goal # 2 Pt will transition sit <> stand with FWW and spv within 6 visits in order to progress functional  mobility.    Goal Outcome # 2 Progressing as expected   Short Term Goal # 3 Pt will ambulate 100 ft with FWW and spv within 6 visits so she can access her home environment.    Goal Outcome # 3 Progressing slower than expected   Education Group   Education Provided Role of Physical Therapist   Role of Physical Therapist Patient Response Patient;Acceptance;Explanation;Demonstration;Verbal Demonstration   Gait Training Patient Response Patient;Acceptance;Explanation;Demonstration;Verbal Demonstration;Action Demonstration   Anticipated Discharge Equipment and Recommendations   DC Equipment Recommendations Unable to determine at this time   Discharge Recommendations Recommend post-acute placement for additional physical therapy services prior to discharge home   Interdisciplinary Plan of Care Collaboration   IDT Collaboration with  Nursing   Patient Position at End of Therapy Seated;Call Light within Reach;Tray Table within Reach;Phone within Reach   Collaboration Comments aware of visit and recs    Session Information   Date / Session Number  11/4-3 (3/3, 11/6)

## 2021-11-05 LAB
ANION GAP SERPL CALC-SCNC: 10 MMOL/L (ref 7–16)
BUN SERPL-MCNC: 20 MG/DL (ref 8–22)
CALCIUM SERPL-MCNC: 8.6 MG/DL (ref 8.4–10.2)
CHLORIDE SERPL-SCNC: 86 MMOL/L (ref 96–112)
CO2 SERPL-SCNC: 36 MMOL/L (ref 20–33)
CREAT SERPL-MCNC: 1.23 MG/DL (ref 0.5–1.4)
GLUCOSE BLD-MCNC: 105 MG/DL (ref 65–99)
GLUCOSE BLD-MCNC: 105 MG/DL (ref 65–99)
GLUCOSE BLD-MCNC: 92 MG/DL (ref 65–99)
GLUCOSE BLD-MCNC: 98 MG/DL (ref 65–99)
GLUCOSE SERPL-MCNC: 93 MG/DL (ref 65–99)
POTASSIUM SERPL-SCNC: 4.8 MMOL/L (ref 3.6–5.5)
SODIUM SERPL-SCNC: 132 MMOL/L (ref 135–145)

## 2021-11-05 PROCEDURE — 97535 SELF CARE MNGMENT TRAINING: CPT

## 2021-11-05 PROCEDURE — 700102 HCHG RX REV CODE 250 W/ 637 OVERRIDE(OP): Performed by: HOSPITALIST

## 2021-11-05 PROCEDURE — A9270 NON-COVERED ITEM OR SERVICE: HCPCS | Performed by: HOSPITALIST

## 2021-11-05 PROCEDURE — 770006 HCHG ROOM/CARE - MED/SURG/GYN SEMI*

## 2021-11-05 PROCEDURE — 700111 HCHG RX REV CODE 636 W/ 250 OVERRIDE (IP): Performed by: INTERNAL MEDICINE

## 2021-11-05 PROCEDURE — 700102 HCHG RX REV CODE 250 W/ 637 OVERRIDE(OP): Performed by: INTERNAL MEDICINE

## 2021-11-05 PROCEDURE — A9270 NON-COVERED ITEM OR SERVICE: HCPCS | Performed by: INTERNAL MEDICINE

## 2021-11-05 PROCEDURE — 36415 COLL VENOUS BLD VENIPUNCTURE: CPT

## 2021-11-05 PROCEDURE — 82962 GLUCOSE BLOOD TEST: CPT | Mod: 91

## 2021-11-05 PROCEDURE — 97112 NEUROMUSCULAR REEDUCATION: CPT

## 2021-11-05 PROCEDURE — 80048 BASIC METABOLIC PNL TOTAL CA: CPT

## 2021-11-05 PROCEDURE — 94760 N-INVAS EAR/PLS OXIMETRY 1: CPT

## 2021-11-05 RX ORDER — FUROSEMIDE 20 MG/1
20 TABLET ORAL
Status: DISCONTINUED | OUTPATIENT
Start: 2021-11-05 | End: 2021-11-07

## 2021-11-05 RX ADMIN — ENOXAPARIN SODIUM 40 MG: 40 INJECTION SUBCUTANEOUS at 05:33

## 2021-11-05 RX ADMIN — METOPROLOL TARTRATE 50 MG: 50 TABLET, FILM COATED ORAL at 05:33

## 2021-11-05 RX ADMIN — ACETAZOLAMIDE 250 MG: 250 TABLET ORAL at 16:42

## 2021-11-05 RX ADMIN — GUAIFENESIN SYRUP AND DEXTROMETHORPHAN 10 ML: 100; 10 SYRUP ORAL at 18:09

## 2021-11-05 RX ADMIN — SENNOSIDES AND DOCUSATE SODIUM 2 TABLET: 50; 8.6 TABLET ORAL at 18:09

## 2021-11-05 RX ADMIN — NYSTATIN: 100000 POWDER TOPICAL at 05:34

## 2021-11-05 RX ADMIN — NYSTATIN: 100000 POWDER TOPICAL at 18:09

## 2021-11-05 RX ADMIN — BENZONATATE 100 MG: 100 CAPSULE ORAL at 12:15

## 2021-11-05 RX ADMIN — NYSTATIN: 100000 POWDER TOPICAL at 11:31

## 2021-11-05 RX ADMIN — TAMSULOSIN HYDROCHLORIDE 0.4 MG: 0.4 CAPSULE ORAL at 10:07

## 2021-11-05 RX ADMIN — ACETAZOLAMIDE 250 MG: 250 TABLET ORAL at 05:33

## 2021-11-05 ASSESSMENT — PAIN DESCRIPTION - PAIN TYPE
TYPE: ACUTE PAIN
TYPE: ACUTE PAIN

## 2021-11-05 ASSESSMENT — FIBROSIS 4 INDEX: FIB4 SCORE: 2.85

## 2021-11-05 NOTE — PROGRESS NOTES
Spoke with Dr Gonzalez regarding pts BP and medications due, see MAR. Dr Gonzalez states to hold medications at this time to due hypotension and to have medications re-evaluated with day hospitalist. Medications held.

## 2021-11-05 NOTE — CARE PLAN
The patient is Stable - Low risk of patient condition declining or worsening    Shift Goals  Clinical Goals: Monitor oxygen levels   Patient Goals: rest  Family Goals: D/C to SNF    Progress made toward(s) clinical / shift goals:  pt is abl to maintain oxygen levels within defined limits on 5L of oxygen.     Patient is not progressing towards the following goals: n/a      Problem: Knowledge Deficit - Standard  Goal: Patient and family/care givers will demonstrate understanding of plan of care, disease process/condition, diagnostic tests and medications  Outcome: Progressing     Problem: Ineffective Airway Clearance  Goal: Patient will maintain patent airway with clear/clearing breath sounds  Outcome: Progressing     Problem: Impaired Gas Exchange  Goal: Patient will demonstrate improved ventilation and adequate oxygenation and participate in treatment regimen within the level of ability/situation.  Outcome: Progressing     Problem: Self Care  Goal: Patient will have the ability to perform ADLs independently or with assistance (bathe, groom, dress, toilet and feed)  Outcome: Progressing

## 2021-11-05 NOTE — DISCHARGE PLANNING
Anticipated Discharge Disposition: SNF    Action: Discussed pt in morning rounds. Per MD, pt is medically cleared.     LSW called fundamental liaison aSda to follow up on insurance auth, LSW left VM.    Barriers to Discharge: insurance auth    Plan: LSW to follow up with Sada.    1130: LSW received VM from Sada. Per Sada, they are still pending insurance auth. Per Sada, she will update this LSW when auth is approved.

## 2021-11-05 NOTE — THERAPY
Occupational Therapy  Daily Treatment     Patient Name: Cheryl D Blakemore  Age:  61 y.o., Sex:  female  Medical Record #: [id number]  Today's Date: 11/5/2021     Precautions  Precautions: Fall Risk  Comments: quick to desat    Assessment  Pt is A&Ox3, slow to respond. O2@4L NC. Pt seen for standing tolerance, seated ADL's; see below for details. T-band issued; instructed in UB ther ex's. Reviewed energy conservation tech's. Tolerates UIC ~1hr.      Plan  Continue current treatment plan.    DC Equipment Recommendations: Unable to determine at this time  Discharge Recommendations: (P) Recommend post-acute placement for additional occupational therapy services prior to discharge home     11/05/21 1519   Cognition    Cognition / Consciousness WDL   Level of Consciousness Alert   Comments slow to respond   Sitting Upper Body Exercises   Sitting Upper Body Exercises Yes   Comments T-band ther ex's    Balance   Sitting Balance (Static) Good   Sitting Balance (Dynamic) Fair   Standing Balance (Static) Fair   Standing Balance (Dynamic) Fair -   Weight Shift Sitting Fair   Weight Shift Standing Fair   Skilled Intervention Verbal Cuing   Bed Mobility    Supine to Sit Supervised   Activities of Daily Living   Eating Independent   Grooming Supervision;Seated  (face, teeth)   Skilled Intervention Verbal Cuing   Functional Mobility   Sit to Stand Supervised   Bed, Chair, Wheelchair Transfer Minimal Assist   Mobility fww   Comments tires in standing   Activity Tolerance   Sitting in Chair 1 hr   Sitting Edge of Bed >15   Standing 5   Short Term Goals   Goal Outcome # 1 Progressing as expected   Goal Outcome # 2 Progressing slower than expected   Goal Outcome # 3 Progressing slower than expected

## 2021-11-05 NOTE — CARE PLAN
The patient is Stable - Low risk of patient condition declining or worsening    Shift Goals  Clinical Goals: Monitor oxygen  Patient Goals: rest  Family Goals: D/C to SNF    Progress made toward(s) clinical / shift goals:  Patient able to maintain oxygen above 90 on 4-5L NC.   Problem: Knowledge Deficit - Standard  Goal: Patient and family/care givers will demonstrate understanding of plan of care, disease process/condition, diagnostic tests and medications  Outcome: Progressing     Problem: Knowledge Deficit - COPD  Goal: Patient/significant other demonstrates understanding of disease process, utilization of the Action Plan, medications and discharge instruction  Outcome: Progressing     Problem: Risk for Infection - COPD  Goal: Patient will remain free from signs and symptoms of infection  Outcome: Progressing     Problem: Nutrition - Advanced  Goal: Patient will display progressive weight gain toward goal have adequate food and fluid intake  Outcome: Progressing     Problem: Ineffective Airway Clearance  Goal: Patient will maintain patent airway with clear/clearing breath sounds  Outcome: Progressing     Problem: Impaired Gas Exchange  Goal: Patient will demonstrate improved ventilation and adequate oxygenation and participate in treatment regimen within the level of ability/situation.  Outcome: Progressing     Problem: Risk for Aspiration  Goal: Patient's risk for aspiration will be absent or decrease  Outcome: Progressing     Problem: Self Care  Goal: Patient will have the ability to perform ADLs independently or with assistance (bathe, groom, dress, toilet and feed)  Outcome: Progressing     Problem: Skin Integrity  Goal: Skin integrity is maintained or improved  Outcome: Progressing     Problem: Fall Risk  Goal: Patient will remain free from falls  Outcome: Progressing     Problem: Infection - Standard  Goal: Patient will remain free from infection  Outcome: Progressing     Problem: Psychosocial  Goal:  Patient's level of anxiety will decrease  Outcome: Progressing  Goal: Spiritual and cultural needs incorporated into hospitalization  Outcome: Progressing

## 2021-11-05 NOTE — CARE PLAN
The patient is Stable - Low risk of patient condition declining or worsening    Shift Goals  Clinical Goals: monitor I&O and spO2 saturation   Patient Goals: rest, comfort   Family Goals: D/C to SNF    Progress made toward(s) clinical / shift goals: Mckeon removed this shift and pt able to void. Monitoring VS and spO2.     Patient is not progressing towards the following goals:

## 2021-11-06 LAB
GLUCOSE BLD-MCNC: 103 MG/DL (ref 65–99)
GLUCOSE BLD-MCNC: 110 MG/DL (ref 65–99)
GLUCOSE BLD-MCNC: 122 MG/DL (ref 65–99)
GLUCOSE BLD-MCNC: 99 MG/DL (ref 65–99)

## 2021-11-06 PROCEDURE — 700102 HCHG RX REV CODE 250 W/ 637 OVERRIDE(OP): Performed by: HOSPITALIST

## 2021-11-06 PROCEDURE — A9270 NON-COVERED ITEM OR SERVICE: HCPCS | Performed by: HOSPITALIST

## 2021-11-06 PROCEDURE — 82962 GLUCOSE BLOOD TEST: CPT | Mod: 91

## 2021-11-06 PROCEDURE — 700111 HCHG RX REV CODE 636 W/ 250 OVERRIDE (IP): Performed by: INTERNAL MEDICINE

## 2021-11-06 PROCEDURE — 770006 HCHG ROOM/CARE - MED/SURG/GYN SEMI*

## 2021-11-06 PROCEDURE — 700102 HCHG RX REV CODE 250 W/ 637 OVERRIDE(OP): Performed by: INTERNAL MEDICINE

## 2021-11-06 PROCEDURE — 99232 SBSQ HOSP IP/OBS MODERATE 35: CPT | Performed by: HOSPITALIST

## 2021-11-06 PROCEDURE — A9270 NON-COVERED ITEM OR SERVICE: HCPCS | Performed by: INTERNAL MEDICINE

## 2021-11-06 RX ADMIN — DIGOXIN 250 MCG: 250 TABLET ORAL at 05:22

## 2021-11-06 RX ADMIN — GUAIFENESIN SYRUP AND DEXTROMETHORPHAN 10 ML: 100; 10 SYRUP ORAL at 05:19

## 2021-11-06 RX ADMIN — AMIODARONE HYDROCHLORIDE 200 MG: 200 TABLET ORAL at 05:22

## 2021-11-06 RX ADMIN — METOPROLOL TARTRATE 25 MG: 25 TABLET, FILM COATED ORAL at 09:16

## 2021-11-06 RX ADMIN — GUAIFENESIN SYRUP AND DEXTROMETHORPHAN 10 ML: 100; 10 SYRUP ORAL at 19:50

## 2021-11-06 RX ADMIN — BENZONATATE 100 MG: 100 CAPSULE ORAL at 21:43

## 2021-11-06 RX ADMIN — SENNOSIDES AND DOCUSATE SODIUM 2 TABLET: 50; 8.6 TABLET ORAL at 05:24

## 2021-11-06 RX ADMIN — METOPROLOL TARTRATE 25 MG: 25 TABLET, FILM COATED ORAL at 17:41

## 2021-11-06 RX ADMIN — FUROSEMIDE 20 MG: 20 TABLET ORAL at 05:22

## 2021-11-06 RX ADMIN — ENOXAPARIN SODIUM 40 MG: 40 INJECTION SUBCUTANEOUS at 05:23

## 2021-11-06 RX ADMIN — NYSTATIN: 100000 POWDER TOPICAL at 05:23

## 2021-11-06 RX ADMIN — TAMSULOSIN HYDROCHLORIDE 0.4 MG: 0.4 CAPSULE ORAL at 09:16

## 2021-11-06 RX ADMIN — ONDANSETRON 4 MG: 4 TABLET, ORALLY DISINTEGRATING ORAL at 21:42

## 2021-11-06 RX ADMIN — ACETAZOLAMIDE 250 MG: 250 TABLET ORAL at 05:24

## 2021-11-06 RX ADMIN — ACETAZOLAMIDE 250 MG: 250 TABLET ORAL at 17:41

## 2021-11-06 ASSESSMENT — ENCOUNTER SYMPTOMS
HEARTBURN: 0
HEADACHES: 0
MYALGIAS: 1
BLURRED VISION: 0
CHILLS: 0
COUGH: 0
NAUSEA: 0
ABDOMINAL PAIN: 0
CONSTIPATION: 0
HEMOPTYSIS: 0
NERVOUS/ANXIOUS: 1
SORE THROAT: 0
ORTHOPNEA: 0
SHORTNESS OF BREATH: 1
DIZZINESS: 0
PHOTOPHOBIA: 0
WEAKNESS: 1

## 2021-11-06 ASSESSMENT — PAIN DESCRIPTION - PAIN TYPE: TYPE: ACUTE PAIN

## 2021-11-06 NOTE — CARE PLAN
The patient is Stable - Low risk of patient condition declining or worsening    Shift Goals  Clinical Goals: Manage oxygen level  Patient Goals: rest  Family Goals: D/C to SNF    Progress made toward(s) clinical / shift goals:  Pt use the bedside commode during the shifts.Pt lower extremities were wash and lotion for comfort.    Patient is not progressing towards the following goals:      Problem: Ineffective Airway Clearance  Goal: Patient will maintain patent airway with clear/clearing breath sounds  Outcome: Progressing     Problem: Risk for Aspiration  Goal: Patient's risk for aspiration will be absent or decrease  Outcome: Progressing     Problem: Self Care  Goal: Patient will have the ability to perform ADLs independently or with assistance (bathe, groom, dress, toilet and feed)  Outcome: Progressing

## 2021-11-06 NOTE — CARE PLAN
The patient is Watcher - Medium risk of patient condition declining or worsening    Shift Goals  Clinical Goals: patient will have adequate oxygenation   Progress made toward(s) clinical / shift goals:  progressing

## 2021-11-06 NOTE — PROGRESS NOTES
"\"Covid 19 surge in effect\"    Report received from day RN Pt is AAO x 4.  Pt reports no pain at this time. Pt on 5L nasal canula. Pt denies and dizziness or discomfort.POC discussed to continue with care.All questions and concerned were addressed. Pt sometimes have intermittent coughing.  All needs met at this time.Bed in low position.Call light within reach.Rounding in place.   "

## 2021-11-06 NOTE — PROGRESS NOTES
Hospital Medicine Daily Progress Note    Date of Service  11/6/2021    Chief Complaint  Cheryl D Blakemore is a 61 y.o. female admitted 10/28/2021 with shortness of breath    Hospital Cours      Interval Problem Update  10/30: we will get PT/OT and concerns for patient significant weakness.  Is wheelchair-bound at home.  Blood cultures +2/2, repeat cultures today.  Discussed with ID.     10/31: PT/OT recommending postacute care placement.  Blood cultures appear to be a contaminant 2 out of 2 with staph epidermidis, repeat cultures negative.  ID has signed off, no need for antibiotics.  Patient is medically cleared for SNF, discussed with case management in IDT rounds.    11/1: No complaints at time of examination.  Medically cleared, pending placement, discussed with case management.  Palliative care met with patient, CODE STATUS adjusted.    I have personally seen and examined the patient at bedside. I discussed the plan of care with patient, bedside RN and charge RN.    11/02:  No acute events overnight, laying in a bed ,d enied any complain  Medically cleared to be discharged  Pending placement    11/03:  No acute events overnight, laying in bed, denies any complaint.  Patient is medically cleared and she has an occipital heart discharge pending their ability.    --Vital signs has been stable  --Patient is noted to have elevated bicarbonate 43, likely 2/2 contraction alkalosis . Hold lasix  This is likely secondary to obesity hypoventilation syndrome  Plan of care has been explained to the patient, bedside nurse, charge nurse,  and pharmacy     11/04: N no acute events overnight, laying in bed, denies any complaint. Patient noted to have elevated HCO3, likely secondary to contraction alkalosis  -- mild liane   -- will stop fluid restriction   Repeat bmp at am  Pt recs post-scute       11/05:  No acute events overnight, laying in bed, denies any complaint.  Patient has been medically cleared to be  discharged, pending insurance Auth    11/06:  --Patient vital sign has been stable, laying in bed.  Did need to be out of bed 3 times daily.  --She has been medically cleared, discussed care with the nursing staff, case management, charge nurse.     consultants/Specialty  critical care and infectious disease    Code Status  DNAR/DNI    Disposition  Patient is medically cleared.   Anticipate discharge to to skilled nursing facility.  I have placed the appropriate orders for post-discharge needs.    Review of Systems  Review of Systems   Constitutional: Positive for malaise/fatigue. Negative for chills.   HENT: Negative for congestion and sore throat.    Eyes: Negative for blurred vision and photophobia.   Respiratory: Positive for shortness of breath. Negative for cough and hemoptysis.    Cardiovascular: Positive for leg swelling. Negative for chest pain and orthopnea.   Gastrointestinal: Negative for abdominal pain, constipation, heartburn and nausea.   Genitourinary: Negative for dysuria and urgency.   Musculoskeletal: Positive for myalgias.   Neurological: Positive for weakness (generalized ). Negative for dizziness and headaches.   Psychiatric/Behavioral: The patient is nervous/anxious.         Physical Exam  Temp:  [36.7 °C (98 °F)-37 °C (98.6 °F)] 36.7 °C (98 °F)  Pulse:  [60-98] 69  Resp:  [18] 18  BP: (108-118)/(47-71) 108/47  SpO2:  [94 %-98 %] 96 %    Physical Exam  Vitals and nursing note reviewed.   Constitutional:       Appearance: She is obese. She is not ill-appearing.   HENT:      Head: Normocephalic and atraumatic.   Eyes:      Extraocular Movements: Extraocular movements intact.      Pupils: Pupils are equal, round, and reactive to light.   Cardiovascular:      Rate and Rhythm: Normal rate.   Pulmonary:      Effort: Pulmonary effort is normal. No respiratory distress.      Breath sounds: Normal breath sounds.   Abdominal:      General: Bowel sounds are normal. There is no distension.       Palpations: Abdomen is soft. There is no mass.   Musculoskeletal:      Cervical back: Neck supple.      Right lower leg: No edema.      Left lower leg: No edema.   Neurological:      Mental Status: She is alert and oriented to person, place, and time.      Cranial Nerves: No cranial nerve deficit.   Psychiatric:         Mood and Affect: Mood normal.         Fluids    Intake/Output Summary (Last 24 hours) at 11/6/2021 1619  Last data filed at 11/5/2021 2300  Gross per 24 hour   Intake 1698.33 ml   Output 900 ml   Net 798.33 ml       Laboratory      Recent Labs     11/04/21  0908 11/05/21  0138   SODIUM 131* 132*   POTASSIUM 5.1 4.8   CHLORIDE 84* 86*   CO2 44* 36*   GLUCOSE 143* 93   BUN 20 20   CREATININE 1.03 1.23   CALCIUM 8.9 8.6                   Imaging  EC-ECHOCARDIOGRAM COMPLETE W/O CONT   Final Result      DX-CHEST-PORTABLE (1 VIEW)   Final Result         1.  Pulmonary edema and/or infiltrates.   2.  Cardiomegaly           Assessment/Plan  Bacteremia due to Staphylococcus- (present on admission)  Assessment & Plan  Likely contamination , echocardiogram showed EF of 45%, no vegetation noted    Leucocytosis  Assessment & Plan  resolved    Hyperglycemia- (present on admission)  Assessment & Plan  Continue insulin sliding scale, hypoglycemic  Protocol     Hyponatremia- (present on admission)  Assessment & Plan  At 1312 she is alert and oriented, answering questions appropriately.    -- continue po lasix    Acute on chronic respiratory failure with hypoxia and hypercapnia (HCC)- (present on admission)  Assessment & Plan  -Multifactorial in patient with acute on chronic systolic heart failure as well as COPD exacerbation  -Covid was negative  -Patient is requiring BiPAP, she does have acute encephalopathy when not on BiPAP, continue BiPAP overnight, reevaluate in the morning  Echocardiogram reviewed, EF is noted to be 55% no mention of diastolic function.  We will hold the Lasix as patient noted to have  contraction alkalosis    Chronic obstructive pulmonary disease with acute exacerbation (HCC)- (present on admission)  Assessment & Plan  -Continue RT protocol, breathing treatment.  She is currently requiring 4 L of oxygen    Essential hypertension- (present on admission)  Assessment & Plan  -Continue home metoprolol  -Start as needed labetalol, clonidine and enalapril  -Adjust as needed    AF (atrial fibrillation) (HCC)- (present on admission)  Assessment & Plan  -Currently rate controlled   --home amiodarone, digoxin and metoprolol      Morbid obesity with BMI of 60.0-69.9, adult (HCC)- (present on admission)  Assessment & Plan  -Chronic, would certainly benefit from diet and exercise adjustment at discharge       VTE prophylaxis: enoxaparin ppx    I have performed a physical exam and reviewed and updated ROS and Plan today (11/6/2021). In review of yesterday's note (11/5/2021), there are no changes except as documented above.

## 2021-11-07 ENCOUNTER — APPOINTMENT (OUTPATIENT)
Dept: RADIOLOGY | Facility: MEDICAL CENTER | Age: 61
DRG: 291 | End: 2021-11-07
Attending: HOSPITALIST
Payer: MEDICARE

## 2021-11-07 LAB
ANION GAP SERPL CALC-SCNC: 6 MMOL/L (ref 7–16)
ANION GAP SERPL CALC-SCNC: 9 MMOL/L (ref 7–16)
ANION GAP SERPL CALC-SCNC: 9 MMOL/L (ref 7–16)
BUN SERPL-MCNC: 16 MG/DL (ref 8–22)
BUN SERPL-MCNC: 17 MG/DL (ref 8–22)
BUN SERPL-MCNC: 18 MG/DL (ref 8–22)
CALCIUM SERPL-MCNC: 8.2 MG/DL (ref 8.4–10.2)
CALCIUM SERPL-MCNC: 8.2 MG/DL (ref 8.4–10.2)
CALCIUM SERPL-MCNC: 8.5 MG/DL (ref 8.4–10.2)
CHLORIDE SERPL-SCNC: 80 MMOL/L (ref 96–112)
CHLORIDE SERPL-SCNC: 80 MMOL/L (ref 96–112)
CHLORIDE SERPL-SCNC: 81 MMOL/L (ref 96–112)
CO2 SERPL-SCNC: 31 MMOL/L (ref 20–33)
CO2 SERPL-SCNC: 32 MMOL/L (ref 20–33)
CO2 SERPL-SCNC: 35 MMOL/L (ref 20–33)
CREAT SERPL-MCNC: 0.86 MG/DL (ref 0.5–1.4)
CREAT SERPL-MCNC: 1 MG/DL (ref 0.5–1.4)
CREAT SERPL-MCNC: 1.05 MG/DL (ref 0.5–1.4)
GLUCOSE BLD-MCNC: 100 MG/DL (ref 65–99)
GLUCOSE BLD-MCNC: 122 MG/DL (ref 65–99)
GLUCOSE BLD-MCNC: 87 MG/DL (ref 65–99)
GLUCOSE BLD-MCNC: 99 MG/DL (ref 65–99)
GLUCOSE SERPL-MCNC: 101 MG/DL (ref 65–99)
GLUCOSE SERPL-MCNC: 101 MG/DL (ref 65–99)
GLUCOSE SERPL-MCNC: 96 MG/DL (ref 65–99)
OSMOLALITY UR: 248 MOSM/KG H2O (ref 300–900)
POTASSIUM SERPL-SCNC: 4.3 MMOL/L (ref 3.6–5.5)
POTASSIUM SERPL-SCNC: 4.3 MMOL/L (ref 3.6–5.5)
POTASSIUM SERPL-SCNC: 4.5 MMOL/L (ref 3.6–5.5)
SODIUM SERPL-SCNC: 121 MMOL/L (ref 135–145)
SODIUM UR-SCNC: 32 MMOL/L

## 2021-11-07 PROCEDURE — 80048 BASIC METABOLIC PNL TOTAL CA: CPT | Mod: 91

## 2021-11-07 PROCEDURE — 36415 COLL VENOUS BLD VENIPUNCTURE: CPT

## 2021-11-07 PROCEDURE — A9270 NON-COVERED ITEM OR SERVICE: HCPCS | Performed by: HOSPITALIST

## 2021-11-07 PROCEDURE — A9270 NON-COVERED ITEM OR SERVICE: HCPCS | Performed by: INTERNAL MEDICINE

## 2021-11-07 PROCEDURE — 700102 HCHG RX REV CODE 250 W/ 637 OVERRIDE(OP): Performed by: HOSPITALIST

## 2021-11-07 PROCEDURE — 82962 GLUCOSE BLOOD TEST: CPT | Mod: 91

## 2021-11-07 PROCEDURE — 99232 SBSQ HOSP IP/OBS MODERATE 35: CPT | Performed by: HOSPITALIST

## 2021-11-07 PROCEDURE — 770006 HCHG ROOM/CARE - MED/SURG/GYN SEMI*

## 2021-11-07 PROCEDURE — 700111 HCHG RX REV CODE 636 W/ 250 OVERRIDE (IP): Performed by: HOSPITALIST

## 2021-11-07 PROCEDURE — 700111 HCHG RX REV CODE 636 W/ 250 OVERRIDE (IP): Performed by: INTERNAL MEDICINE

## 2021-11-07 PROCEDURE — 71045 X-RAY EXAM CHEST 1 VIEW: CPT

## 2021-11-07 PROCEDURE — 84300 ASSAY OF URINE SODIUM: CPT

## 2021-11-07 PROCEDURE — 83935 ASSAY OF URINE OSMOLALITY: CPT

## 2021-11-07 PROCEDURE — 700102 HCHG RX REV CODE 250 W/ 637 OVERRIDE(OP): Performed by: INTERNAL MEDICINE

## 2021-11-07 RX ORDER — FUROSEMIDE 10 MG/ML
40 INJECTION INTRAMUSCULAR; INTRAVENOUS ONCE
Status: DISCONTINUED | OUTPATIENT
Start: 2021-11-07 | End: 2021-11-07

## 2021-11-07 RX ORDER — NYSTATIN 100000 [USP'U]/G
POWDER TOPICAL 2 TIMES DAILY
Status: DISCONTINUED | OUTPATIENT
Start: 2021-11-07 | End: 2021-11-09 | Stop reason: HOSPADM

## 2021-11-07 RX ORDER — METHYLPREDNISOLONE SODIUM SUCCINATE 125 MG/2ML
62.5 INJECTION, POWDER, LYOPHILIZED, FOR SOLUTION INTRAMUSCULAR; INTRAVENOUS EVERY 6 HOURS
Status: DISCONTINUED | OUTPATIENT
Start: 2021-11-07 | End: 2021-11-08

## 2021-11-07 RX ORDER — FUROSEMIDE 10 MG/ML
40 INJECTION INTRAMUSCULAR; INTRAVENOUS ONCE
Status: COMPLETED | OUTPATIENT
Start: 2021-11-07 | End: 2021-11-07

## 2021-11-07 RX ORDER — FUROSEMIDE 20 MG/1
20 TABLET ORAL
Status: DISCONTINUED | OUTPATIENT
Start: 2021-11-07 | End: 2021-11-09 | Stop reason: HOSPADM

## 2021-11-07 RX ORDER — SODIUM CHLORIDE 1 G/1
1 TABLET ORAL
Status: DISCONTINUED | OUTPATIENT
Start: 2021-11-07 | End: 2021-11-08

## 2021-11-07 RX ADMIN — ACETAZOLAMIDE 250 MG: 250 TABLET ORAL at 05:00

## 2021-11-07 RX ADMIN — METOPROLOL TARTRATE 25 MG: 25 TABLET, FILM COATED ORAL at 05:00

## 2021-11-07 RX ADMIN — ENOXAPARIN SODIUM 40 MG: 40 INJECTION SUBCUTANEOUS at 05:00

## 2021-11-07 RX ADMIN — FUROSEMIDE 40 MG: 10 INJECTION, SOLUTION INTRAMUSCULAR; INTRAVENOUS at 08:40

## 2021-11-07 RX ADMIN — FUROSEMIDE 20 MG: 20 TABLET ORAL at 05:01

## 2021-11-07 RX ADMIN — METHYLPREDNISOLONE SODIUM SUCCINATE 62.5 MG: 125 INJECTION, POWDER, FOR SOLUTION INTRAMUSCULAR; INTRAVENOUS at 17:59

## 2021-11-07 RX ADMIN — SODIUM CHLORIDE 1 G: 1 TABLET ORAL at 13:17

## 2021-11-07 RX ADMIN — FUROSEMIDE 20 MG: 20 TABLET ORAL at 17:58

## 2021-11-07 RX ADMIN — SODIUM CHLORIDE 1 G: 1 TABLET ORAL at 18:06

## 2021-11-07 RX ADMIN — METOPROLOL TARTRATE 25 MG: 25 TABLET, FILM COATED ORAL at 17:58

## 2021-11-07 RX ADMIN — AMIODARONE HYDROCHLORIDE 200 MG: 200 TABLET ORAL at 05:00

## 2021-11-07 RX ADMIN — DIGOXIN 250 MCG: 250 TABLET ORAL at 05:01

## 2021-11-07 RX ADMIN — NYSTATIN: 100000 POWDER TOPICAL at 17:59

## 2021-11-07 RX ADMIN — GUAIFENESIN SYRUP AND DEXTROMETHORPHAN 10 ML: 100; 10 SYRUP ORAL at 11:39

## 2021-11-07 ASSESSMENT — ENCOUNTER SYMPTOMS
ABDOMINAL PAIN: 0
SORE THROAT: 0
HEARTBURN: 0
PHOTOPHOBIA: 0
VOMITING: 0
WEIGHT LOSS: 0
HEMOPTYSIS: 0
NERVOUS/ANXIOUS: 1
SHORTNESS OF BREATH: 1
BLURRED VISION: 0
NAUSEA: 0
HEADACHES: 0
ORTHOPNEA: 0
COUGH: 1
MYALGIAS: 1
WEAKNESS: 1
PALPITATIONS: 0
BLOOD IN STOOL: 0

## 2021-11-07 ASSESSMENT — PATIENT HEALTH QUESTIONNAIRE - PHQ9
SUM OF ALL RESPONSES TO PHQ9 QUESTIONS 1 AND 2: 0
1. LITTLE INTEREST OR PLEASURE IN DOING THINGS: NOT AT ALL
2. FEELING DOWN, DEPRESSED, IRRITABLE, OR HOPELESS: NOT AT ALL

## 2021-11-07 ASSESSMENT — PAIN DESCRIPTION - PAIN TYPE: TYPE: ACUTE PAIN

## 2021-11-07 ASSESSMENT — FIBROSIS 4 INDEX: FIB4 SCORE: 2.85

## 2021-11-07 NOTE — DISCHARGE PLANNING
Anticipated Discharge Disposition:   SNF, [facility]     Action:   Chart review complete.     Per MD, patient is medically cleared to discharge to a SNF. Patient has been accepted by [facility] but is pending insurance auth.     CM to follow up with [facility] on Monday morning for insurance auth progress.     Barriers to Discharge:   [facility] insurance auth     Plan:   Hospital care management will continue to assist with discharge planning needs.

## 2021-11-08 LAB
ALBUMIN SERPL BCP-MCNC: 3.3 G/DL (ref 3.2–4.9)
ANION GAP SERPL CALC-SCNC: 11 MMOL/L (ref 7–16)
BUN SERPL-MCNC: 16 MG/DL (ref 8–22)
BUN SERPL-MCNC: 20 MG/DL (ref 8–22)
CALCIUM SERPL-MCNC: 8.4 MG/DL (ref 8.4–10.2)
CALCIUM SERPL-MCNC: 8.4 MG/DL (ref 8.4–10.2)
CHLORIDE SERPL-SCNC: 80 MMOL/L (ref 96–112)
CHLORIDE SERPL-SCNC: 80 MMOL/L (ref 96–112)
CO2 SERPL-SCNC: 30 MMOL/L (ref 20–33)
CO2 SERPL-SCNC: 32 MMOL/L (ref 20–33)
CREAT SERPL-MCNC: 0.96 MG/DL (ref 0.5–1.4)
CREAT SERPL-MCNC: 1.07 MG/DL (ref 0.5–1.4)
GLUCOSE BLD-MCNC: 140 MG/DL (ref 65–99)
GLUCOSE BLD-MCNC: 173 MG/DL (ref 65–99)
GLUCOSE BLD-MCNC: 190 MG/DL (ref 65–99)
GLUCOSE BLD-MCNC: 206 MG/DL (ref 65–99)
GLUCOSE SERPL-MCNC: 160 MG/DL (ref 65–99)
GLUCOSE SERPL-MCNC: 162 MG/DL (ref 65–99)
PHOSPHATE SERPL-MCNC: 3 MG/DL (ref 2.5–4.5)
POTASSIUM SERPL-SCNC: 4.4 MMOL/L (ref 3.6–5.5)
POTASSIUM SERPL-SCNC: 4.5 MMOL/L (ref 3.6–5.5)
PROCALCITONIN SERPL-MCNC: 0.04 NG/ML
SODIUM SERPL-SCNC: 121 MMOL/L (ref 135–145)
SODIUM SERPL-SCNC: 122 MMOL/L (ref 135–145)

## 2021-11-08 PROCEDURE — 700102 HCHG RX REV CODE 250 W/ 637 OVERRIDE(OP): Performed by: HOSPITALIST

## 2021-11-08 PROCEDURE — 84145 PROCALCITONIN (PCT): CPT

## 2021-11-08 PROCEDURE — A9270 NON-COVERED ITEM OR SERVICE: HCPCS | Performed by: HOSPITALIST

## 2021-11-08 PROCEDURE — 700111 HCHG RX REV CODE 636 W/ 250 OVERRIDE (IP): Performed by: INTERNAL MEDICINE

## 2021-11-08 PROCEDURE — 80069 RENAL FUNCTION PANEL: CPT

## 2021-11-08 PROCEDURE — 80048 BASIC METABOLIC PNL TOTAL CA: CPT

## 2021-11-08 PROCEDURE — 700102 HCHG RX REV CODE 250 W/ 637 OVERRIDE(OP): Performed by: INTERNAL MEDICINE

## 2021-11-08 PROCEDURE — 700111 HCHG RX REV CODE 636 W/ 250 OVERRIDE (IP): Performed by: HOSPITALIST

## 2021-11-08 PROCEDURE — 770006 HCHG ROOM/CARE - MED/SURG/GYN SEMI*

## 2021-11-08 PROCEDURE — 82962 GLUCOSE BLOOD TEST: CPT | Mod: 91

## 2021-11-08 PROCEDURE — A9270 NON-COVERED ITEM OR SERVICE: HCPCS | Performed by: INTERNAL MEDICINE

## 2021-11-08 PROCEDURE — 36415 COLL VENOUS BLD VENIPUNCTURE: CPT

## 2021-11-08 PROCEDURE — 94760 N-INVAS EAR/PLS OXIMETRY 1: CPT

## 2021-11-08 PROCEDURE — 99233 SBSQ HOSP IP/OBS HIGH 50: CPT | Performed by: HOSPITALIST

## 2021-11-08 RX ORDER — FUROSEMIDE 10 MG/ML
40 INJECTION INTRAMUSCULAR; INTRAVENOUS
Status: DISCONTINUED | OUTPATIENT
Start: 2021-11-08 | End: 2021-11-08

## 2021-11-08 RX ORDER — SPIRONOLACTONE 25 MG/1
25 TABLET ORAL
Status: DISCONTINUED | OUTPATIENT
Start: 2021-11-08 | End: 2021-11-08

## 2021-11-08 RX ADMIN — AMIODARONE HYDROCHLORIDE 200 MG: 200 TABLET ORAL at 01:27

## 2021-11-08 RX ADMIN — GUAIFENESIN SYRUP AND DEXTROMETHORPHAN 10 ML: 100; 10 SYRUP ORAL at 19:37

## 2021-11-08 RX ADMIN — INSULIN HUMAN 2 UNITS: 100 INJECTION, SOLUTION PARENTERAL at 06:35

## 2021-11-08 RX ADMIN — NYSTATIN: 100000 POWDER TOPICAL at 05:43

## 2021-11-08 RX ADMIN — GUAIFENESIN SYRUP AND DEXTROMETHORPHAN 10 ML: 100; 10 SYRUP ORAL at 07:43

## 2021-11-08 RX ADMIN — METOPROLOL TARTRATE 25 MG: 25 TABLET, FILM COATED ORAL at 17:07

## 2021-11-08 RX ADMIN — NYSTATIN: 100000 POWDER TOPICAL at 17:08

## 2021-11-08 RX ADMIN — METHYLPREDNISOLONE SODIUM SUCCINATE 62.5 MG: 125 INJECTION, POWDER, FOR SOLUTION INTRAMUSCULAR; INTRAVENOUS at 00:08

## 2021-11-08 RX ADMIN — FUROSEMIDE 20 MG: 20 TABLET ORAL at 17:08

## 2021-11-08 RX ADMIN — DIGOXIN 250 MCG: 250 TABLET ORAL at 01:27

## 2021-11-08 RX ADMIN — METHYLPREDNISOLONE SODIUM SUCCINATE 62.5 MG: 125 INJECTION, POWDER, FOR SOLUTION INTRAMUSCULAR; INTRAVENOUS at 05:42

## 2021-11-08 RX ADMIN — INSULIN HUMAN 3 UNITS: 100 INJECTION, SOLUTION PARENTERAL at 20:30

## 2021-11-08 RX ADMIN — SPIRONOLACTONE 25 MG: 25 TABLET ORAL at 08:07

## 2021-11-08 RX ADMIN — METOPROLOL TARTRATE 25 MG: 25 TABLET, FILM COATED ORAL at 05:43

## 2021-11-08 RX ADMIN — FUROSEMIDE 20 MG: 20 TABLET ORAL at 05:43

## 2021-11-08 RX ADMIN — FUROSEMIDE 40 MG: 10 INJECTION, SOLUTION INTRAMUSCULAR; INTRAVENOUS at 08:07

## 2021-11-08 RX ADMIN — INSULIN HUMAN 2 UNITS: 100 INJECTION, SOLUTION PARENTERAL at 11:33

## 2021-11-08 RX ADMIN — ENOXAPARIN SODIUM 40 MG: 40 INJECTION SUBCUTANEOUS at 05:42

## 2021-11-08 RX ADMIN — ACETAMINOPHEN 650 MG: 325 TABLET, FILM COATED ORAL at 20:26

## 2021-11-08 ASSESSMENT — PAIN DESCRIPTION - PAIN TYPE
TYPE: ACUTE PAIN
TYPE: ACUTE PAIN

## 2021-11-08 ASSESSMENT — ENCOUNTER SYMPTOMS
CHILLS: 0
COUGH: 1
VOMITING: 0
PALPITATIONS: 0
NERVOUS/ANXIOUS: 1
ORTHOPNEA: 0
ABDOMINAL PAIN: 0
SHORTNESS OF BREATH: 1
EYE PAIN: 0
SORE THROAT: 0
HEARTBURN: 0
BLURRED VISION: 0
NAUSEA: 0
FOCAL WEAKNESS: 0
HEMOPTYSIS: 0
DIZZINESS: 0
SPUTUM PRODUCTION: 0

## 2021-11-08 ASSESSMENT — FIBROSIS 4 INDEX
FIB4 SCORE: 2.85
FIB4 SCORE: 2.85

## 2021-11-08 NOTE — DISCHARGE PLANNING
Agency/Facility Name: [facility]  Spoke To: Stella  Outcome: DPA confirmed referral is under review, auth is currently pending. Admissions to contact DPA once auth has cleared.

## 2021-11-08 NOTE — PROGRESS NOTES
COVID-19 surge in effect.    Received report from BANDAR Randhawa.  Assumed Pt. Care  Pt. A&Ox4  No sign of cardiac issues but patient was coughing and given her cough medication. She is also on 4L on nasal cannula with oxygen saturation on the 90's. Given her ordered diuretics as well.  Pain is 0/10.  Pt. Is sitting up in bed.   Bed at lowest position.  Bed alarm is on. Call light and personal belonging within reach. Continue to monitor.

## 2021-11-08 NOTE — CARE PLAN
Problem: Impaired Gas Exchange  Goal: Patient will demonstrate improved ventilation and adequate oxygenation and participate in treatment regimen within the level of ability/situation.  Outcome: Progressing     Problem: Fall Risk  Goal: Patient will remain free from falls  Outcome: Progressing   The patient is Stable - Low risk of patient condition declining or worsening    Shift Goals  Clinical Goals: O2 saturation >90%; decrease O2 needs  Patient Goals: Sleep and Safety    Progress made toward(s) clinical / shift goals:  Fall risk assessed. Pt remains free from falls. Pt able to maintain O2 saturation >90%.     Patient is not progressing towards the following goals:

## 2021-11-08 NOTE — CONSULTS
[facility] -  CONSULTATION NOTE               Author: Saroj Shen M.D. Date & Time: 2021  12:26 PM       REASON FOR CONSULTATION:   Hyponatremia    CHIEF COMPLAINT:   Shortness of breath    HISTORY OF PRESENT ILLNESS:    62 yo female with COPD, chronic pain, HTN who initially presented on 10/29 with hypoxic resp failure requiring BIPAP, felt 2/2 COPD and heart failure. Serum sodium 129 on admit. Initially started on loop diuretics in addition to aldactone with improvement in hyponatremia to 139 on 10/31. Diuretic held on 11/3 with climbing bicarb. Given fluids. Fluids stopped on . Serum sodium 132 on  and then to 121 on . Loop Diuretics restarted yesterday. Also on aldactone. 1L UOP documented over last 24 hours. Intake not recorded. Cr normal. Urine sodium 32 and urine osom 248 yesterday. Pending transfer to SNF once medically cleared. Has ongoing cough and still requiring supp oxygen. No sig chest pain. Some nausea yesterday.    REVIEW OF SYSTEMS:    12 point ROS performed, negative other than stated above    PAST MEDICAL HISTORY:   Past Medical History:   Diagnosis Date   • Arthritis    • Chronic pain    • COPD (chronic obstructive pulmonary disease) (HCC)    • Hypertension    • Migraine        PAST SURGICAL HISTORY:      Past Surgical History:   Procedure Laterality Date   • TRACHEOSTOMY  2013    Performed by Flavio Vela M.D. at [facility]   • GASTROSTOMY LAPAROSCOPIC  2013    Performed by Flavio Vela M.D. at [facility]   • PRIMARY C SECTION             FAMILY HISTORY:   No family history of renal disease    SOCIAL HISTORY:   Past tobacco, No EtOH, No illicits    HOME MEDICATIONS:   No current facility-administered medications on file prior to encounter.     Current Outpatient Medications on File Prior to Encounter   Medication Sig Dispense Refill   • acetaminophen (TYLENOL 8 HOUR) 650 MG CR tablet Take  "650 mg by mouth 2 times a day as needed for Mild Pain.     • albuterol (PROVENTIL) 2.5mg/3ml Nebu Soln solution for nebulization Take 2.5 mg by nebulization every four hours as needed for Shortness of Breath.     • amiodarone (CORDARONE) 200 MG Tab Take 200 mg by mouth every day.     • digoxin (LANOXIN) 250 MCG Tab Take 250 mcg by mouth every day.     • gabapentin (NEURONTIN) 300 MG Cap Take 300 mg by mouth 2 times a day.     • metoprolol tartrate (LOPRESSOR) 50 MG Tab Take 50 mg by mouth 2 times a day.     • spironolactone (ALDACTONE) 25 MG Tab Take 25 mg by mouth every day.         ALLERGIES:  Patient has no known allergies.    PHYSICAL EXAM:  VS:  /49   Pulse 74   Temp 36.4 °C (97.6 °F) (Temporal)   Resp 16   Ht 1.626 m (5' 4\")   Wt (!) 170 kg (374 lb 9 oz) Comment: RN notified  SpO2 91%   BMI 64.29 kg/m²   GENERAL: no acute distress  CV:+pitting and non-pitting edema   RESP: decreased bases  GI: Soft  MSK: No joint deformities   SKIN: No concerning rashes  NEURO: AOx3  PSYCH: Cooperative    LABS:  Recent Results (from the past 24 hour(s))   Basic Metabolic Panel    Collection Time: 11/07/21  4:51 PM   Result Value Ref Range    Sodium 121 (L) 135 - 145 mmol/L    Potassium 4.3 3.6 - 5.5 mmol/L    Chloride 80 (L) 96 - 112 mmol/L    Co2 32 20 - 33 mmol/L    Glucose 96 65 - 99 mg/dL    Bun 17 8 - 22 mg/dL    Creatinine 1.05 0.50 - 1.40 mg/dL    Calcium 8.2 (L) 8.4 - 10.2 mg/dL    Anion Gap 9.0 7.0 - 16.0   ESTIMATED GFR    Collection Time: 11/07/21  4:51 PM   Result Value Ref Range    GFR If African American >60 >60 mL/min/1.73 m 2    GFR If Non  53 (A) >60 mL/min/1.73 m 2   POCT glucose device results    Collection Time: 11/07/21  4:57 PM   Result Value Ref Range    Glucose - Accu-Ck 87 65 - 99 mg/dL   POCT glucose device results    Collection Time: 11/07/21  8:37 PM   Result Value Ref Range    Glucose - Accu-Ck 122 (H) 65 - 99 mg/dL   Renal Function Panel    Collection Time: 11/08/21  " 4:37 AM   Result Value Ref Range    Sodium 122 (L) 135 - 145 mmol/L    Potassium 4.4 3.6 - 5.5 mmol/L    Chloride 80 (L) 96 - 112 mmol/L    Co2 32 20 - 33 mmol/L    Glucose 160 (H) 65 - 99 mg/dL    Creatinine 0.96 0.50 - 1.40 mg/dL    Bun 16 8 - 22 mg/dL    Calcium 8.4 8.4 - 10.2 mg/dL    Phosphorus 3.0 2.5 - 4.5 mg/dL    Albumin 3.3 3.2 - 4.9 g/dL   ESTIMATED GFR    Collection Time: 11/08/21  4:37 AM   Result Value Ref Range    GFR If African American >60 >60 mL/min/1.73 m 2    GFR If Non African American 59 (A) >60 mL/min/1.73 m 2   PROCALCITONIN    Collection Time: 11/08/21  4:37 AM   Result Value Ref Range    Procalcitonin 0.04 <0.25 ng/mL   POCT glucose device results    Collection Time: 11/08/21  6:33 AM   Result Value Ref Range    Glucose - Accu-Ck 173 (H) 65 - 99 mg/dL   POCT glucose device results    Collection Time: 11/08/21 11:09 AM   Result Value Ref Range    Glucose - Accu-Ck 190 (H) 65 - 99 mg/dL       (click the triangle to expand results)    IMAGING:  DX-CHEST-LIMITED (1 VIEW)   Final Result      Worsening moderate consolidation and possible pleural fluid with marked cardiac silhouette enlargement. Edema and/or infection      EC-ECHOCARDIOGRAM COMPLETE W/O CONT   Final Result      DX-CHEST-PORTABLE (1 VIEW)   Final Result         1.  Pulmonary edema and/or infiltrates.   2.  Cardiomegaly          ASSESSMENT:  # Hyponatremia: hypervolemic   # Respiratory failure: acute on chronic   # HFpEF  # Mixed acid/base  # COPD  # Morbid Obesity    PLAN:  -Continue lasix 20mg BID  -stop aldactone  -1.5L fluid restriction  -Daily labs  -spot urine pr/cr    Thank you for this consult, we will continue to follow.    Saroj Shen MD

## 2021-11-08 NOTE — PROGRESS NOTES
Hospital Medicine Daily Progress Note    Date of Service  11/8/2021    Chief Complaint  Cheryl D Blakemore is a 61 y.o. female admitted 10/28/2021 with   Chief Complaint   Patient presents with   • Respiratory Distress     brought in via [facility] on cpap, was found to be at 70% RA with tacypenia. PT states that it started about two hours ago        Hospital Course      This is a 61-year-old female with a past medical history significant for obstructive sleep apnea on BiPAP, atrial fibrillation, secondary hypercoagulable state, hypertension, morbid obesity with Body mass index is 64.07 kg/m²., chronic respiratory failure on 3 to 4 L of oxygen, COPD and systolic CHF with EF of 45 to 50%, wheelchair bound  brought into ER on 10/20/2021 by EMS after transported to be hypoxic.    She is being admitted in the hospital for acute hypoxic and hypercarbic respiratory failure. She was recently admitted to the ICU, requiring BiPAP, in the hospital she has been treated for COPD and CHF exacerbation. Blood culture on 10/28 growing staph epi, thought to be contaminant, repeat blood culture on 10/30/2021 no growth to date. ID was consulted, thought to be a contamination; thus antibiotics were discontinued.    Hospital course was complicated with initially contraction alkalosis and feeling lightheaded. As he has been receiving 40 of IV Lasix twice daily. Later Lasix changed to 20 mg p.o. twice daily    She noted to have hyponatremia with sodium of 121, blood work repeated noted to have sodium 121. Urine osmolality 248, likely excess ADH secretion. Continues to remain on fluid restriction monitor I/os, daily weight.      Interval Problem Update  No acute events overnight, went to the bedside of the patient with the charge nurse. Patient is alert and oriented, answering questions appropriately. He has been requiring 5 L of oxygen, saturating well on 92%. Continues to lie at the side of the bed.  She is barely able to suck in air  For 500  cc with IS  Sodium 122, bicarb 32  Need to be out of bed 3 times daily with meals    We'll repeat sodium at 3 PM.     I have personally seen and examined the patient at bedside. I discussed the plan of care with patient, bedside RN, charge RN,  and pharmacy.    Consultants/Specialty  None    Code Status  DNAR/DNI    Disposition  Patient is not medically cleared.   Anticipate discharge to to home with close outpatient follow-up.  I have placed the appropriate orders for post-discharge needs.    Review of Systems  Review of Systems   Constitutional: Positive for malaise/fatigue. Negative for chills.   HENT: Negative for congestion and sore throat.    Eyes: Negative for blurred vision and pain.   Respiratory: Positive for cough and shortness of breath. Negative for hemoptysis and sputum production.    Cardiovascular: Positive for leg swelling. Negative for chest pain, palpitations and orthopnea.   Gastrointestinal: Negative for abdominal pain, heartburn, nausea and vomiting.   Musculoskeletal: Negative for joint pain.   Neurological: Negative for dizziness and focal weakness.   Psychiatric/Behavioral: The patient is nervous/anxious.         Physical Exam  Temp:  [36.4 °C (97.5 °F)-36.7 °C (98.1 °F)] 36.4 °C (97.5 °F)  Pulse:  [61-84] 84  Resp:  [19-20] 20  BP: (101-133)/(49-61) 113/60  SpO2:  [91 %-95 %] 93 %    Physical Exam  Vitals and nursing note reviewed.   Constitutional:       Appearance: She is obese.   HENT:      Head: Normocephalic and atraumatic.   Eyes:      Extraocular Movements: Extraocular movements intact.   Cardiovascular:      Rate and Rhythm: Rhythm irregular.      Pulses: Normal pulses.   Pulmonary:      Comments: Very poor resp effort   Decreased breath sound at the bases   Abdominal:      General: There is no distension.      Palpations: Abdomen is soft.      Tenderness: There is no abdominal tenderness.      Comments: obese   Musculoskeletal:      Cervical back: Neck supple.       Right lower leg: Edema present.      Left lower leg: Edema present.   Skin:     General: Skin is warm.   Neurological:      Mental Status: She is alert and oriented to person, place, and time.      Cranial Nerves: No cranial nerve deficit.   Psychiatric:         Mood and Affect: Mood normal.         Fluids    Intake/Output Summary (Last 24 hours) at 11/8/2021 0737  Last data filed at 11/8/2021 0010  Gross per 24 hour   Intake --   Output 1075 ml   Net -1075 ml       Laboratory      Recent Labs     11/07/21  1039 11/07/21  1651 11/08/21  0437   SODIUM 121* 121* 122*   POTASSIUM 4.5 4.3 4.4   CHLORIDE 80* 80* 80*   CO2 35* 32 32   GLUCOSE 101* 96 160*   BUN 16 17 16   CREATININE 0.86 1.05 0.96   CALCIUM 8.5 8.2* 8.4                   Imaging  Chest x-ray reviewed by myself showed bibasilar opacity, cardiomegaly    Assessment/Plan  Bacteremia due to Staphylococcus- (present on admission)  Assessment & Plan  Likely contamination , echocardiogram showed EF of 55%, no vegetation noted  Unable to determine diastolic funsction    Leucocytosis- (present on admission)  Assessment & Plan  resolved    Hyperglycemia- (present on admission)  Assessment & Plan  Continue insulin sliding scale, hypoglycemic  Protocol , accucheck ac and hs    Hyponatremia- (present on admission)  Assessment & Plan  At 122, it could be secondary to hypervolemia, will continue Lasix  --Urine studies reviewed, call nephrology  Dr Shen for assistance as patient sodium is 132 on 11/5/2021      Acute on chronic respiratory failure with hypoxia and hypercapnia (HCC)- (present on admission)  Assessment & Plan  -Multifactorial in patient with acute on chronic systolic heart failure as well as COPD exacerbation  -Covid was negative  -continue bipap at night  Echocardiogram reviewed, EF is noted to be 55% no mention of diastolic function.  Continue lasix     Chronic obstructive pulmonary disease with acute exacerbation (HCC)- (present on  admission)  Assessment & Plan  -Continue RT protocol, breathing treatment.   --  Never had PFT in the past   --  Continue RT protocol and breathing treatment, mucine    Essential hypertension- (present on admission)  Assessment & Plan  -Continue home metoprolol and aldactone   -Start as needed labetalol, clonidine and enalapril  -Adjust as needed    AF (atrial fibrillation) (HCC)- (present on admission)  Assessment & Plan  -Currently rate controlled   --home amiodarone, digoxin and metoprolol  Not on anti-coagulation, understand the risk and benefit of not being anticoagulated, including stroke     Morbid obesity with BMI of 60.0-69.9, adult (HCC)- (present on admission)  Assessment & Plan  -Chronic, would certainly benefit from diet and exercise adjustment at discharge       VTE prophylaxis: enoxaparin ppx    I have performed a physical exam and reviewed and updated ROS and Plan today (11/8/2021). In review of yesterday's note (11/7/2021), there are no changes except as documented above.

## 2021-11-08 NOTE — PROGRESS NOTES
COVID-19 surge in effect.  Assumed care of pt. A&O x4. Pt denies pain at this time. Pt reports occasional SOB. Breathing is shallow. Crackles heard in lungs. No signs of distress. Discussed POC.

## 2021-11-08 NOTE — CARE PLAN
The patient is Watcher - Medium risk of patient condition declining or worsening    Shift Goals  Clinical Goals: Patient oxygen saturation remains above 90% and patient will  get out of bed within the shift    Progress made toward(s) clinical / shift goals:  Patient oxygen saturation remains on the 90's within the shift. Patient has been coughing and cough medication has been given per order. Patient has some incontinence of urine during coughing. Patient also get out of bed and stays on her recliner most of the shift.       Problem: Knowledge Deficit - Standard  Goal: Patient and family/care givers will demonstrate understanding of plan of care, disease process/condition, diagnostic tests and medications  Outcome: Progressing     Problem: Knowledge Deficit - COPD  Goal: Patient/significant other demonstrates understanding of disease process, utilization of the Action Plan, medications and discharge instruction  Outcome: Progressing

## 2021-11-08 NOTE — DISCHARGE PLANNING
[facility]    Action: LSW called Giuseppe at [phone], line is busy, unable to leave VM.    Barriers to Discharge: bed availability, insurance auth    Plan: LSW to follow up with Giuseppe.    1420: LSW received call from Destiney PORTILLO working with [employer] for insurance auth. LSW answered questions. Per Destiney, she will call this LSW back when auth is approved.    1445: LSW received call from Destiney PORTILLO. Per Destiney auth is approved from 11/8-11/15. Auth number [id number]. Per Destiney, auth is good for 72 hours.    1505: LSW received VM from Destiney PORTILLO. Per Destiney, there are new service dates for the auth: 11/8-11/22.

## 2021-11-08 NOTE — CARE PLAN
The patient is Watcher - Medium risk of patient condition declining or worsening    Shift Goals  Clinical Goals: Patient's oxygen saturation remains above 90% within the shift.    Progress made toward(s) clinical / shift goals:  Patient saturation goes down whenever she remove her oxygen when eating or drinking. Monitor patient oxygen saturation and been using nasal cannula. Patient remains has been using 5-6L of O2  within the shift.      Problem: Knowledge Deficit - COPD  Goal: Patient/significant other demonstrates understanding of disease process, utilization of the Action Plan, medications and discharge instruction  Outcome: Progressing     Problem: Impaired Gas Exchange  Goal: Patient will demonstrate improved ventilation and adequate oxygenation and participate in treatment regimen within the level of ability/situation.  Outcome: Progressing

## 2021-11-09 ENCOUNTER — HOSPITAL ENCOUNTER (INPATIENT)
Facility: MEDICAL CENTER | Age: 61
LOS: 15 days | DRG: 177 | End: 2021-11-24
Attending: EMERGENCY MEDICINE | Admitting: HOSPITALIST
Payer: MEDICARE

## 2021-11-09 ENCOUNTER — PATIENT OUTREACH (OUTPATIENT)
Dept: HEALTH INFORMATION MANAGEMENT | Facility: OTHER | Age: 61
End: 2021-11-09

## 2021-11-09 ENCOUNTER — APPOINTMENT (OUTPATIENT)
Dept: RADIOLOGY | Facility: MEDICAL CENTER | Age: 61
DRG: 177 | End: 2021-11-09
Attending: EMERGENCY MEDICINE
Payer: MEDICARE

## 2021-11-09 VITALS
RESPIRATION RATE: 18 BRPM | DIASTOLIC BLOOD PRESSURE: 61 MMHG | TEMPERATURE: 97.8 F | OXYGEN SATURATION: 91 % | WEIGHT: 293 LBS | HEART RATE: 80 BPM | SYSTOLIC BLOOD PRESSURE: 112 MMHG | HEIGHT: 64 IN | BODY MASS INDEX: 50.02 KG/M2

## 2021-11-09 DIAGNOSIS — I50.32 CHRONIC HEART FAILURE WITH PRESERVED EJECTION FRACTION (HCC): ICD-10-CM

## 2021-11-09 DIAGNOSIS — J96.21 ACUTE ON CHRONIC RESPIRATORY FAILURE WITH HYPOXIA AND HYPERCAPNIA (HCC): ICD-10-CM

## 2021-11-09 DIAGNOSIS — I42.0 DCM (DILATED CARDIOMYOPATHY) (HCC): ICD-10-CM

## 2021-11-09 DIAGNOSIS — J96.22 ACUTE ON CHRONIC RESPIRATORY FAILURE WITH HYPOXIA AND HYPERCAPNIA (HCC): ICD-10-CM

## 2021-11-09 DIAGNOSIS — R09.02 HYPOXIA: ICD-10-CM

## 2021-11-09 DIAGNOSIS — J12.82 PNEUMONIA DUE TO COVID-19 VIRUS: ICD-10-CM

## 2021-11-09 DIAGNOSIS — U07.1 PNEUMONIA DUE TO COVID-19 VIRUS: ICD-10-CM

## 2021-11-09 DIAGNOSIS — E66.01 MORBID OBESITY WITH BMI OF 60.0-69.9, ADULT (HCC): ICD-10-CM

## 2021-11-09 DIAGNOSIS — I48.91 ATRIAL FIBRILLATION, UNSPECIFIED TYPE (HCC): ICD-10-CM

## 2021-11-09 DIAGNOSIS — Z87.09 HISTORY OF COPD: ICD-10-CM

## 2021-11-09 DIAGNOSIS — I95.9 HYPOTENSION, UNSPECIFIED HYPOTENSION TYPE: ICD-10-CM

## 2021-11-09 DIAGNOSIS — I27.20 PULMONARY HYPERTENSION (HCC): ICD-10-CM

## 2021-11-09 PROBLEM — R79.81 ELEVATED CO2 LEVEL: Status: ACTIVE | Noted: 2021-11-09

## 2021-11-09 LAB
ALBUMIN SERPL BCP-MCNC: 3.5 G/DL (ref 3.2–4.9)
ALBUMIN/GLOB SERPL: 1 G/DL
ALP SERPL-CCNC: 51 U/L (ref 30–99)
ALT SERPL-CCNC: 22 U/L (ref 2–50)
ANION GAP SERPL CALC-SCNC: 3 MMOL/L (ref 7–16)
ANION GAP SERPL CALC-SCNC: 5 MMOL/L (ref 7–16)
AST SERPL-CCNC: 27 U/L (ref 12–45)
BASOPHILS # BLD AUTO: 0 % (ref 0–1.8)
BASOPHILS # BLD: 0 K/UL (ref 0–0.12)
BILIRUB SERPL-MCNC: 0.3 MG/DL (ref 0.1–1.5)
BUN SERPL-MCNC: 17 MG/DL (ref 8–22)
BUN SERPL-MCNC: 17 MG/DL (ref 8–22)
CALCIUM SERPL-MCNC: 8.6 MG/DL (ref 8.4–10.2)
CALCIUM SERPL-MCNC: 8.7 MG/DL (ref 8.4–10.2)
CHLORIDE SERPL-SCNC: 85 MMOL/L (ref 96–112)
CHLORIDE SERPL-SCNC: 89 MMOL/L (ref 96–112)
CO2 SERPL-SCNC: 38 MMOL/L (ref 20–33)
CO2 SERPL-SCNC: 41 MMOL/L (ref 20–33)
CREAT SERPL-MCNC: 0.82 MG/DL (ref 0.5–1.4)
CREAT SERPL-MCNC: 0.83 MG/DL (ref 0.5–1.4)
EOSINOPHIL # BLD AUTO: 0 K/UL (ref 0–0.51)
EOSINOPHIL NFR BLD: 0 % (ref 0–6.9)
ERYTHROCYTE [DISTWIDTH] IN BLOOD BY AUTOMATED COUNT: 43.8 FL (ref 35.9–50)
FLUAV RNA SPEC QL NAA+PROBE: NEGATIVE
FLUBV RNA SPEC QL NAA+PROBE: NEGATIVE
GLOBULIN SER CALC-MCNC: 3.6 G/DL (ref 1.9–3.5)
GLUCOSE BLD-MCNC: 114 MG/DL (ref 65–99)
GLUCOSE BLD-MCNC: 128 MG/DL (ref 65–99)
GLUCOSE SERPL-MCNC: 127 MG/DL (ref 65–99)
GLUCOSE SERPL-MCNC: 129 MG/DL (ref 65–99)
HCT VFR BLD AUTO: 38.3 % (ref 37–47)
HGB BLD-MCNC: 12 G/DL (ref 12–16)
LG PLATELETS BLD QL SMEAR: NORMAL
LYMPHOCYTES # BLD AUTO: 0.85 K/UL (ref 1–4.8)
LYMPHOCYTES NFR BLD: 10 % (ref 22–41)
MANUAL DIFF BLD: NORMAL
MCH RBC QN AUTO: 29.9 PG (ref 27–33)
MCHC RBC AUTO-ENTMCNC: 31.3 G/DL (ref 33.6–35)
MCV RBC AUTO: 95.5 FL (ref 81.4–97.8)
MONOCYTES # BLD AUTO: 0.17 K/UL (ref 0–0.85)
MONOCYTES NFR BLD AUTO: 2 % (ref 0–13.4)
NEUTROPHILS # BLD AUTO: 7.48 K/UL (ref 2–7.15)
NEUTROPHILS NFR BLD: 84 % (ref 44–72)
NEUTS BAND NFR BLD MANUAL: 4 % (ref 0–10)
NRBC # BLD AUTO: 0 K/UL
NRBC BLD-RTO: 0 /100 WBC
PLATELET # BLD AUTO: 109 K/UL (ref 164–446)
PLATELET BLD QL SMEAR: NORMAL
PMV BLD AUTO: 12.2 FL (ref 9–12.9)
POTASSIUM SERPL-SCNC: 4.3 MMOL/L (ref 3.6–5.5)
POTASSIUM SERPL-SCNC: 4.6 MMOL/L (ref 3.6–5.5)
PROCALCITONIN SERPL-MCNC: 0.03 NG/ML
PROT SERPL-MCNC: 7.1 G/DL (ref 6–8.2)
RBC # BLD AUTO: 4.01 M/UL (ref 4.2–5.4)
RBC BLD AUTO: NORMAL
RSV RNA SPEC QL NAA+PROBE: NEGATIVE
SARS-COV-2 RNA RESP QL NAA+PROBE: DETECTED
SODIUM SERPL-SCNC: 128 MMOL/L (ref 135–145)
SODIUM SERPL-SCNC: 133 MMOL/L (ref 135–145)
SPECIMEN SOURCE: ABNORMAL
WBC # BLD AUTO: 8.5 K/UL (ref 4.8–10.8)

## 2021-11-09 PROCEDURE — 99285 EMERGENCY DEPT VISIT HI MDM: CPT

## 2021-11-09 PROCEDURE — 71045 X-RAY EXAM CHEST 1 VIEW: CPT

## 2021-11-09 PROCEDURE — 700102 HCHG RX REV CODE 250 W/ 637 OVERRIDE(OP): Performed by: INTERNAL MEDICINE

## 2021-11-09 PROCEDURE — 700102 HCHG RX REV CODE 250 W/ 637 OVERRIDE(OP): Performed by: HOSPITALIST

## 2021-11-09 PROCEDURE — A9270 NON-COVERED ITEM OR SERVICE: HCPCS | Performed by: INTERNAL MEDICINE

## 2021-11-09 PROCEDURE — 80048 BASIC METABOLIC PNL TOTAL CA: CPT

## 2021-11-09 PROCEDURE — 700111 HCHG RX REV CODE 636 W/ 250 OVERRIDE (IP): Performed by: INTERNAL MEDICINE

## 2021-11-09 PROCEDURE — 36415 COLL VENOUS BLD VENIPUNCTURE: CPT

## 2021-11-09 PROCEDURE — 80053 COMPREHEN METABOLIC PANEL: CPT

## 2021-11-09 PROCEDURE — 84145 PROCALCITONIN (PCT): CPT

## 2021-11-09 PROCEDURE — 770006 HCHG ROOM/CARE - MED/SURG/GYN SEMI*

## 2021-11-09 PROCEDURE — 87186 SC STD MICRODIL/AGAR DIL: CPT

## 2021-11-09 PROCEDURE — 0241U HCHG SARS-COV-2 COVID-19 NFCT DS RESP RNA 4 TRGT MIC: CPT

## 2021-11-09 PROCEDURE — A9270 NON-COVERED ITEM OR SERVICE: HCPCS | Performed by: EMERGENCY MEDICINE

## 2021-11-09 PROCEDURE — 87150 DNA/RNA AMPLIFIED PROBE: CPT

## 2021-11-09 PROCEDURE — 700102 HCHG RX REV CODE 250 W/ 637 OVERRIDE(OP): Performed by: EMERGENCY MEDICINE

## 2021-11-09 PROCEDURE — 36600 WITHDRAWAL OF ARTERIAL BLOOD: CPT

## 2021-11-09 PROCEDURE — 85027 COMPLETE CBC AUTOMATED: CPT

## 2021-11-09 PROCEDURE — 700105 HCHG RX REV CODE 258: Performed by: EMERGENCY MEDICINE

## 2021-11-09 PROCEDURE — 99223 1ST HOSP IP/OBS HIGH 75: CPT | Performed by: HOSPITALIST

## 2021-11-09 PROCEDURE — C9803 HOPD COVID-19 SPEC COLLECT: HCPCS | Performed by: EMERGENCY MEDICINE

## 2021-11-09 PROCEDURE — 96365 THER/PROPH/DIAG IV INF INIT: CPT

## 2021-11-09 PROCEDURE — 82962 GLUCOSE BLOOD TEST: CPT

## 2021-11-09 PROCEDURE — A9270 NON-COVERED ITEM OR SERVICE: HCPCS | Performed by: HOSPITALIST

## 2021-11-09 PROCEDURE — 82803 BLOOD GASES ANY COMBINATION: CPT

## 2021-11-09 PROCEDURE — 700111 HCHG RX REV CODE 636 W/ 250 OVERRIDE (IP): Performed by: EMERGENCY MEDICINE

## 2021-11-09 PROCEDURE — 85007 BL SMEAR W/DIFF WBC COUNT: CPT

## 2021-11-09 PROCEDURE — 87040 BLOOD CULTURE FOR BACTERIA: CPT

## 2021-11-09 RX ORDER — BISACODYL 10 MG
10 SUPPOSITORY, RECTAL RECTAL
Status: DISCONTINUED | OUTPATIENT
Start: 2021-11-09 | End: 2021-11-24 | Stop reason: HOSPADM

## 2021-11-09 RX ORDER — PROCHLORPERAZINE EDISYLATE 5 MG/ML
5-10 INJECTION INTRAMUSCULAR; INTRAVENOUS EVERY 4 HOURS PRN
Status: DISCONTINUED | OUTPATIENT
Start: 2021-11-09 | End: 2021-11-24 | Stop reason: HOSPADM

## 2021-11-09 RX ORDER — DEXAMETHASONE 4 MG/1
6 TABLET ORAL DAILY
Status: DISCONTINUED | OUTPATIENT
Start: 2021-11-10 | End: 2021-11-11

## 2021-11-09 RX ORDER — AMOXICILLIN 250 MG
2 CAPSULE ORAL 2 TIMES DAILY
Status: DISCONTINUED | OUTPATIENT
Start: 2021-11-09 | End: 2021-11-24 | Stop reason: HOSPADM

## 2021-11-09 RX ORDER — PROMETHAZINE HYDROCHLORIDE 25 MG/1
12.5-25 TABLET ORAL EVERY 4 HOURS PRN
Status: DISCONTINUED | OUTPATIENT
Start: 2021-11-09 | End: 2021-11-24 | Stop reason: HOSPADM

## 2021-11-09 RX ORDER — BENZONATATE 100 MG/1
100 CAPSULE ORAL 3 TIMES DAILY PRN
Qty: 60 CAPSULE | Refills: 0 | Status: SHIPPED
Start: 2021-11-09

## 2021-11-09 RX ORDER — GUAIFENESIN/DEXTROMETHORPHAN 100-10MG/5
10 SYRUP ORAL EVERY 6 HOURS PRN
Status: DISCONTINUED | OUTPATIENT
Start: 2021-11-09 | End: 2021-11-24 | Stop reason: HOSPADM

## 2021-11-09 RX ORDER — GABAPENTIN 300 MG/1
300 CAPSULE ORAL 2 TIMES DAILY
Status: DISCONTINUED | OUTPATIENT
Start: 2021-11-10 | End: 2021-11-11

## 2021-11-09 RX ORDER — AMIODARONE HYDROCHLORIDE 200 MG/1
200 TABLET ORAL DAILY
Status: DISCONTINUED | OUTPATIENT
Start: 2021-11-10 | End: 2021-11-11

## 2021-11-09 RX ORDER — ALBUTEROL SULFATE 90 UG/1
2 AEROSOL, METERED RESPIRATORY (INHALATION) ONCE
Status: COMPLETED | OUTPATIENT
Start: 2021-11-09 | End: 2021-11-09

## 2021-11-09 RX ORDER — TAMSULOSIN HYDROCHLORIDE 0.4 MG/1
0.4 CAPSULE ORAL
Status: DISCONTINUED | OUTPATIENT
Start: 2021-11-10 | End: 2021-11-11

## 2021-11-09 RX ORDER — ACETAMINOPHEN 325 MG/1
650 TABLET ORAL EVERY 6 HOURS PRN
Status: DISCONTINUED | OUTPATIENT
Start: 2021-11-09 | End: 2021-11-24 | Stop reason: HOSPADM

## 2021-11-09 RX ORDER — TAMSULOSIN HYDROCHLORIDE 0.4 MG/1
0.4 CAPSULE ORAL
Qty: 30 CAPSULE | Status: SHIPPED
Start: 2021-11-09

## 2021-11-09 RX ORDER — FUROSEMIDE 20 MG/1
20 TABLET ORAL 2 TIMES DAILY
Status: DISCONTINUED | OUTPATIENT
Start: 2021-11-09 | End: 2021-11-11

## 2021-11-09 RX ORDER — POLYETHYLENE GLYCOL 3350 17 G/17G
1 POWDER, FOR SOLUTION ORAL
Status: DISCONTINUED | OUTPATIENT
Start: 2021-11-09 | End: 2021-11-24 | Stop reason: HOSPADM

## 2021-11-09 RX ORDER — DIGOXIN 250 MCG
250 TABLET ORAL DAILY
Status: DISCONTINUED | OUTPATIENT
Start: 2021-11-10 | End: 2021-11-11

## 2021-11-09 RX ORDER — ONDANSETRON 4 MG/1
4 TABLET, ORALLY DISINTEGRATING ORAL EVERY 4 HOURS PRN
Status: DISCONTINUED | OUTPATIENT
Start: 2021-11-09 | End: 2021-11-24 | Stop reason: HOSPADM

## 2021-11-09 RX ORDER — SPIRONOLACTONE 25 MG/1
25 TABLET ORAL DAILY
Status: DISCONTINUED | OUTPATIENT
Start: 2021-11-10 | End: 2021-11-11

## 2021-11-09 RX ORDER — PROMETHAZINE HYDROCHLORIDE 25 MG/1
12.5-25 SUPPOSITORY RECTAL EVERY 4 HOURS PRN
Status: DISCONTINUED | OUTPATIENT
Start: 2021-11-09 | End: 2021-11-24 | Stop reason: HOSPADM

## 2021-11-09 RX ORDER — ONDANSETRON 2 MG/ML
4 INJECTION INTRAMUSCULAR; INTRAVENOUS EVERY 4 HOURS PRN
Status: DISCONTINUED | OUTPATIENT
Start: 2021-11-09 | End: 2021-11-24 | Stop reason: HOSPADM

## 2021-11-09 RX ORDER — FUROSEMIDE 20 MG/1
20 TABLET ORAL 2 TIMES DAILY
Qty: 60 TABLET | Status: ON HOLD
Start: 2021-11-09 | End: 2021-11-24

## 2021-11-09 RX ADMIN — METOPROLOL TARTRATE 25 MG: 25 TABLET, FILM COATED ORAL at 22:56

## 2021-11-09 RX ADMIN — BENZONATATE 100 MG: 100 CAPSULE ORAL at 02:00

## 2021-11-09 RX ADMIN — FUROSEMIDE 20 MG: 20 TABLET ORAL at 06:13

## 2021-11-09 RX ADMIN — METOPROLOL TARTRATE 25 MG: 25 TABLET, FILM COATED ORAL at 06:14

## 2021-11-09 RX ADMIN — ONDANSETRON 4 MG: 4 TABLET, ORALLY DISINTEGRATING ORAL at 02:00

## 2021-11-09 RX ADMIN — FUROSEMIDE 20 MG: 40 TABLET ORAL at 22:56

## 2021-11-09 RX ADMIN — ACETAMINOPHEN 650 MG: 325 TABLET, FILM COATED ORAL at 15:12

## 2021-11-09 RX ADMIN — DIGOXIN 250 MCG: 250 TABLET ORAL at 02:01

## 2021-11-09 RX ADMIN — ENOXAPARIN SODIUM 40 MG: 40 INJECTION SUBCUTANEOUS at 06:13

## 2021-11-09 RX ADMIN — ALBUTEROL SULFATE 2 PUFF: 90 AEROSOL, METERED RESPIRATORY (INHALATION) at 21:32

## 2021-11-09 RX ADMIN — AMIODARONE HYDROCHLORIDE 200 MG: 200 TABLET ORAL at 02:00

## 2021-11-09 RX ADMIN — PIPERACILLIN AND TAZOBACTAM 4.5 G: 4; .5 INJECTION, POWDER, LYOPHILIZED, FOR SOLUTION INTRAVENOUS; PARENTERAL at 21:32

## 2021-11-09 ASSESSMENT — FIBROSIS 4 INDEX: FIB4 SCORE: 2.85

## 2021-11-09 ASSESSMENT — ENCOUNTER SYMPTOMS
COUGH: 1
NERVOUS/ANXIOUS: 0
EYE DISCHARGE: 0
FEVER: 0
SHORTNESS OF BREATH: 1
EYE REDNESS: 0
FLANK PAIN: 0
STRIDOR: 0
CHILLS: 0
MYALGIAS: 0
ABDOMINAL PAIN: 0
VOMITING: 0
FOCAL WEAKNESS: 0
BRUISES/BLEEDS EASILY: 0

## 2021-11-09 ASSESSMENT — PAIN DESCRIPTION - PAIN TYPE: TYPE: ACUTE PAIN

## 2021-11-09 NOTE — DISCHARGE SUMMARY
"Discharge Summary    CHIEF COMPLAINT ON ADMISSION  Chief Complaint   Patient presents with   • Respiratory Distress     brought in via [facility] on cpap, was found to be at 70% RA with tacypenia. PT states that it started about two hours ago        Reason for Admission  EMS     Admission Date  10/28/2021    CODE STATUS  DNAR/DNI    HPI & HOSPITAL COURSE  Per notes, \"61-year-old female with a past medical history significant for obstructive sleep apnea on BiPAP, atrial fibrillation, secondary hypercoagulable state, hypertension, morbid obesity with Body mass index is 64.07 kg/m²., chronic respiratory failure on 3 to 4 L of oxygen, COPD and systolic CHF with EF of 45 to 50%, wheelchair bound  brought into ER on 10/20/2021 by EMS after transported to be hypoxic.\"    She is being admitted in the hospital for acute hypoxic and hypercarbic respiratory failure. She was recently admitted to the ICU, requiring BiPAP, in the hospital she has been treated for COPD and CHF exacerbation. Blood culture on 10/28 growing staph epi, thought to be contaminant, repeat blood culture on 10/30/2021 no growth to date. ID was consulted, thought to be a contamination; thus antibiotics were discontinued.     Patient was evaluated by PT and OT who recommended skilled nursing facility for further recovery. While she is in the hospital she did discuss goals of care with palliative care and decided to be DNR/DNI. Patient was actually cleared for discharge for several days, discharge was delayed due to insurance authorization and bed availability at skilled nursing facility. Throughout hospitalization patient had favorable progression, is currently stable on p.o. Lasix.    Therefore, she is discharged in good and stable condition to skilled nursing facility.    The patient met 2-midnight criteria for an inpatient stay at the time of discharge.    Discharge Date  11/09/2021    FOLLOW UP ITEMS POST DISCHARGE  Follow-up with PCP  Follow-up with " nephrology  Follow-up with pulmonology    DISCHARGE DIAGNOSES  Active Problems:    Morbid obesity with BMI of 60.0-69.9, adult (HCC) POA: Yes    AF (atrial fibrillation) (HCC) POA: Yes    Essential hypertension POA: Yes    Chronic obstructive pulmonary disease with acute exacerbation (HCC) POA: Yes    Acute on chronic respiratory failure with hypoxia and hypercapnia (HCC) POA: Yes    Hyponatremia POA: Yes    Hyperglycemia POA: Yes    Leucocytosis POA: Yes    Bacteremia due to Staphylococcus POA: Yes  Resolved Problems:    Acute on chronic congestive heart failure (HCC) POA: Yes      FOLLOW UP  No future appointments.  [facility]  [address], [address]  [facility] [id number] [phone]        Tammy Carpenter M.D.  [address] [place] [address] Dr Brayan HUANG [id number] [phone]      Please call your primary care provider to schedule a hospital follow up. Thank you.     [address] [id number] [phone]  Go on 11/30/2021  Please go to your walk in appointment on 11/30/21 at 9 am to discuss your heart failure diagnosis. Thank you.      MEDICATIONS ON DISCHARGE     Medication List      START taking these medications      Instructions   benzonatate 100 MG Caps  Commonly known as: TESSALON   Take 1 Capsule by mouth 3 times a day as needed for Cough.  Dose: 100 mg     furosemide 20 MG Tabs  Commonly known as: LASIX   Take 1 Tablet by mouth 2 times a day.  Dose: 20 mg     tamsulosin 0.4 MG capsule  Commonly known as: FLOMAX   Take 1 Capsule by mouth 1/2 hour after breakfast.  Dose: 0.4 mg        CHANGE how you take these medications      Instructions   metoprolol tartrate 25 MG Tabs  What changed:   · medication strength  · how much to take  · how to take this  · when to take this  · additional instructions  · Another medication with the same name was removed. Continue taking this medication, and follow the directions you see here.  Commonly known as: LOPRESSOR    Take 1 Tablet by mouth 2 times a day.  Dose: 25 mg        CONTINUE taking these medications      Instructions   amiodarone 200 MG Tabs  Commonly known as: Cordarone   Take 200 mg by mouth every day.  Dose: 200 mg     digoxin 250 MCG Tabs  Commonly known as: LANOXIN   Take 250 mcg by mouth every day.  Dose: 250 mcg     gabapentin 300 MG Caps  Commonly known as: NEURONTIN   Take 300 mg by mouth 2 times a day.  Dose: 300 mg     spironolactone 25 MG Tabs  Commonly known as: ALDACTONE   Take 25 mg by mouth every day.  Dose: 25 mg     Tylenol 8 Hour 650 MG CR tablet  Generic drug: acetaminophen   Take 650 mg by mouth 2 times a day as needed for Mild Pain.  Dose: 650 mg        STOP taking these medications    albuterol 2.5mg/3ml Nebu solution for nebulization  Commonly known as: PROVENTIL            Allergies  No Known Allergies    DIET  Orders Placed This Encounter   Procedures   • Diet Order Diet: Consistent CHO (Diabetic); Fluid modifications: (optional): 1500 ml Fluid Restriction     Standing Status:   Standing     Number of Occurrences:   1     Order Specific Question:   Diet:     Answer:   Consistent CHO (Diabetic) [4]     Order Specific Question:   Fluid modifications: (optional)     Answer:   1500 ml Fluid Restriction [9]       ACTIVITY  As tolerated.  Weight bearing as tolerated    CONSULTATIONS  Critical care  Infectious disease  Nephrology    PROCEDURES  None    LABORATORY  Lab Results   Component Value Date    SODIUM 128 (L) 11/09/2021    POTASSIUM 4.6 11/09/2021    CHLORIDE 85 (L) 11/09/2021    CO2 38 (H) 11/09/2021    GLUCOSE 127 (H) 11/09/2021    BUN 17 11/09/2021    CREATININE 0.83 11/09/2021        Lab Results   Component Value Date    WBC 8.9 11/02/2021    HEMOGLOBIN 12.5 11/02/2021    HEMATOCRIT 39.9 11/02/2021    PLATELETCT 129 (L) 11/02/2021        Total time of the discharge process exceeds 35 minutes.

## 2021-11-09 NOTE — THERAPY
"Missed Therapy     Patient Name: Cheryl D Blakemore  Age:  61 y.o., Sex:  female  Medical Record #: [id number]  Today's Date: 11/8/2021 11/08/21 1105   Treatment Variance   Reason For Missed Therapy Non-Medical - Patient Refused   Cognition    Comments Pt c/o fatigue, sitting up in chair and stated RN had just assisted up to chair.  Pt stated \" I barely made it here, I can't do anything else now\".   Interdisciplinary Plan of Care Collaboration   IDT Collaboration with  Nursing   Collaboration Comments RN aware of PT attempt     "

## 2021-11-09 NOTE — PROGRESS NOTES
"[facility] -  PROGRESS NOTE               Author: RENZO Carson Date & Time: 11/9/2021  11:32 AM     HPI:  62 yo female with COPD, chronic pain, HTN who initially presented on 10/29 with hypoxic resp failure requiring BIPAP, felt 2/2 COPD and heart failure. Serum sodium 129 on admit. Initially started on loop diuretics in addition to aldactone with improvement in hyponatremia to 139 on 10/31. Diuretic held on 11/3 with climbing bicarb. Given fluids. Fluids stopped on 11/4. Serum sodium 132 on 11/5 and then to 121 on 11/7. Loop Diuretics restarted yesterday. Also on aldactone. 1L UOP documented over last 24 hours. Intake not recorded. Cr normal. Urine sodium 32 and urine osom 248 yesterday. Pending transfer to SNF once medically cleared. Has ongoing cough and still requiring supp oxygen. No sig chest pain. Some nausea yesterday.      DAILY NEPHROLOGY SUMMARY:  11/9: Sitting in bed, waiting for discharge to SNF at 1500. Asking for more water. C/o ongoing cough.  Good UOP. SNa 128 improved, trending up. Incont of urine/purewick in place. Discussion regarding fluid restriction.     REVIEW OF SYSTEMS:    10 point ROS reviewed and is as per HPI/daily summary or otherwise negative    PMH/PSH/SH/FH:   Reviewed and unchanged since admission note    CURRENT MEDICATIONS:   Reviewed from admission to present day    VS:  /71   Pulse 74   Temp 36.7 °C (98 °F) (Oral)   Resp 18   Ht 1.626 m (5' 4\")   Wt (!) 170 kg (374 lb 9 oz) Comment: RN notified  SpO2 92%   BMI 64.29 kg/m²     Physical Exam  Constitutional:       General: She is not in acute distress.     Appearance: She is obese. She is ill-appearing. She is not diaphoretic.   HENT:      Head: Normocephalic and atraumatic.      Nose: Congestion present.      Mouth/Throat:      Mouth: Mucous membranes are dry.      Pharynx: Oropharynx is clear.   Eyes:      Extraocular Movements: Extraocular movements intact.      " Conjunctiva/sclera: Conjunctivae normal.      Pupils: Pupils are equal, round, and reactive to light.   Cardiovascular:      Heart sounds: No murmur heard.  No friction rub. No gallop.    Pulmonary:      Effort: Pulmonary effort is normal. No respiratory distress.      Breath sounds: Normal breath sounds. No stridor. No wheezing or rhonchi.   Abdominal:      General: Bowel sounds are normal. There is no distension.      Palpations: Abdomen is soft.   Musculoskeletal:      Cervical back: Normal range of motion and neck supple.   Skin:     General: Skin is warm and dry.   Neurological:      Mental Status: She is alert.         Fluids:  In: 1640 [P.O.:1640]  Out: 1000     LABS:  Recent Labs     11/08/21  0437 11/08/21  1446 11/09/21  0449   SODIUM 122* 121* 128*   POTASSIUM 4.4 4.5 4.6   CHLORIDE 80* 80* 85*   CO2 32 30 38*   GLUCOSE 160* 162* 127*   BUN 16 20 17   CREATININE 0.96 1.07 0.83   CALCIUM 8.4 8.4 8.7       IMAGING:   All imaging reviewed from admission to present day    IMPRESSION:  # Hyponatremia: hypervolemic   # Respiratory failure: acute on chronic   # HFpEF  # Mixed acid/base  # COPD  # Morbid Obesity     PLAN:  -Continue lasix 20mg BID  -Stop aldactone  -1.5L fluid restriction  -Daily labs  -spot urine pr/cr  -Discharge anticipated at 1500 today to [facility]

## 2021-11-09 NOTE — PROGRESS NOTES
Reviewed discharge instructions with patient. Patient show understanding of discharge instructions, all questions answered. IV  Dc'd. Patient meet criteria  for discharge. Discharge paper signed. Patient was escorted outside hospital on a gurney with JEREMIAH.    7300- Talked to BANDAR Mancera from [facility] and given handsoff report.

## 2021-11-09 NOTE — PROGRESS NOTES
Report received from Hanna PORTILLO. Patient is sitting edge of bed. No signs of labored breathing or discomfort. No needs at this time. Safety measures in place and call light/water within reach.

## 2021-11-09 NOTE — CARE PLAN
The patient is Stable - Low risk of patient condition declining or worsening    Shift Goals  Clinical Goals: Oxygen saturation, pain control, safety  Patient Goals: sleep, pain control, get rid of cough  Family Goals: Education    Progress made toward(s) clinical / shift goals:  Patient has maintained adequate oxygen saturations. Pain medication regimen continued. Fall precautions in place    Patient is not progressing towards the following goals: Patient continues to have cough throughout the night.      Problem: Impaired Gas Exchange  Goal: Patient will demonstrate improved ventilation and adequate oxygenation and participate in treatment regimen within the level of ability/situation.  Outcome: Progressing     Problem: Fall Risk  Goal: Patient will remain free from falls  Outcome: Progressing       Problem: Pain - Standard  Goal: Alleviation of pain or a reduction in pain to the patient’s comfort goal  Outcome: Progressing

## 2021-11-09 NOTE — DISCHARGE INSTRUCTIONS
Discharge Instructions    Discharged to [facility] home by medical transportation with escort. Discharged via ambulance, hospital escort: Yes.  Special equipment needed: Oxygen    Be sure to schedule a follow-up appointment with your primary care doctor or any specialists as instructed.     Discharge Plan:   Influenza Vaccine Indication: Indicated: 9 to 64 years of age  Influenza Vaccine Given - only chart on this line when given: Influenza Vaccine Given (See MAR)    I understand that a diet low in cholesterol, fat, and sodium is recommended for good health. Unless I have been given specific instructions below for another diet, I accept this instruction as my diet prescription.   Other diet: Cardiac 1500 fluid restriction.    Special Instructions: Chronic Obstructive Pulmonary Disease (COPD) is a long-term, progressive lung disease that makes it harder to breathe. It includes chronic bronchitis, emphysema, and refractory (non-reversible) asthma. With COPD, the airways in your lungs become inflamed and thicken, and the tissue where oxygen is exchanged is destroyed. The flow of air in and out of your lungs decreases. When that happens, less oxygen gets into your body tissues, and it becomes harder to get rid of the waste gas carbon dioxide. As the disease gets worse, shortness of breath makes it harder to remain active. There is no cure for COPD, but it is preventable and treatable.    COPD Patient Discharge Instructions    • Diet  o Follow a low fat, low cholesterol, low salt diet unless instructed otherwise by your Doctor. Read the labels on the back of food products and track your intake of fat, cholesterol and salt.  • No smoking  o Discontinuing smoking will have the biggest impact on preventing progression of disease.  o To participate in [employer] [place]’s Quit Tobacco Program, call 555-2368 or visit [employer]/QuitTobacco  • Oxygen  o If your doctor has order that you wear oxygen at home, it is important to wear it as  ordered.  o Do not smoke, vape, or use e-cigarettes with oxygen on.  o Store in an appropriate location: upright in its quintana or laid flat down, away from open flames and stoves.   o Do not use oil-based creams and moisturizers (ie: petroleum products, oil-based lip moisturizers) or aerosol sprays (ie: hair sprays or deodorants) when using your oxygen equipment.  o Be careful with tubing placement to prevent yourself and others from tripping.  • Medications  o Refer to your personalized Action Plan to manage your symptoms.  • Warning signs of an exacerbation  o Breathing fast and shallow, worsening shortness of breath (like you just finished exercising)  o Chest tightness  o Increases in sputum production  o Changes in sputum color  o Lower oxygen levels than baseline  • When to call your doctor  o If the warning signs of an exacerbation do not improve  o Refer to your personalized Action Plan   • Pulmonary Rehab  o Your doctor has ordered you a referral to Pulmonary Rehab. Call [phone] to schedule an appointment  • Attend your follow-up appointment with your PCP and/or Pulmonologist  • Remote Monitoring: At the direction of the remote monitoring on-call provider, you may increase your oxygen by 2 liters above your baseline.     See the educational handout provided by your COPD Navigator for more information. This also explains more about COPD, symptoms of an exacerbation, and some of the tests that you have undergone.    · Is patient discharged on Warfarin / Coumadin?   No       Acute Respiratory Failure, Adult    Acute respiratory failure occurs when there is not enough oxygen passing from your lungs to your body. When this happens, your lungs have trouble removing carbon dioxide from the blood. This causes your blood oxygen level to drop too low as carbon dioxide builds up.  Acute respiratory failure is a medical emergency. It can develop quickly, but it is temporary if treated promptly. Your lung capacity, or  how much air your lungs can hold, may improve with time, exercise, and treatment.  What are the causes?  There are many possible causes of acute respiratory failure, including:  · Lung injury.  · Chest injury or damage to the ribs or tissues near the lungs.  · Lung conditions that affect the flow of air and blood into and out of the lungs, such as pneumonia, acute respiratory distress syndrome, and cystic fibrosis.  · Medical conditions, such as strokes or spinal cord injuries, that affect the muscles and nerves that control breathing.  · Blood infection (sepsis).  · Inflammation of the pancreas (pancreatitis).  · A blood clot in the lungs (pulmonary embolism).  · A large-volume blood transfusion.  · Burns.  · Near-drowning.  · Seizure.  · Smoke inhalation.  · Reaction to medicines.  · Alcohol or drug overdose.  What increases the risk?  This condition is more likely to develop in people who have:  · A blocked airway.  · Asthma.  · A condition or disease that damages or weakens the muscles, nerves, bones, or tissues that are involved in breathing.  · A serious infection.  · A health problem that blocks the unconscious reflex that is involved in breathing, such as hypothyroidism or sleep apnea.  · A lung injury or trauma.  What are the signs or symptoms?  Trouble breathing is the main symptom of acute respiratory failure. Symptoms may also include:  · Rapid breathing.  · Restlessness or anxiety.  · Skin, lips, or fingernails that appear blue (cyanosis).  · Rapid heart rate.  · Abnormal heart rhythms (arrhythmias).  · Confusion or changes in behavior.  · Tiredness or loss of energy.  · Feeling sleepy or having a loss of consciousness.  How is this diagnosed?  Your health care provider can diagnose acute respiratory failure with a medical history and physical exam. During the exam, your health care provider will listen to your heart and check for crackling or wheezing sounds in your lungs. Your may also have tests to  confirm the diagnosis and determine what is causing respiratory failure. These tests may include:  · Measuring the amount of oxygen in your blood (pulse oximetry). The measurement comes from a small device that is placed on your finger, earlobe, or toe.  · Other blood tests to measure blood gases and to look for signs of infection.  · Sampling your cerebral spinal fluid or tracheal fluid to check for infections.  · Chest X-ray to look for fluid in spaces that should be filled with air.  · Electrocardiogram (ECG) to look at the heart's electrical activity.  How is this treated?  Treatment for this condition usually takes places in a hospital intensive care unit (ICU). Treatment depends on what is causing the condition. It may include one or more treatments until your symptoms improve. Treatment may include:  · Supplemental oxygen. Extra oxygen is given through a tube in the nose, a face mask, or a slater.  · A device such as a continuous positive airway pressure (CPAP) or bi-level positive airway pressure (BiPAP or BPAP) machine. This treatment uses mild air pressure to keep the airways open. A mask or other device will be placed over your nose or mouth. A tube that is connected to a motor will deliver oxygen through the mask.  · Ventilator. This treatment helps move air into and out of the lungs. This may be done with a bag and mask or a machine. For this treatment, a tube is placed in your windpipe (trachea) so air and oxygen can flow to the lungs.  · Extracorporeal membrane oxygenation (ECMO). This treatment temporarily takes over the function of the heart and lungs, supplying oxygen and removing carbon dioxide. ECMO gives the lungs a chance to recover. It may be used if a ventilator is not effective.  · Tracheostomy. This is a procedure that creates a hole in the neck to insert a breathing tube.  · Receiving fluids and medicines.  · Rocking the bed to help breathing.  Follow these instructions at home:  · Take  "over-the-counter and prescription medicines only as told by your health care provider.  · Return to normal activities as told by your health care provider. Ask your health care provider what activities are safe for you.  · Keep all follow-up visits as told by your health care provider. This is important.  How is this prevented?  Treating infections and medical conditions that may lead to acute respiratory failure can help prevent the condition from developing.  Contact a health care provider if:  · You have a fever.  · Your symptoms do not improve or they get worse.  Get help right away if:  · You are having trouble breathing.  · You lose consciousness.  · Your have cyanosis or turn blue.  · You develop a rapid heart rate.  · You are confused.  These symptoms may represent a serious problem that is an emergency. Do not wait to see if the symptoms will go away. Get medical help right away. Call your local emergency services (911 in the [place] Do not drive yourself to the hospital.  This information is not intended to replace advice given to you by your health care provider. Make sure you discuss any questions you have with your health care provider.  Document Released: 12/23/2014 Document Revised: 11/30/2018 Document Reviewed: 07/05/2017  [employer] Patient Education © 2020 [employer]    MY COPD ACTION PLAN     It is recommended that patients and physicians /healthcare providers complete this action plan together. This plan should be discussed at each physician visit and updated as needed.    The green, yellow and red zones show groups of symptoms of COPD. This list of symptoms is not comprehensive, and you may experience other symptoms. In the \"Actions\" column, your healthcare provider has recommended actions for you to take based on your symptoms.    Patient Name: Cheryl D Blakemore   YOB: 1960   Last Updated on:     Green Zone:  I am doing well today Actions   •  Usual activitiy and exercise level " "•  Take daily medications   •  Usual amounts of cough and phlegm/mucus •  Use oxygen as prescribed   •  Sleep well at night •  Continue regular exercise/diet plan   •  Appetite is good •  At all times avoid cigarette smoke, inhaled irritants     Daily Medications (these medications are taken every day):                Yellow Zone:  I am having a bad day or a COPD flare Actions   •  More breathless than usual •  Continue daily medications   •  I have less energy for my daily activities •  Use quick relief inhaler as ordered   •  Increased or thicker phlegm/mucus •  Use oxygen as prescribed   •  Using quick relief inhaler/nebulizer more often •  Get plenty of rest   •  Swelling of ankles more than usual •  Use pursed lip breathing   •  More coughing than usual •  At all times avoid cigarette smoke, inhaled irritants   •  I feel like I have a \"chest cold\"   •  Poor sleep and my symptoms woke me up   •  My appetite is not good   •  My medicine is not helping    •  Call provider immediately if symptoms don’t improve     Continue daily medications, add rescue medications:               Medications to be used during a flare up, (as Discussed with Provider):              Red Zone:  I need urgent medical care Actions   •  Severe shortness of breath even at rest •  Call 911 or seek medical care immediately   •  Not able to do any activity because of breathing    •  Fever or shaking chills    •  Feeling confused or very drowsy     •  Chest pains    •  Coughing up blood          Depression / Suicide Risk    As you are discharged from this [facility] facility, it is important to learn how to keep safe from harming yourself.    Recognize the warning signs:  · Abrupt changes in personality, positive or negative- including increase in energy   · Giving away possessions  · Change in eating patterns- significant weight changes-  positive or negative  · Change in sleeping patterns- unable to sleep or sleeping all the " time   · Unwillingness or inability to communicate  · Depression  · Unusual sadness, discouragement and loneliness  · Talk of wanting to die  · Neglect of personal appearance   · Rebelliousness- reckless behavior  · Withdrawal from people/activities they love  · Confusion- inability to concentrate     If you or a loved one observes any of these behaviors or has concerns about self-harm, here's what you can do:  · Talk about it- your feelings and reasons for harming yourself  · Remove any means that you might use to hurt yourself (examples: pills, rope, extension cords, firearm)  · Get professional help from the community (Mental Health, Substance Abuse, psychological counseling)  · Do not be alone:Call your Safe Contact- someone whom you trust who will be there for you.  · Call your local CRISIS HOTLINE 391-2990 or 094-371-4764  · Call your local Children's Mobile Crisis Response Team [place] (542) 510-6441 or www.[employer].com  · Call the toll free National Suicide Prevention Hotlines   · National Suicide Prevention Lifeline 902-543-ZSKE (8493)  · National Hope Line Network 800-SUICIDE (763-2986)

## 2021-11-09 NOTE — CARE PLAN
The patient is Stable - Low risk of patient condition declining or worsening    Shift Goals  Clinical Goals: oxygen saturation remain on the 90's  Patient Goals: sleep, pain control  Family Goals: Education    Progress made toward(s) clinical / shift goals:  Patients oxygen saturation remain above 90% within the shift. Patient said that she feels better today.      Problem: Knowledge Deficit - Standard  Goal: Patient and family/care givers will demonstrate understanding of plan of care, disease process/condition, diagnostic tests and medications  Outcome: Progressing     Problem: Knowledge Deficit - COPD  Goal: Patient/significant other demonstrates understanding of disease process, utilization of the Action Plan, medications and discharge instruction  Outcome: Progressing

## 2021-11-09 NOTE — DISCHARGE PLANNING
Anticipated Discharge Disposition: SNF-     Action: LSW informed by Yokasta LANGE that pt has received insurance. LSW informed that insurance informed our team that 11/8 if pt is not placed in 72 hours, they will need to submit for insurance auth again.     LSW called Sada martin to see if she can assist with pt's placement. No answer. LSW left a voicemail with name and number for call back.     Barriers to Discharge: SNF bed availability     Plan: Await SNF bed availability, LSW to continue to follow for d/c needs     Addendum 0928  LSW spoke to Sada martin and was informed that [facility] can take her today. [employer] weight limit of 350. Pt will require [facility].  CANDIEW provided update to bedside RNHanna and RNCM, Alexandra has provided update to Dr. Moreno.     [facility] to coordinate [facility] transport. Sada requesting 1500. Await confirmed [facility] transport time.     Addendum 1314  LSW received a call from Sada ([email or web address]) requesting d/c summary by 1400.   LSW messaged Dr. Moreno and provided request.     Addendum 1327  D/c summary available at this time. LSW able to complete transfer packet. Transfer packet to be placed in pt's chart.

## 2021-11-09 NOTE — DISCHARGE PLANNING
DC Transport Scheduled    Received request at: 0901    Transport Company Scheduled:  [employer]  Spoke with [place] at [facility] to schedule transport.      Scheduled Date:11/9/21  Scheduled Time: 1500    Destination: [place] [address] [address]    Notified care team of scheduled transport via Voalte.     If there are any changes needed to the DC transportation scheduled, please contact [facility] at ext. [phone] between the hours of 2602-5771 Mon-Fri. If outside those hours, contact the ED Case Manager at ext. [phone].

## 2021-11-09 NOTE — DISCHARGE PLANNING
Anticipated discharge disposition: [facility]    Action: met with pt at bedside to update pt on transfer to to [facility] at 1500. COBRA signed by pt.   Discussed IMM letter with pt. Copy signed and faxewd to DPA. Copy of IMM left with pt at bedside.     Barriers to discharge: no needs    Plan: care coordination will continue to follow up with care team for DC needs.

## 2021-11-10 LAB
ALBUMIN SERPL BCP-MCNC: 3.2 G/DL (ref 3.2–4.9)
ALBUMIN/GLOB SERPL: 0.9 G/DL
ALP SERPL-CCNC: 46 U/L (ref 30–99)
ALT SERPL-CCNC: 21 U/L (ref 2–50)
ANION GAP SERPL CALC-SCNC: 3 MMOL/L (ref 7–16)
AST SERPL-CCNC: 26 U/L (ref 12–45)
BASE EXCESS BLDA CALC-SCNC: 15 MMOL/L (ref -4–3)
BASE EXCESS BLDA CALC-SCNC: 16 MMOL/L (ref -4–3)
BASOPHILS # BLD AUTO: 0.1 % (ref 0–1.8)
BASOPHILS # BLD: 0.01 K/UL (ref 0–0.12)
BILIRUB SERPL-MCNC: 0.3 MG/DL (ref 0.1–1.5)
BODY TEMPERATURE: 36.7 CENTIGRADE
BODY TEMPERATURE: ABNORMAL DEGREES
BUN SERPL-MCNC: 14 MG/DL (ref 8–22)
CALCIUM SERPL-MCNC: 8.4 MG/DL (ref 8.4–10.2)
CHLORIDE SERPL-SCNC: 89 MMOL/L (ref 96–112)
CO2 BLDA-SCNC: 49 MMOL/L (ref 20–33)
CO2 SERPL-SCNC: 42 MMOL/L (ref 20–33)
CREAT SERPL-MCNC: 0.76 MG/DL (ref 0.5–1.4)
DELSYS IDSYS: ABNORMAL
EOSINOPHIL # BLD AUTO: 0 K/UL (ref 0–0.51)
EOSINOPHIL NFR BLD: 0 % (ref 0–6.9)
ERYTHROCYTE [DISTWIDTH] IN BLOOD BY AUTOMATED COUNT: 44.2 FL (ref 35.9–50)
GLOBULIN SER CALC-MCNC: 3.6 G/DL (ref 1.9–3.5)
GLUCOSE SERPL-MCNC: 111 MG/DL (ref 65–99)
HCO3 BLDA-SCNC: 45 MMOL/L (ref 17–25)
HCO3 BLDA-SCNC: 46.3 MMOL/L (ref 17–25)
HCT VFR BLD AUTO: 38 % (ref 37–47)
HGB BLD-MCNC: 11.7 G/DL (ref 12–16)
IMM GRANULOCYTES # BLD AUTO: 0.04 K/UL (ref 0–0.11)
IMM GRANULOCYTES NFR BLD AUTO: 0.6 % (ref 0–0.9)
LPM ILPM: 4 LPM
LYMPHOCYTES # BLD AUTO: 0.68 K/UL (ref 1–4.8)
LYMPHOCYTES NFR BLD: 9.7 % (ref 22–41)
MAGNESIUM SERPL-MCNC: 1.8 MG/DL (ref 1.5–2.5)
MCH RBC QN AUTO: 29.8 PG (ref 27–33)
MCHC RBC AUTO-ENTMCNC: 30.8 G/DL (ref 33.6–35)
MCV RBC AUTO: 96.9 FL (ref 81.4–97.8)
MONOCYTES # BLD AUTO: 0.52 K/UL (ref 0–0.85)
MONOCYTES NFR BLD AUTO: 7.4 % (ref 0–13.4)
NEUTROPHILS # BLD AUTO: 5.78 K/UL (ref 2–7.15)
NEUTROPHILS NFR BLD: 82.2 % (ref 44–72)
NRBC # BLD AUTO: 0 K/UL
NRBC BLD-RTO: 0 /100 WBC
O2/TOTAL GAS SETTING VFR VENT: 15 % (ref 30–60)
PCO2 BLDA: 87.1 MMHG (ref 26–37)
PCO2 BLDA: 87.6 MMHG (ref 26–37)
PCO2 TEMP ADJ BLDA: 86 MMHG (ref 26–37)
PH BLDA: 7.33 [PH] (ref 7.4–7.5)
PH BLDA: 7.34 [PH] (ref 7.4–7.5)
PH TEMP ADJ BLDA: 7.34 [PH] (ref 7.4–7.5)
PH TEMP ADJ BLDA: 7.34 [PH] (ref 7.4–7.5)
PLATELET # BLD AUTO: 98 K/UL (ref 164–446)
PMV BLD AUTO: 12.1 FL (ref 9–12.9)
PO2 BLDA: 58.8 MMHG (ref 64–87)
PO2 BLDA: 60 MMHG (ref 64–87)
PO2 TEMP ADJ BLDA: 57 MMHG (ref 64–87)
PO2 TEMP ADJ BLDA: 57.6 MMHG (ref 64–87)
POTASSIUM SERPL-SCNC: 4 MMOL/L (ref 3.6–5.5)
PROT SERPL-MCNC: 6.8 G/DL (ref 6–8.2)
RBC # BLD AUTO: 3.92 M/UL (ref 4.2–5.4)
SAO2 % BLDA: 86 % (ref 93–99)
SAO2 % BLDA: 89.1 % (ref 93–99)
SODIUM SERPL-SCNC: 134 MMOL/L (ref 135–145)
SPECIMEN DRAWN FROM PATIENT: ABNORMAL
WBC # BLD AUTO: 7 K/UL (ref 4.8–10.8)

## 2021-11-10 PROCEDURE — 700102 HCHG RX REV CODE 250 W/ 637 OVERRIDE(OP): Performed by: STUDENT IN AN ORGANIZED HEALTH CARE EDUCATION/TRAINING PROGRAM

## 2021-11-10 PROCEDURE — 80053 COMPREHEN METABOLIC PANEL: CPT

## 2021-11-10 PROCEDURE — 82803 BLOOD GASES ANY COMBINATION: CPT

## 2021-11-10 PROCEDURE — A9270 NON-COVERED ITEM OR SERVICE: HCPCS | Performed by: STUDENT IN AN ORGANIZED HEALTH CARE EDUCATION/TRAINING PROGRAM

## 2021-11-10 PROCEDURE — 83735 ASSAY OF MAGNESIUM: CPT

## 2021-11-10 PROCEDURE — A9270 NON-COVERED ITEM OR SERVICE: HCPCS | Performed by: HOSPITALIST

## 2021-11-10 PROCEDURE — 700105 HCHG RX REV CODE 258: Performed by: HOSPITALIST

## 2021-11-10 PROCEDURE — 700102 HCHG RX REV CODE 250 W/ 637 OVERRIDE(OP): Performed by: HOSPITALIST

## 2021-11-10 PROCEDURE — 770022 HCHG ROOM/CARE - ICU (200)

## 2021-11-10 PROCEDURE — 700111 HCHG RX REV CODE 636 W/ 250 OVERRIDE (IP): Performed by: HOSPITALIST

## 2021-11-10 PROCEDURE — 99232 SBSQ HOSP IP/OBS MODERATE 35: CPT | Performed by: STUDENT IN AN ORGANIZED HEALTH CARE EDUCATION/TRAINING PROGRAM

## 2021-11-10 PROCEDURE — 85025 COMPLETE CBC W/AUTO DIFF WBC: CPT

## 2021-11-10 RX ORDER — METOPROLOL TARTRATE 50 MG/1
50 TABLET, FILM COATED ORAL 2 TIMES DAILY
Status: ON HOLD | COMMUNITY
End: 2021-11-16

## 2021-11-10 RX ORDER — GUAIFENESIN 600 MG/1
1200 TABLET, EXTENDED RELEASE ORAL EVERY 12 HOURS
Status: DISCONTINUED | OUTPATIENT
Start: 2021-11-10 | End: 2021-11-11

## 2021-11-10 RX ADMIN — GABAPENTIN 300 MG: 300 CAPSULE ORAL at 17:47

## 2021-11-10 RX ADMIN — FUROSEMIDE 20 MG: 40 TABLET ORAL at 17:47

## 2021-11-10 RX ADMIN — PIPERACILLIN AND TAZOBACTAM 3.38 G: 3; .375 INJECTION, POWDER, LYOPHILIZED, FOR SOLUTION INTRAVENOUS; PARENTERAL at 01:20

## 2021-11-10 RX ADMIN — PIPERACILLIN AND TAZOBACTAM 3.38 G: 3; .375 INJECTION, POWDER, LYOPHILIZED, FOR SOLUTION INTRAVENOUS; PARENTERAL at 13:00

## 2021-11-10 RX ADMIN — DIGOXIN 250 MCG: 250 TABLET ORAL at 05:47

## 2021-11-10 RX ADMIN — GUAIFENESIN SYRUP AND DEXTROMETHORPHAN 10 ML: 100; 10 SYRUP ORAL at 01:15

## 2021-11-10 RX ADMIN — GUAIFENESIN SYRUP AND DEXTROMETHORPHAN 10 ML: 100; 10 SYRUP ORAL at 09:51

## 2021-11-10 RX ADMIN — SPIRONOLACTONE 25 MG: 25 TABLET ORAL at 05:46

## 2021-11-10 RX ADMIN — FUROSEMIDE 20 MG: 40 TABLET ORAL at 05:47

## 2021-11-10 RX ADMIN — GABAPENTIN 300 MG: 300 CAPSULE ORAL at 05:46

## 2021-11-10 RX ADMIN — VANCOMYCIN HYDROCHLORIDE 3 G: 500 INJECTION, POWDER, LYOPHILIZED, FOR SOLUTION INTRAVENOUS at 20:23

## 2021-11-10 RX ADMIN — TAMSULOSIN HYDROCHLORIDE 0.4 MG: 0.4 CAPSULE ORAL at 08:29

## 2021-11-10 RX ADMIN — SENNOSIDES AND DOCUSATE SODIUM 2 TABLET: 50; 8.6 TABLET ORAL at 17:47

## 2021-11-10 RX ADMIN — METOPROLOL TARTRATE 25 MG: 25 TABLET, FILM COATED ORAL at 17:47

## 2021-11-10 RX ADMIN — GUAIFENESIN 1200 MG: 600 TABLET, EXTENDED RELEASE ORAL at 17:47

## 2021-11-10 RX ADMIN — AMIODARONE HYDROCHLORIDE 200 MG: 200 TABLET ORAL at 05:46

## 2021-11-10 RX ADMIN — METOPROLOL TARTRATE 25 MG: 25 TABLET, FILM COATED ORAL at 05:46

## 2021-11-10 RX ADMIN — ENOXAPARIN SODIUM 40 MG: 40 INJECTION SUBCUTANEOUS at 05:45

## 2021-11-10 RX ADMIN — DEXAMETHASONE 6 MG: 4 TABLET ORAL at 05:47

## 2021-11-10 ASSESSMENT — ENCOUNTER SYMPTOMS
VOMITING: 0
WHEEZING: 0
ABDOMINAL PAIN: 0
DIARRHEA: 0
COUGH: 1
NAUSEA: 0
DOUBLE VISION: 0
FEVER: 0
PALPITATIONS: 0
SHORTNESS OF BREATH: 1
DIZZINESS: 0
MYALGIAS: 0
SPUTUM PRODUCTION: 0
BLURRED VISION: 0
HEADACHES: 0
NERVOUS/ANXIOUS: 0
SINUS PAIN: 0
CHILLS: 0
CONSTIPATION: 0
SORE THROAT: 0
FOCAL WEAKNESS: 0

## 2021-11-10 ASSESSMENT — COGNITIVE AND FUNCTIONAL STATUS - GENERAL
DAILY ACTIVITIY SCORE: 13
MOBILITY SCORE: 12
DRESSING REGULAR UPPER BODY CLOTHING: A LOT
WALKING IN HOSPITAL ROOM: A LOT
HELP NEEDED FOR BATHING: A LOT
SUGGESTED CMS G CODE MODIFIER MOBILITY: CL
DRESSING REGULAR LOWER BODY CLOTHING: A LOT
STANDING UP FROM CHAIR USING ARMS: A LOT
SUGGESTED CMS G CODE MODIFIER DAILY ACTIVITY: CL
PERSONAL GROOMING: A LOT
MOVING TO AND FROM BED TO CHAIR: A LOT
MOVING FROM LYING ON BACK TO SITTING ON SIDE OF FLAT BED: A LOT
TURNING FROM BACK TO SIDE WHILE IN FLAT BAD: A LOT
TOILETING: A LOT
EATING MEALS: A LITTLE
CLIMB 3 TO 5 STEPS WITH RAILING: A LOT

## 2021-11-10 ASSESSMENT — LIFESTYLE VARIABLES
ALCOHOL_USE: NO
TOTAL SCORE: 0
EVER HAD A DRINK FIRST THING IN THE MORNING TO STEADY YOUR NERVES TO GET RID OF A HANGOVER: NO
EVER FELT BAD OR GUILTY ABOUT YOUR DRINKING: NO
HAVE YOU EVER FELT YOU SHOULD CUT DOWN ON YOUR DRINKING: NO
AVERAGE NUMBER OF DAYS PER WEEK YOU HAVE A DRINK CONTAINING ALCOHOL: 0
HAVE PEOPLE ANNOYED YOU BY CRITICIZING YOUR DRINKING: NO
CONSUMPTION TOTAL: NEGATIVE
TOTAL SCORE: 0
HOW MANY TIMES IN THE PAST YEAR HAVE YOU HAD 5 OR MORE DRINKS IN A DAY: 0
ON A TYPICAL DAY WHEN YOU DRINK ALCOHOL HOW MANY DRINKS DO YOU HAVE: 0
TOTAL SCORE: 0

## 2021-11-10 ASSESSMENT — FIBROSIS 4 INDEX: FIB4 SCORE: 3.27

## 2021-11-10 ASSESSMENT — PAIN DESCRIPTION - PAIN TYPE: TYPE: ACUTE PAIN

## 2021-11-10 NOTE — ASSESSMENT & PLAN NOTE
Likely multifactorial, respiratory acidosis from hypercapnia and and metabolic alkalosis from diuresing.  Resolved, will resume oral diuresis given her tenuous respiratory status with COVID and dCHF

## 2021-11-10 NOTE — ED NOTES
Lab called with critical result of Co2 at 41. Critical lab result read back.   Dr. Woody notified of critical lab result.

## 2021-11-10 NOTE — PROGRESS NOTES
Cheryl Blakemore has been enrolled in the [facility] remote patient monitoring proof of concept program in which biometric data will be collected concurrently during the hospital stay and upon discharge and transfer for approximately 30 days.  Consent has been obtained for this project.  The monitoring device must be REMOVED for MRIs, but may be worn during all other times of patient care.  If there are questions regarding this program please contact the [facility] [place] (RTOC) leadership who will be happy to answer your questions.  Information about the program has been provided to the patient and is available from the [facility] nursing coordinators.  During the initial phase of this  project, we will be determining how to best use the data to optimize each patient's care and we will NOT be monitoring the data in real-time, so there will not be notifications of abnormalities, and this will not replace or change your standard care for the patient.   A referral for this monitoring program has been placed under the supervision of Dr. John Muñoz, Medical Director of the [facility] [place] to enable to collection of data in the patient's electronic medical record.

## 2021-11-10 NOTE — PROGRESS NOTES
4 Eyes Skin Assessment Completed by BANDAR Sanchez and BANDAR Andres.    Head WDL  Ears WDL  Nose WDL  Mouth WDL  Neck WDL  Breast/Chest WDL  Shoulder Blades WDL  Spine WDL  (R) Arm/Elbow/Hand WDL  (L) Arm/Elbow/Hand WDL  Abdomen Redness and Bruising  Groin Redness and Excoriation  Scrotum/Coccyx/Buttocks WDL  (R) Leg WDL  (L) Leg Scab  (R) Heel/Foot/Toe dryness  (L) Heel/Foot/Toe dryness          Devices In Places Pulse Ox and Nasal Cannula PIV Purewick      Interventions In Place NC W/Ear Foams, Pillows and Pressure Redistribution Mattress Barrier Cream    Possible Skin Injury No    Pictures Uploaded Into Epic N/A  Wound Consult Placed N/A  RN Wound Prevention Protocol Ordered No

## 2021-11-10 NOTE — DIETARY
NUTRITION SERVICES: BMI - Pt with BMI >40 (=Body mass index is 63.34 kg/m².), Class III obesity. Weight loss counseling not appropriate in acute care setting. RECOMMEND - If appropriate at DC please refer to outpatient nutrition services for weight management.

## 2021-11-10 NOTE — CARE PLAN
The patient is Stable - Low risk of patient condition declining or worsening    Shift Goals  Clinical Goals: maintain oxygen in the 90's  Patient Goals: rest    Progress made toward(s) clinical / shift goals:       Problem: Skin Integrity  Goal: Skin integrity is maintained or improved  Outcome: Progressing     Problem: Fall Risk  Goal: Patient will remain free from falls  Outcome: Progressing     Problem: Communication  Goal: The ability to communicate needs accurately and effectively will improve  Outcome: Progressing     Problem: Respiratory  Goal: Patient will achieve/maintain optimum respiratory ventilation and gas exchange  Outcome: Progressing

## 2021-11-10 NOTE — PROGRESS NOTES
"Hospital Medicine Daily Progress Note    Date of Service  11/10/2021    Chief Complaint  Cheryl D Blakemore is a 61 y.o. female admitted 11/9/2021 with covid     Hospital Course  \" a 61 y.o. female with a past medical history of atrial fibrillation, hypertension, obstructive sleep apnea who was just discharged today to skilled nursing facility after hospitalization from October 12-November 19 for shortness of breath requiring ICU admission and BiPAP support who presented 11/9/2021 with worsening shortness of breath and testing positive for COVID-19.  Patient was discharged earlier today, at the skilled nursing facility she was feeling short of breath and more tired.  Tested positive for COVID-19 infection there.  She denies having chills and fevers.  She denies having changes to taste or smell at this point.  \"     Interval Problem Update  She was seen and examined at bedside.  Patient complain of coughing spells.  Pt is on 4L O2 ( baseline 3-4L )  Decadron for 10 days  Procalcitonin negative - will discontinue antibiotics  Daily Lasix   PT/OT   Will check with ID control whether the patient can be deemed as Covid recovered.    I have personally seen and examined the patient at bedside. I discussed the plan of care with patient, bedside RN, charge RN,  and pharmacy.    Consultants/Specialty  None     Code Status  DNAR/DNI    Disposition  Patient is not medically cleared.   Anticipate discharge to to skilled nursing facility.  I have placed the appropriate orders for post-discharge needs.    Review of Systems  Review of Systems   Constitutional: Positive for malaise/fatigue. Negative for chills and fever.   HENT: Negative for congestion, ear discharge, ear pain, sinus pain and sore throat.    Eyes: Negative for blurred vision and double vision.   Respiratory: Positive for cough and shortness of breath. Negative for sputum production and wheezing.    Cardiovascular: Negative for chest pain, palpitations " and leg swelling.   Gastrointestinal: Negative for abdominal pain, constipation, diarrhea, nausea and vomiting.   Genitourinary: Negative for dysuria, frequency and urgency.   Musculoskeletal: Negative for myalgias.   Neurological: Negative for dizziness, focal weakness and headaches.   Psychiatric/Behavioral: The patient is not nervous/anxious.         Physical Exam  Temp:  [35.6 °C (96 °F)-37 °C (98.6 °F)] 37 °C (98.6 °F)  Pulse:  [71-88] 73  Resp:  [17-20] 18  BP: (113-166)/(57-90) 148/90  SpO2:  [84 %-93 %] 89 %    Physical Exam  Constitutional:       General: She is not in acute distress.     Appearance: She is obese.   HENT:      Head: Normocephalic and atraumatic.      Nose: Nose normal.      Mouth/Throat:      Mouth: Mucous membranes are moist.      Pharynx: No posterior oropharyngeal erythema.   Eyes:      General: No scleral icterus.     Extraocular Movements: Extraocular movements intact.      Conjunctiva/sclera: Conjunctivae normal.      Pupils: Pupils are equal, round, and reactive to light.   Cardiovascular:      Rate and Rhythm: Normal rate and regular rhythm.      Pulses: Normal pulses.      Heart sounds: Normal heart sounds. No murmur heard.  No gallop.    Pulmonary:      Effort: Pulmonary effort is normal.      Breath sounds: No stridor. Rales present. No wheezing or rhonchi.   Abdominal:      General: Bowel sounds are normal. There is distension.      Palpations: Abdomen is soft.      Tenderness: There is no abdominal tenderness.   Musculoskeletal:         General: No swelling or tenderness.      Cervical back: Normal range of motion and neck supple. No rigidity.   Skin:     General: Skin is warm.   Neurological:      General: No focal deficit present.      Mental Status: She is alert and oriented to person, place, and time.   Psychiatric:         Mood and Affect: Mood normal.         Behavior: Behavior normal.         Fluids    Intake/Output Summary (Last 24 hours) at 11/10/2021 1406  Last data  filed at 11/10/2021 0549  Gross per 24 hour   Intake --   Output 650 ml   Net -650 ml       Laboratory  Recent Labs     11/09/21  2100 11/10/21  0356   WBC 8.5 7.0   RBC 4.01* 3.92*   HEMOGLOBIN 12.0 11.7*   HEMATOCRIT 38.3 38.0   MCV 95.5 96.9   MCH 29.9 29.8   MCHC 31.3* 30.8*   RDW 43.8 44.2   PLATELETCT 109* 98*   MPV 12.2 12.1     Recent Labs     11/09/21  0449 11/09/21  2100 11/10/21  0356   SODIUM 128* 133* 134*   POTASSIUM 4.6 4.3 4.0   CHLORIDE 85* 89* 89*   CO2 38* 41* 42*   GLUCOSE 127* 129* 111*   BUN 17 17 14   CREATININE 0.83 0.82 0.76   CALCIUM 8.7 8.6 8.4                   Imaging  DX-CHEST-PORTABLE (1 VIEW)   Final Result         1.  Pulmonary edema and/or infiltrates are identified, which are stable since the prior exam.   2.  Small bilateral pleural effusions   3.  Cardiomegaly           Assessment/Plan  Elevated CO2 level- (present on admission)  Assessment & Plan  Likely multifactorial, respiratory acidosis from hypercapnia and and metabolic alkalosis from diuresing.  She is maintaining well at this point, will consider BiPAP if her respiratory status is getting worse     Pneumonia due to COVID-19 virus- (present on admission)  Assessment & Plan  Chest x-ray shows ill-defined pulmonary infiltrates/edema bilaterally in the lower and middle zones  Will start Decadron given the presence of acute hypoxemic respiratory failure  Consider infectious disease for Remdesivir / tocilizumab according to clinical course  No erythema or edema of both lower extremities at this point. We will start prophylactic pharmacologic agents with Lovenox.    Encourage prone positioning. Oxygen as needed, Incentive spirometry     Acute on chronic respiratory failure with hypoxia and hypercapnia (HCC)- (present on admission)  Assessment & Plan  CXR shows ill-defined pulmonary infiltrates/edema bilaterally in the lower and middle zones  Will start Decadron given the presence of acute hypoxemic respiratory failure  Consider  infectious disease for Remdesivir / tocilizumab according to clinical course  Encourage prone positioning. Oxygen as needed, Incentive spirometry   Procalcitonin negative we will hold off on antibiotics    Essential hypertension- (present on admission)  Assessment & Plan  Resume home metoprolol & spironolactone with hold parameters    AF (atrial fibrillation) (HCC)- (present on admission)  Assessment & Plan  Resume home amiodarone and digoxin       VTE prophylaxis: SCDs/TEDs and enoxaparin ppx    I have performed a physical exam and reviewed and updated ROS and Plan today (11/10/2021). In review of yesterday's note (11/9/2021), there are no changes except as documented above.

## 2021-11-10 NOTE — ED PROVIDER NOTES
ED Provider Note    CHIEF COMPLAINT  Chief Complaint   Patient presents with   • Other     pt was discharged from the floor today back to SNF and they decided they would not take her back because she had a positive covid test today. originally admitted for pna       HPI  Cheryl D Blakemore is a 61 y.o. female here for evaluation of shortness of breath and a positive Covid test.  The patient states that she was sent home from here earlier today, went back to her care facility, and they tested her for Covid.  This came back positive, so sent her back here for further evaluation because of her increasing shortness of breath.  She has no chest pain, vomiting, or fever chills.  She not been on any recent antibiotics today.  She has no other medical concerns at this time.      ROS  See HPI for further details, o/w negative.     PAST MEDICAL HISTORY   has a past medical history of Arthritis, Chronic pain, COPD (chronic obstructive pulmonary disease) (HCC), Hypertension, and Migraine.    SOCIAL HISTORY  Social History     Tobacco Use   • Smoking status: Former Smoker     Packs/day: 1.00     Years: 20.00     Pack years: 20.00     Types: Cigarettes     Quit date: 2013     Years since quittin.8   • Smokeless tobacco: Never Used   • Tobacco comment: 1 ppd   Substance and Sexual Activity   • Alcohol use: No     Alcohol/week: 0.0 oz   • Drug use: Yes     Types: Marijuana   • Sexual activity: Not on file       Family History  No bleeding disorders    SURGICAL HISTORY   has a past surgical history that includes primary c section; tracheostomy (2013); and gastrostomy laparoscopic (2013).    CURRENT MEDICATIONS  Home Medications    **Home medications have not yet been reviewed for this encounter**         ALLERGIES  No Known Allergies    REVIEW OF SYSTEMS  See HPI for further details. Review of systems as above, otherwise all other systems are negative.     PHYSICAL EXAM  Constitutional: Well developed, well  nourished. Moderate-baseline distress.  HEENT: Normocephalic, atraumatic. Posterior pharynx clear and moist.  Eyes:  EOMI. Normal sclera.  Neck: Supple, Full range of motion, nontender.  Chest/Pulmonary:   Diminished breath sounds, equal expansion  Cardio: Regular rate and rhythm with no murmur.   Abdomen: Soft, nontender. No peritoneal signs. No guarding. No palpable masses.  Musculoskeletal: No deformity, no edema, neurovascular intact.   Neuro: Clear speech, appropriate, cooperative, cranial nerves II-XII grossly intact.  Psych: Flat mood and affect    Results for orders placed or performed during the hospital encounter of 11/09/21   COV-2, FLU A/B, AND RSV BY PCR (2-4 HOURS CEPHEID): Collect NP swab in VTM    Specimen: Respirate   Result Value Ref Range    Influenza virus A RNA Negative Negative    Influenza virus B, PCR Negative Negative    RSV, PCR Negative Negative    SARS-CoV-2 by PCR DETECTED (AA)     SARS-CoV-2 Source Nasal Swab    CBC w/ Differential   Result Value Ref Range    WBC 8.5 4.8 - 10.8 K/uL    RBC 4.01 (L) 4.20 - 5.40 M/uL    Hemoglobin 12.0 12.0 - 16.0 g/dL    Hematocrit 38.3 37.0 - 47.0 %    MCV 95.5 81.4 - 97.8 fL    MCH 29.9 27.0 - 33.0 pg    MCHC 31.3 (L) 33.6 - 35.0 g/dL    RDW 43.8 35.9 - 50.0 fL    Platelet Count 109 (L) 164 - 446 K/uL    MPV 12.2 9.0 - 12.9 fL    Neutrophils-Polys 84.00 (H) 44.00 - 72.00 %    Lymphocytes 10.00 (L) 22.00 - 41.00 %    Monocytes 2.00 0.00 - 13.40 %    Eosinophils 0.00 0.00 - 6.90 %    Basophils 0.00 0.00 - 1.80 %    Nucleated RBC 0.00 /100 WBC    Neutrophils (Absolute) 7.48 (H) 2.00 - 7.15 K/uL    Lymphs (Absolute) 0.85 (L) 1.00 - 4.80 K/uL    Monos (Absolute) 0.17 0.00 - 0.85 K/uL    Eos (Absolute) 0.00 0.00 - 0.51 K/uL    Baso (Absolute) 0.00 0.00 - 0.12 K/uL    NRBC (Absolute) 0.00 K/uL   Complete Metabolic Panel (CMP)   Result Value Ref Range    Sodium 133 (L) 135 - 145 mmol/L    Potassium 4.3 3.6 - 5.5 mmol/L    Chloride 89 (L) 96 - 112 mmol/L    Co2  41 (HH) 20 - 33 mmol/L    Anion Gap 3.0 (L) 7.0 - 16.0    Glucose 129 (H) 65 - 99 mg/dL    Bun 17 8 - 22 mg/dL    Creatinine 0.82 0.50 - 1.40 mg/dL    Calcium 8.6 8.4 - 10.2 mg/dL    AST(SGOT) 27 12 - 45 U/L    ALT(SGPT) 22 2 - 50 U/L    Alkaline Phosphatase 51 30 - 99 U/L    Total Bilirubin 0.3 0.1 - 1.5 mg/dL    Albumin 3.5 3.2 - 4.9 g/dL    Total Protein 7.1 6.0 - 8.2 g/dL    Globulin 3.6 (H) 1.9 - 3.5 g/dL    A-G Ratio 1.0 g/dL   ESTIMATED GFR   Result Value Ref Range    GFR If African American >60 >60 mL/min/1.73 m 2    GFR If Non African American >60 >60 mL/min/1.73 m 2   PROCALCITONIN   Result Value Ref Range    Procalcitonin 0.03 <0.25 ng/mL   DIFFERENTIAL MANUAL   Result Value Ref Range    Bands-Stabs 4.00 0.00 - 10.00 %    Manual Diff Status PERFORMED    PLATELET ESTIMATE   Result Value Ref Range    Plt Estimation Decreased    MORPHOLOGY   Result Value Ref Range    RBC Morphology Normal     Large Platelets 1+      DX-CHEST-PORTABLE (1 VIEW)   Final Result         1.  Pulmonary edema and/or infiltrates are identified, which are stable since the prior exam.   2.  Small bilateral pleural effusions   3.  Cardiomegaly          PROCEDURES     MEDICAL RECORD  I have reviewed patient's medical record and pertinent results are listed.    COURSE & MEDICAL DECISION MAKING  I have reviewed any medical record information, laboratory studies and radiographic results as noted above.    The pt will be admitted to Dr. Woody, for further evaluation and treatment.       FINAL IMPRESSION  1. Hypoxia     2.     Pneumonia         Electronically signed by: Augustus Zuniga D.O., 11/9/2021 7:30 PM

## 2021-11-10 NOTE — PROGRESS NOTES
Patient arrived to floor via ER gurney and transferred to hospital bed via slide board. Patient A&O 4. Patient oriented to room, admission vitals obtained, bed locked and in lowest position, white board updated, and call light in reach. Admission profile completed and assessment completed. Two RN skin check completed.   No needs at this time.    COVID 19 surge in effect

## 2021-11-10 NOTE — ASSESSMENT & PLAN NOTE
Secondary to COVID on top of COPD, JUAN CARLOS and CHF   Currently on Baricitinib (completes the course today), has completed Decadron course   Encourage prone positioning.  Procalcitonin negative, we will hold off on antibiotics  Currently on 50L/50%  D-dimer <3  Will start back a trial of low dose daily oral lasix today as metabolic acidosis resolved and pt has a history of diastolic heart failure

## 2021-11-10 NOTE — PROGRESS NOTES
Lab called with a critical lab result of CO2 of 42. Critical lab read back.   Dr. Gonzalez notified via voalte.

## 2021-11-10 NOTE — ASSESSMENT & PLAN NOTE
Resumed home amiodarone, metoprolol and digoxin  Med list does not indicate pt being on an anticoagulant at home, previous clinic note from 2020 indicates at one point she was on Xarelto.  Hospital notes since then question whether she is no longer on it due to fall risk.  Pt cannot tell me why it was stopped.  Currently on prophylactic Lovenox dosing.

## 2021-11-10 NOTE — PROGRESS NOTES
This RN reached out to Humphrey from [facility] via voalte at 0517 about obtaining ABG. He said that if phleb could not obtain ABG to let [facility] know.       This RN reached out to Ezekiel from Lab about the ABG ordered for 0400 for this patient. Could not reach phleb via volate.    Reached out to Augustus with [facility] via voalte at 0602 about obtaining ABG. Augustus CANCINO is aware that ABG needs to be obtained at this time.

## 2021-11-10 NOTE — DISCHARGE PLANNING
Anticipated discharge disposition: SNF    Action: discussed this pt in IDT rounds, this pt was discharged from [facility] yesterday 11/9 to [facility], pt was tested for covid upon admission to [facility] and came back positive. Pt transferred back to [facility]. Pt will need to be 10 days post positive covid test in order to return to SNF.    Barriers to discharge: SNF acceptance    Plan: will continue to follow with care team for DC plan.

## 2021-11-10 NOTE — H&P
Hospital Medicine History & Physical Note    Date of Service  11/9/2021    Primary Care Physician  Tammy Carpenter M.D.    Consultants  None     Code Status  DNAR/DNI    Chief Complaint  Chief Complaint   Patient presents with   • Other     pt was discharged from the floor today back to SNF and they decided they would not take her back because she had a positive covid test today. originally admitted for pna     History of Presenting Illness  Cheryl D Blakemore is a 61 y.o. female with a past medical history of atrial fibrillation, hypertension, obstructive sleep apnea who was just discharged today to skilled nursing facility after hospitalization from October 12-November 19 for shortness of breath requiring ICU admission and BiPAP support who presented 11/9/2021 with worsening shortness of breath and testing positive for COVID-19.  Patient was discharged earlier today, at the skilled nursing facility she was feeling short of breath and more tired.  Tested positive for COVID-19 infection there.  She denies having chills and fevers.  She denies having changes to taste or smell at this point.      I discussed the plan of care with emergency department physician, and the patient    Review of Systems  Review of Systems   Constitutional: Positive for malaise/fatigue. Negative for chills and fever.   Eyes: Negative for discharge and redness.   Respiratory: Positive for cough and shortness of breath. Negative for stridor.    Cardiovascular: Negative for chest pain and leg swelling.   Gastrointestinal: Negative for abdominal pain and vomiting.   Genitourinary: Negative for flank pain.   Musculoskeletal: Negative for myalgias.   Skin: Negative.    Neurological: Negative for focal weakness.   Endo/Heme/Allergies: Does not bruise/bleed easily.   Psychiatric/Behavioral: The patient is not nervous/anxious.      Past Medical History   has a past medical history of Arthritis, Chronic pain, COPD (chronic obstructive pulmonary  disease) (HCC), Hypertension, and Migraine.    Surgical History   has a past surgical history that includes primary c section; tracheostomy (2/4/2013); and gastrostomy laparoscopic (2/4/2013).     Family History  family history includes Cancer in her father; Diabetes in her maternal aunt and mother; Heart Disease in her sister; Hyperlipidemia in her mother and sister; Hypertension in her mother and sister; Thyroid in her sister.      Social History   reports that she quit smoking about 8 years ago. Her smoking use included cigarettes. She has a 20.00 pack-year smoking history. She has never used smokeless tobacco. She reports current drug use. Drug: Marijuana. She reports that she does not drink alcohol.    Allergies  No Known Allergies    Medications  Prior to Admission Medications   Prescriptions Last Dose Informant Patient Reported? Taking?   acetaminophen (TYLENOL 8 HOUR) 650 MG CR tablet  Patient's Home Pharmacy Yes No   Sig: Take 650 mg by mouth 2 times a day as needed for Mild Pain.   amiodarone (CORDARONE) 200 MG Tab  Patient's Home Pharmacy Yes No   Sig: Take 200 mg by mouth every day.   benzonatate (TESSALON) 100 MG Cap   No No   Sig: Take 1 Capsule by mouth 3 times a day as needed for Cough.   digoxin (LANOXIN) 250 MCG Tab  Patient's Home Pharmacy Yes No   Sig: Take 250 mcg by mouth every day.   furosemide (LASIX) 20 MG Tab   No No   Sig: Take 1 Tablet by mouth 2 times a day.   gabapentin (NEURONTIN) 300 MG Cap  Patient's Home Pharmacy Yes No   Sig: Take 300 mg by mouth 2 times a day.   metoprolol tartrate (LOPRESSOR) 25 MG Tab   No No   Sig: Take 1 Tablet by mouth 2 times a day.   spironolactone (ALDACTONE) 25 MG Tab  Patient's Home Pharmacy Yes No   Sig: Take 25 mg by mouth every day.   tamsulosin (FLOMAX) 0.4 MG capsule   No No   Sig: Take 1 Capsule by mouth 1/2 hour after breakfast.      Facility-Administered Medications: None     Physical Exam  Temp:  [35.6 °C (96 °F)-36.7 °C (98 °F)] 35.6 °C (96  °F)  Pulse:  [72-80] 80  Resp:  [18-20] 20  BP: (112-166)/(60-81) 166/81  SpO2:  [86 %-93 %] 86 %  Blood Pressure: (!) 166/81   Temperature: (!) 35.6 °C (96 °F)   Pulse: 80   Respiration: 20   Pulse Oximetry: (!) 86 %     Physical Exam  Constitutional:       General: She is not in acute distress.     Appearance: She is obese.   HENT:      Head: Normocephalic and atraumatic.      Right Ear: External ear normal.      Left Ear: External ear normal.      Nose: No congestion or rhinorrhea.      Mouth/Throat:      Mouth: Mucous membranes are moist.      Pharynx: No oropharyngeal exudate or posterior oropharyngeal erythema.   Eyes:      General: No scleral icterus.        Right eye: No discharge.         Left eye: No discharge.      Conjunctiva/sclera: Conjunctivae normal.      Pupils: Pupils are equal, round, and reactive to light.   Cardiovascular:      Rate and Rhythm: Normal rate.      Heart sounds: No friction rub. No gallop.    Pulmonary:      Comments: Tachypneic.  Requiring 5 L of oxygen nasal cannula.  Abdominal:      General: Abdomen is flat. There is no distension.      Tenderness: There is no guarding.   Musculoskeletal:         General: No swelling.      Cervical back: Neck supple. No rigidity. No muscular tenderness.      Right lower leg: No edema.      Left lower leg: No edema.   Skin:     Capillary Refill: Capillary refill takes 2 to 3 seconds.      Coloration: Skin is not jaundiced or pale.      Findings: No bruising or erythema.   Neurological:      Mental Status: She is alert and oriented to person, place, and time.   Psychiatric:         Mood and Affect: Mood normal.       Laboratory:  Recent Labs     11/09/21  2100   WBC 8.5   RBC 4.01*   HEMOGLOBIN 12.0   HEMATOCRIT 38.3   MCV 95.5   MCH 29.9   MCHC 31.3*   RDW 43.8   PLATELETCT 109*   MPV 12.2     Recent Labs     11/08/21  1446 11/09/21  0449 11/09/21  2100   SODIUM 121* 128* 133*   POTASSIUM 4.5 4.6 4.3   CHLORIDE 80* 85* 89*   CO2 30 38* 41*    GLUCOSE 162* 127* 129*   BUN 20 17 17   CREATININE 1.07 0.83 0.82   CALCIUM 8.4 8.7 8.6     Recent Labs     11/08/21  1446 11/09/21  0449 11/09/21  2100   ALTSGPT  --   --  22   ASTSGOT  --   --  27   ALKPHOSPHAT  --   --  51   TBILIRUBIN  --   --  0.3   GLUCOSE 162* 127* 129*         No results for input(s): NTPROBNP in the last 72 hours.      No results for input(s): TROPONINT in the last 72 hours.    Imaging:  DX-CHEST-PORTABLE (1 VIEW)   Final Result         1.  Pulmonary edema and/or infiltrates are identified, which are stable since the prior exam.   2.  Small bilateral pleural effusions   3.  Cardiomegaly        I personally reviewed patient chest x-ray which shows ill-defined pulmonary infiltrates/edema bilaterally in the lower and middle zones    Assessment/Plan:  I anticipate this patient will require at least two midnights for appropriate medical management, necessitating inpatient admission.    Elevated CO2 level- (present on admission)  Assessment & Plan  Likely multifactorial, respiratory acidosis from hypercapnia and and metabolic alkalosis from diuresing.  She is maintaining well at this point, will check an ABG, consider BiPAP according to ABG, results, follow up CO2 elevels and clinical course    Pneumonia due to COVID-19 virus- (present on admission)  Assessment & Plan  Chest x-ray shows ill-defined pulmonary infiltrates/edema bilaterally in the lower and middle zones  Will start Decadron given the presence of acute hypoxemic respiratory failure  Consider infectious disease for Remdesivir / tocilizumab according to clinical course  No erythema or edema of both lower extremities at this point. We will start prophylactic pharmacologic agents with Lovenox.    Encourage prone positioning. Oxygen as needed, Incentive spirometry     Acute on chronic respiratory failure with hypoxia and hypercapnia (HCC)- (present on admission)  Assessment & Plan  CXR shows ill-defined pulmonary infiltrates/edema  bilaterally in the lower and middle zones  CO2 up to 41, I will check ABG, may need BiPAP support  Will start Decadron given the presence of acute hypoxemic respiratory failure  Consider infectious disease for Remdesivir / tocilizumab according to clinical course  Started on Zosyn, continue for now follow cultures and sensitivities, will check procalcitonin  Encourage prone positioning. Oxygen as needed, Incentive spirometry     Essential hypertension- (present on admission)  Assessment & Plan  Resume home metoprolol & spironolactone with hold parameters    AF (atrial fibrillation) (HCC)- (present on admission)  Assessment & Plan  Resume home amiodarone and digoxin    VTE prophylaxis: SCDs/TEDs and enoxaparin ppx

## 2021-11-10 NOTE — ASSESSMENT & PLAN NOTE
Completed Decadron, started Baricitinib 11/11, completes course on 11/24  On prophylactic dosing of Lovenox for her weight  Encourage prone positioning. Oxygen as needed, Incentive spirometry

## 2021-11-10 NOTE — ED TRIAGE NOTES
Bib ems for above complaints.     Chief Complaint   Patient presents with   • Other     pt was discharged from the floor today back to SNF and they decided they would not take her back because she had a positive covid test today. originally admitted for pna     Pt states she was sent back for positive covid test but no positive test in our system.     BP (!) 166/81   Pulse 80   Temp (!) 35.6 °C (96 °F)   Resp 20   Wt (!) 170 kg (374 lb)   SpO2 93%   Breastfeeding No   BMI 64.20 kg/m²     Has this patient been vaccinated for COVID no

## 2021-11-10 NOTE — PROGRESS NOTES
Med rec updated and complete  Allergies reviewed  Pt is not sure the names and strengths of her medications.  Called [facility] @  [phone], per pharmacy reports that pt was switched over to [facility].  Per [facility] reports that pt has not filled any medications for over 30 or 90 day supply  Called [facility] @ [phone] to verify all medications  Pt has not picked up her BENZONATATE 100MG, FUROSEMIDE 20MG, and TAMSULOSIN 0.4MG from the pharmacy yet.    Pt thinks that she took all her medications about 10 days ago.  Pt reports no vitamins   Pts pharmacy's reports no antibiotics in the last 30 days.      Current Outpatient Medications on File Prior to Encounter   Medication Sig Dispense Refill   • metoprolol tartrate (LOPRESSOR) 50 MG Tab Take 50 mg by mouth 2 times a day.     • benzonatate (TESSALON) 100 MG Cap Take 1 Capsule by mouth 3 times a day as needed for Cough. 60 Capsule 0   • furosemide (LASIX) 20 MG Tab Take 1 Tablet by mouth 2 times a day. 60 Tablet    • tamsulosin (FLOMAX) 0.4 MG capsule Take 1 Capsule by mouth 1/2 hour after breakfast. 30 Capsule    • acetaminophen (TYLENOL 8 HOUR) 650 MG CR tablet Take 650 mg by mouth 2 times a day.     • amiodarone (CORDARONE) 200 MG Tab Take 200 mg by mouth every day.     • digoxin (LANOXIN) 250 MCG Tab Take 250 mcg by mouth every day.     • gabapentin (NEURONTIN) 300 MG Cap Take 300 mg by mouth 2 times a day.     • spironolactone (ALDACTONE) 25 MG Tab Take 25 mg by mouth every day.

## 2021-11-11 PROBLEM — E87.4 METABOLIC ALKALOSIS WITH RESPIRATORY ACIDOSIS: Status: ACTIVE | Noted: 2021-11-11

## 2021-11-11 PROBLEM — I50.32 CHRONIC HEART FAILURE WITH PRESERVED EJECTION FRACTION (HCC): Status: ACTIVE | Noted: 2021-11-11

## 2021-11-11 PROBLEM — I50.31 ACUTE HEART FAILURE WITH PRESERVED EJECTION FRACTION (HCC): Status: ACTIVE | Noted: 2021-11-11

## 2021-11-11 PROBLEM — Z87.09 HISTORY OF COPD: Status: ACTIVE | Noted: 2021-11-11

## 2021-11-11 PROBLEM — B95.7 BACTEREMIA, COAGULASE-NEGATIVE STAPHYLOCOCCAL: Status: ACTIVE | Noted: 2021-10-30

## 2021-11-11 LAB
ANION GAP SERPL CALC-SCNC: 1 MMOL/L (ref 7–16)
BASE EXCESS BLDA CALC-SCNC: 23 MMOL/L (ref -4–3)
BASOPHILS # BLD AUTO: 0 % (ref 0–1.8)
BASOPHILS # BLD: 0 K/UL (ref 0–0.12)
BODY TEMPERATURE: ABNORMAL DEGREES
BUN SERPL-MCNC: 13 MG/DL (ref 8–22)
CALCIUM SERPL-MCNC: 8.5 MG/DL (ref 8.4–10.2)
CHLORIDE SERPL-SCNC: 87 MMOL/L (ref 96–112)
CO2 BLDA-SCNC: >50 MMOL/L (ref 20–33)
CO2 SERPL-SCNC: 48 MMOL/L (ref 20–33)
CREAT SERPL-MCNC: 0.62 MG/DL (ref 0.5–1.4)
CRP SERPL HS-MCNC: 5.07 MG/DL (ref 0–0.75)
D DIMER PPP IA.FEU-MCNC: 1.51 UG/ML (FEU) (ref 0–0.5)
DELSYS IDSYS: ABNORMAL
EOSINOPHIL # BLD AUTO: 0 K/UL (ref 0–0.51)
EOSINOPHIL NFR BLD: 0 % (ref 0–6.9)
ERYTHROCYTE [DISTWIDTH] IN BLOOD BY AUTOMATED COUNT: 45.1 FL (ref 35.9–50)
GLUCOSE SERPL-MCNC: 121 MG/DL (ref 65–99)
HCO3 BLDA-SCNC: 53.7 MMOL/L (ref 17–25)
HCT VFR BLD AUTO: 40.2 % (ref 37–47)
HGB BLD-MCNC: 12.1 G/DL (ref 12–16)
HOROWITZ INDEX BLDA+IHG-RTO: 73 MM[HG]
IMM GRANULOCYTES # BLD AUTO: 0.03 K/UL (ref 0–0.11)
IMM GRANULOCYTES NFR BLD AUTO: 0.3 % (ref 0–0.9)
LACTATE BLD-SCNC: 0.6 MMOL/L (ref 0.5–2)
LYMPHOCYTES # BLD AUTO: 0.64 K/UL (ref 1–4.8)
LYMPHOCYTES NFR BLD: 7.2 % (ref 22–41)
MCH RBC QN AUTO: 29.6 PG (ref 27–33)
MCHC RBC AUTO-ENTMCNC: 30.1 G/DL (ref 33.6–35)
MCV RBC AUTO: 98.3 FL (ref 81.4–97.8)
MONOCYTES # BLD AUTO: 0.4 K/UL (ref 0–0.85)
MONOCYTES NFR BLD AUTO: 4.5 % (ref 0–13.4)
MRSA DNA SPEC QL NAA+PROBE: NORMAL
NEUTROPHILS # BLD AUTO: 7.81 K/UL (ref 2–7.15)
NEUTROPHILS NFR BLD: 88 % (ref 44–72)
NRBC # BLD AUTO: 0 K/UL
NRBC BLD-RTO: 0 /100 WBC
NT-PROBNP SERPL IA-MCNC: 898 PG/ML (ref 0–125)
O2/TOTAL GAS SETTING VFR VENT: 100 %
PCO2 BLDA: 97.6 MMHG (ref 26–37)
PH BLDA: 7.35 [PH] (ref 7.4–7.5)
PLATELET # BLD AUTO: 103 K/UL (ref 164–446)
PMV BLD AUTO: 12.2 FL (ref 9–12.9)
PO2 BLDA: 73 MMHG (ref 64–87)
POTASSIUM SERPL-SCNC: 4.5 MMOL/L (ref 3.6–5.5)
PROCALCITONIN SERPL-MCNC: 0.05 NG/ML
RBC # BLD AUTO: 4.09 M/UL (ref 4.2–5.4)
SAO2 % BLDA: 92 % (ref 93–99)
SIGNIFICANT IND 70042: NORMAL
SITE SITE: NORMAL
SODIUM SERPL-SCNC: 136 MMOL/L (ref 135–145)
SOURCE SOURCE: NORMAL
SPECIMEN DRAWN FROM PATIENT: ABNORMAL
WBC # BLD AUTO: 8.9 K/UL (ref 4.8–10.8)

## 2021-11-11 PROCEDURE — 700111 HCHG RX REV CODE 636 W/ 250 OVERRIDE (IP): Performed by: INTERNAL MEDICINE

## 2021-11-11 PROCEDURE — 87641 MR-STAPH DNA AMP PROBE: CPT

## 2021-11-11 PROCEDURE — 700111 HCHG RX REV CODE 636 W/ 250 OVERRIDE (IP): Performed by: HOSPITALIST

## 2021-11-11 PROCEDURE — 36600 WITHDRAWAL OF ARTERIAL BLOOD: CPT

## 2021-11-11 PROCEDURE — 84145 PROCALCITONIN (PCT): CPT

## 2021-11-11 PROCEDURE — 700101 HCHG RX REV CODE 250: Performed by: INTERNAL MEDICINE

## 2021-11-11 PROCEDURE — 770022 HCHG ROOM/CARE - ICU (200)

## 2021-11-11 PROCEDURE — 94760 N-INVAS EAR/PLS OXIMETRY 1: CPT

## 2021-11-11 PROCEDURE — 85379 FIBRIN DEGRADATION QUANT: CPT

## 2021-11-11 PROCEDURE — 83605 ASSAY OF LACTIC ACID: CPT

## 2021-11-11 PROCEDURE — 86140 C-REACTIVE PROTEIN: CPT

## 2021-11-11 PROCEDURE — 83880 ASSAY OF NATRIURETIC PEPTIDE: CPT

## 2021-11-11 PROCEDURE — 94640 AIRWAY INHALATION TREATMENT: CPT

## 2021-11-11 PROCEDURE — 85025 COMPLETE CBC W/AUTO DIFF WBC: CPT

## 2021-11-11 PROCEDURE — A9270 NON-COVERED ITEM OR SERVICE: HCPCS | Performed by: INTERNAL MEDICINE

## 2021-11-11 PROCEDURE — 94003 VENT MGMT INPAT SUBQ DAY: CPT

## 2021-11-11 PROCEDURE — 82803 BLOOD GASES ANY COMBINATION: CPT

## 2021-11-11 PROCEDURE — 700102 HCHG RX REV CODE 250 W/ 637 OVERRIDE(OP): Performed by: INTERNAL MEDICINE

## 2021-11-11 PROCEDURE — 94660 CPAP INITIATION&MGMT: CPT

## 2021-11-11 PROCEDURE — 80048 BASIC METABOLIC PNL TOTAL CA: CPT

## 2021-11-11 PROCEDURE — 99291 CRITICAL CARE FIRST HOUR: CPT | Performed by: INTERNAL MEDICINE

## 2021-11-11 RX ORDER — TRAZODONE HYDROCHLORIDE 50 MG/1
50 TABLET ORAL NIGHTLY PRN
Status: DISCONTINUED | OUTPATIENT
Start: 2021-11-11 | End: 2021-11-13

## 2021-11-11 RX ORDER — AMIODARONE HYDROCHLORIDE 200 MG/1
200 TABLET ORAL
Status: DISCONTINUED | OUTPATIENT
Start: 2021-11-11 | End: 2021-11-24 | Stop reason: HOSPADM

## 2021-11-11 RX ORDER — FUROSEMIDE 10 MG/ML
20 INJECTION INTRAMUSCULAR; INTRAVENOUS
Status: DISCONTINUED | OUTPATIENT
Start: 2021-11-11 | End: 2021-11-14

## 2021-11-11 RX ORDER — DEXAMETHASONE SODIUM PHOSPHATE 4 MG/ML
6 INJECTION, SOLUTION INTRA-ARTICULAR; INTRALESIONAL; INTRAMUSCULAR; INTRAVENOUS; SOFT TISSUE DAILY
Status: DISCONTINUED | OUTPATIENT
Start: 2021-11-11 | End: 2021-11-19

## 2021-11-11 RX ORDER — METOPROLOL TARTRATE 1 MG/ML
5 INJECTION, SOLUTION INTRAVENOUS
Status: DISCONTINUED | OUTPATIENT
Start: 2021-11-11 | End: 2021-11-24 | Stop reason: HOSPADM

## 2021-11-11 RX ORDER — IPRATROPIUM BROMIDE AND ALBUTEROL SULFATE 2.5; .5 MG/3ML; MG/3ML
3 SOLUTION RESPIRATORY (INHALATION)
Status: DISCONTINUED | OUTPATIENT
Start: 2021-11-11 | End: 2021-11-24 | Stop reason: HOSPADM

## 2021-11-11 RX ORDER — LORAZEPAM 2 MG/ML
0.5 INJECTION INTRAMUSCULAR ONCE
Status: COMPLETED | OUTPATIENT
Start: 2021-11-11 | End: 2021-11-11

## 2021-11-11 RX ORDER — CHOLECALCIFEROL (VITAMIN D3) 125 MCG
5 CAPSULE ORAL NIGHTLY
Status: DISCONTINUED | OUTPATIENT
Start: 2021-11-11 | End: 2021-11-13

## 2021-11-11 RX ORDER — DIGOXIN 0.25 MG/ML
250 INJECTION INTRAMUSCULAR; INTRAVENOUS DAILY
Status: DISCONTINUED | OUTPATIENT
Start: 2021-11-11 | End: 2021-11-12

## 2021-11-11 RX ADMIN — FUROSEMIDE 20 MG: 10 INJECTION, SOLUTION INTRAMUSCULAR; INTRAVENOUS at 16:25

## 2021-11-11 RX ADMIN — AMIODARONE HYDROCHLORIDE 200 MG: 200 TABLET ORAL at 18:07

## 2021-11-11 RX ADMIN — LORAZEPAM 0.5 MG: 2 INJECTION INTRAMUSCULAR; INTRAVENOUS at 03:33

## 2021-11-11 RX ADMIN — FUROSEMIDE 20 MG: 10 INJECTION, SOLUTION INTRAMUSCULAR; INTRAVENOUS at 10:34

## 2021-11-11 RX ADMIN — DEXAMETHASONE SODIUM PHOSPHATE 6 MG: 4 INJECTION, SOLUTION INTRA-ARTICULAR; INTRALESIONAL; INTRAMUSCULAR; INTRAVENOUS; SOFT TISSUE at 10:34

## 2021-11-11 RX ADMIN — WATER 4 MG: 100 IRRIGANT IRRIGATION at 18:08

## 2021-11-11 RX ADMIN — TRAZODONE HYDROCHLORIDE 50 MG: 50 TABLET ORAL at 20:08

## 2021-11-11 RX ADMIN — Medication 5 MG: at 20:08

## 2021-11-11 RX ADMIN — FAMOTIDINE 20 MG: 10 INJECTION INTRAVENOUS at 10:35

## 2021-11-11 RX ADMIN — DIGOXIN 250 MCG: 0.25 INJECTION INTRAMUSCULAR; INTRAVENOUS at 18:08

## 2021-11-11 RX ADMIN — ENOXAPARIN SODIUM 60 MG: 60 INJECTION SUBCUTANEOUS at 18:08

## 2021-11-11 RX ADMIN — ENOXAPARIN SODIUM 40 MG: 40 INJECTION SUBCUTANEOUS at 05:00

## 2021-11-11 RX ADMIN — FAMOTIDINE 20 MG: 10 INJECTION INTRAVENOUS at 18:08

## 2021-11-11 ASSESSMENT — PULMONARY FUNCTION TESTS
EPAP_CMH2O: 10

## 2021-11-11 ASSESSMENT — PAIN DESCRIPTION - PAIN TYPE
TYPE: ACUTE PAIN
TYPE: ACUTE PAIN;CHRONIC PAIN
TYPE: ACUTE PAIN;CHRONIC PAIN
TYPE: ACUTE PAIN

## 2021-11-11 ASSESSMENT — PATIENT HEALTH QUESTIONNAIRE - PHQ9
1. LITTLE INTEREST OR PLEASURE IN DOING THINGS: NOT AT ALL
2. FEELING DOWN, DEPRESSED, IRRITABLE, OR HOPELESS: NOT AT ALL
SUM OF ALL RESPONSES TO PHQ9 QUESTIONS 1 AND 2: 0

## 2021-11-11 ASSESSMENT — FIBROSIS 4 INDEX: FIB4 SCORE: 3.59

## 2021-11-11 NOTE — ASSESSMENT & PLAN NOTE
Chronic resp acidosis with compensatory metabolic alkalosis + contraction alkalosis  Now alkalemic  Cont spot dose diamox PRN  Cont BIPAP

## 2021-11-11 NOTE — PROGRESS NOTES
Pharmacy Vancomycin Kinetics Note for 11/10/2021     61 y.o. female on Vancomycin day # 1     Vancomycin Indication (Two level/Trough based Dosing): Bacteremia (goal trough 15-20)    Provider specified end date: 11/15/21    Active Antibiotics (From admission, onward)    Ordered     Ordering Provider       Wed Nov 10, 2021 10:02 PM    11/10/21 2202  vancomycin (VANCOCIN) 2,000 mg in  mL IVPB  (vancomycin (VANCOCIN) IV (LD + Maintenance))  ONCE         Mario Woody M.D.       Wed Nov 10, 2021  7:29 PM    11/10/21 1929  MD Alert...Vancomycin per Pharmacy  PHARMACY TO DOSE        Note to Pharmacy: Per [facility] Pharmacist Clinical Intervention Protocol   Question:  Indication(s) for vancomycin?  Answer:  Staphylococcus aureus bacteremia    Mario Woody M.D.       Wed Nov 10, 2021  7:27 PM    11/10/21 1927  vancomycin (VANCOCIN) 3 g in  mL IVPB  ONCE         Mario Woody M.D.          Dosing Weight: 168 kg (370 lb 6 oz)      Admission History: Admitted on 11/9/2021 for Acute hypoxemic respiratory failure (HCC) [J96.01]  Pertinent history: recent CONS bacteremia felt to be contaminat, repeat culture with staph species, Ill with COVID    Allergies:     Patient has no known allergies.     Pertinent cultures to date:     Results     Procedure Component Value Units Date/Time    BLOOD CULTURE [[id number]]  (Abnormal) Collected: 11/09/21 2130    Order Status: Completed Specimen: Blood from Peripheral Updated: 11/10/21 2153     Significant Indicator POS     Source BLD     Site PERIPHERAL     Culture Result Growth detected by Bactec instrument. 11/10/2021  19:08  Gram Stain: Gram positive cocci: Possible Staphylococcus sp.  Negative for Staphylococcus aureus and MRSA by PCR. Correlate  ongoing need for antibiotics with clinical condition.  Further report to follow.      Narrative:      CALL  [facility] tel. [phone],  CALLED  MED tel. [phone] 11/10/2021, 21:52, RB PERF. RESULTS CALLED TO: BANDAR  [phone]  Per  "Hospital Policy: Only change Specimen Src: to \"Line\" if  specified by physician order.  Left Forearm/Arm    BLOOD CULTURE [[id number]] Collected: 11/09/21 2100    Order Status: Completed Specimen: Blood from Peripheral Updated: 11/10/21 0733     Significant Indicator NEG     Source BLD     Site PERIPHERAL     Culture Result No Growth  Note: Blood cultures are incubated for 5 days and  are monitored continuously.Positive blood cultures  are called to the RN and reported as soon as  they are identified.  Blood culture testing and Gram stain, if indicated, are  performed at [address].  Positive blood cultures are  sent to [facility], [address] [place] [address], for organism identification and  susceptibility testing.      Narrative:      Per Hospital Policy: Only change Specimen Src: to \"Line\" if  specified by physician order.  Left AC    COV-2, FLU A/B, AND RSV BY PCR (2-4 HOURS CEPHEID): Collect NP swab in VTM [[id number]]  (Abnormal) Collected: 11/09/21 1923    Order Status: Completed Specimen: Respirate Updated: 11/09/21 2018     Influenza virus A RNA Negative     Influenza virus B, PCR Negative     RSV, PCR Negative     SARS-CoV-2 by PCR DETECTED     Comment: PATIENTS: Important information regarding your results and instructions can  be found at [email or web address]   \"After your  Covid-19 Test\"    **The [employer] GeneXpert Xpress SARS-CoV-2 RT-PCR Test has been made  available for use under the Emergency Use Authorization (EUA) only.          SARS-CoV-2 Source Nasal Swab    Narrative:      Have you been in close contact with a person who is suspected  or known to be positive for COVID-19 within the last 30 days  (e.g. last seen that person < 30 days ago)->Yes          Labs:     Estimated Creatinine Clearance: 121.2 mL/min (by C-G formula based on SCr of 0.76 mg/dL).  Recent Labs     11/09/21 2100 " "11/10/21  0356   WBC 8.5 7.0   NEUTSPOLYS 84.00* 82.20*   BANDSSTABS 4.00  --      Recent Labs     21  0437 21  1446 21  0449 21  2100 11/10/21  0356   BUN 16 20 17 17 14   CREATININE 0.96 1.07 0.83 0.82 0.76   ALBUMIN 3.3  --   --  3.5 3.2       Intake/Output Summary (Last 24 hours) at 11/10/2021 2205  Last data filed at 11/10/2021 0549  Gross per 24 hour   Intake --   Output 650 ml   Net -650 ml      /68   Pulse 93   Temp 36.9 °C (98.5 °F) (Oral)   Resp 18   Ht 1.63 m (5' 4.17\")   Wt (!) 168 kg (371 lb 0.6 oz)   SpO2 90%  Temp (24hrs), Av.8 °C (98.2 °F), Min:36.3 °C (97.4 °F), Max:37 °C (98.6 °F)      List concerns for Vancomycin clearance:     Obesity;CHF;Age        A/P:     -  Vancomycin dose: Start vancomycin pulse dosing. 3000mg x1 followed by 2000mg 12 hours later to fully load the patient    -  Next vancomycin level(s):   Random 24 hour level at 0800 on     -  Comments: Patient at high risk for vancomycin accumulation due to age and extreme obesity, will pulse dose initially. Consider AUC dosing pending levels. Pharmacy will follow. Monitor renal. Consider ID consult.    Ezekiel Polk, PharmD, BCPS  "

## 2021-11-11 NOTE — PROGRESS NOTES
Lab called to report critical ABG result:   PCO2 86.0    Critical result read back to lab    MD Gonzalez notified via voalte

## 2021-11-11 NOTE — ASSESSMENT & PLAN NOTE
Rate currently controlled  TTE 10/30/2021 showed grossly normal LVEF, 55%, severe biatrial, RV not well visualized nor were the IVC, tricuspid or pulmonic valves.  HD stable  Cont amiodarone and digoxin PO  Cont lopressor 12.5mg PO BID  MTP 5mg IVP PRN HR > 120

## 2021-11-11 NOTE — DISCHARGE PLANNING
Anticipated Discharge Disposition:   SNF when medically cleared    Action:   Chart review complete.     Per chart review, this patient was admitted for [facility] for SOB. Patient COVID positive on 11/9. Patient is currently on HFNC and is not medically cleared to discharge.     Anticipate discharge back to SNF on 11/19. RN CM to collect SNF choice. Patient will need to be at least 10 days out from COVID diagnosis to return to SNF.    Barriers to Discharge:   SNF choice  Medical Clearance  COVID    Plan:   Hospital care management will continue to assist with discharge planning needs.

## 2021-11-11 NOTE — PROGRESS NOTES
Phoebe from Lab called with critical result of CO2 48 at 0542. Critical lab result read back to Phoebe.   Dr. Gonzalez notified of critical lab result at 0544.  Critical lab result read back by  0545.

## 2021-11-11 NOTE — FLOWSHEET NOTE
11/11/21 0017   Events/Summary/Plan   Events/Summary/Plan BIPAP initiated   Skin Inspection Respiratory Device Intact   Vital Signs   $ Pulse Oximetry (Spot Check) Yes   Respiratory Assessment   Respiratory Pattern Within Normal Limits   Level of Consciousness Alert   Chest Exam   Work Of Breathing / Effort Tachypnea   Breath Sounds   RUL Breath Sounds Diminished   RML Breath Sounds Diminished   RLL Breath Sounds Diminished   CECILE Breath Sounds Diminished   LLL Breath Sounds Diminished   Secretions   Cough Productive   How Sputum Obtained Spontaneous   Sputum Amount Small   Sputum Color Yellow   Sputum Consistency Thick;Thin   Oxygen   FiO2% 100 %

## 2021-11-11 NOTE — FLOWSHEET NOTE
11/11/21 1415   Events/Summary/Plan   Skin Inspection Respiratory Device Intact   Vital Signs   Pulse 83   Respiration (!) 34   Pulse Oximetry 91 %   $ Pulse Oximetry (Spot Check) Yes   Oxygen   O2 (LPM) 45   FiO2% 100 %   O2 Delivery Device Heated High Flow Nasal Cannula   Aerosols   $ Aerosol Delivery Device Heated High Flow Nasal Cannula   Aerosol Temperature 31 °C (87.8 °F)

## 2021-11-11 NOTE — PROGRESS NOTES
Titrated patient up from 10L NRB to 15L NRB since start of shift. Patient sating between 86-87% on 15L NRB. Patient A&O 4    This RN notified Lorraine RT via voalte.     MD Gonzalez notified via voalte

## 2021-11-11 NOTE — ASSESSMENT & PLAN NOTE
BCx 10/28 + staph hominus and epidermis. Repeat BCx 10/30 negative. 1/2 bottles 11/9 + again, + staph hominus  Negative MRSA, suspected contaminant.   vanco/zosyn 11/9-11/11.  Repeated blood cultures  11/12 NGTD, afebrile, procal < 0.05

## 2021-11-11 NOTE — PROGRESS NOTES
Pt transferred to unit by tele RN. Pt moved to ICU bed. 2 RN skin check performed with Stephanie PORTILLO. Pt placed on monitors. Pt currently on non-rebreather. RT ready for BiPAP.

## 2021-11-11 NOTE — ASSESSMENT & PLAN NOTE
With resultant OHS, restrictive respiratory mechanics  High risk comorbidity for severe disease in COVID  Would benefit from weight loss for long-term health risk reduction

## 2021-11-11 NOTE — PROGRESS NOTES
OVERNIGHT HOSPITALIST:    Transferred patient to the ICU at the beginning of my shift for BiPAP trial as her PCO2 is 87.1.      Recent admission from 10/28  for COPD exacerbation requiring ICU for BiPAP support, later downgraded to the floor.  She was downgraded to the floor and finally discharged to a skilled nursing facility on 11/9/2021.  Upon arrival to the facility, noticing that she had ongoing shortness of breath, Covid test was done which was positive reason why she was sent back to the hospital.  Initial ABG done on admission showed pH of 7.331 and PCO2 of 87.6, PO2 of 60 and bicarb of 46.3.  Patient did well in the floor during the day and repeat ABG this evening was not much unchanged even though PO2 was dropping to 58.8 and she was becoming slightly more confused reason why I decided to transfer to the ICU for BiPAP trial and support.

## 2021-11-11 NOTE — PROGRESS NOTES
"      I was paged by patient nurse for an update regarding blood culture results, \"2/2 cultures obtained from November 9 are growing gram-positive cocci possible staph\" .  I reviewed prior cultures and discussed with pharmacy, plan to start with empiric vancomycin.  Recommend infectious disease consult in the morning.  "

## 2021-11-11 NOTE — PROGRESS NOTES
Lab called to report two positive blood cultures from 11/9 at 2130 both came back positive for gram positive cocci possible staphylococcus    MD Woody notified via voalte.

## 2021-11-11 NOTE — ASSESSMENT & PLAN NOTE
Swab positive on (11/9/2021)  Severe disease based on clinical criteria, inflammatory markers and comorbidities  Dexamethasone IV 6 mg daily, Baricitinib 11/11-  DC lasix 40mg IV BID, spirinolactone due to DIANE  16/8 hour proning/supine protocol, patient and nursing instructed, patient agreeable and saturations improved, stable in low 90s  Remains BIPAP dependent, FiO2 slightly improved from 100 -> 80%  CRP 10, Procal remains negative

## 2021-11-11 NOTE — PROGRESS NOTES
Report from B[facility] [place]. Plan of care reviewed. Patient sitting up in bed on Bipap and appears to be tolerating settings. No complaints of pain at this time. Frequently requests ice cubes- given 1 to 2 at a time with RN supervision.

## 2021-11-11 NOTE — ASSESSMENT & PLAN NOTE
TTE 10/30/2021: grossly normal LVEF, 55%, severe biatrial enlargement, RV not well visualized nor were the IVC, tricuspid or pulmonic valves.  Reported history of dilated cardiomyopathy and moderate pulmonary  BNP chronically elevated, currently ~900  Trace edema  Clinically slightly overloaded  DC lasix 40mg IV BID, spironolactone 25 due to liane

## 2021-11-11 NOTE — CARE PLAN
The patient is Watcher - Medium risk of patient condition declining or worsening    Shift Goals  Clinical Goals: decrease oxygen needs  Patient Goals: remain comfortable    Progress made toward(s) clinical / shift goals:  Patient remains alert and oriented and compliant with bipap therapy    Patient is not progressing towards the following goals:Unable to titrate FIO2 on bipap      Problem: Respiratory  Goal: Patient will achieve/maintain optimum respiratory ventilation and gas exchange  Outcome: Not Progressing     Problem: Nutrition  Goal: Patient's nutritional and fluid intake will be adequate or improve  Outcome: Not Progressing

## 2021-11-11 NOTE — PROGRESS NOTES
Patient is sating at 85% on 15L oxy mask. HR 89, Placed pt on nonrebeather mask at 10L and pt is now 88-89%. HR in the 80's. Pt is talking and states she doesn't feel that great but the nonrebreather is better, Charge nurse is aware.

## 2021-11-11 NOTE — FLOWSHEET NOTE
11/11/21 1300   Events/Summary/Plan   Events/Summary/Plan to HHFO, Bipap off   Vital Signs   Pulse 89   Respiration (!) 39   Pulse Oximetry 93 %   $ Pulse Oximetry (Spot Check) Yes   Oxygen   O2 (LPM) 45   FiO2% 100 %   O2 Delivery Device Heated High Flow Nasal Cannula   Aerosols   $ Aerosol Delivery Device Heated High Flow Nasal Cannula   Aerosol Temperature 31 °C (87.8 °F)   Equipment Change Date 11/18/21

## 2021-11-11 NOTE — CONSULTS
Critical Care Consultation    Date of consult: 11/11/2021    Referring Physician  Nisa Bustos M.D.     Reason for Consultation  Acute on chronic mixed hypercapnic and hypoxemic respiratory failure due to COVID-19    History of Presenting Illness  61 y.o. female with past medical history of atrial fibrillation, dilated cardiomyopathy, moderate pulmonary hypertension on digoxin and amiodarone, Body mass index is 61.42 kg/m².,  JUAN CARLOS/OHS on BIPAP and chronic hypoxemic respiratory failure on 3-4L at baseline who recently had a prolonged hospitalization for acute on chronic congestive heart failure and possible COPD exacerbation which improved with BIPAP and diuresis.  Echo on 10/30/2021 showed grossly normal LVEF, 55%, severe biatrial enlargement , RV not well visualized nor were the IVC, tricuspid or pulmonic valves. During her hospitalization, palliative care was consulted and CODE STATUS was changed to DNR/DNI and she was discharged to a skilled nursing facility for PT/OT on 11/9.    She was readmitted the same day 11/9/2021 as she tested positive for Covid 19.  Reportedly had no changes symptomatically since discharge aside from mildly increased shortness of breath increased fatigue.  Not vaccinated.  CXR unchanged.  ABG on admission 7.3 3/87/60.  Started on Decadron.  She was admitted to the floor, overnight RRT was called as patient became increasingly confused/disoriented.  Repeat ABG 7.34/97/73 on 100% FiO2.  Transferred to the ICU for rescue BiPAP.  Of note, BCx 10/28 + staph hominus and epidermis. Repeat BCx 10/30 negative. 1/2 bottles 11/9 + again, prelim staph. Negative MRSA, possible contaminant.    Cardiac: Atrial fibrillation on amiodarone, digoxin. Rates variable. BP ok. TTE 10/30/2021 showed grossly normal LVEF, 55%, severe biatrial, RV not well visualized nor were the IVC, tricuspid or pulmonic valves.  Lasix 20 mg p.o. twice daily, spironolactone 25 daily.  Lopressor 25 twice daily.  Pulm:  BiPAP 18/6 100%.  Neuro: Intact, oriented  Heme: Reviewed, lovenox ppx  I/O: Cr < 1, contraction alkalosis with resp acidosis, -600cc  ID:  Decadron,  BCx 10/28 + staph hominus and epidermis. Repeat BCx 10/30 negative. 1/2 bottles 11/9 + again, prelim staph. Negative MRSA, possible contaminant. Started vanco/zosyn 11/9, dc'ed 11/11.  GI/endo: NPO for BIPAP, tolerating ice chips, glucose at goal  Labs/Imaging: reviewed  Lines: BIPAP, pete    Code Status  DNAR/DNI    Review of Systems  Review of Systems   Unable to perform ROS: Severe respiratory distress     Past Medical History   has a past medical history of Arthritis, Chronic pain, COPD (chronic obstructive pulmonary disease) (HCC), Hypertension, and Migraine.    Surgical History   has a past surgical history that includes primary c section; tracheostomy (2/4/2013); and gastrostomy laparoscopic (2/4/2013).    Family History  family history includes Cancer in her father; Diabetes in her maternal aunt and mother; Heart Disease in her sister; Hyperlipidemia in her mother and sister; Hypertension in her mother and sister; Thyroid in her sister.    Social History   reports that she quit smoking about 8 years ago. Her smoking use included cigarettes. She has a 20.00 pack-year smoking history. She has never used smokeless tobacco. She reports current drug use. Drug: Marijuana. She reports that she does not drink alcohol.    Medications  Home Medications     Reviewed by Leandro Yap (Pharmacy Tech) on 11/10/21 at 0933  Med List Status: Complete   Medication Last Dose Status   acetaminophen (TYLENOL 8 HOUR) 650 MG CR tablet > 10 days Active   amiodarone (CORDARONE) 200 MG Tab > 10 days Active   benzonatate (TESSALON) 100 MG Cap NEW RX Active   digoxin (LANOXIN) 250 MCG Tab > 10 days Active   furosemide (LASIX) 20 MG Tab NEW RX Active   gabapentin (NEURONTIN) 300 MG Cap > 10 days Active   metoprolol tartrate (LOPRESSOR) 50 MG Tab > 10 days Active   spironolactone  (ALDACTONE) 25 MG Tab > 10 days Active   tamsulosin (FLOMAX) 0.4 MG capsule NEW RX Active              Current Facility-Administered Medications   Medication Dose Route Frequency Provider Last Rate Last Admin   • dexamethasone (DECADRON) injection 6 mg  6 mg Intravenous DAILY Abdiel Camarillo M.D.   6 mg at 11/11/21 1034   • famotidine (PEPCID) injection 20 mg  20 mg Intravenous BID Abdiel Camarillo M.D.   20 mg at 11/11/21 1035   • digoxin (Lanoxin) injection 250 mcg  250 mcg Intravenous DAILY AT 1800 Abdiel Camarillo M.D.       • furosemide (LASIX) injection 20 mg  20 mg Intravenous BID DIURETIC Abdiel Camarillo M.D.   20 mg at 11/11/21 1034   • Metoprolol Tartrate (LOPRESSOR) injection 5 mg  5 mg Intravenous Q HOUR PRN Abdiel Camarillo M.D.       • baricitinib (Olumiant) oral solution 4 mg  4 mg Enteral Tube DAILY AT 1800 Abdiel Camarillo M.D.       • enoxaparin (LOVENOX) inj 60 mg  60 mg Subcutaneous Q12HRS Abdiel Camarillo M.D.       • ipratropium-albuterol (DUONEB) nebulizer solution  3 mL Nebulization Q4H PRN (RT) Abdiel Camarillo M.D.       • acetaminophen (Tylenol) tablet 650 mg  650 mg Oral Q6HRS PRN Mario Woody M.D.       • senna-docusate (PERICOLACE or SENOKOT S) 8.6-50 MG per tablet 2 Tablet  2 Tablet Oral BID Mario Woody M.D.   2 Tablet at 11/10/21 1747    And   • polyethylene glycol/lytes (MIRALAX) PACKET 1 Packet  1 Packet Oral QDAY PRN Mario Woody M.D.        And   • magnesium hydroxide (MILK OF MAGNESIA) suspension 30 mL  30 mL Oral QDAY PRN Mario Woody M.D.        And   • bisacodyl (DULCOLAX) suppository 10 mg  10 mg Rectal QDAY PRN Mario Woody M.D.       • Respiratory Therapy Consult   Nebulization Continuous RT Mario Woody M.D.       • ondansetron (ZOFRAN) syringe/vial injection 4 mg  4 mg Intravenous Q4HRS PRN Mario Woody M.D.       • ondansetron (ZOFRAN ODT) dispertab 4 mg  4 mg Oral Q4HRS PRN Mario Woody M.D.       • promethazine (PHENERGAN)  tablet 12.5-25 mg  12.5-25 mg Oral Q4HRS PRN Mario Woody M.D.       • promethazine (PHENERGAN) suppository 12.5-25 mg  12.5-25 mg Rectal Q4HRS PRN Mario Wodoy M.D.       • prochlorperazine (COMPAZINE) injection 5-10 mg  5-10 mg Intravenous Q4HRS PRN Mario Woody M.D.       • guaiFENesin dextromethorphan (ROBITUSSIN DM) 100-10 MG/5ML syrup 10 mL  10 mL Oral Q6HRS PRN Mario Woody M.D.   10 mL at 11/10/21 0951       Allergies  No Known Allergies    Vital Signs last 24 hours  Temp:  [36.2 °C (97.2 °F)-36.9 °C (98.5 °F)] 36.7 °C (98 °F)  Pulse:  [] 94  Resp:  [17-39] 22  BP: ()/() 144/102  SpO2:  [85 %-96 %] 89 %    Physical Exam  Physical Exam  Vitals and nursing note reviewed.   Constitutional:       General: She is not in acute distress.     Appearance: Normal appearance. She is well-developed. She is obese. She is ill-appearing. She is not diaphoretic.   Eyes:      General: No scleral icterus.        Right eye: No discharge.         Left eye: No discharge.      Conjunctiva/sclera: Conjunctivae normal.      Pupils: Pupils are equal, round, and reactive to light.   Neck:      Thyroid: No thyromegaly.      Vascular: No JVD.   Cardiovascular:      Rate and Rhythm: Normal rate and regular rhythm.      Heart sounds: Normal heart sounds. No murmur heard.  No gallop.    Pulmonary:      Effort: Respiratory distress present.      Breath sounds: Normal breath sounds. No wheezing or rales.      Comments: Tripoding in moderate resp distress  Distant breath sounds diffusely  Abdominal:      General: There is no distension.      Palpations: Abdomen is soft.      Tenderness: There is no abdominal tenderness. There is no guarding.   Musculoskeletal:         General: No tenderness.      Right lower leg: Edema present.      Left lower leg: Edema present.   Lymphadenopathy:      Cervical: No cervical adenopathy.   Skin:     General: Skin is warm.      Capillary Refill: Capillary refill takes less  than 2 seconds.      Coloration: Skin is pale.      Findings: No erythema or rash.   Neurological:      Mental Status: She is alert and oriented to person, place, and time.      Cranial Nerves: No cranial nerve deficit.      Sensory: No sensory deficit.      Motor: No abnormal muscle tone.   Psychiatric:         Behavior: Behavior normal.       Fluids    Intake/Output Summary (Last 24 hours) at 11/11/2021 1557  Last data filed at 11/11/2021 1400  Gross per 24 hour   Intake 731.35 ml   Output 1355 ml   Net -623.65 ml       Laboratory  Recent Results (from the past 48 hour(s))   COV-2, FLU A/B, AND RSV BY PCR (2-4 HOURS CEPHEID): Collect NP swab in VTM    Collection Time: 11/09/21  7:23 PM    Specimen: Respirate   Result Value Ref Range    Influenza virus A RNA Negative Negative    Influenza virus B, PCR Negative Negative    RSV, PCR Negative Negative    SARS-CoV-2 by PCR DETECTED (AA)     SARS-CoV-2 Source Nasal Swab    CBC w/ Differential    Collection Time: 11/09/21  9:00 PM   Result Value Ref Range    WBC 8.5 4.8 - 10.8 K/uL    RBC 4.01 (L) 4.20 - 5.40 M/uL    Hemoglobin 12.0 12.0 - 16.0 g/dL    Hematocrit 38.3 37.0 - 47.0 %    MCV 95.5 81.4 - 97.8 fL    MCH 29.9 27.0 - 33.0 pg    MCHC 31.3 (L) 33.6 - 35.0 g/dL    RDW 43.8 35.9 - 50.0 fL    Platelet Count 109 (L) 164 - 446 K/uL    MPV 12.2 9.0 - 12.9 fL    Neutrophils-Polys 84.00 (H) 44.00 - 72.00 %    Lymphocytes 10.00 (L) 22.00 - 41.00 %    Monocytes 2.00 0.00 - 13.40 %    Eosinophils 0.00 0.00 - 6.90 %    Basophils 0.00 0.00 - 1.80 %    Nucleated RBC 0.00 /100 WBC    Neutrophils (Absolute) 7.48 (H) 2.00 - 7.15 K/uL    Lymphs (Absolute) 0.85 (L) 1.00 - 4.80 K/uL    Monos (Absolute) 0.17 0.00 - 0.85 K/uL    Eos (Absolute) 0.00 0.00 - 0.51 K/uL    Baso (Absolute) 0.00 0.00 - 0.12 K/uL    NRBC (Absolute) 0.00 K/uL   Complete Metabolic Panel (CMP)    Collection Time: 11/09/21  9:00 PM   Result Value Ref Range    Sodium 133 (L) 135 - 145 mmol/L    Potassium 4.3 3.6 -  5.5 mmol/L    Chloride 89 (L) 96 - 112 mmol/L    Co2 41 (HH) 20 - 33 mmol/L    Anion Gap 3.0 (L) 7.0 - 16.0    Glucose 129 (H) 65 - 99 mg/dL    Bun 17 8 - 22 mg/dL    Creatinine 0.82 0.50 - 1.40 mg/dL    Calcium 8.6 8.4 - 10.2 mg/dL    AST(SGOT) 27 12 - 45 U/L    ALT(SGPT) 22 2 - 50 U/L    Alkaline Phosphatase 51 30 - 99 U/L    Total Bilirubin 0.3 0.1 - 1.5 mg/dL    Albumin 3.5 3.2 - 4.9 g/dL    Total Protein 7.1 6.0 - 8.2 g/dL    Globulin 3.6 (H) 1.9 - 3.5 g/dL    A-G Ratio 1.0 g/dL   BLOOD CULTURE    Collection Time: 11/09/21  9:00 PM    Specimen: Peripheral; Blood   Result Value Ref Range    Significant Indicator NEG     Source BLD     Site PERIPHERAL     Culture Result       No Growth  Note: Blood cultures are incubated for 5 days and  are monitored continuously.Positive blood cultures  are called to the RN and reported as soon as  they are identified.  Blood culture testing and Gram stain, if indicated, are  performed at [facility], [address] [place] [address].  Positive blood cultures are  sent to [facility], [address] [place] [address], for organism identification and  susceptibility testing.     ESTIMATED GFR    Collection Time: 11/09/21  9:00 PM   Result Value Ref Range    GFR If African American >60 >60 mL/min/1.73 m 2    GFR If Non African American >60 >60 mL/min/1.73 m 2   PROCALCITONIN    Collection Time: 11/09/21  9:00 PM   Result Value Ref Range    Procalcitonin 0.03 <0.25 ng/mL   DIFFERENTIAL MANUAL    Collection Time: 11/09/21  9:00 PM   Result Value Ref Range    Bands-Stabs 4.00 0.00 - 10.00 %    Manual Diff Status PERFORMED    PLATELET ESTIMATE    Collection Time: 11/09/21  9:00 PM   Result Value Ref Range    Plt Estimation Decreased    MORPHOLOGY    Collection Time: 11/09/21  9:00 PM   Result Value Ref Range    RBC Morphology Normal     Large Platelets 1+    BLOOD CULTURE    Collection Time: 11/09/21  9:30 PM    Specimen: Peripheral; Blood    Result Value Ref Range    Significant Indicator POS (POS)     Source BLD     Site PERIPHERAL     Culture Result (A)      Growth detected by Bactec instrument. 11/10/2021  19:08  Gram Stain: Gram positive cocci: Possible Staphylococcus sp.  Negative for Staphylococcus aureus and MRSA by PCR. Correlate  ongoing need for antibiotics with clinical condition.  Further report to follow.     POCT arterial blood gas device results    Collection Time: 11/09/21 11:33 PM   Result Value Ref Range    Ph 7.331 (L) 7.400 - 7.500    Pco2 87.6 (HH) 26.0 - 37.0 mmHg    Po2 60 (L) 64 - 87 mmHg    Tco2 49 (HH) 20 - 33 mmol/L    S02 86 (L) 93 - 99 %    Hco3 46.3 (H) 17.0 - 25.0 mmol/L    BE 16 (H) -4 - 3 mmol/L    Body Temp 97.3 F degrees    Ph Temp Lang 7.341 (L) 7.400 - 7.500    Po2 Temp Cor 57 (L) 64 - 87 mmHg    Specimen Arterial     DelSys Other     LPM 4 lpm   CBC with Differential    Collection Time: 11/10/21  3:56 AM   Result Value Ref Range    WBC 7.0 4.8 - 10.8 K/uL    RBC 3.92 (L) 4.20 - 5.40 M/uL    Hemoglobin 11.7 (L) 12.0 - 16.0 g/dL    Hematocrit 38.0 37.0 - 47.0 %    MCV 96.9 81.4 - 97.8 fL    MCH 29.8 27.0 - 33.0 pg    MCHC 30.8 (L) 33.6 - 35.0 g/dL    RDW 44.2 35.9 - 50.0 fL    Platelet Count 98 (L) 164 - 446 K/uL    MPV 12.1 9.0 - 12.9 fL    Neutrophils-Polys 82.20 (H) 44.00 - 72.00 %    Lymphocytes 9.70 (L) 22.00 - 41.00 %    Monocytes 7.40 0.00 - 13.40 %    Eosinophils 0.00 0.00 - 6.90 %    Basophils 0.10 0.00 - 1.80 %    Immature Granulocytes 0.60 0.00 - 0.90 %    Nucleated RBC 0.00 /100 WBC    Neutrophils (Absolute) 5.78 2.00 - 7.15 K/uL    Lymphs (Absolute) 0.68 (L) 1.00 - 4.80 K/uL    Monos (Absolute) 0.52 0.00 - 0.85 K/uL    Eos (Absolute) 0.00 0.00 - 0.51 K/uL    Baso (Absolute) 0.01 0.00 - 0.12 K/uL    Immature Granulocytes (abs) 0.04 0.00 - 0.11 K/uL    NRBC (Absolute) 0.00 K/uL   Comp Metabolic Panel (CMP)    Collection Time: 11/10/21  3:56 AM   Result Value Ref Range    Sodium 134 (L) 135 - 145 mmol/L     Potassium 4.0 3.6 - 5.5 mmol/L    Chloride 89 (L) 96 - 112 mmol/L    Co2 42 (HH) 20 - 33 mmol/L    Anion Gap 3.0 (L) 7.0 - 16.0    Glucose 111 (H) 65 - 99 mg/dL    Bun 14 8 - 22 mg/dL    Creatinine 0.76 0.50 - 1.40 mg/dL    Calcium 8.4 8.4 - 10.2 mg/dL    AST(SGOT) 26 12 - 45 U/L    ALT(SGPT) 21 2 - 50 U/L    Alkaline Phosphatase 46 30 - 99 U/L    Total Bilirubin 0.3 0.1 - 1.5 mg/dL    Albumin 3.2 3.2 - 4.9 g/dL    Total Protein 6.8 6.0 - 8.2 g/dL    Globulin 3.6 (H) 1.9 - 3.5 g/dL    A-G Ratio 0.9 g/dL   Magnesium    Collection Time: 11/10/21  3:56 AM   Result Value Ref Range    Magnesium 1.8 1.5 - 2.5 mg/dL   ESTIMATED GFR    Collection Time: 11/10/21  3:56 AM   Result Value Ref Range    GFR If African American >60 >60 mL/min/1.73 m 2    GFR If Non African American >60 >60 mL/min/1.73 m 2   ABG - LAB    Collection Time: 11/10/21 10:29 PM   Result Value Ref Range    Ph 7.34 (L) 7.40 - 7.50    Pco2 87.1 (HH) 26.0 - 37.0 mmHg    Po2 58.8 (L) 64.0 - 87.0 mmHg    O2 Saturation 89.1 (L) 93.0 - 99.0 %    Hco3 45 (H) 17 - 25 mmol/L    Base Excess 15 (H) -4 - 3 mmol/L    Body Temp 36.7 Centigrade    FIO2 15 (L) 30 - 60 %    Ph -TC 7.34 (L) 7.40 - 7.50    Pco2 -TC 86.0 (HH) 26.0 - 37.0 mmHg    Po2 -TC 57.6 (L) 64.0 - 87.0 mmHg   POCT arterial blood gas device results    Collection Time: 11/11/21  4:45 AM   Result Value Ref Range    Ph 7.349 (L) 7.400 - 7.500    Pco2 97.6 (HH) 26.0 - 37.0 mmHg    Po2 73 64 - 87 mmHg    Tco2 >50 (HH) 20 - 33 mmol/L    S02 92 (L) 93 - 99 %    Hco3 53.7 (H) 17.0 - 25.0 mmol/L    BE 23 (H) -4 - 3 mmol/L    Body Temp see below degrees    O2 Therapy 100 %    iPF Ratio 73     Specimen Arterial     DelSys NIV    POCT lactate device results    Collection Time: 11/11/21  4:45 AM   Result Value Ref Range    iStat Lactate 0.6 0.5 - 2.0 mmol/L   CBC WITH DIFFERENTIAL    Collection Time: 11/11/21  5:00 AM   Result Value Ref Range    WBC 8.9 4.8 - 10.8 K/uL    RBC 4.09 (L) 4.20 - 5.40 M/uL    Hemoglobin  12.1 12.0 - 16.0 g/dL    Hematocrit 40.2 37.0 - 47.0 %    MCV 98.3 (H) 81.4 - 97.8 fL    MCH 29.6 27.0 - 33.0 pg    MCHC 30.1 (L) 33.6 - 35.0 g/dL    RDW 45.1 35.9 - 50.0 fL    Platelet Count 103 (L) 164 - 446 K/uL    MPV 12.2 9.0 - 12.9 fL    Neutrophils-Polys 88.00 (H) 44.00 - 72.00 %    Lymphocytes 7.20 (L) 22.00 - 41.00 %    Monocytes 4.50 0.00 - 13.40 %    Eosinophils 0.00 0.00 - 6.90 %    Basophils 0.00 0.00 - 1.80 %    Immature Granulocytes 0.30 0.00 - 0.90 %    Nucleated RBC 0.00 /100 WBC    Neutrophils (Absolute) 7.81 (H) 2.00 - 7.15 K/uL    Lymphs (Absolute) 0.64 (L) 1.00 - 4.80 K/uL    Monos (Absolute) 0.40 0.00 - 0.85 K/uL    Eos (Absolute) 0.00 0.00 - 0.51 K/uL    Baso (Absolute) 0.00 0.00 - 0.12 K/uL    Immature Granulocytes (abs) 0.03 0.00 - 0.11 K/uL    NRBC (Absolute) 0.00 K/uL   Basic Metabolic Panel    Collection Time: 11/11/21  5:00 AM   Result Value Ref Range    Sodium 136 135 - 145 mmol/L    Potassium 4.5 3.6 - 5.5 mmol/L    Chloride 87 (L) 96 - 112 mmol/L    Co2 48 (HH) 20 - 33 mmol/L    Glucose 121 (H) 65 - 99 mg/dL    Bun 13 8 - 22 mg/dL    Creatinine 0.62 0.50 - 1.40 mg/dL    Calcium 8.5 8.4 - 10.2 mg/dL    Anion Gap 1.0 (L) 7.0 - 16.0   PROCALCITONIN    Collection Time: 11/11/21  5:00 AM   Result Value Ref Range    Procalcitonin 0.05 <0.25 ng/mL   CRP QUANTITIVE (NON-CARDIAC)    Collection Time: 11/11/21  5:00 AM   Result Value Ref Range    Stat C-Reactive Protein 5.07 (H) 0.00 - 0.75 mg/dL   ESTIMATED GFR    Collection Time: 11/11/21  5:00 AM   Result Value Ref Range    GFR If African American >60 >60 mL/min/1.73 m 2    GFR If Non African American >60 >60 mL/min/1.73 m 2   D-DIMER    Collection Time: 11/11/21  5:50 AM   Result Value Ref Range    D-Dimer Screen 1.51 (H) 0.00 - 0.50 ug/mL (FEU)   proBrain Natriuretic Peptide, NT    Collection Time: 11/11/21  5:51 AM   Result Value Ref Range    NT-proBNP 898 (H) 0 - 125 pg/mL     Imaging  DX-CHEST-PORTABLE (1 VIEW)   Final Result         1.   Pulmonary edema and/or infiltrates are identified, which are stable since the prior exam.   2.  Small bilateral pleural effusions   3.  Cardiomegaly        Assessment/Plan    * Acute on chronic respiratory failure with hypoxia and hypercapnia (HCC)- (present on admission)  Assessment & Plan  Swab positive on (11/9/2021)  Severe disease based on clinical criteria, inflammatory markers and comorbidities  Dexamethasone IV 6 mg daily x 10 days (date)  Start baricitinib  Cont lasix  16/8 hour proning/supine protocol, patient and nursing instructed  Judicious fluids  Rescue BIPAP, trial HFNC flow/ FiO2 to maintain SpO2 > 88% if mentating well and not fatiguing  DNR/DNI    Pneumonia due to COVID-19 virus- (present on admission)  Assessment & Plan  Plan of care as outlined above.    Chronic heart failure with preserved ejection fraction (HCC)  Assessment & Plan  TTE 10/30/2021: grossly normal LVEF, 55%, severe biatrial enlargement, RV not well visualized nor were the IVC, tricuspid or pulmonic valves.  Reported history of dilated cardiomyopathy and moderate pulmonary  BNP chronically elevated, currently ~800  Trace edema  Clinically slightly overloaded  Cont lasix 60mg IV BID  Baseline lasix 20 PO QD, spironolactone 25, lopressor 25 bid  Holding PO meds while on rescue bipap    AF (atrial fibrillation) (HCC)- (present on admission)  Assessment & Plan  Rate currently variable  TTE 10/30/2021 showed grossly normal LVEF, 55%, severe biatrial, RV not well visualized nor were the IVC, tricuspid or pulmonic valves.  HD stable  Cont amiodarone PO  Digoxin  MTP 5mg IVP PRN HR > 120    History of COPD  Assessment & Plan  chart has COPD but no PFTS and no emphysema on CT  No wheezing on exam  No maintenance inhalers  duonebs q4H PRN    BMI 60.0-69.9, adult ([facility])  Assessment & Plan  With resultant OHS, restrictive respiratory mechanics  High risk comorbidity for severe disease in COVID  Would benefit from weight loss for long-term  health risk reduction    Metabolic alkalosis with respiratory acidosis  Assessment & Plan  Chronic resp acidosis with compensatory metabolic alkalosis + contraction alkalosis  Slightly acidemic due to hypercapnia  Cont BIPAP  Daily ABG    Bacteremia, coagulase-negative staphylococcal  Assessment & Plan  Suspected contaminant  BCx 10/28 + staph hominus and epidermis. Repeat BCx 10/30 negative. 1/2 bottles 11/9 + again, prelim staph.   Negative MRSA, possible contaminant.   Started vanco/zosyn 11/9, dc'ed 11/11.  Repeat AM blood cultures to ensure clearance      JUAN CARLOS treated with BiPAP  Assessment & Plan  Cont PPV nightly    Discussed patient condition and risk of morbidity and/or mortality with RN, RT, Pharmacy, Code status disscussed and Patient.      The patient remains critically ill.  Critical care time =  60 minutes in directly providing and coordinating critical care and extensive data review.  No time overlap and excludes procedures.    This note was generated using voice recognition software which has a chance of producing errors of grammar and content.  I have made every reasonable attempt to find and correct any errors, but it should be expected that some may not be found prior to finalization of this note.  __________  Abdiel Camarillo MD  Pulmonary and Critical Care Medicine  [facility]

## 2021-11-11 NOTE — PROGRESS NOTES
Report received from Miriam PORTILLO. Pt awake and sitting up in bed at the time of report. Patient is A&O4. Plan of care discussed in doorway related to enhanced precautions. White board has been updated. Safety precautions in place and call light within reach. No needs at this time.       COVID 19 surge in effect

## 2021-11-11 NOTE — ASSESSMENT & PLAN NOTE
chart has COPD but no PFTS and no emphysema on CT  No wheezing on exam  No maintenance inhalers  duonebs q4H PRN

## 2021-11-11 NOTE — PROGRESS NOTES
Carrol from Microbiology called this RN to report that the PCR results for the positive blood cultures on 11/9 came back negative for MRSA and Staphylococcus aureus.    MD Woody notified via voalte.

## 2021-11-12 PROBLEM — Z71.89 ADVANCED CARE PLANNING/COUNSELING DISCUSSION: Status: ACTIVE | Noted: 2017-06-22

## 2021-11-12 LAB
ALBUMIN SERPL BCP-MCNC: 2.6 G/DL (ref 3.2–4.9)
ALBUMIN/GLOB SERPL: 0.7 G/DL
ALP SERPL-CCNC: 40 U/L (ref 30–99)
ALT SERPL-CCNC: 14 U/L (ref 2–50)
ANION GAP SERPL CALC-SCNC: 8 MMOL/L (ref 7–16)
AST SERPL-CCNC: 15 U/L (ref 12–45)
BASE EXCESS BLDA CALC-SCNC: 24 MMOL/L (ref -4–3)
BASOPHILS # BLD AUTO: 0.1 % (ref 0–1.8)
BASOPHILS # BLD: 0.01 K/UL (ref 0–0.12)
BILIRUB SERPL-MCNC: 0.6 MG/DL (ref 0.1–1.5)
BODY TEMPERATURE: ABNORMAL DEGREES
BUN SERPL-MCNC: 15 MG/DL (ref 8–22)
CALCIUM SERPL-MCNC: 8.4 MG/DL (ref 8.4–10.2)
CENTIMETERS OF WATER PRESSURE ICMH: 1812 CMH20
CHLORIDE SERPL-SCNC: 86 MMOL/L (ref 96–112)
CO2 BLDA-SCNC: >50 MMOL/L (ref 20–33)
CO2 SERPL-SCNC: 40 MMOL/L (ref 20–33)
CREAT SERPL-MCNC: 0.58 MG/DL (ref 0.5–1.4)
DELSYS IDSYS: ABNORMAL
EOSINOPHIL # BLD AUTO: 0 K/UL (ref 0–0.51)
EOSINOPHIL NFR BLD: 0 % (ref 0–6.9)
ERYTHROCYTE [DISTWIDTH] IN BLOOD BY AUTOMATED COUNT: 44.5 FL (ref 35.9–50)
GLOBULIN SER CALC-MCNC: 3.8 G/DL (ref 1.9–3.5)
GLUCOSE SERPL-MCNC: 112 MG/DL (ref 65–99)
HCO3 BLDA-SCNC: 52.2 MMOL/L (ref 17–25)
HCT VFR BLD AUTO: 36.6 % (ref 37–47)
HGB BLD-MCNC: 11.2 G/DL (ref 12–16)
HOROWITZ INDEX BLDA+IHG-RTO: 48 MM[HG]
IMM GRANULOCYTES # BLD AUTO: 0.02 K/UL (ref 0–0.11)
IMM GRANULOCYTES NFR BLD AUTO: 0.3 % (ref 0–0.9)
LACTATE BLD-SCNC: 0.8 MMOL/L (ref 0.5–2)
LYMPHOCYTES # BLD AUTO: 0.74 K/UL (ref 1–4.8)
LYMPHOCYTES NFR BLD: 11 % (ref 22–41)
MAGNESIUM SERPL-MCNC: 1.8 MG/DL (ref 1.5–2.5)
MCH RBC QN AUTO: 29.9 PG (ref 27–33)
MCHC RBC AUTO-ENTMCNC: 30.6 G/DL (ref 33.6–35)
MCV RBC AUTO: 97.6 FL (ref 81.4–97.8)
MONOCYTES # BLD AUTO: 0.33 K/UL (ref 0–0.85)
MONOCYTES NFR BLD AUTO: 4.9 % (ref 0–13.4)
NEUTROPHILS # BLD AUTO: 5.63 K/UL (ref 2–7.15)
NEUTROPHILS NFR BLD: 83.7 % (ref 44–72)
NRBC # BLD AUTO: 0 K/UL
NRBC BLD-RTO: 0 /100 WBC
O2/TOTAL GAS SETTING VFR VENT: 100 %
PCO2 BLDA: 69.9 MMHG (ref 26–37)
PH BLDA: 7.48 [PH] (ref 7.4–7.5)
PH TEMP ADJ BLDA: 7.5 [PH] (ref 7.4–7.5)
PHOSPHATE SERPL-MCNC: 1.4 MG/DL (ref 2.5–4.5)
PLATELET # BLD AUTO: 101 K/UL (ref 164–446)
PMV BLD AUTO: 12.1 FL (ref 9–12.9)
PO2 BLDA: 48 MMHG (ref 64–87)
PO2 TEMP ADJ BLDA: 44 MMHG (ref 64–87)
POTASSIUM SERPL-SCNC: 4.4 MMOL/L (ref 3.6–5.5)
PROT SERPL-MCNC: 6.4 G/DL (ref 6–8.2)
RBC # BLD AUTO: 3.75 M/UL (ref 4.2–5.4)
SAO2 % BLDA: 84 % (ref 93–99)
SODIUM SERPL-SCNC: 134 MMOL/L (ref 135–145)
SPECIMEN DRAWN FROM PATIENT: ABNORMAL
WBC # BLD AUTO: 6.7 K/UL (ref 4.8–10.8)

## 2021-11-12 PROCEDURE — 99291 CRITICAL CARE FIRST HOUR: CPT | Performed by: INTERNAL MEDICINE

## 2021-11-12 PROCEDURE — 700111 HCHG RX REV CODE 636 W/ 250 OVERRIDE (IP): Performed by: HOSPITALIST

## 2021-11-12 PROCEDURE — 99292 CRITICAL CARE ADDL 30 MIN: CPT | Performed by: INTERNAL MEDICINE

## 2021-11-12 PROCEDURE — 94660 CPAP INITIATION&MGMT: CPT

## 2021-11-12 PROCEDURE — 94760 N-INVAS EAR/PLS OXIMETRY 1: CPT

## 2021-11-12 PROCEDURE — 85025 COMPLETE CBC W/AUTO DIFF WBC: CPT

## 2021-11-12 PROCEDURE — 700102 HCHG RX REV CODE 250 W/ 637 OVERRIDE(OP): Performed by: INTERNAL MEDICINE

## 2021-11-12 PROCEDURE — 700105 HCHG RX REV CODE 258: Performed by: INTERNAL MEDICINE

## 2021-11-12 PROCEDURE — 87040 BLOOD CULTURE FOR BACTERIA: CPT

## 2021-11-12 PROCEDURE — 83735 ASSAY OF MAGNESIUM: CPT

## 2021-11-12 PROCEDURE — A9270 NON-COVERED ITEM OR SERVICE: HCPCS | Performed by: HOSPITALIST

## 2021-11-12 PROCEDURE — 80053 COMPREHEN METABOLIC PANEL: CPT

## 2021-11-12 PROCEDURE — 700111 HCHG RX REV CODE 636 W/ 250 OVERRIDE (IP): Performed by: INTERNAL MEDICINE

## 2021-11-12 PROCEDURE — 84100 ASSAY OF PHOSPHORUS: CPT

## 2021-11-12 PROCEDURE — A9270 NON-COVERED ITEM OR SERVICE: HCPCS | Performed by: INTERNAL MEDICINE

## 2021-11-12 PROCEDURE — 700101 HCHG RX REV CODE 250: Performed by: INTERNAL MEDICINE

## 2021-11-12 PROCEDURE — 94640 AIRWAY INHALATION TREATMENT: CPT

## 2021-11-12 PROCEDURE — 36600 WITHDRAWAL OF ARTERIAL BLOOD: CPT

## 2021-11-12 PROCEDURE — 83605 ASSAY OF LACTIC ACID: CPT

## 2021-11-12 PROCEDURE — 82803 BLOOD GASES ANY COMBINATION: CPT

## 2021-11-12 PROCEDURE — 700102 HCHG RX REV CODE 250 W/ 637 OVERRIDE(OP): Performed by: HOSPITALIST

## 2021-11-12 PROCEDURE — 770022 HCHG ROOM/CARE - ICU (200)

## 2021-11-12 RX ORDER — ACETAZOLAMIDE 500 MG/5ML
250 INJECTION, POWDER, LYOPHILIZED, FOR SOLUTION INTRAVENOUS
Status: COMPLETED | OUTPATIENT
Start: 2021-11-12 | End: 2021-11-12

## 2021-11-12 RX ORDER — MAGNESIUM SULFATE HEPTAHYDRATE 40 MG/ML
2 INJECTION, SOLUTION INTRAVENOUS ONCE
Status: COMPLETED | OUTPATIENT
Start: 2021-11-12 | End: 2021-11-12

## 2021-11-12 RX ORDER — DIGOXIN 250 MCG
250 TABLET ORAL DAILY
Status: DISCONTINUED | OUTPATIENT
Start: 2021-11-12 | End: 2021-11-24 | Stop reason: HOSPADM

## 2021-11-12 RX ORDER — SPIRONOLACTONE 25 MG/1
25 TABLET ORAL
Status: DISCONTINUED | OUTPATIENT
Start: 2021-11-12 | End: 2021-11-15

## 2021-11-12 RX ADMIN — DIGOXIN 250 MCG: 250 TABLET ORAL at 17:38

## 2021-11-12 RX ADMIN — ACETAZOLAMIDE 250 MG: 500 INJECTION, POWDER, LYOPHILIZED, FOR SOLUTION INTRAVENOUS at 16:02

## 2021-11-12 RX ADMIN — FUROSEMIDE 20 MG: 10 INJECTION, SOLUTION INTRAMUSCULAR; INTRAVENOUS at 05:01

## 2021-11-12 RX ADMIN — FUROSEMIDE 20 MG: 10 INJECTION, SOLUTION INTRAMUSCULAR; INTRAVENOUS at 16:00

## 2021-11-12 RX ADMIN — DEXAMETHASONE SODIUM PHOSPHATE 6 MG: 4 INJECTION, SOLUTION INTRA-ARTICULAR; INTRALESIONAL; INTRAMUSCULAR; INTRAVENOUS; SOFT TISSUE at 05:01

## 2021-11-12 RX ADMIN — SPIRONOLACTONE 25 MG: 25 TABLET ORAL at 10:51

## 2021-11-12 RX ADMIN — SENNOSIDES AND DOCUSATE SODIUM 2 TABLET: 50; 8.6 TABLET ORAL at 05:01

## 2021-11-12 RX ADMIN — Medication 5 MG: at 20:10

## 2021-11-12 RX ADMIN — ACETAZOLAMIDE 250 MG: 500 INJECTION, POWDER, LYOPHILIZED, FOR SOLUTION INTRAVENOUS at 08:33

## 2021-11-12 RX ADMIN — METOPROLOL TARTRATE 12.5 MG: 25 TABLET, FILM COATED ORAL at 10:52

## 2021-11-12 RX ADMIN — MAGNESIUM SULFATE 2 G: 2 INJECTION INTRAVENOUS at 16:17

## 2021-11-12 RX ADMIN — FAMOTIDINE 20 MG: 10 INJECTION INTRAVENOUS at 05:01

## 2021-11-12 RX ADMIN — WATER 4 MG: 100 IRRIGANT IRRIGATION at 17:38

## 2021-11-12 RX ADMIN — ENOXAPARIN SODIUM 60 MG: 60 INJECTION SUBCUTANEOUS at 17:38

## 2021-11-12 RX ADMIN — FAMOTIDINE 20 MG: 10 INJECTION INTRAVENOUS at 17:38

## 2021-11-12 RX ADMIN — METOPROLOL TARTRATE 12.5 MG: 25 TABLET, FILM COATED ORAL at 17:41

## 2021-11-12 RX ADMIN — SODIUM PHOSPHATE, MONOBASIC, MONOHYDRATE 40 MMOL: 276; 142 INJECTION, SOLUTION INTRAVENOUS at 09:31

## 2021-11-12 RX ADMIN — ONDANSETRON HYDROCHLORIDE 4 MG: 2 SOLUTION INTRAMUSCULAR; INTRAVENOUS at 15:59

## 2021-11-12 RX ADMIN — ENOXAPARIN SODIUM 60 MG: 60 INJECTION SUBCUTANEOUS at 05:01

## 2021-11-12 ASSESSMENT — PAIN DESCRIPTION - PAIN TYPE
TYPE: ACUTE PAIN
TYPE: ACUTE PAIN;CHRONIC PAIN
TYPE: ACUTE PAIN
TYPE: ACUTE PAIN;CHRONIC PAIN
TYPE: ACUTE PAIN;CHRONIC PAIN
TYPE: ACUTE PAIN
TYPE: ACUTE PAIN;CHRONIC PAIN
TYPE: ACUTE PAIN;CHRONIC PAIN
TYPE: ACUTE PAIN
TYPE: ACUTE PAIN
TYPE: ACUTE PAIN;CHRONIC PAIN
TYPE: ACUTE PAIN

## 2021-11-12 ASSESSMENT — PULMONARY FUNCTION TESTS
EPAP_CMH2O: 10

## 2021-11-12 ASSESSMENT — ENCOUNTER SYMPTOMS: SHORTNESS OF BREATH: 1

## 2021-11-12 NOTE — ASSESSMENT & PLAN NOTE
Code status changed back to DNR/DNI on 11/12, see ACP note. Goals of care reflected on POLST from 11/1 stands  Family meeting held with patient and daughter in law (Sherron) on 11/15. Pt reaffirmed DNR/DNI code status. She understands she is stuck on BIPAP and cannot take PO due to desaturations. She does not want to transition to comfort measures at this time.

## 2021-11-12 NOTE — PROGRESS NOTES
Pulmonary Progress Note    Date of admission  11/9/2021    Chief Complaint  61 y.o. female admitted 11/9/2021 with acute on chronic mixed hypercapnic and hypoxemic respiratory failure due to COVID-19    Hospital Course  61 y.o. female with past medical history of atrial fibrillation, dilated cardiomyopathy, moderate pulmonary hypertension on digoxin and amiodarone, Body mass index is 61.42 kg/m².,  JUAN CARLOS/OHS on BIPAP and chronic hypoxemic respiratory failure on 3-4L at baseline who recently had a prolonged hospitalization for acute on chronic congestive heart failure and possible COPD exacerbation which improved with BIPAP and diuresis.  Echo on 10/30/2021 showed grossly normal LVEF, 55%, severe biatrial enlargement , RV not well visualized nor were the IVC, tricuspid or pulmonic valves. During her hospitalization, palliative care was consulted and CODE STATUS was changed to DNR/DNI and she was discharged to a skilled nursing facility for PT/OT on 11/9.     She was readmitted the same day 11/9/2021 as she tested positive for Covid 19.  Reportedly had no changes symptomatically since discharge aside from mildly increased shortness of breath increased fatigue.  Not vaccinated.  CXR unchanged.  ABG on admission 7.3 3/87/60.  Started on Decadron.  She was admitted to the floor, overnight RRT was called as patient became increasingly confused/disoriented.  Repeat ABG 7.34/97/73 on 100% FiO2.  Transferred to the ICU for rescue BiPAP.  Of note, BCx 10/28 + staph hominus and epidermis. Repeat BCx 10/30 negative. 1/2 bottles 11/9 + again, prelim staph. Negative MRSA, possible contaminant.     Interval Problem Update  Chart review from the past 24 hours includes imaging, laboratory studies, vital signs and notes available.  Pertinent data for today's visit includes    Tolerated HFNC for several hours yesterday  Patient requesting to take off BiPAP mask frequently overnight  Otherwise no significant interval events    Cardiac:  Atrial fibrillation on amiodarone, digoxin. Rates controlled. HD stable. lasix 20mg IV BID. TTE 10/30/2021 showed grossly normal LVEF, 55%, severe biatrial, RV not well visualized nor were the IVC, tricuspid or pulmonic valves.    Pulm: HFNC 100%/45L during day, BiPAP 18/6 100% overnight. ABG 7.49/70/48(?) however SPO2 88%, suspected mixed venous draw. CXR 11/9: cardiomegaly, interstitial basilar infiltrates L > R, unchanged  Neuro: Intact, oriented, more lethargic today as she did not sleep well overnight  Heme: Reviewed, stable thrombocytopenia, Hb stable, lovenox ppx  I/O: Cr < 1, contraction alkalosis with chronic compensated resp acidosis, -2.6L  ID:  Decadron 11/11-, baricitinib 11/11- .BCx 10/28 + staph hominus and epidermis. Repeat BCx 10/30 negative. 1/2 bottles 11/9 + again, staph hominus. Negative MRSA, likely contaminant. On vanco/zosyn 11/9-11/11. Repeated BCx 11/12 pending.  GI/endo: NPO, sips with meds ok while on BIPAP, tolerating ice chips, glucose at goal  Labs/Imaging: reviewed  Lines: BIPAP, pete    Review of Systems  Review of Systems   Constitutional: Positive for malaise/fatigue.   Respiratory: Positive for shortness of breath.    All other systems reviewed and are negative.  Dry mouth    Vital Signs for last 24 hours   Temp:  [36.4 °C (97.5 °F)-36.7 °C (98 °F)] 36.6 °C (97.8 °F)  Pulse:  [] 89  Resp:  [19-45] 35  BP: ()/() 141/79  SpO2:  [87 %-93 %] 88 %    Physical Exam   Physical Exam  Vitals and nursing note reviewed.   Constitutional:       General: She is not in acute distress.     Appearance: Normal appearance. She is well-developed. She is obese. She is ill-appearing. She is not diaphoretic.   Eyes:      General: No scleral icterus.        Right eye: No discharge.         Left eye: No discharge.      Conjunctiva/sclera: Conjunctivae normal.      Pupils: Pupils are equal, round, and reactive to light.   Neck:      Thyroid: No thyromegaly.      Vascular: No JVD.    Cardiovascular:      Rate and Rhythm: Normal rate and regular rhythm.      Heart sounds: Normal heart sounds. No murmur heard.  No gallop.    Pulmonary:      Effort: Respiratory distress present.      Breath sounds: Normal breath sounds. No wheezing or rales.      Comments: Tripoding in moderate resp distress  Distant breath sounds diffusely  Abdominal:      General: There is no distension.      Palpations: Abdomen is soft.      Tenderness: There is no abdominal tenderness. There is no guarding.   Musculoskeletal:         General: No tenderness.      Right lower leg: Edema present.      Left lower leg: Edema present.   Lymphadenopathy:      Cervical: No cervical adenopathy.   Skin:     General: Skin is warm.      Capillary Refill: Capillary refill takes less than 2 seconds.      Coloration: Skin is pale.      Findings: No erythema or rash.   Neurological:      Mental Status: She is alert and oriented to person, place, and time.      Cranial Nerves: No cranial nerve deficit.      Sensory: No sensory deficit.      Motor: No abnormal muscle tone.   Psychiatric:         Behavior: Behavior normal.       Medications  Current Facility-Administered Medications   Medication Dose Route Frequency Provider Last Rate Last Admin   • sodium phosphate 40 mmol in  mL ivpb  40 mmol Intravenous Once Abdiel Camarillo M.D.       • acetaZOLAMIDE (DIAMOX) injection 250 mg  250 mg Intravenous BID DIURETIC Abdiel Camarillo M.D.       • spironolactone (ALDACTONE) tablet 25 mg  25 mg Oral Q DAY Abdiel Camarillo M.D.       • metoprolol tartrate (LOPRESSOR) tablet 12.5 mg  12.5 mg Oral TWICE DAILY Abdiel Camarillo M.D.       • digoxin (LANOXIN) tablet 250 mcg  250 mcg Oral DAILY AT 1800 Abdiel Camarillo M.D.       • dexamethasone (DECADRON) injection 6 mg  6 mg Intravenous DAILY Abdiel Camarillo M.D.   6 mg at 11/12/21 0501   • famotidine (PEPCID) injection 20 mg  20 mg Intravenous BID Abdiel Camarillo M.D.   20 mg at 11/12/21  0501   • furosemide (LASIX) injection 20 mg  20 mg Intravenous BID DIURETIC Abdiel Camarillo M.D.   20 mg at 11/12/21 0501   • Metoprolol Tartrate (LOPRESSOR) injection 5 mg  5 mg Intravenous Q HOUR PRN Abdiel Camarillo M.D.       • baricitinib (Olumiant) oral solution 4 mg  4 mg Enteral Tube DAILY AT 1800 Abdiel Camarillo M.D.   4 mg at 11/11/21 1808   • enoxaparin (LOVENOX) inj 60 mg  60 mg Subcutaneous Q12HRS Abdiel Camarillo M.D.   60 mg at 11/12/21 0501   • ipratropium-albuterol (DUONEB) nebulizer solution  3 mL Nebulization Q4H PRN (RT) Abdiel Camarillo M.D.       • amiodarone (Cordarone) tablet 200 mg  200 mg Oral Q DAY Abdiel Camarillo M.D.   200 mg at 11/11/21 1807   • melatonin tablet 5 mg  5 mg Oral Nightly Abdiel Camarillo M.D.   5 mg at 11/11/21 2008   • traZODone (DESYREL) tablet 50 mg  50 mg Oral HS PRN Abdiel Camarillo M.D.   50 mg at 11/11/21 2008   • acetaminophen (Tylenol) tablet 650 mg  650 mg Oral Q6HRS PRN Asem CHADD Woody M.D.       • senna-docusate (PERICOLACE or SENOKOT S) 8.6-50 MG per tablet 2 Tablet  2 Tablet Oral BID Asem CHADD Woody M.D.   2 Tablet at 11/12/21 0501    And   • polyethylene glycol/lytes (MIRALAX) PACKET 1 Packet  1 Packet Oral QDAY PRN Asem CHADD Woody M.D.        And   • magnesium hydroxide (MILK OF MAGNESIA) suspension 30 mL  30 mL Oral QDAY PRN Asem CHADD Woody M.D.        And   • bisacodyl (DULCOLAX) suppository 10 mg  10 mg Rectal QDAY PRN Asem CHADD Woody M.D.       • Respiratory Therapy Consult   Nebulization Continuous RT Asem CHADD Woody M.D.       • ondansetron (ZOFRAN) syringe/vial injection 4 mg  4 mg Intravenous Q4HRS PRN Asem CHADD Woody M.D.       • ondansetron (ZOFRAN ODT) dispertab 4 mg  4 mg Oral Q4HRS PRN Aseapril Woody M.D.       • promethazine (PHENERGAN) tablet 12.5-25 mg  12.5-25 mg Oral Q4HRS PRN Aseapril Woody M.D.       • promethazine (PHENERGAN) suppository 12.5-25 mg  12.5-25 mg Rectal Q4HRS PRN Asem CHADD Woody M.D.       •  prochlorperazine (COMPAZINE) injection 5-10 mg  5-10 mg Intravenous Q4HRS PRN Mario Woody M.D.       • guaiFENesin dextromethorphan (ROBITUSSIN DM) 100-10 MG/5ML syrup 10 mL  10 mL Oral Q6HRS PRN Mario Woody M.D.   10 mL at 11/10/21 0951       Fluids    Intake/Output Summary (Last 24 hours) at 11/12/2021 0741  Last data filed at 11/12/2021 0500  Gross per 24 hour   Intake 240 ml   Output 2295 ml   Net -2055 ml       Laboratory  Recent Labs     11/09/21  2333 11/10/21  2229 11/11/21  0445 11/12/21  0522   [id number]  --  7.34*  --   --    [id number]  --  87.1*  --   --    [id number]  --  58.8*  --   --    PSYQ3ZMH  --  89.1*  --   --    ARTHCO3  --  45*  --   --    FIO2  --  15*  --   --    ARTBE  --  15*  --   --    ISTATAPH 7.331*  --  7.349* 7.481   ISTATAPCO2 87.6*  --  97.6* 69.9*   ISTATAPO2 60*  --  73 48*   ISTATATCO2 49*  --  >50* >50*   [id number] 86*  --  92* 84*   ISTATARTHCO3 46.3*  --  53.7* 52.2*   ISTATARTBE 16*  --  23* 24*   ISTATTEMP 97.3 F  --  see below 96.5 F   ISTATFIO2  --   --  100 100   ISTATSPEC Arterial  --  Arterial Arterial   ISTATAPHTC 7.341*  --   --  7.499   [id number] 57*  --   --  44*         Recent Labs     11/10/21  0356 11/11/21  0500 11/12/21  0350   SODIUM 134* 136 134*   POTASSIUM 4.0 4.5 4.4   CHLORIDE 89* 87* 86*   CO2 42* 48* 40*   BUN 14 13 15   CREATININE 0.76 0.62 0.58   MAGNESIUM 1.8  --  1.8   PHOSPHORUS  --   --  1.4*   CALCIUM 8.4 8.5 8.4     Recent Labs     11/09/21  2100 11/09/21  2100 11/10/21  0356 11/11/21 0500 11/12/21 0350   ALTSGPT 22  --  21  --  14   ASTSGOT 27  --  26  --  15   ALKPHOSPHAT 51  --  46  --  40   TBILIRUBIN 0.3  --  0.3  --  0.6   GLUCOSE 129*   < > 111* 121* 112*    < > = values in this interval not displayed.     Recent Labs     11/09/21  2100 11/09/21  2100 11/10/21  0356 11/11/21  0500 11/12/21  0350   WBC 8.5   < > 7.0 8.9 6.7   NEUTSPOLYS 84.00*   < > 82.20* 88.00* 83.70*   LYMPHOCYTES 10.00*   < > 9.70* 7.20* 11.00*    MONOCYTES 2.00   < > 7.40 4.50 4.90   EOSINOPHILS 0.00   < > 0.00 0.00 0.00   BASOPHILS 0.00   < > 0.10 0.00 0.10   ASTSGOT 27  --  26  --  15   ALTSGPT 22  --  21  --  14   ALKPHOSPHAT 51  --  46  --  40   TBILIRUBIN 0.3  --  0.3  --  0.6    < > = values in this interval not displayed.     Recent Labs     11/10/21  0356 11/11/21  0500 11/12/21  0350   RBC 3.92* 4.09* 3.75*   HEMOGLOBIN 11.7* 12.1 11.2*   HEMATOCRIT 38.0 40.2 36.6*   PLATELETCT 98* 103* 101*       Imaging  X-Ray:  I have personally reviewed the images and compared with prior images.  CXR 11/9: cardiomegaly, interstitial basilar infiltrates L > R, unchanged    Assessment/Plan    * Acute on chronic respiratory failure with hypoxia and hypercapnia (HCC)- (present on admission)  Assessment & Plan  Swab positive on (11/9/2021)  Severe disease based on clinical criteria, inflammatory markers and comorbidities  Dexamethasone IV 6 mg daily x 10 days (date)  Baricitinib 11/11-  Cont lasix  16/8 hour proning/supine protocol, patient and nursing instructed  Judicious fluids  Cont rescue BIPAP, HFNC flow/ FiO2 to maintain SpO2 > 88% if mentating well and not fatiguing  Swab positive on (date)  Severe disease based on clinical criteria, inflammatory markers and comorbidities  Dexamethasone IV 6 mg daily x 10 days (date)  Remdesivir (date - )  Tocilizmab  lasix  16/8 hour proning/supine protocol, patient and nursing instructed  Judicious fluids  Titrate HFNC flow/ FiO2 to maintain SpO2 > 80% if mentating well and not fatiguing    Pneumonia due to COVID-19 virus- (present on admission)  Assessment & Plan  Plan of care as outlined above.    Advanced care planning/counseling discussion  Assessment & Plan  Pt requested to change code status to FULL CODE evening 11/11  Explained death is exceedingly likely if she were to require mechanical ventilation, and in the unlikely outcome of survival, she would most certainly experience significant morbidity and compromise  to quality of life.  Reconsult palliative care 11/12/2021     Chronic heart failure with preserved ejection fraction (HCC)  Assessment & Plan  TTE 10/30/2021: grossly normal LVEF, 55%, severe biatrial enlargement, RV not well visualized nor were the IVC, tricuspid or pulmonic valves.  Reported history of dilated cardiomyopathy and moderate pulmonary  BNP chronically elevated, currently ~900  Trace edema  Clinically slightly overloaded  Cont lasix 20mg IV BID  resume home spironolactone 25 and lopressor 12.5mg BID      AF (atrial fibrillation) ([facility])- (present on admission)  Assessment & Plan  Rate currently controlled  TTE 10/30/2021 showed grossly normal LVEF, 55%, severe biatrial, RV not well visualized nor were the IVC, tricuspid or pulmonic valves.  HD stable  Cont amiodarone and digoxin PO  Resume home lopressor at half dose 12.5mg PO BID  MTP 5mg IVP PRN HR > 120    History of COPD  Assessment & Plan  chart has COPD but no PFTS and no emphysema on CT  No wheezing on exam  No maintenance inhalers  duonebs q4H PRN    BMI 60.0-69.9, adult ([facility])  Assessment & Plan  With resultant OHS, restrictive respiratory mechanics  High risk comorbidity for severe disease in COVID  Would benefit from weight loss for long-term health risk reduction    Metabolic alkalosis with respiratory acidosis  Assessment & Plan  Chronic resp acidosis with compensatory metabolic alkalosis + contraction alkalosis  Now alkalemic  Spot dose diamox  Cont BIPAP QHS  Daily ABG    Bacteremia, coagulase-negative staphylococcal  Assessment & Plan  BCx 10/28 + staph hominus and epidermis. Repeat BCx 10/30 negative. 1/2 bottles 11/9 + again, + staph hominus  Negative MRSA, suspected contaminant.   vanco/zosyn 11/9-11/11.  Repeated blood cultures  11/12 to ensure clearance      JUAN CARLOS treated with BiPAP  Assessment & Plan  Cont PPV nightly    Essential hypertension- (present on admission)  Assessment & Plan  Resume lopressor 1/2 home dose, spironolactone  home dose     VTE:  Lovenox  Ulcer: H2 Antagonist  Lines: Mckeon Catheter  Ongoing indication addressed    I have performed a physical exam and reviewed and updated ROS and Plan today (11/12/2021). In review of yesterday's note (11/11/2021), there are no changes except as documented above.     Discussed patient condition and risk of morbidity and/or mortality with Family, RN, RT, Pharmacy, Code status disscussed, Patient and palliative care     The patient remains critically ill.  Critical care time = 96 minutes in directly providing and coordinating critical care and extensive data review.  No time overlap and excludes procedures.    This note was generated using voice recognition software which has a chance of producing errors of grammar and content.  I have made every reasonable attempt to find and correct any errors, but it should be expected that some may not be found prior to finalization of this note.  __________  Abdiel Camarillo MD  Pulmonary and Critical Care Medicine  [facility]

## 2021-11-12 NOTE — CARE PLAN
The patient is Watcher - Medium risk of patient condition declining or worsening    Shift Goals  Clinical Goals: maintain O2 sats around 90%  Patient Goals: rest    Progress made toward(s) clinical / shift goals:  Patient sleeping this AM. Lethargic but awakes to voice and answers questions appropriately.     Patient is not progressing towards the following goals: Patient transitioned to HFNC + 15L oxymask from bipap    Problem: Respiratory  Goal: Patient will achieve/maintain optimum respiratory ventilation and gas exchange  Outcome: Not Progressing     Problem: Psychosocial  Goal: Patient's level of anxiety will decrease  Outcome: Progressing   Patient sleeping this morning

## 2021-11-12 NOTE — PROGRESS NOTES
This afternoon patient transitioned to high flow NC per RT. Tolerating well with O2 sats high 80s-90s. Daughter in law in to visit briefly this afternoon and took home patient's lap top. Discussed with Dr. Camarillo patient's wishes to change code status to full. Patient tolerated PO meds this evening.

## 2021-11-12 NOTE — ACP (ADVANCE CARE PLANNING)
"Advance Care Planning Note    Discussion date:  11/11/2021  Discussion participants:  patient    The patient wishes to discuss Advanced Care Planning today and the following is a brief summary of our discussion.    Patient has capacity to make their own medical decisions.  Health Care Agent/Surrogate Decision Maker not documented in chart.    Documents of Advance Directives:  POLST and advanced directive    Communication of relevant diagnoses: COVID-19    Communication of prognosis: guarded    Communication of treatment goals/options: change of code status    Treatment decisions:  Change of code status. She would like to discuss further with her family (specifically her son) but in the meantime states she wants \"every attempt\" to be made including CPR and mechanical ventilation if needed. Will reconsult palliaitve care tomorrow    Code status:  attempt full resuscitation/full treatment      Time statement:  I discussed advance care planning with the patient for at least 20 minutes, including diagnosis, prognosis, plan of care, risks and benefits of any therapies that could be offered, as well as alternatives including palliation and hospice, as appropriate, exclusive of evaluation and management or other separately billable procedures.    Abdiel Camarillo M.D.  "

## 2021-11-13 LAB
ALBUMIN SERPL BCP-MCNC: 3 G/DL (ref 3.2–4.9)
ALBUMIN/GLOB SERPL: 0.7 G/DL
ALP SERPL-CCNC: 45 U/L (ref 30–99)
ALT SERPL-CCNC: 15 U/L (ref 2–50)
ANION GAP SERPL CALC-SCNC: 9 MMOL/L (ref 7–16)
AST SERPL-CCNC: 14 U/L (ref 12–45)
BASOPHILS # BLD AUTO: 0 % (ref 0–1.8)
BASOPHILS # BLD: 0 K/UL (ref 0–0.12)
BILIRUB SERPL-MCNC: 0.5 MG/DL (ref 0.1–1.5)
BUN SERPL-MCNC: 23 MG/DL (ref 8–22)
CALCIUM SERPL-MCNC: 9 MG/DL (ref 8.4–10.2)
CHLORIDE SERPL-SCNC: 90 MMOL/L (ref 96–112)
CO2 SERPL-SCNC: 36 MMOL/L (ref 20–33)
CREAT SERPL-MCNC: 0.79 MG/DL (ref 0.5–1.4)
CRP SERPL HS-MCNC: 10.67 MG/DL (ref 0–0.75)
EOSINOPHIL # BLD AUTO: 0.02 K/UL (ref 0–0.51)
EOSINOPHIL NFR BLD: 0.2 % (ref 0–6.9)
ERYTHROCYTE [DISTWIDTH] IN BLOOD BY AUTOMATED COUNT: 44.2 FL (ref 35.9–50)
GLOBULIN SER CALC-MCNC: 4.4 G/DL (ref 1.9–3.5)
GLUCOSE SERPL-MCNC: 108 MG/DL (ref 65–99)
HCT VFR BLD AUTO: 42 % (ref 37–47)
HGB BLD-MCNC: 12.7 G/DL (ref 12–16)
IMM GRANULOCYTES # BLD AUTO: 0.04 K/UL (ref 0–0.11)
IMM GRANULOCYTES NFR BLD AUTO: 0.5 % (ref 0–0.9)
LYMPHOCYTES # BLD AUTO: 0.82 K/UL (ref 1–4.8)
LYMPHOCYTES NFR BLD: 10.1 % (ref 22–41)
MAGNESIUM SERPL-MCNC: 2.3 MG/DL (ref 1.5–2.5)
MCH RBC QN AUTO: 29.4 PG (ref 27–33)
MCHC RBC AUTO-ENTMCNC: 30.2 G/DL (ref 33.6–35)
MCV RBC AUTO: 97.2 FL (ref 81.4–97.8)
MONOCYTES # BLD AUTO: 0.4 K/UL (ref 0–0.85)
MONOCYTES NFR BLD AUTO: 4.9 % (ref 0–13.4)
NEUTROPHILS # BLD AUTO: 6.84 K/UL (ref 2–7.15)
NEUTROPHILS NFR BLD: 84.3 % (ref 44–72)
NRBC # BLD AUTO: 0 K/UL
NRBC BLD-RTO: 0 /100 WBC
PHOSPHATE SERPL-MCNC: 4.2 MG/DL (ref 2.5–4.5)
PLATELET # BLD AUTO: 136 K/UL (ref 164–446)
PMV BLD AUTO: 12 FL (ref 9–12.9)
POTASSIUM SERPL-SCNC: 4.6 MMOL/L (ref 3.6–5.5)
PROCALCITONIN SERPL-MCNC: 0.03 NG/ML
PROT SERPL-MCNC: 7.4 G/DL (ref 6–8.2)
RBC # BLD AUTO: 4.32 M/UL (ref 4.2–5.4)
SODIUM SERPL-SCNC: 135 MMOL/L (ref 135–145)
WBC # BLD AUTO: 8.1 K/UL (ref 4.8–10.8)

## 2021-11-13 PROCEDURE — 84145 PROCALCITONIN (PCT): CPT

## 2021-11-13 PROCEDURE — 85025 COMPLETE CBC W/AUTO DIFF WBC: CPT

## 2021-11-13 PROCEDURE — 700102 HCHG RX REV CODE 250 W/ 637 OVERRIDE(OP): Performed by: INTERNAL MEDICINE

## 2021-11-13 PROCEDURE — 700102 HCHG RX REV CODE 250 W/ 637 OVERRIDE(OP): Performed by: HOSPITALIST

## 2021-11-13 PROCEDURE — A9270 NON-COVERED ITEM OR SERVICE: HCPCS | Performed by: HOSPITALIST

## 2021-11-13 PROCEDURE — 94760 N-INVAS EAR/PLS OXIMETRY 1: CPT

## 2021-11-13 PROCEDURE — 99291 CRITICAL CARE FIRST HOUR: CPT | Performed by: INTERNAL MEDICINE

## 2021-11-13 PROCEDURE — 80053 COMPREHEN METABOLIC PANEL: CPT

## 2021-11-13 PROCEDURE — A9270 NON-COVERED ITEM OR SERVICE: HCPCS | Performed by: INTERNAL MEDICINE

## 2021-11-13 PROCEDURE — 700111 HCHG RX REV CODE 636 W/ 250 OVERRIDE (IP): Performed by: INTERNAL MEDICINE

## 2021-11-13 PROCEDURE — 84100 ASSAY OF PHOSPHORUS: CPT

## 2021-11-13 PROCEDURE — 86140 C-REACTIVE PROTEIN: CPT

## 2021-11-13 PROCEDURE — 83735 ASSAY OF MAGNESIUM: CPT

## 2021-11-13 PROCEDURE — 94660 CPAP INITIATION&MGMT: CPT

## 2021-11-13 PROCEDURE — 770022 HCHG ROOM/CARE - ICU (200)

## 2021-11-13 RX ORDER — TRAZODONE HYDROCHLORIDE 50 MG/1
100 TABLET ORAL NIGHTLY PRN
Status: DISCONTINUED | OUTPATIENT
Start: 2021-11-13 | End: 2021-11-24 | Stop reason: HOSPADM

## 2021-11-13 RX ORDER — CHOLECALCIFEROL (VITAMIN D3) 125 MCG
5 CAPSULE ORAL ONCE
Status: COMPLETED | OUTPATIENT
Start: 2021-11-13 | End: 2021-11-13

## 2021-11-13 RX ORDER — CHOLECALCIFEROL (VITAMIN D3) 125 MCG
5 CAPSULE ORAL NIGHTLY
Status: DISCONTINUED | OUTPATIENT
Start: 2021-11-13 | End: 2021-11-24 | Stop reason: HOSPADM

## 2021-11-13 RX ADMIN — ENOXAPARIN SODIUM 60 MG: 60 INJECTION SUBCUTANEOUS at 17:00

## 2021-11-13 RX ADMIN — DEXAMETHASONE SODIUM PHOSPHATE 6 MG: 4 INJECTION, SOLUTION INTRA-ARTICULAR; INTRALESIONAL; INTRAMUSCULAR; INTRAVENOUS; SOFT TISSUE at 05:08

## 2021-11-13 RX ADMIN — MAGNESIUM HYDROXIDE 30 ML: 400 SUSPENSION ORAL at 16:58

## 2021-11-13 RX ADMIN — FUROSEMIDE 20 MG: 10 INJECTION, SOLUTION INTRAMUSCULAR; INTRAVENOUS at 05:08

## 2021-11-13 RX ADMIN — SENNOSIDES AND DOCUSATE SODIUM 2 TABLET: 50; 8.6 TABLET ORAL at 17:01

## 2021-11-13 RX ADMIN — METOPROLOL TARTRATE 12.5 MG: 25 TABLET, FILM COATED ORAL at 05:08

## 2021-11-13 RX ADMIN — DIGOXIN 250 MCG: 250 TABLET ORAL at 19:52

## 2021-11-13 RX ADMIN — Medication 5 MG: at 20:42

## 2021-11-13 RX ADMIN — FUROSEMIDE 20 MG: 10 INJECTION, SOLUTION INTRAMUSCULAR; INTRAVENOUS at 16:58

## 2021-11-13 RX ADMIN — FAMOTIDINE 20 MG: 10 INJECTION INTRAVENOUS at 17:01

## 2021-11-13 RX ADMIN — METOPROLOL TARTRATE 12.5 MG: 25 TABLET, FILM COATED ORAL at 17:01

## 2021-11-13 RX ADMIN — AMIODARONE HYDROCHLORIDE 200 MG: 200 TABLET ORAL at 05:08

## 2021-11-13 RX ADMIN — Medication 5 MG: at 06:14

## 2021-11-13 RX ADMIN — SPIRONOLACTONE 25 MG: 25 TABLET ORAL at 05:08

## 2021-11-13 RX ADMIN — ENOXAPARIN SODIUM 60 MG: 60 INJECTION SUBCUTANEOUS at 05:08

## 2021-11-13 RX ADMIN — FAMOTIDINE 20 MG: 10 INJECTION INTRAVENOUS at 05:08

## 2021-11-13 RX ADMIN — SENNOSIDES AND DOCUSATE SODIUM 2 TABLET: 50; 8.6 TABLET ORAL at 05:08

## 2021-11-13 ASSESSMENT — COGNITIVE AND FUNCTIONAL STATUS - GENERAL
SUGGESTED CMS G CODE MODIFIER MOBILITY: CL
WALKING IN HOSPITAL ROOM: TOTAL
TOILETING: A LOT
DRESSING REGULAR LOWER BODY CLOTHING: A LOT
MOBILITY SCORE: 10
MOVING TO AND FROM BED TO CHAIR: A LOT
TURNING FROM BACK TO SIDE WHILE IN FLAT BAD: A LOT
PERSONAL GROOMING: A LOT
CLIMB 3 TO 5 STEPS WITH RAILING: TOTAL
MOVING FROM LYING ON BACK TO SITTING ON SIDE OF FLAT BED: A LOT
DAILY ACTIVITIY SCORE: 11
EATING MEALS: TOTAL
SUGGESTED CMS G CODE MODIFIER DAILY ACTIVITY: CL
STANDING UP FROM CHAIR USING ARMS: A LOT
DRESSING REGULAR UPPER BODY CLOTHING: A LOT
HELP NEEDED FOR BATHING: A LOT

## 2021-11-13 ASSESSMENT — PAIN DESCRIPTION - PAIN TYPE
TYPE: ACUTE PAIN;CHRONIC PAIN

## 2021-11-13 ASSESSMENT — PULMONARY FUNCTION TESTS
EPAP_CMH2O: 10

## 2021-11-13 ASSESSMENT — FIBROSIS 4 INDEX: FIB4 SCORE: 1.65

## 2021-11-13 NOTE — PROGRESS NOTES
Critical Care Progress Note    Date of admission  11/9/2021    Chief Complaint  61 y.o. female admitted 11/9/2021 with acute on chronic mixed hypercapnic and hypoxemic respiratory failure due to COVID-19    Hospital Course  61 y.o. female with past medical history of atrial fibrillation, dilated cardiomyopathy, moderate pulmonary hypertension on digoxin and amiodarone, Body mass index is 61.42 kg/m².,  JUAN CARLOS/OHS on BIPAP and chronic hypoxemic respiratory failure on 3-4L at baseline who recently had a prolonged hospitalization for acute on chronic congestive heart failure and possible COPD exacerbation which improved with BIPAP and diuresis.  Echo on 10/30/2021 showed grossly normal LVEF, 55%, severe biatrial enlargement , RV not well visualized nor were the IVC, tricuspid or pulmonic valves. During her hospitalization, palliative care was consulted and CODE STATUS was changed to DNR/DNI and she was discharged to a skilled nursing facility for PT/OT on 11/9.     She was readmitted the same day 11/9/2021 as she tested positive for Covid 19.  Reportedly had no changes symptomatically since discharge aside from mildly increased shortness of breath increased fatigue.  Not vaccinated.  CXR unchanged.  ABG on admission 7.3 3/87/60.  Started on Decadron.  She was admitted to the floor, overnight RRT was called as patient became increasingly confused/disoriented.  Repeat ABG 7.34/97/73 on 100% FiO2.  Transferred to the ICU for rescue BiPAP.  Of note, BCx 10/28 + staph hominus and epidermis. Repeat BCx 10/30 negative. 1/2 bottles 11/9 + again, prelim staph. Negative MRSA, possible contaminant.     Interval Problem Update  Chart review from the past 24 hours includes imaging, laboratory studies, vital signs and notes available.  Pertinent data for today's visit includes    Family meeting held yesterday at bedside with patient and son (via phone, son sick)  Code status changed back to DNR/DNI, in line with POLST, see acp note  from 11/12  Desated on HFNC yesterday evening, transitioned to BIPAP  Fatigued    Cardiac: Atrial fibrillation on amiodarone, digoxin. Rates controlled. HD stable. lasix 20mg IV BID. TTE: grossly normal LVEF, 55%, severe biatrial enlargement, RV not well visualized nor was IVC, tricuspid or pulmonic valves.    Pulm: BiPAP 18/6 100% continuous. CXR 11/9: cardiomegaly, interstitial basilar infiltrates L > R, unchanged  Neuro: Intact, oriented, increasingly lethargic  Heme: Reviewed, stable thrombocytopenia, Hb stable, lovenox ppx  I/O: Cr < 1, contraction alkalosis with chronic compensated resp acidosis, -6.7L, resumed spironolactone, s/p diamox 11/12  ID:  Decadron 11/11-, baricitinib 11/11- .BCx 10/28 + staph hominus and epidermis. Repeat BCx 10/30 negative. 1/2 bottles 11/9 + again, staph hominus. Negative MRSA, likely contaminant. On vanco/zosyn 11/9-11/11. BCx 11/12 NGTD.  GI/endo: NPO, sips with meds ok while on BIPAP, tolerating ice chips, glucose at goal  Labs/Imaging: reviewed  Lines: yanci JAMA    Review of Systems  Review of Systems   Unable to perform ROS: Severe respiratory distress     Vital Signs for last 24 hours   Temp:  [35.9 °C (96.7 °F)-36.4 °C (97.6 °F)] 35.9 °C (96.7 °F)  Pulse:  [58-91] 71  Resp:  [19-57] 31  BP: ()/(56-80) 106/59  SpO2:  [81 %-91 %] 90 %    Physical Exam   Physical Exam  Vitals and nursing note reviewed.   Constitutional:       General: She is not in acute distress.     Appearance: Normal appearance. She is well-developed. She is obese. She is ill-appearing. She is not diaphoretic.      Comments: Fatigued, lethargic  Voice difficult to hear over bipap mask   Eyes:      General: No scleral icterus.        Right eye: No discharge.         Left eye: No discharge.      Conjunctiva/sclera: Conjunctivae normal.      Pupils: Pupils are equal, round, and reactive to light.   Neck:      Thyroid: No thyromegaly.      Vascular: No JVD.   Cardiovascular:      Rate and  Rhythm: Normal rate and regular rhythm.      Heart sounds: Normal heart sounds. No murmur heard.  No gallop.    Pulmonary:      Effort: Respiratory distress present.      Breath sounds: Normal breath sounds. No wheezing or rales.      Comments: Tripoding in moderate resp distress  Distant breath sounds diffusely  Abdominal:      General: There is no distension.      Palpations: Abdomen is soft.      Tenderness: There is no abdominal tenderness. There is no guarding.   Musculoskeletal:         General: No tenderness.      Right lower leg: Edema ( improving) present.      Left lower leg: Edema ( improving) present.   Lymphadenopathy:      Cervical: No cervical adenopathy.   Skin:     General: Skin is warm.      Capillary Refill: Capillary refill takes less than 2 seconds.      Coloration: Skin is pale.      Findings: No erythema or rash.   Neurological:      Mental Status: She is oriented to person, place, and time.      Cranial Nerves: No cranial nerve deficit.      Sensory: No sensory deficit.      Motor: No abnormal muscle tone.   Psychiatric:         Behavior: Behavior normal.       Medications  Current Facility-Administered Medications   Medication Dose Route Frequency Provider Last Rate Last Admin   • melatonin tablet 5 mg  5 mg Oral Nightly Nisa Bustos M.D.       • spironolactone (ALDACTONE) tablet 25 mg  25 mg Oral Q DAY Abdiel Camarillo M.D.   25 mg at 11/13/21 0508   • metoprolol tartrate (LOPRESSOR) tablet 12.5 mg  12.5 mg Oral TWICE DAILY Abdiel Camarillo M.D.   12.5 mg at 11/13/21 0508   • digoxin (LANOXIN) tablet 250 mcg  250 mcg Oral DAILY AT 1800 Abdiel Camarillo M.D.   250 mcg at 11/12/21 1738   • dexamethasone (DECADRON) injection 6 mg  6 mg Intravenous DAILY Abdiel Camarillo M.D.   6 mg at 11/13/21 0508   • famotidine (PEPCID) injection 20 mg  20 mg Intravenous BID Abdiel Camarillo M.D.   20 mg at 11/13/21 0508   • furosemide (LASIX) injection 20 mg  20 mg Intravenous BID DIURETIC  Abdiel Camarillo M.D.   20 mg at 11/13/21 0508   • Metoprolol Tartrate (LOPRESSOR) injection 5 mg  5 mg Intravenous Q HOUR PRN Abdiel Camarillo M.D.       • baricitinib (Olumiant) oral solution 4 mg  4 mg Enteral Tube DAILY AT 1800 Abdiel Camarillo M.D.   4 mg at 11/12/21 1738   • enoxaparin (LOVENOX) inj 60 mg  60 mg Subcutaneous Q12HRS Abdiel Camarillo M.D.   60 mg at 11/13/21 0508   • ipratropium-albuterol (DUONEB) nebulizer solution  3 mL Nebulization Q4H PRN (RT) Abdiel Camarillo M.D.       • amiodarone (Cordarone) tablet 200 mg  200 mg Oral Q DAY Abdiel Camarillo M.D.   200 mg at 11/13/21 0508   • traZODone (DESYREL) tablet 50 mg  50 mg Oral HS PRN Abdiel Camarillo M.D.   50 mg at 11/11/21 2008   • acetaminophen (Tylenol) tablet 650 mg  650 mg Oral Q6HRS PRN Asem CHADD Woody M.D.       • senna-docusate (PERICOLACE or SENOKOT S) 8.6-50 MG per tablet 2 Tablet  2 Tablet Oral BID Asem CHADD Woody M.D.   2 Tablet at 11/13/21 0508    And   • polyethylene glycol/lytes (MIRALAX) PACKET 1 Packet  1 Packet Oral QDAY PRN Asem CHADD Woody M.D.        And   • magnesium hydroxide (MILK OF MAGNESIA) suspension 30 mL  30 mL Oral QDAY PRN Asem CHADD Woody M.D.        And   • bisacodyl (DULCOLAX) suppository 10 mg  10 mg Rectal QDAY PRN Asem CHADD Woody M.D.       • Respiratory Therapy Consult   Nebulization Continuous RT Asem CHADD Woody M.D.       • ondansetron (ZOFRAN) syringe/vial injection 4 mg  4 mg Intravenous Q4HRS PRN Asem CHADD Woody M.D.   4 mg at 11/12/21 1559   • ondansetron (ZOFRAN ODT) dispertab 4 mg  4 mg Oral Q4HRS PRN Asem CHADD Woody M.D.       • promethazine (PHENERGAN) tablet 12.5-25 mg  12.5-25 mg Oral Q4HRS PRN Aseapril Woody M.D.       • promethazine (PHENERGAN) suppository 12.5-25 mg  12.5-25 mg Rectal Q4HRS PRN Aseapril Woody M.D.       • prochlorperazine (COMPAZINE) injection 5-10 mg  5-10 mg Intravenous Q4HRS PRN Asem CHADD Woody M.D.       • guaiFENesin dextromethorphan (ROBITUSSIN DM)  100-10 MG/5ML syrup 10 mL  10 mL Oral Q6HRS PRN Mario Woody M.D.   10 mL at 11/10/21 0951       Fluids    Intake/Output Summary (Last 24 hours) at 11/13/2021 1155  Last data filed at 11/13/2021 0800  Gross per 24 hour   Intake 1156.62 ml   Output 3565 ml   Net -2408.38 ml       Laboratory  Recent Labs     11/10/21  2229 11/11/21  0445 11/12/21  0522   NVLAL72D 7.34*  --   --    [id number] 87.1*  --   --    [id number] 58.8*  --   --    LXYL7PKH 89.1*  --   --    ARTHCO3 45*  --   --    FIO2 15*  --   --    ARTBE 15*  --   --    ISTATAPH  --  7.349* 7.481   ISTATAPCO2  --  97.6* 69.9*   ISTATAPO2  --  73 48*   ISTATATCO2  --  >50* >50*   [id number]  --  92* 84*   ISTATARTHCO3  --  53.7* 52.2*   ISTATARTBE  --  23* 24*   ISTATTEMP  --  see below 96.5 F   ISTATFIO2  --  100 100   ISTATSPEC  --  Arterial Arterial   ISTATAPHTC  --   --  7.499   [id number]  --   --  44*         Recent Labs     11/11/21  0500 11/12/21 0350 11/13/21 0350   SODIUM 136 134* 135   POTASSIUM 4.5 4.4 4.6   CHLORIDE 87* 86* 90*   CO2 48* 40* 36*   BUN 13 15 23*   CREATININE 0.62 0.58 0.79   MAGNESIUM  --  1.8 2.3   PHOSPHORUS  --  1.4* 4.2   CALCIUM 8.5 8.4 9.0     Recent Labs     11/11/21  0500 11/12/21 0350 11/13/21  0350   ALTSGPT  --  14 15   ASTSGOT  --  15 14   ALKPHOSPHAT  --  40 45   TBILIRUBIN  --  0.6 0.5   GLUCOSE 121* 112* 108*     Recent Labs     11/11/21  0500 11/12/21  0350 11/13/21  0350   WBC 8.9 6.7 8.1   NEUTSPOLYS 88.00* 83.70* 84.30*   LYMPHOCYTES 7.20* 11.00* 10.10*   MONOCYTES 4.50 4.90 4.90   EOSINOPHILS 0.00 0.00 0.20   BASOPHILS 0.00 0.10 0.00   ASTSGOT  --  15 14   ALTSGPT  --  14 15   ALKPHOSPHAT  --  40 45   TBILIRUBIN  --  0.6 0.5     Recent Labs     11/11/21  0500 11/12/21  0350 11/13/21  0350   RBC 4.09* 3.75* 4.32   HEMOGLOBIN 12.1 11.2* 12.7   HEMATOCRIT 40.2 36.6* 42.0   PLATELETCT 103* 101* 136*       Imaging  X-Ray:  No film today  CXR 11/9: cardiomegaly, interstitial basilar infiltrates L > R,  unchanged    Assessment/Plan    * Acute on chronic respiratory failure with hypoxia and hypercapnia (HCC)- (present on admission)  Assessment & Plan  Swab positive on (11/9/2021)  Severe disease based on clinical criteria, inflammatory markers and comorbidities  Dexamethasone IV 6 mg daily  Baricitinib 11/11-  Cont lasix 20mg IV BID, spirinolactone, Judicious fluids  16/8 hour proning/supine protocol, patient and nursing instructed although pt refuses despite counseling  Worsening , now BIPAP dependent, FiO2 100%  Procal remains negative    Pneumonia due to COVID-19 virus- (present on admission)  Assessment & Plan  Plan of care as outlined above.    Advanced care planning/counseling discussion  Assessment & Plan  Code status changed back to DNR/DNI on 11/12, see ACP note. Goals of care reflected on POLST from 11/1 stand    Chronic heart failure with preserved ejection fraction (HCC)  Assessment & Plan  TTE 10/30/2021: grossly normal LVEF, 55%, severe biatrial enlargement, RV not well visualized nor were the IVC, tricuspid or pulmonic valves.  Reported history of dilated cardiomyopathy and moderate pulmonary  BNP chronically elevated, currently ~900  Trace edema  Clinically slightly overloaded  Cont lasix 20mg IV BID, spironolactone 25 and lopressor 12.5mg BID      AF (atrial fibrillation) (HCC)- (present on admission)  Assessment & Plan  Rate currently controlled  TTE 10/30/2021 showed grossly normal LVEF, 55%, severe biatrial, RV not well visualized nor were the IVC, tricuspid or pulmonic valves.  HD stable  Cont amiodarone and digoxin PO  Cont lopressor 12.5mg PO BID  MTP 5mg IVP PRN HR > 120    History of COPD  Assessment & Plan  chart has COPD but no PFTS and no emphysema on CT  No wheezing on exam  No maintenance inhalers  duonebs q4H PRN    BMI 60.0-69.9, adult ([facility])  Assessment & Plan  With resultant OHS, restrictive respiratory mechanics  High risk comorbidity for severe disease in COVID  Would benefit from  weight loss for long-term health risk reduction    Metabolic alkalosis with respiratory acidosis  Assessment & Plan  Chronic resp acidosis with compensatory metabolic alkalosis + contraction alkalosis  Now alkalemic  Cont spot dose diamox PRN  Cont BIPAP    Bacteremia, coagulase-negative staphylococcal  Assessment & Plan  BCx 10/28 + staph hominus and epidermis. Repeat BCx 10/30 negative. 1/2 bottles 11/9 + again, + staph hominus  Negative MRSA, suspected contaminant.   vanco/zosyn 11/9-11/11.  Repeated blood cultures  11/12 NGTD, afebrile, procal < 0.05    JUAN CARLOS treated with BiPAP  Assessment & Plan  Cont PPV nightly    Essential hypertension- (present on admission)  Assessment & Plan  Cont lopressor 1/2 home dose, spironolactone home dose     VTE:  Lovenox  Ulcer: H2 Antagonist  Lines: Mckeon Catheter  Ongoing indication addressed    I have performed a physical exam and reviewed and updated ROS and Plan today (11/13/2021). In review of yesterday's note (11/12/2021), there are no changes except as documented above.     Discussed patient condition and risk of morbidity and/or mortality with RN, RT, Pharmacy, Code status disscussed and Patient     The patient remains critically ill.  Critical care time = 66 minutes in directly providing and coordinating critical care and extensive data review.  No time overlap and excludes procedures.    This note was generated using voice recognition software which has a chance of producing errors of grammar and content.  I have made every reasonable attempt to find and correct any errors, but it should be expected that some may not be found prior to finalization of this note.  __________  Abdiel Camarillo MD  Pulmonary and Critical Care Medicine  [facility]

## 2021-11-13 NOTE — THERAPY
Missed Therapy     Patient Name: Cheryl D Blakemore  Age:  61 y.o., Sex:  female  Medical Record #: [id number]  Today's Date: 11/13/2021 11/13/21 1324   Treatment Variance   Reason For Missed Therapy Medical - Patient Is Not Medically Stable   Interdisciplinary Plan of Care Collaboration   IDT Collaboration with  Nursing   Collaboration Comments Per discussion with RN, pt remains on BiPAP and is unable to participate in Clinical Swallow Evaluation. SLP will continue to hold and evaluate as appropriate.

## 2021-11-13 NOTE — PROGRESS NOTES
Patient transitioned from Bipap to HFNC and 15L oxymask per RT this AM. Patient tolerating ok. Sats in mid 80s. Ok for sats at 85% per Dr. Camarillo. Patient lethargic during morning assessment but has woken up throughout day and remains alert and oriented. Discussed patient condition with patient's family via telephone. Patient able to side lie this afternoon. Encouraged to remain in this patient for as long as tolerable.

## 2021-11-13 NOTE — CARE PLAN
Problem: Skin Integrity  Goal: Skin integrity is maintained or improved  Outcome: Progressing  Turning/repositioning, skin kept clean & dry. Interdry between skin folds.     Problem: Respiratory  Goal: Patient will achieve/maintain optimum respiratory ventilation and gas exchange  Outcome: Progressing  O2 sat maintained above 90% on bipap.      Problem: Fluid Volume  Goal: Fluid volume balance will be maintained  Outcome: Progressing   The patient is Watcher - Medium risk of patient condition declining or worsening    Shift Goals  Clinical Goals: increase O2 sats  Patient Goals: sleep

## 2021-11-13 NOTE — THERAPY
Missed Therapy     Patient Name: Cheryl D Blakemore  Age:  61 y.o., Sex:  female  Medical Record #: [id number]  Today's Date: 11/12/2021    Discussed missed therapy with RN     11/12/21 1614   Treatment Variance   Reason For Missed Therapy Medical - Patient Is Not Medically Stable   Total Time Spent   Total Time Spent (Mins) 5   Initial Contact Note    Initial Contact Note  Order Received and Verified, Speech Therapy Evaluation in Progress with Full Report to Follow.   Interdisciplinary Plan of Care Collaboration   IDT Collaboration with  Nursing   Collaboration Comments Attempted to see pt at this time for a clinical swallow evaluation. Pt currently on 60L at 100% Fi02 with 16 L oxymask and saturations fluctuating between high 70's to low 80's. RT at bedside to reposition pt to help with oxygenation so pt is not appropriate for the evaluation at this time. Per RN, pt to have palliative care consult soon to discuss POC. SLP to follow.

## 2021-11-13 NOTE — CONSULTS
Reason for PC Consult: Advance Care Planning    Consulted by:   Dr. Camarillo    Assessment:  General:   61 year old female admitted for covid on 11/9/21. Pt has a history of Afib, dilated cardiomyopathy, moderate pulmonary HTN, JUAN CARLOS/OHS on Bipap, chronic hypoxemic respiratory failure, 3-4L home O2, COPD, CHF, chronic pain and obesity (BMI 61.30). Pt presented to [facility] ED via EMS for worsening shortness of breath. Pt tested positive for Covid on arrival.     Prior PC Consult:   11/1/21    Social:   Pt resides with her son Jin and his wife Sherron. His friend Tex also lives in the home and helps with cooking and housekeeping. She tells PC she manages all ADLs like bathing/hygiene. She has another son Haresh who is incarcerated locally and a son Kane who is in [place]. Her mom Jammie is also in [place]. She has several grandchildren.    Dyspnea: Yes, 92 on 60L and 100% FiO2 via ETT  Last BM: 11/07/21    Pain: No    Depression: No    Dementia: No      Spiritual:  Is Jain or spirituality important for coping with this illness? No    Has a  or spiritual provider visit been requested? No    Palliative Performance Scale: 30%    Advance Directive: Advance Directive    DPOA: Yes, Eduard Hopkins  POLST: Yes      To locate the AD/POLST, please hover the cursor over the patient's code status to find all linked ACP documents.    Code Status: DNR/DNI    Outcome:  A well documented GOC discussion with pt occurred on 11/1/21.    Called daughter in law Sherron, introduced self and role of PC. She inquired about additional support in the home or having Jin be a paid caregiver for pt at home. Emailed resources to Sherron at [email or web address]. She denies any further needs but stated Jin might need something.    Called Jin, introduced self and role of PC. Jin reports that pt was discharged to SNF and test positive for Covid and they sent pt back to the hospital. Jin reports she was doing well, sitting up and  talking. Now she isn't doing so well.    Discussed clinical picture and concern for worsening oxygen needs. Discussed concerns for long term recovery and survival, explored his thoughts and feelings of clinical picture. iJn states pt did well last time when she had gotten a trach and was doing so well, that's why it is difficult to understand the sudden change in clinical status.    Jin states he is going to call his wife and will need to call PC RN back later today.    Active listening, education, validation, normalization, therapeutic touch, and emotional support provided throughout encounter.    Updated:   PC Team    Plan:   Continue GOC discussion as clinical picture unfolds.     Recommendations: I do not recommend an ethics or hospice consult at this time because GOC discussion is ongoing.    *Recommendation does not provide clinical appropriateness for hospice nor does it provide that the patient would or would not qualify for hospice services.*     Thank you for allowing Palliative Care to participate in this patient's care. Please feel free to call [phone] with any questions or concerns.

## 2021-11-13 NOTE — PROGRESS NOTES
Patient assisted with PO meds. Tolerated well. Placed back on bipap with assistance of RT. Code status discussed with patient, MD, and son via telephone.

## 2021-11-13 NOTE — DIETARY
Nutrition Services:   Pt has been NPO >2 days. Currently NPO except sips with meds due to need for BIPAP. Noted pt has POLST stating tube feed desired only if able to improve clinically. RD to monitor for diet advancement or need for nutrition support if within goals of care.

## 2021-11-13 NOTE — ACP (ADVANCE CARE PLANNING)
Advance Care Planning Note    Discussion date:  11/12/2021  Discussion participants:  patient and son (Jin, over phone)    The patient wishes to discuss Advanced Care Planning today and the following is a brief summary of our discussion.    Patient has capacity to make their own medical decisions.  Health Care Agent/Surrogate Decision Maker documented in chart.    Documents of Advance Directives:  POLST    Communication of relevant diagnoses: acute on chronic mixed hypercapnic and hypoxemic respiratory failure due to COVID-19    Communication of prognosis: guarded    Communication of treatment goals/options: continue current plan, change of code status and comfort care    Treatment decisions:  Revert back to goals of care outlined in POLST signed on 11/1/2021. Patient discussed with her son Jin and is agreeable to BIPAP but would not want to be intubated or undergo CPR given the understanding that the exceedingly likely outcome would be death and in the unlikely outcome of survival, would most certainly experience significant morbidity and compromise to quality of life.    Code status:  do not attempt resuscitation (DNAR)     Time statement:  I discussed advance care planning with the patient and patient's family for at least 30 minutes, including diagnosis, prognosis, plan of care, risks and benefits of any therapies that could be offered, as well as alternatives including palliation and hospice, as appropriate, exclusive of evaluation and management or other separately billable procedures.    This note was generated using voice recognition software which has a chance of producing errors of grammar and content.  I have made every reasonable attempt to find and correct any errors, but it should be expected that some may not be found prior to finalization of this note.  __________  Abdiel Camarillo MD  Pulmonary and Critical Care Medicine  [facility]

## 2021-11-14 LAB
ALBUMIN SERPL BCP-MCNC: 2.8 G/DL (ref 3.2–4.9)
ALBUMIN/GLOB SERPL: 0.7 G/DL
ALP SERPL-CCNC: 42 U/L (ref 30–99)
ALT SERPL-CCNC: 13 U/L (ref 2–50)
ANION GAP SERPL CALC-SCNC: 8 MMOL/L (ref 7–16)
AST SERPL-CCNC: 12 U/L (ref 12–45)
BACTERIA BLD CULT: NORMAL
BASOPHILS # BLD AUTO: 0.1 % (ref 0–1.8)
BASOPHILS # BLD: 0.01 K/UL (ref 0–0.12)
BILIRUB SERPL-MCNC: 0.5 MG/DL (ref 0.1–1.5)
BUN SERPL-MCNC: 27 MG/DL (ref 8–22)
CALCIUM SERPL-MCNC: 8.6 MG/DL (ref 8.4–10.2)
CHLORIDE SERPL-SCNC: 89 MMOL/L (ref 96–112)
CO2 SERPL-SCNC: 37 MMOL/L (ref 20–33)
CREAT SERPL-MCNC: 0.76 MG/DL (ref 0.5–1.4)
EOSINOPHIL # BLD AUTO: 0.01 K/UL (ref 0–0.51)
EOSINOPHIL NFR BLD: 0.1 % (ref 0–6.9)
ERYTHROCYTE [DISTWIDTH] IN BLOOD BY AUTOMATED COUNT: 43.7 FL (ref 35.9–50)
GLOBULIN SER CALC-MCNC: 4.2 G/DL (ref 1.9–3.5)
GLUCOSE SERPL-MCNC: 105 MG/DL (ref 65–99)
HCT VFR BLD AUTO: 42.4 % (ref 37–47)
HGB BLD-MCNC: 12.7 G/DL (ref 12–16)
IMM GRANULOCYTES # BLD AUTO: 0.03 K/UL (ref 0–0.11)
IMM GRANULOCYTES NFR BLD AUTO: 0.4 % (ref 0–0.9)
LYMPHOCYTES # BLD AUTO: 0.54 K/UL (ref 1–4.8)
LYMPHOCYTES NFR BLD: 6.7 % (ref 22–41)
MAGNESIUM SERPL-MCNC: 2.2 MG/DL (ref 1.5–2.5)
MCH RBC QN AUTO: 29 PG (ref 27–33)
MCHC RBC AUTO-ENTMCNC: 30 G/DL (ref 33.6–35)
MCV RBC AUTO: 96.8 FL (ref 81.4–97.8)
MONOCYTES # BLD AUTO: 0.53 K/UL (ref 0–0.85)
MONOCYTES NFR BLD AUTO: 6.6 % (ref 0–13.4)
NEUTROPHILS # BLD AUTO: 6.97 K/UL (ref 2–7.15)
NEUTROPHILS NFR BLD: 86.1 % (ref 44–72)
NRBC # BLD AUTO: 0 K/UL
NRBC BLD-RTO: 0 /100 WBC
PLATELET # BLD AUTO: 104 K/UL (ref 164–446)
PMV BLD AUTO: 12.6 FL (ref 9–12.9)
POTASSIUM SERPL-SCNC: 5 MMOL/L (ref 3.6–5.5)
PROT SERPL-MCNC: 7 G/DL (ref 6–8.2)
RBC # BLD AUTO: 4.38 M/UL (ref 4.2–5.4)
SIGNIFICANT IND 70042: NORMAL
SITE SITE: NORMAL
SODIUM SERPL-SCNC: 134 MMOL/L (ref 135–145)
SOURCE SOURCE: NORMAL
WBC # BLD AUTO: 8.1 K/UL (ref 4.8–10.8)

## 2021-11-14 PROCEDURE — 99291 CRITICAL CARE FIRST HOUR: CPT | Performed by: INTERNAL MEDICINE

## 2021-11-14 PROCEDURE — 83735 ASSAY OF MAGNESIUM: CPT

## 2021-11-14 PROCEDURE — 94660 CPAP INITIATION&MGMT: CPT

## 2021-11-14 PROCEDURE — 700101 HCHG RX REV CODE 250: Performed by: INTERNAL MEDICINE

## 2021-11-14 PROCEDURE — 700102 HCHG RX REV CODE 250 W/ 637 OVERRIDE(OP): Performed by: INTERNAL MEDICINE

## 2021-11-14 PROCEDURE — A9270 NON-COVERED ITEM OR SERVICE: HCPCS | Performed by: INTERNAL MEDICINE

## 2021-11-14 PROCEDURE — 770022 HCHG ROOM/CARE - ICU (200)

## 2021-11-14 PROCEDURE — 85025 COMPLETE CBC W/AUTO DIFF WBC: CPT

## 2021-11-14 PROCEDURE — 700102 HCHG RX REV CODE 250 W/ 637 OVERRIDE(OP): Performed by: HOSPITALIST

## 2021-11-14 PROCEDURE — 80053 COMPREHEN METABOLIC PANEL: CPT

## 2021-11-14 PROCEDURE — A9270 NON-COVERED ITEM OR SERVICE: HCPCS | Performed by: HOSPITALIST

## 2021-11-14 PROCEDURE — 700111 HCHG RX REV CODE 636 W/ 250 OVERRIDE (IP): Performed by: INTERNAL MEDICINE

## 2021-11-14 PROCEDURE — 94760 N-INVAS EAR/PLS OXIMETRY 1: CPT

## 2021-11-14 RX ORDER — FUROSEMIDE 10 MG/ML
40 INJECTION INTRAMUSCULAR; INTRAVENOUS
Status: DISCONTINUED | OUTPATIENT
Start: 2021-11-14 | End: 2021-11-15

## 2021-11-14 RX ADMIN — SPIRONOLACTONE 25 MG: 25 TABLET ORAL at 06:23

## 2021-11-14 RX ADMIN — METOPROLOL TARTRATE 12.5 MG: 25 TABLET, FILM COATED ORAL at 06:23

## 2021-11-14 RX ADMIN — ENOXAPARIN SODIUM 60 MG: 60 INJECTION SUBCUTANEOUS at 06:22

## 2021-11-14 RX ADMIN — BISACODYL 10 MG: 10 SUPPOSITORY RECTAL at 17:23

## 2021-11-14 RX ADMIN — TRAZODONE HYDROCHLORIDE 100 MG: 50 TABLET ORAL at 20:54

## 2021-11-14 RX ADMIN — METOPROLOL TARTRATE 5 MG: 5 INJECTION, SOLUTION INTRAVENOUS at 18:45

## 2021-11-14 RX ADMIN — FAMOTIDINE 20 MG: 10 INJECTION INTRAVENOUS at 06:23

## 2021-11-14 RX ADMIN — FAMOTIDINE 20 MG: 10 INJECTION INTRAVENOUS at 17:38

## 2021-11-14 RX ADMIN — SENNOSIDES AND DOCUSATE SODIUM 2 TABLET: 50; 8.6 TABLET ORAL at 06:23

## 2021-11-14 RX ADMIN — SENNOSIDES AND DOCUSATE SODIUM 2 TABLET: 50; 8.6 TABLET ORAL at 17:38

## 2021-11-14 RX ADMIN — METOPROLOL TARTRATE 12.5 MG: 25 TABLET, FILM COATED ORAL at 17:38

## 2021-11-14 RX ADMIN — ENOXAPARIN SODIUM 60 MG: 60 INJECTION SUBCUTANEOUS at 17:38

## 2021-11-14 RX ADMIN — AMIODARONE HYDROCHLORIDE 200 MG: 200 TABLET ORAL at 06:23

## 2021-11-14 RX ADMIN — DEXAMETHASONE SODIUM PHOSPHATE 6 MG: 4 INJECTION, SOLUTION INTRA-ARTICULAR; INTRALESIONAL; INTRAMUSCULAR; INTRAVENOUS; SOFT TISSUE at 06:22

## 2021-11-14 RX ADMIN — TRAZODONE HYDROCHLORIDE 100 MG: 50 TABLET ORAL at 00:21

## 2021-11-14 RX ADMIN — FUROSEMIDE 20 MG: 10 INJECTION, SOLUTION INTRAMUSCULAR; INTRAVENOUS at 06:23

## 2021-11-14 RX ADMIN — Medication 5 MG: at 20:54

## 2021-11-14 RX ADMIN — FUROSEMIDE 40 MG: 10 INJECTION, SOLUTION INTRAMUSCULAR; INTRAVENOUS at 17:23

## 2021-11-14 RX ADMIN — BARICITINIB 4 MG: 2 TABLET, FILM COATED ORAL at 17:38

## 2021-11-14 RX ADMIN — ACETAMINOPHEN 650 MG: 325 TABLET ORAL at 08:15

## 2021-11-14 RX ADMIN — METOPROLOL TARTRATE 5 MG: 5 INJECTION, SOLUTION INTRAVENOUS at 10:10

## 2021-11-14 RX ADMIN — DIGOXIN 250 MCG: 250 TABLET ORAL at 17:39

## 2021-11-14 ASSESSMENT — PULMONARY FUNCTION TESTS
EPAP_CMH2O: 10

## 2021-11-14 ASSESSMENT — PAIN DESCRIPTION - PAIN TYPE
TYPE: ACUTE PAIN

## 2021-11-14 ASSESSMENT — ENCOUNTER SYMPTOMS
SHORTNESS OF BREATH: 1
NERVOUS/ANXIOUS: 1

## 2021-11-14 ASSESSMENT — FIBROSIS 4 INDEX: FIB4 SCORE: 1.98

## 2021-11-14 NOTE — PROGRESS NOTES
Critical Care Progress Note    Date of admission  11/9/2021    Chief Complaint  61 y.o. female admitted 11/9/2021 with acute on chronic mixed hypercapnic and hypoxemic respiratory failure due to COVID-19    Hospital Course  61 y.o. female with past medical history of atrial fibrillation, dilated cardiomyopathy, moderate pulmonary hypertension on digoxin and amiodarone, Body mass index is 61.42 kg/m².,  JUAN CARLOS/OHS on BIPAP and chronic hypoxemic respiratory failure on 3-4L at baseline who recently had a prolonged hospitalization for acute on chronic congestive heart failure and possible COPD exacerbation which improved with BIPAP and diuresis.  Echo on 10/30/2021 showed grossly normal LVEF, 55%, severe biatrial enlargement , RV not well visualized nor were the IVC, tricuspid or pulmonic valves. During her hospitalization, palliative care was consulted and CODE STATUS was changed to DNR/DNI and she was discharged to a skilled nursing facility for PT/OT on 11/9.     She was readmitted the same day 11/9/2021 as she tested positive for Covid 19.  Reportedly had no changes symptomatically since discharge aside from mildly increased shortness of breath increased fatigue.  Not vaccinated.  CXR unchanged.  ABG on admission 7.3 3/87/60.  Started on Decadron.  She was admitted to the floor, overnight RRT was called as patient became increasingly confused/disoriented.  Repeat ABG 7.34/97/73 on 100% FiO2.  Transferred to the ICU for rescue BiPAP.  Of note, BCx 10/28 + staph hominus and epidermis. Repeat BCx 10/30 negative. 1/2 bottles 11/9 + again, prelim staph. Negative MRSA, possible contaminant.     11/12: Family meeting held at bedside with patient and son (via phone, son sick) Code status changed back to DNR/DNI, in line with POLST    Interval Problem Update  Chart review from the past 24 hours includes imaging, laboratory studies, vital signs and notes available.  Pertinent data for today's visit includes    Stuck on BIPAP,  desats immediately with any interruptions  CRP 10, on decadron, baricitinib    Cardiac: Atrial fibrillation on amiodarone, digoxin. Rates controlled. HD stable. lasix 20mg IV BID. TTE: grossly normal LVEF, 55%, severe biatrial enlargement, RV not well visualized nor was IVC, tricuspid or pulmonic valves.    Pulm: BiPAP 18/6 100% continuous. CXR 11/9: cardiomegaly, interstitial basilar infiltrates L > R, unchanged  Neuro: Intact, oriented, increasingly lethargic  Heme: Reviewed, stable thrombocytopenia, Hb stable, lovenox ppx  I/O: Cr < 1, contraction alkalosis with chronic compensated resp acidosis, -8.3L  ID:  Decadron 11/11-, baricitinib 11/11- .BCx 10/28 + staph hominus and epidermis. 1/2 bottles 11/9 + again, staph hominus. Negative MRSA, likely contaminant. On vanco/zosyn 11/9-11/11. BCx 11/12 NGTD.  GI/endo: soft diet/sips with meds ok while on BIPAP, tolerating ice chips, glucose at goal  Labs/Imaging: reviewed  Lines: BIPAP, pete    Review of Systems  Review of Systems   Constitutional:        Hungry  Anxious  Requesting water   Respiratory: Positive for shortness of breath.    Psychiatric/Behavioral: The patient is nervous/anxious.      Vital Signs for last 24 hours   Temp:  [36.1 °C (96.9 °F)-36.4 °C (97.5 °F)] 36.1 °C (96.9 °F)  Pulse:  [55-81] 70  Resp:  [21-36] 23  BP: ()/(57-90) 149/90  SpO2:  [84 %-92 %] 89 %    Physical Exam   Physical Exam  Vitals and nursing note reviewed.   Constitutional:       General: She is not in acute distress.     Appearance: Normal appearance. She is well-developed. She is obese. She is ill-appearing. She is not diaphoretic.      Comments: Fatigued, lethargic  Voice difficult to hear over bipap mask   Eyes:      General: No scleral icterus.        Right eye: No discharge.         Left eye: No discharge.      Conjunctiva/sclera: Conjunctivae normal.      Pupils: Pupils are equal, round, and reactive to light.   Neck:      Thyroid: No thyromegaly.       Vascular: No JVD.   Cardiovascular:      Rate and Rhythm: Normal rate and regular rhythm.      Heart sounds: Normal heart sounds. No murmur heard.  No gallop.    Pulmonary:      Effort: Respiratory distress present.      Breath sounds: Normal breath sounds. No wheezing or rales.      Comments: Tripoding in moderate resp distress  Distant breath sounds diffusely  Abdominal:      General: There is no distension.      Palpations: Abdomen is soft.      Tenderness: There is no abdominal tenderness. There is no guarding.   Musculoskeletal:         General: No tenderness.      Right lower leg: Edema ( improving) present.      Left lower leg: Edema ( improving) present.   Lymphadenopathy:      Cervical: No cervical adenopathy.   Skin:     General: Skin is warm.      Capillary Refill: Capillary refill takes less than 2 seconds.      Coloration: Skin is pale.      Findings: No erythema or rash.   Neurological:      Mental Status: She is oriented to person, place, and time.      Cranial Nerves: No cranial nerve deficit.      Sensory: No sensory deficit.      Motor: No abnormal muscle tone.   Psychiatric:      Comments: anxious       Medications  Current Facility-Administered Medications   Medication Dose Route Frequency Provider Last Rate Last Admin   • melatonin tablet 5 mg  5 mg Oral Nightly Nisa Bustos M.D.   5 mg at 11/13/21 2042   • baricitinib (Olumiant) tablet 4 mg  4 mg Oral DAILY AT 1800 Abdiel Camarillo M.D.       • traZODone (DESYREL) tablet 100 mg  100 mg Oral HS PRN Abdiel Camarillo M.D.   100 mg at 11/14/21 0021   • spironolactone (ALDACTONE) tablet 25 mg  25 mg Oral Q DAY Abdiel Camarillo M.D.   25 mg at 11/14/21 0623   • metoprolol tartrate (LOPRESSOR) tablet 12.5 mg  12.5 mg Oral TWICE DAILY Abdiel Camarillo M.D.   12.5 mg at 11/14/21 0623   • digoxin (LANOXIN) tablet 250 mcg  250 mcg Oral DAILY AT 1800 Abdiel Camarillo M.D.   250 mcg at 11/13/21 1952   • dexamethasone (DECADRON) injection 6  mg  6 mg Intravenous DAILY Abdiel Camarillo M.D.   6 mg at 11/14/21 0622   • famotidine (PEPCID) injection 20 mg  20 mg Intravenous BID Abdiel Camarillo M.D.   20 mg at 11/14/21 0623   • furosemide (LASIX) injection 20 mg  20 mg Intravenous BID DIURETIC Abdiel Camarillo M.D.   20 mg at 11/14/21 0623   • Metoprolol Tartrate (LOPRESSOR) injection 5 mg  5 mg Intravenous Q HOUR PRN Abdiel Camarillo M.D.       • enoxaparin (LOVENOX) inj 60 mg  60 mg Subcutaneous Q12HRS Abdiel Camarillo M.D.   60 mg at 11/14/21 0622   • ipratropium-albuterol (DUONEB) nebulizer solution  3 mL Nebulization Q4H PRN (RT) Abdiel Camarillo M.D.       • amiodarone (Cordarone) tablet 200 mg  200 mg Oral Q DAY Abdiel Camarillo M.D.   200 mg at 11/14/21 0623   • acetaminophen (Tylenol) tablet 650 mg  650 mg Oral Q6HRS PRN Asem CHADD Woody M.D.       • senna-docusate (PERICOLACE or SENOKOT S) 8.6-50 MG per tablet 2 Tablet  2 Tablet Oral BID Asem CHADD Woody M.D.   2 Tablet at 11/14/21 0623    And   • polyethylene glycol/lytes (MIRALAX) PACKET 1 Packet  1 Packet Oral QDAY PRN Asem CHADD Woody M.D.        And   • magnesium hydroxide (MILK OF MAGNESIA) suspension 30 mL  30 mL Oral QDAY PRN Asem CHADD Woody M.D.   30 mL at 11/13/21 1658    And   • bisacodyl (DULCOLAX) suppository 10 mg  10 mg Rectal QDAY PRN Asem CHADD Woody M.D.       • Respiratory Therapy Consult   Nebulization Continuous RT Asem CHADD Woody M.D.       • ondansetron (ZOFRAN) syringe/vial injection 4 mg  4 mg Intravenous Q4HRS PRN Asem CHADD Woody M.D.   4 mg at 11/12/21 1559   • ondansetron (ZOFRAN ODT) dispertab 4 mg  4 mg Oral Q4HRS PRN Asem CHADD Woody M.D.       • promethazine (PHENERGAN) tablet 12.5-25 mg  12.5-25 mg Oral Q4HRS PRN Aseapril Woody M.D.       • promethazine (PHENERGAN) suppository 12.5-25 mg  12.5-25 mg Rectal Q4HRS PRN Aseapril Woody M.D.       • prochlorperazine (COMPAZINE) injection 5-10 mg  5-10 mg Intravenous Q4HRS PRN Aseapril Woody M.D.       •  guaiFENesin dextromethorphan (ROBITUSSIN DM) 100-10 MG/5ML syrup 10 mL  10 mL Oral Q6HRS PRN Mario Woody M.D.   10 mL at 11/10/21 0951     Fluids    Intake/Output Summary (Last 24 hours) at 11/14/2021 0810  Last data filed at 11/14/2021 0800  Gross per 24 hour   Intake 0 ml   Output 1620 ml   Net -1620 ml       Laboratory  Recent Labs     11/12/21  0522   ISTATAPH 7.481   ISTATAPCO2 69.9*   ISTATAPO2 48*   ISTATATCO2 >50*   [id number] 84*   ISTATARTHCO3 52.2*   ISTATARTBE 24*   ISTATTEMP 96.5 F   ISTATFIO2 100   ISTATSPEC Arterial   ISTATAPHTC 7.499   [id number] 44*         Recent Labs     11/12/21  0350 11/13/21  0350 11/14/21  0445   SODIUM 134* 135 134*   POTASSIUM 4.4 4.6 5.0   CHLORIDE 86* 90* 89*   CO2 40* 36* 37*   BUN 15 23* 27*   CREATININE 0.58 0.79 0.76   MAGNESIUM 1.8 2.3 2.2   PHOSPHORUS 1.4* 4.2  --    CALCIUM 8.4 9.0 8.6     Recent Labs     11/12/21  0350 11/13/21  0350 11/14/21  0445   ALTSGPT 14 15 13   ASTSGOT 15 14 12   ALKPHOSPHAT 40 45 42   TBILIRUBIN 0.6 0.5 0.5   GLUCOSE 112* 108* 105*     Recent Labs     11/12/21  0350 11/13/21  0350 11/14/21  0345 11/14/21  0445   WBC 6.7 8.1 8.1  --    NEUTSPOLYS 83.70* 84.30* 86.10*  --    LYMPHOCYTES 11.00* 10.10* 6.70*  --    MONOCYTES 4.90 4.90 6.60  --    EOSINOPHILS 0.00 0.20 0.10  --    BASOPHILS 0.10 0.00 0.10  --    ASTSGOT 15 14  --  12   ALTSGPT 14 15  --  13   ALKPHOSPHAT 40 45  --  42   TBILIRUBIN 0.6 0.5  --  0.5     Recent Labs     11/12/21  0350 11/13/21  0350 11/14/21  0345   RBC 3.75* 4.32 4.38   HEMOGLOBIN 11.2* 12.7 12.7   HEMATOCRIT 36.6* 42.0 42.4   PLATELETCT 101* 136* 104*     Imaging  X-Ray:  No film today  CXR 11/9: cardiomegaly, interstitial basilar infiltrates L > R, unchanged    Assessment/Plan    * Acute on chronic respiratory failure with hypoxia and hypercapnia (HCC)- (present on admission)  Assessment & Plan  Swab positive on (11/9/2021)  Severe disease based on clinical criteria, inflammatory markers and  comorbidities  Dexamethasone IV 6 mg daily, Baricitinib 11/11-  Increase lasix 40mg IV BID, spirinolactone, Judicious fluids  16/8 hour proning/supine protocol, patient and nursing instructed although pt refuses despite counseling  Remains BIPAP dependent, FiO2 100%  CRP 10, Procal remains negative    Pneumonia due to COVID-19 virus- (present on admission)  Assessment & Plan  Plan of care as outlined above.    Advanced care planning/counseling discussion  Assessment & Plan  Code status changed back to DNR/DNI on 11/12, see ACP note. Goals of care reflected on POLST from 11/1 stands    Chronic heart failure with preserved ejection fraction ([facility])  Assessment & Plan  TTE 10/30/2021: grossly normal LVEF, 55%, severe biatrial enlargement, RV not well visualized nor were the IVC, tricuspid or pulmonic valves.  Reported history of dilated cardiomyopathy and moderate pulmonary  BNP chronically elevated, currently ~900  Trace edema  Clinically slightly overloaded  Increase lasix 40mg IV BID, spironolactone 25 and lopressor 12.5mg BID      AF (atrial fibrillation) ([facility])- (present on admission)  Assessment & Plan  Rate currently controlled  TTE 10/30/2021 showed grossly normal LVEF, 55%, severe biatrial, RV not well visualized nor were the IVC, tricuspid or pulmonic valves.  HD stable  Cont amiodarone and digoxin PO  Cont lopressor 12.5mg PO BID  MTP 5mg IVP PRN HR > 120    History of COPD  Assessment & Plan  chart has COPD but no PFTS and no emphysema on CT  No wheezing on exam  No maintenance inhalers  duonebs q4H PRN    BMI 60.0-69.9, adult ([facility])  Assessment & Plan  With resultant OHS, restrictive respiratory mechanics  High risk comorbidity for severe disease in COVID  Would benefit from weight loss for long-term health risk reduction    Metabolic alkalosis with respiratory acidosis  Assessment & Plan  Chronic resp acidosis with compensatory metabolic alkalosis + contraction alkalosis  Now alkalemic  Cont spot dose diamox  PRN  Cont BIPAP    Bacteremia, coagulase-negative staphylococcal  Assessment & Plan  BCx 10/28 + staph hominus and epidermis. Repeat BCx 10/30 negative. 1/2 bottles 11/9 + again, + staph hominus  Negative MRSA, suspected contaminant.   vanco/zosyn 11/9-11/11.  Repeated blood cultures  11/12 NGTD, afebrile, procal < 0.05    JUAN CARLOS treated with BiPAP  Assessment & Plan  Stuck on continuous BIPAP    Essential hypertension- (present on admission)  Assessment & Plan  Cont lopressor 1/2 home dose, spironolactone home dose     VTE:  Lovenox  Ulcer: H2 Antagonist  Lines: Mckeon Catheter  Ongoing indication addressed    I have performed a physical exam and reviewed and updated ROS and Plan today (11/14/2021). In review of yesterday's note (11/13/2021), there are no changes except as documented above.     Discussed patient condition and risk of morbidity and/or mortality with Family, RN, RT, Pharmacy, Code status disscussed and Patient. Spoke with Jin (son) and Sherron (Jin's wife) over phone and updated on condition.    The patient remains critically ill.  Critical care time = 51 minutes in directly providing and coordinating critical care and extensive data review.  No time overlap and excludes procedures.    This note was generated using voice recognition software which has a chance of producing errors of grammar and content.  I have made every reasonable attempt to find and correct any errors, but it should be expected that some may not be found prior to finalization of this note.  __________  Abdiel Camarillo MD  Pulmonary and Critical Care Medicine  [facility]

## 2021-11-14 NOTE — CARE PLAN
The patient is Watcher- medium risk of patient condition declining or worsening    Shift Goals  Clinical Goals: Decrease O2 needs, maintain adequate O2 sats  Patient Goals: rest  Family Goals: JOSE ANGEL no family present    Progress made toward(s) clinical / shift goals:     Problem: Skin Integrity  Goal: Skin integrity is maintained or improved  11/14/2021 0430 by Smita Priest, R.N.  Outcome: Progressing    Problem: Psychosocial  Goal: Patient's level of anxiety will decrease  Outcome: Progressing     Patient is not progressing towards the following goals:  Patient still requiring 100% on bipap.     Problem: Respiratory  Goal: Patient will achieve/maintain optimum respiratory ventilation and gas exchange  11/14/2021 0430 by Smita Priest, R.N.  Outcome: Not Progressing

## 2021-11-15 PROBLEM — I16.0 HYPERTENSIVE URGENCY: Status: ACTIVE | Noted: 2021-11-15

## 2021-11-15 LAB
ALBUMIN SERPL BCP-MCNC: 3 G/DL (ref 3.2–4.9)
ALBUMIN/GLOB SERPL: 0.7 G/DL
ALP SERPL-CCNC: 50 U/L (ref 30–99)
ALT SERPL-CCNC: 18 U/L (ref 2–50)
ANION GAP SERPL CALC-SCNC: 5 MMOL/L (ref 7–16)
AST SERPL-CCNC: 16 U/L (ref 12–45)
BACTERIA BLD CULT: ABNORMAL
BACTERIA BLD CULT: ABNORMAL
BASOPHILS # BLD AUTO: 0 % (ref 0–1.8)
BASOPHILS # BLD: 0 K/UL (ref 0–0.12)
BILIRUB SERPL-MCNC: 0.6 MG/DL (ref 0.1–1.5)
BUN SERPL-MCNC: 39 MG/DL (ref 8–22)
CALCIUM SERPL-MCNC: 9.1 MG/DL (ref 8.4–10.2)
CHLORIDE SERPL-SCNC: 89 MMOL/L (ref 96–112)
CO2 SERPL-SCNC: 43 MMOL/L (ref 20–33)
CREAT SERPL-MCNC: 1.04 MG/DL (ref 0.5–1.4)
EOSINOPHIL # BLD AUTO: 0.01 K/UL (ref 0–0.51)
EOSINOPHIL NFR BLD: 0.2 % (ref 0–6.9)
ERYTHROCYTE [DISTWIDTH] IN BLOOD BY AUTOMATED COUNT: 43.5 FL (ref 35.9–50)
GLOBULIN SER CALC-MCNC: 4.2 G/DL (ref 1.9–3.5)
GLUCOSE SERPL-MCNC: 121 MG/DL (ref 65–99)
HCT VFR BLD AUTO: 41.4 % (ref 37–47)
HGB BLD-MCNC: 12.7 G/DL (ref 12–16)
IMM GRANULOCYTES # BLD AUTO: 0.05 K/UL (ref 0–0.11)
IMM GRANULOCYTES NFR BLD AUTO: 0.8 % (ref 0–0.9)
LYMPHOCYTES # BLD AUTO: 0.69 K/UL (ref 1–4.8)
LYMPHOCYTES NFR BLD: 10.6 % (ref 22–41)
MAGNESIUM SERPL-MCNC: 2.3 MG/DL (ref 1.5–2.5)
MCH RBC QN AUTO: 29.5 PG (ref 27–33)
MCHC RBC AUTO-ENTMCNC: 30.7 G/DL (ref 33.6–35)
MCV RBC AUTO: 96.1 FL (ref 81.4–97.8)
MONOCYTES # BLD AUTO: 0.46 K/UL (ref 0–0.85)
MONOCYTES NFR BLD AUTO: 7 % (ref 0–13.4)
NEUTROPHILS # BLD AUTO: 5.32 K/UL (ref 2–7.15)
NEUTROPHILS NFR BLD: 81.4 % (ref 44–72)
NRBC # BLD AUTO: 0 K/UL
NRBC BLD-RTO: 0 /100 WBC
PHOSPHATE SERPL-MCNC: 4.1 MG/DL (ref 2.5–4.5)
PLATELET # BLD AUTO: 125 K/UL (ref 164–446)
PMV BLD AUTO: 12.6 FL (ref 9–12.9)
POTASSIUM SERPL-SCNC: 5 MMOL/L (ref 3.6–5.5)
PROT SERPL-MCNC: 7.2 G/DL (ref 6–8.2)
RBC # BLD AUTO: 4.31 M/UL (ref 4.2–5.4)
SIGNIFICANT IND 70042: ABNORMAL
SITE SITE: ABNORMAL
SODIUM SERPL-SCNC: 137 MMOL/L (ref 135–145)
SOURCE SOURCE: ABNORMAL
WBC # BLD AUTO: 6.5 K/UL (ref 4.8–10.8)

## 2021-11-15 PROCEDURE — 94660 CPAP INITIATION&MGMT: CPT

## 2021-11-15 PROCEDURE — A9270 NON-COVERED ITEM OR SERVICE: HCPCS | Performed by: INTERNAL MEDICINE

## 2021-11-15 PROCEDURE — 94640 AIRWAY INHALATION TREATMENT: CPT

## 2021-11-15 PROCEDURE — 94760 N-INVAS EAR/PLS OXIMETRY 1: CPT

## 2021-11-15 PROCEDURE — 700111 HCHG RX REV CODE 636 W/ 250 OVERRIDE (IP): Performed by: INTERNAL MEDICINE

## 2021-11-15 PROCEDURE — 700102 HCHG RX REV CODE 250 W/ 637 OVERRIDE(OP): Performed by: HOSPITALIST

## 2021-11-15 PROCEDURE — A9270 NON-COVERED ITEM OR SERVICE: HCPCS | Performed by: HOSPITALIST

## 2021-11-15 PROCEDURE — 99291 CRITICAL CARE FIRST HOUR: CPT | Performed by: INTERNAL MEDICINE

## 2021-11-15 PROCEDURE — 85025 COMPLETE CBC W/AUTO DIFF WBC: CPT

## 2021-11-15 PROCEDURE — 99292 CRITICAL CARE ADDL 30 MIN: CPT | Performed by: INTERNAL MEDICINE

## 2021-11-15 PROCEDURE — 770022 HCHG ROOM/CARE - ICU (200)

## 2021-11-15 PROCEDURE — 700102 HCHG RX REV CODE 250 W/ 637 OVERRIDE(OP): Performed by: INTERNAL MEDICINE

## 2021-11-15 PROCEDURE — 83735 ASSAY OF MAGNESIUM: CPT

## 2021-11-15 PROCEDURE — 700111 HCHG RX REV CODE 636 W/ 250 OVERRIDE (IP): Performed by: HOSPITALIST

## 2021-11-15 PROCEDURE — 80053 COMPREHEN METABOLIC PANEL: CPT

## 2021-11-15 PROCEDURE — 84100 ASSAY OF PHOSPHORUS: CPT

## 2021-11-15 RX ORDER — HYDRALAZINE HYDROCHLORIDE 20 MG/ML
10 INJECTION INTRAMUSCULAR; INTRAVENOUS EVERY 6 HOURS PRN
Status: DISCONTINUED | OUTPATIENT
Start: 2021-11-15 | End: 2021-11-24 | Stop reason: HOSPADM

## 2021-11-15 RX ADMIN — HYDRALAZINE HYDROCHLORIDE 10 MG: 20 INJECTION INTRAMUSCULAR; INTRAVENOUS at 18:26

## 2021-11-15 RX ADMIN — BARICITINIB 2 MG: 2 TABLET, FILM COATED ORAL at 18:23

## 2021-11-15 RX ADMIN — ENOXAPARIN SODIUM 60 MG: 60 INJECTION SUBCUTANEOUS at 18:24

## 2021-11-15 RX ADMIN — DEXAMETHASONE SODIUM PHOSPHATE 6 MG: 4 INJECTION, SOLUTION INTRA-ARTICULAR; INTRALESIONAL; INTRAMUSCULAR; INTRAVENOUS; SOFT TISSUE at 05:56

## 2021-11-15 RX ADMIN — FAMOTIDINE 20 MG: 10 INJECTION INTRAVENOUS at 05:56

## 2021-11-15 RX ADMIN — TRAZODONE HYDROCHLORIDE 100 MG: 50 TABLET ORAL at 20:13

## 2021-11-15 RX ADMIN — Medication 5 MG: at 20:14

## 2021-11-15 RX ADMIN — SPIRONOLACTONE 25 MG: 25 TABLET ORAL at 05:56

## 2021-11-15 RX ADMIN — GUAIFENESIN SYRUP AND DEXTROMETHORPHAN 10 ML: 100; 10 SYRUP ORAL at 22:30

## 2021-11-15 RX ADMIN — SENNOSIDES AND DOCUSATE SODIUM 2 TABLET: 50; 8.6 TABLET ORAL at 18:24

## 2021-11-15 RX ADMIN — ENOXAPARIN SODIUM 60 MG: 60 INJECTION SUBCUTANEOUS at 05:59

## 2021-11-15 RX ADMIN — DIGOXIN 250 MCG: 250 TABLET ORAL at 18:24

## 2021-11-15 RX ADMIN — METOPROLOL TARTRATE 12.5 MG: 25 TABLET, FILM COATED ORAL at 18:25

## 2021-11-15 RX ADMIN — HYDRALAZINE HYDROCHLORIDE 10 MG: 20 INJECTION INTRAMUSCULAR; INTRAVENOUS at 10:32

## 2021-11-15 RX ADMIN — SENNOSIDES AND DOCUSATE SODIUM 2 TABLET: 50; 8.6 TABLET ORAL at 05:56

## 2021-11-15 RX ADMIN — AMIODARONE HYDROCHLORIDE 200 MG: 200 TABLET ORAL at 05:56

## 2021-11-15 RX ADMIN — FUROSEMIDE 40 MG: 10 INJECTION, SOLUTION INTRAMUSCULAR; INTRAVENOUS at 05:56

## 2021-11-15 RX ADMIN — ONDANSETRON 4 MG: 4 TABLET, ORALLY DISINTEGRATING ORAL at 20:13

## 2021-11-15 RX ADMIN — METOPROLOL TARTRATE 12.5 MG: 25 TABLET, FILM COATED ORAL at 05:56

## 2021-11-15 ASSESSMENT — PULMONARY FUNCTION TESTS
EPAP_CMH2O: 10

## 2021-11-15 ASSESSMENT — PAIN DESCRIPTION - PAIN TYPE
TYPE: ACUTE PAIN

## 2021-11-15 ASSESSMENT — ENCOUNTER SYMPTOMS
NERVOUS/ANXIOUS: 1
SHORTNESS OF BREATH: 1

## 2021-11-15 NOTE — PROGRESS NOTES
Critical Care Progress Note    Date of admission  11/9/2021    Chief Complaint  61 y.o. female admitted 11/9/2021 with acute on chronic mixed hypercapnic and hypoxemic respiratory failure due to COVID-19    Hospital Course  61 y.o. female with past medical history of atrial fibrillation, dilated cardiomyopathy, moderate pulmonary hypertension on digoxin and amiodarone, Body mass index is 61.42 kg/m².,  JUAN CARLOS/OHS on BIPAP and chronic hypoxemic respiratory failure on 3-4L at baseline who recently had a prolonged hospitalization for acute on chronic congestive heart failure and possible COPD exacerbation which improved with BIPAP and diuresis.  Echo on 10/30/2021 showed grossly normal LVEF, 55%, severe biatrial enlargement , RV not well visualized nor were the IVC, tricuspid or pulmonic valves. During her hospitalization, palliative care was consulted and CODE STATUS was changed to DNR/DNI and she was discharged to a skilled nursing facility for PT/OT on 11/9.     She was readmitted the same day 11/9/2021 as she tested positive for Covid 19.  Reportedly had no changes symptomatically since discharge aside from mildly increased shortness of breath increased fatigue.  Not vaccinated.  CXR unchanged.  ABG on admission 7.3 3/87/60.  Started on Decadron.  She was admitted to the floor, overnight RRT was called as patient became increasingly confused/disoriented.  Repeat ABG 7.34/97/73 on 100% FiO2.  Transferred to the ICU for rescue BiPAP.  Of note, BCx 10/28 + staph hominus and epidermis. Repeat BCx 10/30 negative. 1/2 bottles 11/9 + again, prelim staph. Negative MRSA, possible contaminant.     11/12: Family meeting held at bedside with patient and son (via phone, son sick) Code status changed back to DNR/DNI, in line with POLST    Interval Problem Update  Chart review from the past 24 hours includes imaging, laboratory studies, vital signs and notes available.  Pertinent data for today's visit includes    Remains on  continuous  BIPAP, desats immediately with any interruptions  Was able to successfully prone yesterday after extensive discussion  Hypertensive overnight  Family meeting held with patient and daughter in law (Sherron). Pt reaffirmed DNR/DNI code status. She understands she is stuck on BIPAP and cannot take PO due to desaturations. She does not want to transition to comfort measures at this time.    Cardiac: Atrial fibrillation on amiodarone, digoxin. Rates well controlled. Hypertensive. TTE: grossly normal LVEF, 55%, severe biatrial enlargement, RV not well visualized nor was IVC, tricuspid or pulmonic valves.    Pulm: on continuous BiPAP 18/6 100%. CXR 11/9: cardiomegaly, interstitial basilar infiltrates L > R, unchanged  Neuro: increasingly lethargic, arousable to voice  Heme: stable thrombocytopenia, Hb stable, lovenox ppx  I/O: Cr uptrending > 1, worsening contraction alkalosis with chronic compensated resp acidosis, -10L  ID:  Decadron 11/11-, baricitinib 11/11- .BCx 10/28 + staph hominus and epidermis. 1/2 bottles 11/9 + again, staph hominus. On vanco/zosyn 11/9-11/11. BCx 11/12 remain NGTD.  GI/endo: soft diet/sips with meds ok, tolerating ice chips, glucose at goal  Labs/Imaging: reviewed  Lines: BIPAP, pete    Review of Systems  Review of Systems   Constitutional:        Hungry  Anxious  Requesting water   Respiratory: Positive for shortness of breath.    Psychiatric/Behavioral: The patient is nervous/anxious.      Vital Signs for last 24 hours   Temp:  [35.9 °C (96.6 °F)-36.5 °C (97.7 °F)] 36.4 °C (97.5 °F)  Pulse:  [58-94] 65  Resp:  [14-36] 14  BP: (105-192)/(63-96) 192/85  SpO2:  [83 %-96 %] 93 %    Physical Exam   Physical Exam  Vitals and nursing note reviewed.   Constitutional:       General: She is not in acute distress.     Appearance: Normal appearance. She is well-developed. She is obese. She is ill-appearing. She is not diaphoretic.      Comments: Fatigued, lethargic  Voice difficult to  hear over bipap mask   Eyes:      General: No scleral icterus.        Right eye: No discharge.         Left eye: No discharge.      Conjunctiva/sclera: Conjunctivae normal.      Pupils: Pupils are equal, round, and reactive to light.   Neck:      Thyroid: No thyromegaly.      Vascular: No JVD.   Cardiovascular:      Rate and Rhythm: Normal rate and regular rhythm.      Heart sounds: Normal heart sounds. No murmur heard.  No gallop.    Pulmonary:      Effort: Respiratory distress present.      Breath sounds: Normal breath sounds. No wheezing or rales.      Comments: Tripoding in moderate resp distress  Distant breath sounds diffusely  Abdominal:      General: There is no distension.      Palpations: Abdomen is soft.      Tenderness: There is no abdominal tenderness. There is no guarding.   Musculoskeletal:         General: No tenderness.      Right lower leg: Edema ( improving) present.      Left lower leg: Edema ( improving) present.   Lymphadenopathy:      Cervical: No cervical adenopathy.   Skin:     General: Skin is warm.      Capillary Refill: Capillary refill takes less than 2 seconds.      Coloration: Skin is pale.      Findings: No erythema or rash.   Neurological:      Mental Status: She is oriented to person, place, and time.      Cranial Nerves: No cranial nerve deficit.      Sensory: No sensory deficit.      Motor: No abnormal muscle tone.   Psychiatric:      Comments: anxious       Medications  Current Facility-Administered Medications   Medication Dose Route Frequency Provider Last Rate Last Admin   • baricitinib (Olumiant) tablet 2 mg  2 mg Oral DAILY AT 1800 Abdiel Camarillo M.D.       • furosemide (LASIX) injection 40 mg  40 mg Intravenous BID DIURETIC Abdiel Camarillo M.D.   40 mg at 11/15/21 0556   • melatonin tablet 5 mg  5 mg Oral Nightly Nisa Bustos M.D.   5 mg at 11/14/21 2054   • traZODone (DESYREL) tablet 100 mg  100 mg Oral HS PRN Abdiel Camarillo M.D.   100 mg at 11/14/21  2054   • spironolactone (ALDACTONE) tablet 25 mg  25 mg Oral Q DAY Abdiel Camarillo M.D.   25 mg at 11/15/21 0556   • metoprolol tartrate (LOPRESSOR) tablet 12.5 mg  12.5 mg Oral TWICE DAILY Abdiel Camarlilo M.D.   12.5 mg at 11/15/21 0556   • digoxin (LANOXIN) tablet 250 mcg  250 mcg Oral DAILY AT 1800 Abdiel Camarillo M.D.   250 mcg at 11/14/21 1739   • dexamethasone (DECADRON) injection 6 mg  6 mg Intravenous DAILY Abdiel Camarillo M.D.   6 mg at 11/15/21 0556   • famotidine (PEPCID) injection 20 mg  20 mg Intravenous BID Abdiel Camarillo M.D.   20 mg at 11/15/21 0556   • Metoprolol Tartrate (LOPRESSOR) injection 5 mg  5 mg Intravenous Q HOUR PRN Abdiel Camarillo M.D.   5 mg at 11/14/21 1845   • enoxaparin (LOVENOX) inj 60 mg  60 mg Subcutaneous Q12HRS Abdiel Camarillo M.D.   60 mg at 11/15/21 0559   • ipratropium-albuterol (DUONEB) nebulizer solution  3 mL Nebulization Q4H PRN (RT) Abdiel Camarillo M.D.       • amiodarone (Cordarone) tablet 200 mg  200 mg Oral Q DAY Abdiel Camarillo M.D.   200 mg at 11/15/21 0556   • acetaminophen (Tylenol) tablet 650 mg  650 mg Oral Q6HRS PRN Mario Woody M.D.   650 mg at 11/14/21 0815   • senna-docusate (PERICOLACE or SENOKOT S) 8.6-50 MG per tablet 2 Tablet  2 Tablet Oral BID Mario Woody M.D.   2 Tablet at 11/15/21 0556    And   • polyethylene glycol/lytes (MIRALAX) PACKET 1 Packet  1 Packet Oral QDAY PRN Mario Woody M.D.        And   • magnesium hydroxide (MILK OF MAGNESIA) suspension 30 mL  30 mL Oral QDAY PRN Mario Woody M.D.   30 mL at 11/13/21 1658    And   • bisacodyl (DULCOLAX) suppository 10 mg  10 mg Rectal QDAY PRN Mario Woody M.D.   10 mg at 11/14/21 1723   • Respiratory Therapy Consult   Nebulization Continuous RT Aseapril Woody M.D.       • ondansetron (ZOFRAN) syringe/vial injection 4 mg  4 mg Intravenous Q4HRS PRN Mario Woody M.D.   4 mg at 11/12/21 5559   • ondansetron (ZOFRAN ODT) dispertab 4 mg  4 mg Oral Q4HRS PRN  Mario Woody M.D.       • promethazine (PHENERGAN) tablet 12.5-25 mg  12.5-25 mg Oral Q4HRS PRN Mario Woody M.D.       • promethazine (PHENERGAN) suppository 12.5-25 mg  12.5-25 mg Rectal Q4HRS PRN Mario Woody M.D.       • prochlorperazine (COMPAZINE) injection 5-10 mg  5-10 mg Intravenous Q4HRS PRN Mario Woody M.D.       • guaiFENesin dextromethorphan (ROBITUSSIN DM) 100-10 MG/5ML syrup 10 mL  10 mL Oral Q6HRS PRN Mario Woody M.D.   10 mL at 11/10/21 0951     Fluids    Intake/Output Summary (Last 24 hours) at 11/15/2021 0757  Last data filed at 11/15/2021 0600  Gross per 24 hour   Intake --   Output 1775 ml   Net -1775 ml       Laboratory          Recent Labs     11/13/21 0350 11/14/21 0445 11/15/21  0425   SODIUM 135 134* 137   POTASSIUM 4.6 5.0 5.0   CHLORIDE 90* 89* 89*   CO2 36* 37* 43*   BUN 23* 27* 39*   CREATININE 0.79 0.76 1.04   MAGNESIUM 2.3 2.2 2.3   PHOSPHORUS 4.2  --  4.1   CALCIUM 9.0 8.6 9.1     Recent Labs     11/13/21  0350 11/14/21 0445 11/15/21  0425   ALTSGPT 15 13 18   ASTSGOT 14 12 16   ALKPHOSPHAT 45 42 50   TBILIRUBIN 0.5 0.5 0.6   GLUCOSE 108* 105* 121*     Recent Labs     11/13/21  0350 11/14/21  0345 11/14/21 0445 11/15/21  0425   WBC 8.1 8.1  --  6.5   NEUTSPOLYS 84.30* 86.10*  --  81.40*   LYMPHOCYTES 10.10* 6.70*  --  10.60*   MONOCYTES 4.90 6.60  --  7.00   EOSINOPHILS 0.20 0.10  --  0.20   BASOPHILS 0.00 0.10  --  0.00   ASTSGOT 14  --  12 16   ALTSGPT 15  --  13 18   ALKPHOSPHAT 45  --  42 50   TBILIRUBIN 0.5  --  0.5 0.6     Recent Labs     11/13/21  0350 11/14/21  0345 11/15/21  0425   RBC 4.32 4.38 4.31   HEMOGLOBIN 12.7 12.7 12.7   HEMATOCRIT 42.0 42.4 41.4   PLATELETCT 136* 104* 125*     Imaging  X-Ray:  No film today  CXR 11/9: cardiomegaly, interstitial basilar infiltrates L > R, unchanged    Assessment/Plan    * Acute on chronic respiratory failure with hypoxia and hypercapnia (HCC)- (present on admission)  Assessment & Plan  Swab positive on  (11/9/2021)  Severe disease based on clinical criteria, inflammatory markers and comorbidities  Dexamethasone IV 6 mg daily, Baricitinib 11/11-  DC lasix 40mg IV BID, spirinolactone due to DIANE  16/8 hour proning/supine protocol, patient and nursing instructed, patient agreeable and saturations improved, stable in low 90s  Remains BIPAP dependent, FiO2 slightly improved from 100 -> 80%  CRP 10, Procal remains negative    Pneumonia due to COVID-19 virus- (present on admission)  Assessment & Plan  Plan of care as outlined above.    Advanced care planning/counseling discussion  Assessment & Plan  Code status changed back to DNR/DNI on 11/12, see ACP note. Goals of care reflected on POLST from 11/1 stands  Family meeting held with patient and daughter in law (Sherron) on 11/15. Pt reaffirmed DNR/DNI code status. She understands she is stuck on BIPAP and cannot take PO due to desaturations. She does not want to transition to comfort measures at this time.    Chronic heart failure with preserved ejection fraction (HCC)  Assessment & Plan  TTE 10/30/2021: grossly normal LVEF, 55%, severe biatrial enlargement, RV not well visualized nor were the IVC, tricuspid or pulmonic valves.  Reported history of dilated cardiomyopathy and moderate pulmonary  BNP chronically elevated, currently ~900  Trace edema  Clinically slightly overloaded  DC lasix 40mg IV BID, spironolactone 25 due to diane      AF (atrial fibrillation) ([facility])- (present on admission)  Assessment & Plan  Rate currently controlled  TTE 10/30/2021 showed grossly normal LVEF, 55%, severe biatrial, RV not well visualized nor were the IVC, tricuspid or pulmonic valves.  HD stable  Cont amiodarone and digoxin PO  Cont lopressor 12.5mg PO BID  MTP 5mg IVP PRN HR > 120    Hypertensive urgency  Assessment & Plan  Add hydralazine 10mg IVP q6H PRN    History of COPD  Assessment & Plan  chart has COPD but no PFTS and no emphysema on CT  No wheezing on exam  No maintenance  inhalers  duonebs q4H PRN    BMI 60.0-69.9, adult (HCC)  Assessment & Plan  With resultant OHS, restrictive respiratory mechanics  High risk comorbidity for severe disease in COVID  Would benefit from weight loss for long-term health risk reduction    Metabolic alkalosis with respiratory acidosis  Assessment & Plan  Chronic resp acidosis with compensatory metabolic alkalosis + contraction alkalosis  Now alkalemic  Cont spot dose diamox PRN  Cont BIPAP    Bacteremia, coagulase-negative staphylococcal  Assessment & Plan  BCx 10/28 + staph hominus and epidermis. Repeat BCx 10/30 negative. 1/2 bottles 11/9 + again, + staph hominus  Negative MRSA, suspected contaminant.   vanco/zosyn 11/9-11/11.  Repeated blood cultures  11/12 NGTD, afebrile, procal < 0.05    JUAN CARLOS treated with BiPAP  Assessment & Plan  Stuck on continuous BIPAP    Essential hypertension- (present on admission)  Assessment & Plan  Cont lopressor 1/2 home dose, spironolactone home dose     VTE:  Lovenox  Ulcer: H2 Antagonist  Lines: Mckeon Catheter  Ongoing indication addressed    I have performed a physical exam and reviewed and updated ROS and Plan today (11/15/2021). In review of yesterday's note (11/14/2021), there are no changes except as documented above.     Discussed patient condition and risk of morbidity and/or mortality with Family, RN, RT, Pharmacy, Code status disscussed and Patient. Spoke with Jin (son) over phone and Sherron (Jin's wife) in person and updated on condition.    The patient remains critically ill.  Critical care time = 95 minutes in directly providing and coordinating critical care and extensive data review.  No time overlap and excludes procedures.    This note was generated using voice recognition software which has a chance of producing errors of grammar and content.  I have made every reasonable attempt to find and correct any errors, but it should be expected that some may not be found prior to finalization of this  note.  __________  Abdiel Camarillo MD  Pulmonary and Critical Care Medicine  [facility] hospital

## 2021-11-15 NOTE — PALLIATIVE CARE
Palliative Care follow-up  Phone call received from Dr. Camarillo requesting additional care conference with patient's family. Per Dr. Camarillo pt has been asking to remove BiPap in order to eat/drink but desaturates immediately and therefore the care team is unable to accomdate this. Patient has also been asking for pain and anxiety medication. However, she is deferring to her family for assistance in decision making.     PC RN called patient's son, Jin, to attempt to set up a time for a conference call. Jin states that his aunts (his mother's sisters) are asking him to go against his mothers wishes and he is conflicted about this. He states that he knows his mom is suffering and that he wants her to be comfortable. Jin plans to call back in 15 minutes.    Updated: MD    Plan: Continue GOC discussion.    Thank you for allowing Palliative Care to support this patient and family. Contact [phone] for additional assistance, change in patient status, or with any questions/concerns.

## 2021-11-15 NOTE — DISCHARGE PLANNING
Anticipated discharge disposition: TBD pending medical team collaboration.     Action: Discussed this pt in IDT rounds. Per MD family will have care conference today to discuss plan of care.   Addendum @1035   Notified that family/pt has requested AD paperwork   Addendum @5939  Provided AD paperwork to bedside RN to give to family/pt     Barriers to discharge: family/ pt decisions on plan of care    Plan: care coordination will continue to follow with care team for DC needs.

## 2021-11-15 NOTE — THERAPY
Missed Therapy     Patient Name: Cheryl D Blakemore  Age:  61 y.o., Sex:  female  Medical Record #: [id number]  Today's Date: 11/15/2021    Discussed missed therapy with RN       11/15/21 1228   Treatment Variance   Reason For Missed Therapy Medical - Patient Is Not Medically Stable   Total Time Spent   Total Time Spent (Mins) 3   Interdisciplinary Plan of Care Collaboration   IDT Collaboration with  Nursing   Collaboration Comments Pt currently laying on side with BiPAP on. SLP will continue to hold until medically appropriate to participate with PO trials/clinical swallow evaluation.

## 2021-11-15 NOTE — PROGRESS NOTES
Assumed pt care. Pt is alert and oriented X4. Pt.denied any pain. Assessment completed.Pt ob Bipap with 02 sats in the high 80s to low 90s.  Pt denied any needs. Safety precautions in place. Call light within reach. Pt instructed to notify RN of any needs. Hourly rounding in place.

## 2021-11-15 NOTE — CARE PLAN
The patient is Watcher - Medium risk of patient condition declining or worsening    Shift Goals  Clinical Goals: decrease oxygen demand   Patient Goals: sleep through night   Family Goals: JOSE ANGEL no family present    Progress made toward(s) clinical / shift goals:  Pt was able to prone with increases oxygen saturation to the mid 90s. Pt has not complained of anxiety.   Problem: Psychosocial  Goal: Patient's level of anxiety will decrease  Outcome: Progressing     Problem: Chest Tube Management  Goal: Complications related to chest tube will be avoided or minimized  Outcome: Progressing       Patient is not progressing towards the following goals:

## 2021-11-16 LAB
ALBUMIN SERPL BCP-MCNC: 3.2 G/DL (ref 3.2–4.9)
ALBUMIN/GLOB SERPL: 0.7 G/DL
ALP SERPL-CCNC: 52 U/L (ref 30–99)
ALT SERPL-CCNC: 24 U/L (ref 2–50)
ANION GAP SERPL CALC-SCNC: 5 MMOL/L (ref 7–16)
AST SERPL-CCNC: 19 U/L (ref 12–45)
BASOPHILS # BLD AUTO: 0.1 % (ref 0–1.8)
BASOPHILS # BLD: 0.01 K/UL (ref 0–0.12)
BILIRUB SERPL-MCNC: 0.7 MG/DL (ref 0.1–1.5)
BUN SERPL-MCNC: 49 MG/DL (ref 8–22)
CALCIUM SERPL-MCNC: 9.7 MG/DL (ref 8.4–10.2)
CHLORIDE SERPL-SCNC: 88 MMOL/L (ref 96–112)
CO2 SERPL-SCNC: 44 MMOL/L (ref 20–33)
CREAT SERPL-MCNC: 1.09 MG/DL (ref 0.5–1.4)
EOSINOPHIL # BLD AUTO: 0 K/UL (ref 0–0.51)
EOSINOPHIL NFR BLD: 0 % (ref 0–6.9)
ERYTHROCYTE [DISTWIDTH] IN BLOOD BY AUTOMATED COUNT: 43.5 FL (ref 35.9–50)
GLOBULIN SER CALC-MCNC: 4.6 G/DL (ref 1.9–3.5)
GLUCOSE SERPL-MCNC: 121 MG/DL (ref 65–99)
HCT VFR BLD AUTO: 46.8 % (ref 37–47)
HGB BLD-MCNC: 14.4 G/DL (ref 12–16)
IMM GRANULOCYTES # BLD AUTO: 0.07 K/UL (ref 0–0.11)
IMM GRANULOCYTES NFR BLD AUTO: 0.8 % (ref 0–0.9)
LYMPHOCYTES # BLD AUTO: 0.91 K/UL (ref 1–4.8)
LYMPHOCYTES NFR BLD: 9.9 % (ref 22–41)
MAGNESIUM SERPL-MCNC: 2.7 MG/DL (ref 1.5–2.5)
MCH RBC QN AUTO: 29.7 PG (ref 27–33)
MCHC RBC AUTO-ENTMCNC: 30.8 G/DL (ref 33.6–35)
MCV RBC AUTO: 96.5 FL (ref 81.4–97.8)
MONOCYTES # BLD AUTO: 0.66 K/UL (ref 0–0.85)
MONOCYTES NFR BLD AUTO: 7.2 % (ref 0–13.4)
NEUTROPHILS # BLD AUTO: 7.55 K/UL (ref 2–7.15)
NEUTROPHILS NFR BLD: 82 % (ref 44–72)
NRBC # BLD AUTO: 0 K/UL
NRBC BLD-RTO: 0 /100 WBC
PHOSPHATE SERPL-MCNC: 4.8 MG/DL (ref 2.5–4.5)
PLATELET # BLD AUTO: 136 K/UL (ref 164–446)
PMV BLD AUTO: 13 FL (ref 9–12.9)
POTASSIUM SERPL-SCNC: 5.1 MMOL/L (ref 3.6–5.5)
PROT SERPL-MCNC: 7.8 G/DL (ref 6–8.2)
RBC # BLD AUTO: 4.85 M/UL (ref 4.2–5.4)
SODIUM SERPL-SCNC: 137 MMOL/L (ref 135–145)
WBC # BLD AUTO: 9.2 K/UL (ref 4.8–10.8)

## 2021-11-16 PROCEDURE — 700102 HCHG RX REV CODE 250 W/ 637 OVERRIDE(OP): Performed by: HOSPITALIST

## 2021-11-16 PROCEDURE — 94760 N-INVAS EAR/PLS OXIMETRY 1: CPT

## 2021-11-16 PROCEDURE — 770022 HCHG ROOM/CARE - ICU (200)

## 2021-11-16 PROCEDURE — A9270 NON-COVERED ITEM OR SERVICE: HCPCS | Performed by: HOSPITALIST

## 2021-11-16 PROCEDURE — 700102 HCHG RX REV CODE 250 W/ 637 OVERRIDE(OP): Performed by: INTERNAL MEDICINE

## 2021-11-16 PROCEDURE — A9270 NON-COVERED ITEM OR SERVICE: HCPCS | Performed by: INTERNAL MEDICINE

## 2021-11-16 PROCEDURE — 92610 EVALUATE SWALLOWING FUNCTION: CPT

## 2021-11-16 PROCEDURE — 99291 CRITICAL CARE FIRST HOUR: CPT | Performed by: INTERNAL MEDICINE

## 2021-11-16 PROCEDURE — 94640 AIRWAY INHALATION TREATMENT: CPT

## 2021-11-16 PROCEDURE — 84100 ASSAY OF PHOSPHORUS: CPT

## 2021-11-16 PROCEDURE — 700111 HCHG RX REV CODE 636 W/ 250 OVERRIDE (IP): Performed by: HOSPITALIST

## 2021-11-16 PROCEDURE — 94660 CPAP INITIATION&MGMT: CPT

## 2021-11-16 PROCEDURE — 85025 COMPLETE CBC W/AUTO DIFF WBC: CPT

## 2021-11-16 PROCEDURE — 80053 COMPREHEN METABOLIC PANEL: CPT

## 2021-11-16 PROCEDURE — 700111 HCHG RX REV CODE 636 W/ 250 OVERRIDE (IP): Performed by: INTERNAL MEDICINE

## 2021-11-16 PROCEDURE — 83735 ASSAY OF MAGNESIUM: CPT

## 2021-11-16 RX ORDER — AMIODARONE HYDROCHLORIDE 200 MG/1
TABLET ORAL
Qty: 30 TABLET | Refills: 10 | Status: SHIPPED | OUTPATIENT
Start: 2021-11-16 | End: 2022-08-29 | Stop reason: SDUPTHER

## 2021-11-16 RX ORDER — SPIRONOLACTONE 25 MG/1
TABLET ORAL
Qty: 30 TABLET | Refills: 10 | Status: SHIPPED
Start: 2021-11-16 | End: 2021-11-24

## 2021-11-16 RX ORDER — DIGOXIN 250 MCG
TABLET ORAL
Qty: 30 TABLET | Refills: 10 | Status: SHIPPED | OUTPATIENT
Start: 2021-11-16 | End: 2022-08-29 | Stop reason: SDUPTHER

## 2021-11-16 RX ORDER — METOPROLOL TARTRATE 50 MG/1
TABLET, FILM COATED ORAL
Qty: 60 TABLET | Refills: 10 | Status: SHIPPED
Start: 2021-11-16 | End: 2021-11-24

## 2021-11-16 RX ORDER — GABAPENTIN 300 MG/1
CAPSULE ORAL
Qty: 60 CAPSULE | Refills: 10 | Status: SHIPPED
Start: 2021-11-16 | End: 2021-11-24

## 2021-11-16 RX ADMIN — METOPROLOL TARTRATE 12.5 MG: 25 TABLET, FILM COATED ORAL at 18:04

## 2021-11-16 RX ADMIN — ONDANSETRON 4 MG: 4 TABLET, ORALLY DISINTEGRATING ORAL at 00:27

## 2021-11-16 RX ADMIN — SENNOSIDES AND DOCUSATE SODIUM 2 TABLET: 50; 8.6 TABLET ORAL at 06:21

## 2021-11-16 RX ADMIN — AMIODARONE HYDROCHLORIDE 200 MG: 200 TABLET ORAL at 06:22

## 2021-11-16 RX ADMIN — DIGOXIN 250 MCG: 250 TABLET ORAL at 18:04

## 2021-11-16 RX ADMIN — SENNOSIDES AND DOCUSATE SODIUM 2 TABLET: 50; 8.6 TABLET ORAL at 18:04

## 2021-11-16 RX ADMIN — ENOXAPARIN SODIUM 60 MG: 60 INJECTION SUBCUTANEOUS at 18:05

## 2021-11-16 RX ADMIN — BARICITINIB 2 MG: 2 TABLET, FILM COATED ORAL at 18:05

## 2021-11-16 RX ADMIN — METOPROLOL TARTRATE 12.5 MG: 25 TABLET, FILM COATED ORAL at 06:21

## 2021-11-16 RX ADMIN — ENOXAPARIN SODIUM 60 MG: 60 INJECTION SUBCUTANEOUS at 06:20

## 2021-11-16 RX ADMIN — Medication 5 MG: at 21:11

## 2021-11-16 RX ADMIN — DEXAMETHASONE SODIUM PHOSPHATE 6 MG: 4 INJECTION, SOLUTION INTRA-ARTICULAR; INTRALESIONAL; INTRAMUSCULAR; INTRAVENOUS; SOFT TISSUE at 06:21

## 2021-11-16 ASSESSMENT — ENCOUNTER SYMPTOMS
NERVOUS/ANXIOUS: 1
SHORTNESS OF BREATH: 1
HEADACHES: 0
PALPITATIONS: 0

## 2021-11-16 ASSESSMENT — PAIN DESCRIPTION - PAIN TYPE
TYPE: ACUTE PAIN
TYPE: ACUTE PAIN

## 2021-11-16 ASSESSMENT — PULMONARY FUNCTION TESTS
EPAP_CMH2O: 10

## 2021-11-16 ASSESSMENT — FIBROSIS 4 INDEX: FIB4 SCORE: 1.87

## 2021-11-16 NOTE — PROGRESS NOTES
"Critical Care Progress Note    Date of admission  11/9/2021    Chief Complaint  61 y.o. female admitted 11/9/2021 with acute on chronic mixed hypercapnic and hypoxemic respiratory failure due to COVID-19    Hospital Course  61 y.o. female with past medical history of atrial fibrillation, dilated cardiomyopathy, moderate pulmonary hypertension on digoxin and amiodarone, Body mass index is 61.42 kg/m².,  JUAN CARLOS/OHS on BIPAP and chronic hypoxemic respiratory failure on 3-4L at baseline who recently had a prolonged hospitalization for acute on chronic congestive heart failure and possible COPD exacerbation which improved with BIPAP and diuresis.  Echo on 10/30/2021 showed grossly normal LVEF, 55%, severe biatrial enlargement , RV not well visualized nor were the IVC, tricuspid or pulmonic valves. During her hospitalization, palliative care was consulted and CODE STATUS was changed to DNR/DNI and she was discharged to a skilled nursing facility for PT/OT on 11/9.     She was readmitted the same day 11/9/2021 as she tested positive for Covid 19.  Reportedly had no changes symptomatically since discharge aside from mildly increased shortness of breath increased fatigue.  Not vaccinated.  CXR unchanged.  ABG on admission 7.3 3/87/60.  Started on Decadron.  She was admitted to the floor, overnight RRT was called as patient became increasingly confused/disoriented.  Repeat ABG 7.34/97/73 on 100% FiO2.  Transferred to the ICU for rescue BiPAP.  Of note, BCx 10/28 + staph hominus and epidermis. Repeat BCx 10/30 negative. 1/2 bottles 11/9 + again, prelim staph. Negative MRSA, possible contaminant.     11/12: Family meeting held at bedside with patient and son (via phone, son sick) Code status changed back to DNR/DNI, in line with POLST    11/16: Patient expressing wishes to be intubated if needed.  She wants to \"try everything\". I had an extensive discussion with her today. I was very honest with her about her poor chances of " survival and the high risk nature of intubating her. She seems understand these risks and wants to proceed with intubation. I also explained that intubation in the circumstances would be when I deem it is necessary and safety do so and it would likely take away valuable time that she could spend with her family well on BiPAP. I explained to her that once she is intubated she will not be communicative and therefore will likely not speak to her family again unless she is able to improve and be extubated. She gave me permission to speak to her daughter-in-law, Sherron. I called Sherron and explained the conversation to her as well. As the patient is currently alert and oriented x4 she is making her own decision and her CODE STATUS has been changed to full code to reflect this conversation.    Interval Problem Update  Chart review from the past 24 hours includes imaging, laboratory studies, vital signs and notes available.  Pertinent data for today's visit includes    Remains on continuous  BIPAP, plan to trial high flow nasal cannula today  Was able to successfully prone yesterday after extensive discussion  Hypertensive overnight    Cardiac: Atrial fibrillation on amiodarone, digoxin. Rates well controlled. Hypertensive. TTE: grossly normal LVEF, 55%, severe biatrial enlargement, RV not well visualized nor was IVC, tricuspid or pulmonic valves.    Pulm: on continuous BiPAP 18/6 100%. CXR 11/9: cardiomegaly, interstitial basilar infiltrates L > R, unchanged  Neuro: Awake and alert, conversant today.  Heme: stable thrombocytopenia, Hb stable, lovenox ppx  I/O: Cr uptrending > 1, worsening contraction alkalosis with chronic compensated resp acidosis-11.5  ID:  Decadron 11/11-, baricitinib 11/11- .BCx 10/28 + staph hominus and epidermis. 1/2 bottles 11/9 + again, staph hominus. On vanco/zosyn 11/9-11/11. BCx 11/12 remain NGTD.  GI/endo: soft diet/sips with meds ok, tolerating ice chips, glucose at  goal  Labs/Imaging: reviewed  Lines: yanci JAMA    Review of Systems  Review of Systems   Constitutional: Positive for malaise/fatigue.        Hungry  Anxious  Requesting water   Respiratory: Positive for shortness of breath.    Cardiovascular: Negative for chest pain and palpitations.   Neurological: Negative for headaches.   Psychiatric/Behavioral: The patient is nervous/anxious.      Vital Signs for last 24 hours   Temp:  [35.9 °C (96.6 °F)-36.7 °C (98 °F)] 36.3 °C (97.3 °F)  Pulse:  [57-92] 66  Resp:  [22-38] 25  BP: ()/(55-97) 124/65  SpO2:  [79 %-92 %] 90 %    Physical Exam   Physical Exam  Vitals and nursing note reviewed.   Constitutional:       General: She is not in acute distress.     Appearance: Normal appearance. She is well-developed. She is obese. She is ill-appearing. She is not diaphoretic.      Comments: Fatigued, lethargic  Voice difficult to hear over bipap mask   Eyes:      General: No scleral icterus.        Right eye: No discharge.         Left eye: No discharge.      Conjunctiva/sclera: Conjunctivae normal.      Pupils: Pupils are equal, round, and reactive to light.   Neck:      Thyroid: No thyromegaly.      Vascular: No JVD.   Cardiovascular:      Rate and Rhythm: Normal rate and regular rhythm.      Heart sounds: Normal heart sounds. No murmur heard.  No gallop.    Pulmonary:      Effort: No respiratory distress.      Breath sounds: Normal breath sounds. No wheezing or rales.      Comments: Distant breath sounds diffusely  Abdominal:      General: There is no distension.      Palpations: Abdomen is soft.      Tenderness: There is no abdominal tenderness. There is no guarding.   Musculoskeletal:         General: No tenderness.      Right lower leg: Edema ( improving) present.      Left lower leg: Edema ( improving) present.   Lymphadenopathy:      Cervical: No cervical adenopathy.   Skin:     General: Skin is warm.      Capillary Refill: Capillary refill takes less than 2 seconds.       Coloration: Skin is pale.      Findings: No erythema or rash.   Neurological:      Mental Status: She is oriented to person, place, and time.      Cranial Nerves: No cranial nerve deficit.      Sensory: No sensory deficit.      Motor: No abnormal muscle tone.   Psychiatric:      Comments: anxious       Medications  Current Facility-Administered Medications   Medication Dose Route Frequency Provider Last Rate Last Admin   • baricitinib (Olumiant) tablet 2 mg  2 mg Oral DAILY AT 1800 Abdiel Camarillo M.D.   2 mg at 11/15/21 1823   • hydrALAZINE (APRESOLINE) injection 10 mg  10 mg Intravenous Q6HRS PRN Abdiel Camarillo M.D.   10 mg at 11/15/21 1826   • melatonin tablet 5 mg  5 mg Oral Nightly Nisa Bustos M.D.   5 mg at 11/15/21 2014   • traZODone (DESYREL) tablet 100 mg  100 mg Oral HS PRN Abdiel Camarillo M.D.   100 mg at 11/15/21 2013   • metoprolol tartrate (LOPRESSOR) tablet 12.5 mg  12.5 mg Oral TWICE DAILY Abdiel Camarillo M.D.   12.5 mg at 11/16/21 0621   • digoxin (LANOXIN) tablet 250 mcg  250 mcg Oral DAILY AT 1800 Abdiel Camarillo M.D.   250 mcg at 11/15/21 1824   • dexamethasone (DECADRON) injection 6 mg  6 mg Intravenous DAILY Abdiel Camarillo M.D.   6 mg at 11/16/21 0621   • Metoprolol Tartrate (LOPRESSOR) injection 5 mg  5 mg Intravenous Q HOUR PRN Abdiel Camarillo M.D.   5 mg at 11/14/21 1845   • enoxaparin (LOVENOX) inj 60 mg  60 mg Subcutaneous Q12HRS Abdiel Camarillo M.D.   60 mg at 11/16/21 0620   • ipratropium-albuterol (DUONEB) nebulizer solution  3 mL Nebulization Q4H PRN (RT) Abdiel Camarillo M.D.       • amiodarone (Cordarone) tablet 200 mg  200 mg Oral Q DAY Abdiel Camarillo M.D.   200 mg at 11/16/21 0622   • acetaminophen (Tylenol) tablet 650 mg  650 mg Oral Q6HRS PRN Mario Woody M.D.   650 mg at 11/14/21 0815   • senna-docusate (PERICOLACE or SENOKOT S) 8.6-50 MG per tablet 2 Tablet  2 Tablet Oral BID Mario Woody M.D.   2 Tablet at 11/16/21 0621    And   •  polyethylene glycol/lytes (MIRALAX) PACKET 1 Packet  1 Packet Oral QDAY PRN Asem A ALONDRA Woody        And   • magnesium hydroxide (MILK OF MAGNESIA) suspension 30 mL  30 mL Oral QDAY PRN Asem A ALONDRA Woody   30 mL at 11/13/21 1658    And   • bisacodyl (DULCOLAX) suppository 10 mg  10 mg Rectal QDAY PRN Asem A ALONDRA Woody   10 mg at 11/14/21 1723   • Respiratory Therapy Consult   Nebulization Continuous RT Asem CHADD Woody M.D.       • ondansetron (ZOFRAN) syringe/vial injection 4 mg  4 mg Intravenous Q4HRS PRN Asem CHADD Woody M.D.   4 mg at 11/12/21 1559   • ondansetron (ZOFRAN ODT) dispertab 4 mg  4 mg Oral Q4HRS PRN Asem A ALONDRA Woody   4 mg at 11/16/21 0027   • promethazine (PHENERGAN) tablet 12.5-25 mg  12.5-25 mg Oral Q4HRS PRN Asem A ALONDRA Woody       • promethazine (PHENERGAN) suppository 12.5-25 mg  12.5-25 mg Rectal Q4HRS PRN Asem CHADD Woody M.D.       • prochlorperazine (COMPAZINE) injection 5-10 mg  5-10 mg Intravenous Q4HRS PRN Asem CHADD Woody M.D.       • guaiFENesin dextromethorphan (ROBITUSSIN DM) 100-10 MG/5ML syrup 10 mL  10 mL Oral Q6HRS PRN Asem A ALONDRA Woody   10 mL at 11/15/21 2230     Fluids    Intake/Output Summary (Last 24 hours) at 11/16/2021 1415  Last data filed at 11/16/2021 1400  Gross per 24 hour   Intake --   Output 1080 ml   Net -1080 ml       Laboratory          Recent Labs     11/14/21  0445 11/15/21  0425 11/16/21  0415   SODIUM 134* 137 137   POTASSIUM 5.0 5.0 5.1   CHLORIDE 89* 89* 88*   CO2 37* 43* 44*   BUN 27* 39* 49*   CREATININE 0.76 1.04 1.09   MAGNESIUM 2.2 2.3 2.7*   PHOSPHORUS  --  4.1 4.8*   CALCIUM 8.6 9.1 9.7     Recent Labs     11/14/21  0445 11/15/21  0425 11/16/21  0415   ALTSGPT 13 18 24   ASTSGOT 12 16 19   ALKPHOSPHAT 42 50 52   TBILIRUBIN 0.5 0.6 0.7   GLUCOSE 105* 121* 121*     Recent Labs     11/14/21  0345 11/14/21  0445 11/15/21  0425 11/16/21  0415   WBC 8.1  --  6.5 9.2   NEUTSPOLYS 86.10*  --  81.40* 82.00*   LYMPHOCYTES 6.70*  --   10.60* 9.90*   MONOCYTES 6.60  --  7.00 7.20   EOSINOPHILS 0.10  --  0.20 0.00   BASOPHILS 0.10  --  0.00 0.10   ASTSGOT  --  12 16 19   ALTSGPT  --  13 18 24   ALKPHOSPHAT  --  42 50 52   TBILIRUBIN  --  0.5 0.6 0.7     Recent Labs     11/14/21  0345 11/15/21  0425 11/16/21  0415   RBC 4.38 4.31 4.85   HEMOGLOBIN 12.7 12.7 14.4   HEMATOCRIT 42.4 41.4 46.8   PLATELETCT 104* 125* 136*     Imaging  X-Ray:  No film today  CXR 11/9: cardiomegaly, interstitial basilar infiltrates L > R, unchanged    Assessment/Plan    * Acute on chronic respiratory failure with hypoxia and hypercapnia (HCC)- (present on admission)  Assessment & Plan  COVID pneumonia with acute hypoxic respiratory failure  -- Date of initial positive test: 11/9/2021  -- Risk factors for severe covid - Morbid obesity, unvaccinated, CHF, possible PH, afib  -- Current COVID complications include DIANE, BIPAP dependent respiratory failure.   -- Covid specific therapies: Decadron (11/11-11/21)  -- Patient is a candidate for Baricitinib and started on 11/11  -- Oxygen support: BIPAP dependent at 70%  -- No ABG today  -- Patient in appropriate isolation with careful gowning and hand hygiene upon entrance and exit of room  -- Rescue maneuvers: Proning - encouraging patient to self-prone, ECMO - not an ecmo candidate due to super obesity.   -- Prophylaxis with lovenox      Metabolic alkalosis with respiratory acidosis  Assessment & Plan  Chronic resp acidosis with compensatory metabolic alkalosis + contraction alkalosis  Now alkalemic  Cont spot dose diamox PRN  Cont BIPAP    Chronic heart failure with preserved ejection fraction (HCC)  Assessment & Plan  TTE 10/30/2021: grossly normal LVEF, 55%, severe biatrial enlargement, RV not well visualized nor were the IVC, tricuspid or pulmonic valves.  Reported history of dilated cardiomyopathy and moderate pulmonary hypertension  BNP chronically elevated, currently ~900  Trace edema  Clinically slightly  overloaded  Holding lasix due to DIANE    Pneumonia due to COVID-19 virus- (present on admission)  Assessment & Plan  See plan for respiratory failure    Bacteremia, coagulase-negative staphylococcal  Assessment & Plan  BCx 10/28 + staph hominus and epidermis. Repeat BCx 10/30 negative. 1/2 bottles 11/9 + again, + staph hominus  Negative MRSA, suspected contaminant.   vanco/zosyn 11/9-11/11.  Repeated blood cultures  11/12 NGTD, afebrile, procal < 0.05    Advanced care planning/counseling discussion  Assessment & Plan  Patient has been thinking about her CODE STATUS for the last few days and has decided she wants to try intubation.   We discussed the high risk nature of her intubation and the likely poor prognosis on a ventilator.  She states she understands.     AF (atrial fibrillation) (HCC)- (present on admission)  Assessment & Plan  Rate currently controlled  TTE 10/30/2021 showed grossly normal LVEF, 55%, severe biatrial, RV not well visualized nor were the IVC, tricuspid or pulmonic valves.  HD stable  Cont amiodarone and digoxin PO  Cont lopressor 12.5mg PO BID  MTP 5mg IVP PRN HR > 120     VTE:  Lovenox  Ulcer: H2 Antagonist  Lines: Mckeon Catheter  Ongoing indication addressed    I have performed a physical exam and reviewed and updated ROS and Plan today (11/16/2021). In review of yesterday's note (11/15/2021), there are no changes except as documented above.     Discussed patient condition and risk of morbidity and/or mortality with Family, RN, RT, Pharmacy, Code status disscussed and Patient. Spoke with Jin (son) over phone and Sherron (Jin's wife) in person and updated on condition.    The patient remains critically ill.  Critical care time = 60 minutes in directly providing and coordinating critical care and extensive data review.  No time overlap and excludes procedures.    Glenny Mcgarry MD RD  Pulmonary and Critical Care    Available on Voalte

## 2021-11-16 NOTE — FLOWSHEET NOTE
11/16/21 1428   Events/Summary/Plan   Events/Summary/Plan Patient taken off BiPAP and placed on HFNC.   Vital Signs   Pulse 61   Respiration (!) 28   Pulse Oximetry 92 %   $ Pulse Oximetry (Spot Check) Yes   Respiratory Assessment   Level of Consciousness Alert   Oxygen   O2 (LPM) 60   FiO2% 100 %   O2 Delivery Device Heated High Flow Nasal Cannula   Aerosols   $ Aerosol Delivery Device Heated High Flow Nasal Cannula   Aerosol Temperature 31 °C (87.8 °F)

## 2021-11-16 NOTE — DISCHARGE PLANNING
Anticipated discharge disposition: LTACH when appropriate    Action: discussed this pt in IDT rounds, per MD she would like to hold off on LTACh order at this time as pt is still too acute.     Barriers to discharge: LTACH, medical clearance    Plan: care coordination will continue to follow for DC needs

## 2021-11-16 NOTE — ASSESSMENT & PLAN NOTE
TTE 10/30/2021: grossly normal LVEF, 55%, severe biatrial enlargement, RV not well visualized nor were the IVC, tricuspid or pulmonic valves.  Reported history of dilated cardiomyopathy and moderate pulmonary hypertension  BNP chronically elevated, currently ~900 -> 458  Latest Procal 0.03  Trace edema  Clinically slightly overloaded  Spot dosing lasix, however patient's renal function declines each time lasix is given

## 2021-11-16 NOTE — DIETARY
Nutrition Services: Brief Update    Oral diet started on 11/13, no PO intake recorded. Per RN, pt is not able to eat d/t oxygen needs on Bipap, plan to switch to HFNC and NRB mask. RN will try to feed pt and if unable to adequately eat, will discuss with Intensivist. SLP notes unable to assess multiple times d/t Bipap needs.    Recommendations/Plan:  1. Diet as tolerated per SLP. Will order Boost Plus BID to promote PO intake.  2. If pt is unable to eat adequately d/t oxygen needs, consider starting tubefeed as needed.    RD following.

## 2021-11-16 NOTE — ASSESSMENT & PLAN NOTE
COVID pneumonia with acute hypoxic respiratory failure  -- Date of initial positive test: 11/9/2021  -- Risk factors for severe covid - Morbid obesity, unvaccinated, CHF, possible PH, afib  -- Current COVID complications include DIANE, BIPAP dependent respiratory failure.   -- Covid specific therapies: Decadron (11/11-11/21)  -- Patient is a candidate for Baricitinib and started on 11/11  -- Oxygen support: BIPAP at night 70%, tolerating max high flow now during the day  -- No ABG today  -- Patient in appropriate isolation with careful gowning and hand hygiene upon entrance and exit of room  -- Rescue maneuvers: Proning - encouraging patient to self-prone, ECMO - not an ecmo candidate due to super obesity.   -- Prophylaxis with lovenox  -- Patient and family decided again on DNAR 11/18.    -- Precedex held due to bradycardia and hypotension, Xanax added  -- If respiratory distress develops a morphine drip should be started.  -- The patient is not able to make decisions independently and should she attempt to reverse her code status while in extremis this decision needs to be made in concert with her son and daughter-in-law.

## 2021-11-16 NOTE — CARE PLAN
Problem: Nutritional:  Goal: Achieve adequate nutritional intake  Description: Advance diet as tolerated. Patient will consume >50% of meals.  Outcome: Not Progressing

## 2021-11-16 NOTE — CARE PLAN
The patient is     Shift Goals  Clinical Goals: Improve O2 sats, proning  Patient Goals: comfort  Family Goals: JOSE ANGEL no family present    Progress made toward(s) clinical / shift goals:      Patient is not progressing towards the following goals:      Problem: Knowledge Deficit - Standard  Goal: Patient and family/care givers will demonstrate understanding of plan of care, disease process/condition, diagnostic tests and medications  Outcome: Not Progressing     Problem: Skin Integrity  Goal: Skin integrity is maintained or improved  Outcome: Not Progressing     Problem: Fall Risk  Goal: Patient will remain free from falls  Outcome: Not Progressing     Problem: Psychosocial  Goal: Patient's level of anxiety will decrease  Outcome: Not Progressing  Goal: Patient's ability to verbalize feelings about condition will improve  Outcome: Not Progressing  Goal: Patient's ability to re-evaluate and adapt role responsibilities will improve  Outcome: Not Progressing  Goal: Patient and family will demonstrate ability to cope with life altering diagnosis and/or procedure  Outcome: Not Progressing  Goal: Spiritual and cultural needs incorporated into hospitalization  Outcome: Not Progressing     Problem: Communication  Goal: The ability to communicate needs accurately and effectively will improve  Outcome: Not Progressing     Problem: Discharge Barriers/Planning  Goal: Patient's continuum of care needs are met  Outcome: Not Progressing     Problem: Hemodynamics  Goal: Patient's hemodynamics, fluid balance and neurologic status will be stable or improve  Outcome: Not Progressing     Problem: Respiratory  Goal: Patient will achieve/maintain optimum respiratory ventilation and gas exchange  Outcome: Not Progressing     Problem: Chest Tube Management  Goal: Complications related to chest tube will be avoided or minimized  Outcome: Not Progressing     Problem: Fluid Volume  Goal: Fluid volume balance will be maintained  Outcome: Not  Progressing     Problem: Mechanical Ventilation  Goal: Safe management of artificial airway and ventilation  Outcome: Not Progressing  Goal: Successful weaning off mechanical ventilator, spontaneously maintains adequate gas exchange  Outcome: Not Progressing  Goal: Patient will be able to express needs and understand communication  Outcome: Not Progressing     Problem: Dysphagia  Goal: Dysphagia will improve  Outcome: Not Progressing     Problem: Risk for Aspiration  Goal: Patient's risk for aspiration will be absent or decrease  Outcome: Not Progressing     Problem: Nutrition  Goal: Patient's nutritional and fluid intake will be adequate or improve  Outcome: Not Progressing  Goal: Enteral nutrition will be maintained or improve  Outcome: Not Progressing  Goal: Enteral nutrition will be maintained or improve  Outcome: Not Progressing     Problem: Urinary Elimination  Goal: Establish and maintain regular urinary output  Outcome: Not Progressing     Problem: Bowel Elimination  Goal: Establish and maintain regular bowel function  Outcome: Not Progressing     Problem: Gastrointestinal Irritability  Goal: Nausea and vomiting will be absent or improve  Outcome: Not Progressing  Goal: Diarrhea will be absent or improved  Outcome: Not Progressing     Problem: Rectal Tube  Goal: Fecal output will be contained and skin will remain free from irritation  Outcome: Not Progressing     Problem: Mobility  Goal: Patient's capacity to carry out activities will improve  Outcome: Not Progressing     Problem: Self Care  Goal: Patient will have the ability to perform ADLs independently or with assistance (bathe, groom, dress, toilet and feed)  Outcome: Not Progressing     Problem: Infection - Standard  Goal: Patient will remain free from infection  Outcome: Not Progressing     Problem: Wound/ / Incision Healing  Goal: Patient's wound/surgical incision will decrease in size and heals properly  Outcome: Not Progressing     Problem: Pain -  Standard  Goal: Alleviation of pain or a reduction in pain to the patient’s comfort goal  Outcome: Not Progressing     Problem: Care Map:  Optimal Outcome for the Pneumonia Patient  Goal: Collection and monitoring of appropriate tests and labs  Outcome: Not Progressing     Problem: Hemodynamics - Pneumonia  Goal: Patient's hemodynamics, fluid balance and neurologic status will be stable or improve  Outcome: Not Progressing

## 2021-11-16 NOTE — CARE PLAN
The patient is Watcher - Medium risk of patient condition declining or worsening    Shift Goals  Clinical Goals: Improve O2 sats, proning  Patient Goals: comfort  Family Goals: Family met with intensivist today to discuss goals of care.Supportive of patient & hopeful for improvement.    Progress made toward(s) clinical / shift goals:     Problem: Skin Integrity  Goal: Skin integrity is maintained or improved  11/15/2021 1718 by Kelley Brannon R.N.  Outcome: Progressing  Excoriation between skin folds improving. Interdry placed between all folds. Turning & repositioning q 2 hrs.      Problem: Fall Risk  Goal: Patient will remain free from falls  11/15/2021 1718 by Kelley Brannon R.N.  Outcome: Progressing  Call bell within reach, bed alarm set     Problem: Psychosocial  Goal: Patient's level of anxiety will decrease  11/15/2021 1718 by Kelley Brannon R.N.  Outcome: Progressing  Used ipad to put on pt's favorite tv show & pt reports feeling less anxious.      Problem: Respiratory  Goal: Patient will achieve/maintain optimum respiratory ventilation and gas exchange  11/15/2021 1718 by Kelley Brannon R.N.  Outcome: Progressing  FiO2 on bipap decreased from 100% to 70%, o2 sat maintained above 90%.     Problem: Bowel Elimination  Goal: Establish and maintain regular bowel function  Outcome: Progressing  Patient had bowel movement today     Problem: Mobility  Goal: Patient's capacity to carry out activities will improve  Outcome: Progressing   Patient encouraged to perform ROM and change positions. Proning intermittently throughout the day.

## 2021-11-16 NOTE — THERAPY
Speech Language Pathology   Clinical Swallow Evaluation     Patient Name: Cheryl D Blakemore  AGE:  61 y.o., SEX:  female  Medical Record #: [id number]  Today's Date: 11/16/2021          Assessment    Patient is 61 y.o. female admitted 11/9/2021 with acute on chronic mixed hypercapnic and hypoxemic respiratory failure due to COVID-19    CXR 11/9: cardiomegaly, interstitial basilar infiltrates L > R, unchanged    Patient participated in clinical swallow evaluation this date.     Pertinent Information    Respiratory status: Oxygen: 60 liters/minute via high flow nasal cannula at 100% FiO2  PO trials: thin liquids, liquidized, puree and soft and bite sized   Behavioral observations: Alert, Cooperative and A&Ox4  Drooling/excess secretions: Within Normal Limits    Oral Mechanism Exam:    Patient with symmetrical facial features. Tongue protrusion to midline. ROM and strength of oral musculature found to be WFL. Dentition of poor quality with no dentures. Patient reports a preference of soft solids.    Oral/Pharyngeal phase:    Patient with adequate oral acceptance and containment. Adequate bite and mastication of presented bolus. Presumed timely AP transit and timely swallow trigger. Vocal quality strong. Swallow trigger timely across consistencies. Patient with throat clearing following large bite of soft solids. Small bites were consumed with no overt s/sx of aspiration. Patient declined globus sensation.     Clinical Impressions:    Clinical signs of pharyngeal dysphagia, likely acute related to high flow nasal cannula, COVID+ and generalized weakness. Swallow prognosis is good. Instrumental swallow study is indicated should s/sx of aspiration persist.     Recommendations:    Initiation of diet minced and moist with thin liquids.     Swallowing instructions/precautions:   Supervision: Close supervision, patient may be left alone for less than 5 minutes at a time  Positioning: Seat fully upright and midline when  "eating  Medication: Whole with liquid wash  and Float whole in puree  Swallow Techniques: small bites and sips, alternate bites and sips and slow rate of pace    Plan    Recommend Speech Therapy 3 times per week until therapy goals are met for the following treatments:  Dysphagia Training.    Discharge Recommendations: Recommend post-acute placement for additional speech therapy services prior to discharge home    Objective       11/16/21 1543   Oral Motor Eval    Is Patient Able to Complete Oral Motor Eval Yes, Within Normal Limits   Laryngeal Function   Voice Quality Within Functional Limits   Volutional Cough Within Functional Limits   Excursion Upon Swallow Complete   Oral Food Presentation   Single Swallow Thin (0) Within Functional Limits   Serial Swallow Thin (0) Within Functional Limits   Liquidised (3) Within Functional Limits   Soft & Bite-Sized (6) - (Dysphagia III) Minimal   Self Feeding Independent   Tracheostomy   Tracheostomy  No   Dysphagia Strategies / Recommendations   Strategies / Interventions Recommended (Yes / No) Yes   Compensatory Strategies Direct Supervision During Meals   Diet / Liquid Recommendation Thin (0);Minced & Moist (5) - (Dysphagia II)   Medication Administration  Crush all Medications in Puree;Float Whole with Puree   Therapy Interventions Dysphagia Therapy By Speech Language Pathologist   Dysphagia Rating   Nutritional Liquid Intake Rating Scale Non thickened beverages   Nutritional Food Intake Rating Scale Total oral diet with multiple consistencies but requiring special preparation or compensations   Patient / Family Goals   Patient / Family Goal #1 \"this tastes so good\"   Short Term Goals   Short Term Goal # 1 The patient will consume MM5/TN0 meal with no overt s/sx of aspiration          "

## 2021-11-16 NOTE — PROGRESS NOTES
Pt had a good night she watched tv with the ipad,  She states that it helps to keep her calm.  She is on bipap with 70 % o2 and sats are in high 80s, to low 90s.  She is refussing to turn and likes to be on her left side,

## 2021-11-17 LAB
ALBUMIN SERPL BCP-MCNC: 3.3 G/DL (ref 3.2–4.9)
ALBUMIN/GLOB SERPL: 0.9 G/DL
ALP SERPL-CCNC: 50 U/L (ref 30–99)
ALT SERPL-CCNC: 24 U/L (ref 2–50)
ANION GAP SERPL CALC-SCNC: 6 MMOL/L (ref 7–16)
AST SERPL-CCNC: 18 U/L (ref 12–45)
BACTERIA BLD CULT: NORMAL
BACTERIA BLD CULT: NORMAL
BASOPHILS # BLD AUTO: 0.1 % (ref 0–1.8)
BASOPHILS # BLD: 0.01 K/UL (ref 0–0.12)
BILIRUB SERPL-MCNC: 0.6 MG/DL (ref 0.1–1.5)
BUN SERPL-MCNC: 49 MG/DL (ref 8–22)
CALCIUM SERPL-MCNC: 9.4 MG/DL (ref 8.4–10.2)
CHLORIDE SERPL-SCNC: 90 MMOL/L (ref 96–112)
CO2 SERPL-SCNC: 42 MMOL/L (ref 20–33)
CREAT SERPL-MCNC: 0.9 MG/DL (ref 0.5–1.4)
EOSINOPHIL # BLD AUTO: 0 K/UL (ref 0–0.51)
EOSINOPHIL NFR BLD: 0 % (ref 0–6.9)
ERYTHROCYTE [DISTWIDTH] IN BLOOD BY AUTOMATED COUNT: 41.9 FL (ref 35.9–50)
GLOBULIN SER CALC-MCNC: 3.8 G/DL (ref 1.9–3.5)
GLUCOSE SERPL-MCNC: 120 MG/DL (ref 65–99)
HCT VFR BLD AUTO: 45.6 % (ref 37–47)
HGB BLD-MCNC: 13.7 G/DL (ref 12–16)
IMM GRANULOCYTES # BLD AUTO: 0.08 K/UL (ref 0–0.11)
IMM GRANULOCYTES NFR BLD AUTO: 0.9 % (ref 0–0.9)
LYMPHOCYTES # BLD AUTO: 0.82 K/UL (ref 1–4.8)
LYMPHOCYTES NFR BLD: 9.3 % (ref 22–41)
MCH RBC QN AUTO: 28.9 PG (ref 27–33)
MCHC RBC AUTO-ENTMCNC: 30 G/DL (ref 33.6–35)
MCV RBC AUTO: 96.2 FL (ref 81.4–97.8)
MONOCYTES # BLD AUTO: 0.7 K/UL (ref 0–0.85)
MONOCYTES NFR BLD AUTO: 7.9 % (ref 0–13.4)
NEUTROPHILS # BLD AUTO: 7.23 K/UL (ref 2–7.15)
NEUTROPHILS NFR BLD: 81.8 % (ref 44–72)
NRBC # BLD AUTO: 0 K/UL
NRBC BLD-RTO: 0 /100 WBC
NT-PROBNP SERPL IA-MCNC: 458 PG/ML (ref 0–125)
PLATELET # BLD AUTO: 147 K/UL (ref 164–446)
PMV BLD AUTO: 13 FL (ref 9–12.9)
POTASSIUM SERPL-SCNC: 5.2 MMOL/L (ref 3.6–5.5)
PROCALCITONIN SERPL-MCNC: 0.03 NG/ML
PROT SERPL-MCNC: 7.1 G/DL (ref 6–8.2)
RBC # BLD AUTO: 4.74 M/UL (ref 4.2–5.4)
SIGNIFICANT IND 70042: NORMAL
SIGNIFICANT IND 70042: NORMAL
SITE SITE: NORMAL
SITE SITE: NORMAL
SODIUM SERPL-SCNC: 138 MMOL/L (ref 135–145)
SOURCE SOURCE: NORMAL
SOURCE SOURCE: NORMAL
WBC # BLD AUTO: 8.8 K/UL (ref 4.8–10.8)

## 2021-11-17 PROCEDURE — 94760 N-INVAS EAR/PLS OXIMETRY 1: CPT

## 2021-11-17 PROCEDURE — 85025 COMPLETE CBC W/AUTO DIFF WBC: CPT

## 2021-11-17 PROCEDURE — 80053 COMPREHEN METABOLIC PANEL: CPT

## 2021-11-17 PROCEDURE — 84145 PROCALCITONIN (PCT): CPT

## 2021-11-17 PROCEDURE — A9270 NON-COVERED ITEM OR SERVICE: HCPCS | Performed by: HOSPITALIST

## 2021-11-17 PROCEDURE — 94660 CPAP INITIATION&MGMT: CPT

## 2021-11-17 PROCEDURE — A9270 NON-COVERED ITEM OR SERVICE: HCPCS | Performed by: INTERNAL MEDICINE

## 2021-11-17 PROCEDURE — 92526 ORAL FUNCTION THERAPY: CPT

## 2021-11-17 PROCEDURE — 700102 HCHG RX REV CODE 250 W/ 637 OVERRIDE(OP): Performed by: HOSPITALIST

## 2021-11-17 PROCEDURE — 700111 HCHG RX REV CODE 636 W/ 250 OVERRIDE (IP): Performed by: HOSPITALIST

## 2021-11-17 PROCEDURE — 700111 HCHG RX REV CODE 636 W/ 250 OVERRIDE (IP): Performed by: INTERNAL MEDICINE

## 2021-11-17 PROCEDURE — 83880 ASSAY OF NATRIURETIC PEPTIDE: CPT

## 2021-11-17 PROCEDURE — 99291 CRITICAL CARE FIRST HOUR: CPT | Performed by: INTERNAL MEDICINE

## 2021-11-17 PROCEDURE — 94640 AIRWAY INHALATION TREATMENT: CPT

## 2021-11-17 PROCEDURE — 700102 HCHG RX REV CODE 250 W/ 637 OVERRIDE(OP): Performed by: INTERNAL MEDICINE

## 2021-11-17 PROCEDURE — 770022 HCHG ROOM/CARE - ICU (200)

## 2021-11-17 RX ORDER — FAMOTIDINE 20 MG/1
20 TABLET, FILM COATED ORAL 2 TIMES DAILY
Status: DISCONTINUED | OUTPATIENT
Start: 2021-11-17 | End: 2021-11-24 | Stop reason: HOSPADM

## 2021-11-17 RX ADMIN — SENNOSIDES AND DOCUSATE SODIUM 2 TABLET: 50; 8.6 TABLET ORAL at 05:35

## 2021-11-17 RX ADMIN — ACETAMINOPHEN 650 MG: 325 TABLET ORAL at 14:11

## 2021-11-17 RX ADMIN — ENOXAPARIN SODIUM 60 MG: 60 INJECTION SUBCUTANEOUS at 17:54

## 2021-11-17 RX ADMIN — DIGOXIN 250 MCG: 250 TABLET ORAL at 17:55

## 2021-11-17 RX ADMIN — FAMOTIDINE 20 MG: 20 TABLET ORAL at 17:55

## 2021-11-17 RX ADMIN — METOPROLOL TARTRATE 12.5 MG: 25 TABLET, FILM COATED ORAL at 05:35

## 2021-11-17 RX ADMIN — SENNOSIDES AND DOCUSATE SODIUM 2 TABLET: 50; 8.6 TABLET ORAL at 17:54

## 2021-11-17 RX ADMIN — AMIODARONE HYDROCHLORIDE 200 MG: 200 TABLET ORAL at 05:35

## 2021-11-17 RX ADMIN — METOPROLOL TARTRATE 12.5 MG: 25 TABLET, FILM COATED ORAL at 17:53

## 2021-11-17 RX ADMIN — DEXAMETHASONE SODIUM PHOSPHATE 6 MG: 4 INJECTION, SOLUTION INTRA-ARTICULAR; INTRALESIONAL; INTRAMUSCULAR; INTRAVENOUS; SOFT TISSUE at 05:35

## 2021-11-17 RX ADMIN — BARICITINIB 4 MG: 2 TABLET, FILM COATED ORAL at 17:55

## 2021-11-17 RX ADMIN — ENOXAPARIN SODIUM 60 MG: 60 INJECTION SUBCUTANEOUS at 05:34

## 2021-11-17 RX ADMIN — Medication 5 MG: at 21:47

## 2021-11-17 RX ADMIN — ONDANSETRON HYDROCHLORIDE 4 MG: 2 SOLUTION INTRAMUSCULAR; INTRAVENOUS at 17:35

## 2021-11-17 ASSESSMENT — PAIN DESCRIPTION - PAIN TYPE
TYPE: ACUTE PAIN
TYPE: ACUTE PAIN;CHRONIC PAIN
TYPE: ACUTE PAIN
TYPE: ACUTE PAIN

## 2021-11-17 ASSESSMENT — ENCOUNTER SYMPTOMS
COUGH: 0
SHORTNESS OF BREATH: 1
PALPITATIONS: 0
MYALGIAS: 0
HEADACHES: 0
DIZZINESS: 0
NERVOUS/ANXIOUS: 1

## 2021-11-17 ASSESSMENT — PULMONARY FUNCTION TESTS
EPAP_CMH2O: 10

## 2021-11-17 ASSESSMENT — FIBROSIS 4 INDEX: FIB4 SCORE: 1.77

## 2021-11-17 NOTE — CARE PLAN
The patient is Watcher - Medium risk of patient condition declining or worsening    Shift Goals  Clinical Goals: Oxygen Stability, Mobility   Patient Goals: Comfort, Sleep   Family Goals: No family present at this time     Progress made toward(s) clinical / shift goals:    Problem: Knowledge Deficit - Standard  Goal: Patient and family/care givers will demonstrate understanding of plan of care, disease process/condition, diagnostic tests and medications  Outcome: Progressing     Problem: Fall Risk  Goal: Patient will remain free from falls  Outcome: Progressing     Problem: Respiratory  Goal: Patient will achieve/maintain optimum respiratory ventilation and gas exchange  Outcome: Progressing     Problem: Dysphagia  Goal: Dysphagia will improve  Outcome: Progressing

## 2021-11-17 NOTE — CARE PLAN
The patient is Watcher - Medium risk of patient condition declining or worsening    Shift Goals  Clinical Goals: titrate down fio2 on bipap & maintain o2 sat above 88%  Patient Goals: comfort & no anxiety  Family Goals: JOSE ANGEL no family present    Progress made toward(s) clinical / shift goals:       Problem: Skin Integrity  Goal: Skin integrity is maintained or improved  Outcome: Progressing  Turning & repositioning q 2 hrs. Interdry placed between skin folds. Excoriation improving      Problem: Psychosocial  Goal: Patient's level of anxiety will decrease  Outcome: Progressing  Ipad used to play patient's favorite show     Problem: Respiratory  Goal: Patient will achieve/maintain optimum respiratory ventilation and gas exchange  Outcome: Progressing  Transitioned from bipap to high flow 100% and tolerated well. Oxymask added after 1 hour and o2 sats maintained above 88%. Patient turned to left side & proned intermittently      Problem: Dysphagia  Goal: Dysphagia will improve  Outcome: Progressing  Diet ordered today after speech therapist eval     Problem: Risk for Aspiration  Goal: Patient's risk for aspiration will be absent or decrease  Outcome: Progressing  Minced diet ordered. HOB elevated to 60 for meals     Problem: Nutrition  Goal: Patient's nutritional and fluid intake will be adequate or improve  Outcome: Progressing  Patient finished 50% of dinner & drank boost

## 2021-11-17 NOTE — THERAPY
Speech Language Pathology  Daily Treatment     Patient Name: Cheryl D Blakemore  Age:  61 y.o., Sex:  female  Medical Record #: [id number]  Today's Date: 11/17/2021     Precautions  Precautions: (P) Swallow Precautions ( See Comments)    Assessment    Pt seen on this date for dysphagia therapy. Per RN, pt was on BiPAP earlier today and just transitioned to 60L at 75% Fi02 via HFNC. Voice is clear, but slightly weak. Pt endorsed that meal times have been going well and she has been eating as much as possible from trays. Pt reported that at home she cannot eat many soft solid textures 2/2 mostly edentulous state and consumes food textures such as oatmeal at home. She reported that she had difficulty chewing peaches and noodles and will often times cough when attempting to eat solid textures. She consumed trials of minced and moist texture and trials of thin liquids via cup sip and straw sip with no overt s/sx of aspiration. Delayed cough appreciated ~2 mins after cessation of PO trials. She fed self independently but did appear to have difficulty holding container with left hand. At this time, recommend continuation of minced and moist (MM5)/thin liquid diet. This is likely closest to pt's baseline diet as pt has difficulty chewing soft solids at baseline.    Plan    1) continue minced and moist (MM5)/thin liquids. This diet is likely closest to pt's baseline diet given pt report of difficulty chewing soft solids at baseline and avoiding foods harder than oatmeal.    Continue current treatment plan.    Discharge Recommendations: (P) Recommend post-acute placement for additional speech therapy services prior to discharge home       Objective       11/17/21 1230   Vitals   O2 (LPM) 60   O2 Delivery Device High Flow Nasal Cannula   Pain 0 - 10 Group   Therapist Pain Assessment Post Activity Pain Same as Prior to Activity;Nurse Notified;0   Cognitive-Linguistic   Level of Consciousness Alert   Dysphagia    Dysphagia X    Positioning / Behavior Modification Modulate Rate or Bite Size;Self Monitoring   Other Treatments trials of puree, minced and moist, and thin liquids via cup sip and straw sip   Diet / Liquid Recommendation Thin (0);Minced & Moist (5) - (Dysphagia II)   Nutritional Liquid Intake Rating Scale Non thickened beverages   Nutritional Food Intake Rating Scale Total oral diet of a single consistency   Recommended Route of Medication Administration   Medication Administration  Crush all Medications in Puree;Float Whole with Puree   Short Term Goals   Short Term Goal # 1 The patient will consume MM5/TN0 meal with no overt s/sx of aspiration    Goal Outcome # 1 Progressing as expected

## 2021-11-17 NOTE — PROGRESS NOTES
"Critical Care Progress Note    Date of admission  11/9/2021    Chief Complaint  61 y.o. female admitted 11/9/2021 with acute on chronic mixed hypercapnic and hypoxemic respiratory failure due to COVID-19    Hospital Course  61 y.o. female with past medical history of atrial fibrillation, dilated cardiomyopathy, moderate pulmonary hypertension on digoxin and amiodarone, Body mass index is 61.42 kg/m².,  JUAN CARLOS/OHS on BIPAP and chronic hypoxemic respiratory failure on 3-4L at baseline who recently had a prolonged hospitalization for acute on chronic congestive heart failure and possible COPD exacerbation which improved with BIPAP and diuresis.  Echo on 10/30/2021 showed grossly normal LVEF, 55%, severe biatrial enlargement , RV not well visualized nor were the IVC, tricuspid or pulmonic valves. During her hospitalization, palliative care was consulted and CODE STATUS was changed to DNR/DNI and she was discharged to a skilled nursing facility for PT/OT on 11/9.     She was readmitted the same day 11/9/2021 as she tested positive for Covid 19.  Reportedly had no changes symptomatically since discharge aside from mildly increased shortness of breath increased fatigue.  Not vaccinated.  CXR unchanged.  ABG on admission 7.3 3/87/60.  Started on Decadron.  She was admitted to the floor, overnight RRT was called as patient became increasingly confused/disoriented.  Repeat ABG 7.34/97/73 on 100% FiO2.  Transferred to the ICU for rescue BiPAP.  Of note, BCx 10/28 + staph hominus and epidermis. Repeat BCx 10/30 negative. 1/2 bottles 11/9 + again, prelim staph. Negative MRSA, possible contaminant.     11/12: Family meeting held at bedside with patient and son (via phone, son sick) Code status changed back to DNR/DNI, in line with POLST    11/16: Patient expressing wishes to be intubated if needed.  She wants to \"try everything\". I had an extensive discussion with her today. I was very honest with her about her poor chances of " survival and the high risk nature of intubating her. She seems understand these risks and wants to proceed with intubation. I also explained that intubation in these circumstances would be done when I deem it is necessary and safety do so and it would likely take away valuable time that she could spend with her family well on BiPAP. I explained to her that once she is intubated she will not be communicative and therefore will likely not speak to her family again unless she is able to improve and be extubated. She gave me permission to speak to her daughter-in-law, Sherron. I called Sherron and explained the conversation to her as well. As the patient is currently alert and oriented x4 she is making her own decision and her CODE STATUS has been changed to full code to reflect this conversation.    Interval Problem Update  Chart review from the past 24 hours includes imaging, laboratory studies, vital signs and notes available.  Pertinent data for today's visit includes    ICU CHECKLIST:  Neuro: Stable, alert and oriented  Cardiac: Afib rate controlled. On Amio and dig. TTE: grossly normal LVEF, 55%, severe biatrial enlargement, RV not well visualized nor was IVC, tricuspid or pulmonic valves. BNP previously 900, repeat pending  Pulmonary: Able to spend a few hours off BIPAP yesterday on high flow. Saturating better on high flow today  Heme: Stable thrombocytopenia.    DVT Prophylaxis: Lovenox prophy  /Renal: DIANE improving today with holding diuresis   I/O: -11974   Mckeon: D/C'd, urinating well  ID:  Decadron 11/11-, baricitinib 11/11- .BCx 10/28 + staph hominus and epidermis. 1/2 bottles 11/9 + again, staph hominus. On vanco/zosyn 11/9-11/11. BCx 11/12 remain NGTD.  Skin: intact  GI/Nutrition: Seen by speech yesterday, Minced diet ordered   Prophylaxis: Pepcid 2/2 steroids  Endo: Stable  Lines/Tubes: PIV      Review of Systems  Review of Systems   Constitutional: Positive for malaise/fatigue.         Hungry  Anxious  Requesting water   Respiratory: Positive for shortness of breath. Negative for cough.    Cardiovascular: Negative for chest pain, palpitations and leg swelling.   Musculoskeletal: Negative for myalgias.   Neurological: Negative for dizziness and headaches.   Psychiatric/Behavioral: The patient is nervous/anxious.      Vital Signs for last 24 hours   Temp:  [36 °C (96.8 °F)-36.7 °C (98 °F)] 36.1 °C (97 °F)  Pulse:  [56-79] 75  Resp:  [12-35] 25  BP: ()/(50-76) 102/59  SpO2:  [89 %-96 %] 93 %    Physical Exam   Physical Exam  Vitals and nursing note reviewed.   Constitutional:       General: She is not in acute distress.     Appearance: Normal appearance. She is well-developed. She is obese. She is ill-appearing. She is not diaphoretic.      Comments: Fatigued, lethargic  Voice difficult to hear over bipap mask   Eyes:      General: No scleral icterus.        Right eye: No discharge.         Left eye: No discharge.      Conjunctiva/sclera: Conjunctivae normal.      Pupils: Pupils are equal, round, and reactive to light.   Neck:      Thyroid: No thyromegaly.      Vascular: No JVD.   Cardiovascular:      Rate and Rhythm: Normal rate and regular rhythm.      Heart sounds: Normal heart sounds. No murmur heard.  No gallop.    Pulmonary:      Effort: No respiratory distress.      Breath sounds: Normal breath sounds. No wheezing or rales.      Comments: Distant breath sounds diffusely  Abdominal:      General: There is no distension.      Palpations: Abdomen is soft.      Tenderness: There is no abdominal tenderness. There is no guarding.   Musculoskeletal:         General: No tenderness.      Right lower leg: Edema ( improving) present.      Left lower leg: Edema ( improving) present.   Lymphadenopathy:      Cervical: No cervical adenopathy.   Skin:     General: Skin is warm.      Capillary Refill: Capillary refill takes less than 2 seconds.      Coloration: Skin is pale.      Findings: No erythema or  rash.   Neurological:      Mental Status: She is oriented to person, place, and time.      Cranial Nerves: No cranial nerve deficit.      Sensory: No sensory deficit.      Motor: No abnormal muscle tone.   Psychiatric:      Comments: anxious       Medications  Current Facility-Administered Medications   Medication Dose Route Frequency Provider Last Rate Last Admin   • baricitinib (Olumiant) tablet 4 mg  4 mg Oral DAILY AT 1800 Glenny Mcgarry M.D.       • famotidine (PEPCID) tablet 20 mg  20 mg Oral BID Glenny Mcgarry M.D.       • hydrALAZINE (APRESOLINE) injection 10 mg  10 mg Intravenous Q6HRS PRN Abdiel Camarillo M.D.   10 mg at 11/15/21 1826   • melatonin tablet 5 mg  5 mg Oral Nightly Nisa Bustos M.D.   5 mg at 11/16/21 2111   • traZODone (DESYREL) tablet 100 mg  100 mg Oral HS PRN Abdiel Camarillo M.D.   100 mg at 11/15/21 2013   • metoprolol tartrate (LOPRESSOR) tablet 12.5 mg  12.5 mg Oral TWICE DAILY Abdiel Camarillo M.D.   12.5 mg at 11/17/21 0535   • digoxin (LANOXIN) tablet 250 mcg  250 mcg Oral DAILY AT 1800 Abdiel Camarillo M.D.   250 mcg at 11/16/21 1804   • dexamethasone (DECADRON) injection 6 mg  6 mg Intravenous DAILY Glenny Mcgarry M.D.   6 mg at 11/17/21 0535   • Metoprolol Tartrate (LOPRESSOR) injection 5 mg  5 mg Intravenous Q HOUR PRN Abdiel Camarillo M.D.   5 mg at 11/14/21 1845   • enoxaparin (LOVENOX) inj 60 mg  60 mg Subcutaneous Q12HRS Abdiel Camarillo M.D.   60 mg at 11/17/21 0534   • ipratropium-albuterol (DUONEB) nebulizer solution  3 mL Nebulization Q4H PRN (RT) Abdiel Camarillo M.D.       • amiodarone (Cordarone) tablet 200 mg  200 mg Oral Q DAY Abdiel Camarillo M.D.   200 mg at 11/17/21 0535   • acetaminophen (Tylenol) tablet 650 mg  650 mg Oral Q6HRS PRN Mario Woody M.D.   650 mg at 11/14/21 0815   • senna-docusate (PERICOLACE or SENOKOT S) 8.6-50 MG per tablet 2 Tablet  2 Tablet Oral BID Mario Woody M.D.   2 Tablet at 11/17/21 0535    And   •  polyethylene glycol/lytes (MIRALAX) PACKET 1 Packet  1 Packet Oral QDAY PRN Asem CHADD Woody M.D.        And   • magnesium hydroxide (MILK OF MAGNESIA) suspension 30 mL  30 mL Oral QDAY PRN Asem A ALONDRA Woody   30 mL at 11/13/21 1658    And   • bisacodyl (DULCOLAX) suppository 10 mg  10 mg Rectal QDAY PRN Asem CHADD Woody M.D.   10 mg at 11/14/21 1723   • Respiratory Therapy Consult   Nebulization Continuous RT Asem CHADD Woody M.D.       • ondansetron (ZOFRAN) syringe/vial injection 4 mg  4 mg Intravenous Q4HRS PRN Asem CHADD Woody M.D.   4 mg at 11/12/21 1559   • ondansetron (ZOFRAN ODT) dispertab 4 mg  4 mg Oral Q4HRS PRN Asem CHADD Woody M.D.   4 mg at 11/16/21 0027   • promethazine (PHENERGAN) tablet 12.5-25 mg  12.5-25 mg Oral Q4HRS PRN Asem A ALONDRA Woody       • promethazine (PHENERGAN) suppository 12.5-25 mg  12.5-25 mg Rectal Q4HRS PRN Asem CHADD Woody M.D.       • prochlorperazine (COMPAZINE) injection 5-10 mg  5-10 mg Intravenous Q4HRS PRN Asem CHADD Woody M.D.       • guaiFENesin dextromethorphan (ROBITUSSIN DM) 100-10 MG/5ML syrup 10 mL  10 mL Oral Q6HRS PRN Asem A ALONDRA Woody   10 mL at 11/15/21 2230     Fluids    Intake/Output Summary (Last 24 hours) at 11/17/2021 1250  Last data filed at 11/17/2021 1200  Gross per 24 hour   Intake 840 ml   Output 1170 ml   Net -330 ml       Laboratory          Recent Labs     11/15/21  0425 11/16/21  0415 11/17/21  0347   SODIUM 137 137 138   POTASSIUM 5.0 5.1 5.2   CHLORIDE 89* 88* 90*   CO2 43* 44* 42*   BUN 39* 49* 49*   CREATININE 1.04 1.09 0.90   MAGNESIUM 2.3 2.7*  --    PHOSPHORUS 4.1 4.8*  --    CALCIUM 9.1 9.7 9.4     Recent Labs     11/15/21  0425 11/16/21  0415 11/17/21  0347   ALTSGPT 18 24 24   ASTSGOT 16 19 18   ALKPHOSPHAT 50 52 50   TBILIRUBIN 0.6 0.7 0.6   GLUCOSE 121* 121* 120*     Recent Labs     11/15/21  0425 11/16/21  0415 11/17/21  0347   WBC 6.5 9.2 8.8   NEUTSPOLYS 81.40* 82.00* 81.80*   LYMPHOCYTES 10.60* 9.90* 9.30*   MONOCYTES  7.00 7.20 7.90   EOSINOPHILS 0.20 0.00 0.00   BASOPHILS 0.00 0.10 0.10   ASTSGOT 16 19 18   ALTSGPT 18 24 24   ALKPHOSPHAT 50 52 50   TBILIRUBIN 0.6 0.7 0.6     Recent Labs     11/15/21  0425 11/16/21  0415 11/17/21  0347   RBC 4.31 4.85 4.74   HEMOGLOBIN 12.7 14.4 13.7   HEMATOCRIT 41.4 46.8 45.6   PLATELETCT 125* 136* 147*     Imaging  X-Ray:  No film today  CXR 11/9: cardiomegaly, interstitial basilar infiltrates L > R, unchanged    Assessment/Plan    * Acute on chronic respiratory failure with hypoxia and hypercapnia (HCC)- (present on admission)  Assessment & Plan  COVID pneumonia with acute hypoxic respiratory failure  -- Date of initial positive test: 11/9/2021  -- Risk factors for severe covid - Morbid obesity, unvaccinated, CHF, possible PH, afib  -- Current COVID complications include DIANE, BIPAP dependent respiratory failure.   -- Covid specific therapies: Decadron (11/11-11/21)  -- Patient is a candidate for Baricitinib and started on 11/11  -- Oxygen support: BIPAP at night 70%, tolerating max high flow now during the day  -- No ABG today  -- Patient in appropriate isolation with careful gowning and hand hygiene upon entrance and exit of room  -- Rescue maneuvers: Proning - encouraging patient to self-prone, ECMO - not an ecmo candidate due to super obesity.   -- Prophylaxis with lovenox      Metabolic alkalosis with respiratory acidosis  Assessment & Plan  Chronic resp acidosis with compensatory metabolic alkalosis + contraction alkalosis  Now alkalemic  Cont spot dose diamox PRN  Cont BIPAP    Chronic heart failure with preserved ejection fraction (HCC)  Assessment & Plan  TTE 10/30/2021: grossly normal LVEF, 55%, severe biatrial enlargement, RV not well visualized nor were the IVC, tricuspid or pulmonic valves.  Reported history of dilated cardiomyopathy and moderate pulmonary hypertension  BNP chronically elevated, currently ~900 -> 458  Procal today 0.03  Trace edema  Clinically slightly  overloaded  Holding lasix due to DIANE    Pneumonia due to COVID-19 virus- (present on admission)  Assessment & Plan  See plan for respiratory failure    Bacteremia, coagulase-negative staphylococcal  Assessment & Plan  BCx 10/28 + staph hominus and epidermis. Repeat BCx 10/30 negative. 1/2 bottles 11/9 + again, + staph hominus  Negative MRSA, suspected contaminant.   vanco/zosyn 11/9-11/11.  Repeated blood cultures  11/12 NGTD, afebrile, procal < 0.05    Advanced care planning/counseling discussion  Assessment & Plan  Patient has been thinking about her CODE STATUS for the last few days and has decided she wants to try intubation.   On 11/16 we discussed the high risk nature of her intubation and the likely poor prognosis on a ventilator.  She states she understands.     AF (atrial fibrillation) (HCC)- (present on admission)  Assessment & Plan  Rate currently controlled  TTE 10/30/2021 showed grossly normal LVEF, 55%, severe biatrial, RV not well visualized nor were the IVC, tricuspid or pulmonic valves.  HD stable  Cont amiodarone and digoxin PO  Cont lopressor 12.5mg PO BID  MTP 5mg IVP PRN HR > 120     VTE:  Lovenox  Ulcer: H2 Antagonist  Lines: Mckeon Catheter  Ongoing indication addressed    I have performed a physical exam and reviewed and updated ROS and Plan today (11/17/2021). In review of yesterday's note (11/16/2021), there are no changes except as documented above.     Discussed patient condition and risk of morbidity and/or mortality with Family, RN, RT, Pharmacy, Code status disscussed and Patient. Spoke with Jin (son) over phone and Sherron (Jin's wife) in person and updated on condition.    The patient remains critically ill.  Critical care time = 60 minutes in directly providing and coordinating critical care and extensive data review.  No time overlap and excludes procedures.    Glenny Mcgarry MD RD  Pulmonary and Critical Care    Available on Voalte

## 2021-11-17 NOTE — PROGRESS NOTES
Cristine from Lab called with critical result CO2 of 42 at 0452. Critical lab result read back to Cristine.    MDs are aware of critical CO2 levels which are trending down from previous labs.

## 2021-11-17 NOTE — FLOWSHEET NOTE
11/17/21 1010   Events/Summary/Plan   Events/Summary/Plan Bipap off. HHFO at 60 lpm with fO2 at .85   Vital Signs   Pulse (!) 56   Respiration 16   Pulse Oximetry 96 %   $ Pulse Oximetry (Spot Check) Yes   Oxygen   O2 (LPM) 60   FiO2% 85 %   O2 Delivery Device BIPAP   Aerosols   $ Aerosol Delivery Device Heated High Flow Nasal Cannula   Aerosol Temperature 31 °C (87.8 °F)   Equipment Change Date 11/23/21

## 2021-11-17 NOTE — DIETARY
Nutrition Services: Brief Update    Pt back on Bipap yesterday. Noted SLP saw and recommends minced and moist diet with thin liquids. Subsequently pt ate 25-50% of dinner 11/6 with % of Boost Plus.    Recommendations/Plan:  1. Diet per SLP with no additional restrictions at this time. Continue Boost Plus BID and will reassess need for oral supplement once PO intake consistently 50+%.  2. Nutrition support not indicated at this time as pt appears to be doing well so far on oral diet since seen by SLP.  3. Encourage PO intake as able.  4. Monitor weight trend.    RD following.

## 2021-11-18 ENCOUNTER — APPOINTMENT (OUTPATIENT)
Dept: RADIOLOGY | Facility: MEDICAL CENTER | Age: 61
DRG: 177 | End: 2021-11-18
Attending: INTERNAL MEDICINE
Payer: MEDICARE

## 2021-11-18 LAB
ALBUMIN SERPL BCP-MCNC: 3.2 G/DL (ref 3.2–4.9)
ALBUMIN/GLOB SERPL: 0.9 G/DL
ALP SERPL-CCNC: 48 U/L (ref 30–99)
ALT SERPL-CCNC: 24 U/L (ref 2–50)
ANION GAP SERPL CALC-SCNC: 7 MMOL/L (ref 7–16)
AST SERPL-CCNC: 15 U/L (ref 12–45)
BASOPHILS # BLD AUTO: 0.2 % (ref 0–1.8)
BASOPHILS # BLD: 0.02 K/UL (ref 0–0.12)
BILIRUB SERPL-MCNC: 0.6 MG/DL (ref 0.1–1.5)
BUN SERPL-MCNC: 54 MG/DL (ref 8–22)
CALCIUM SERPL-MCNC: 9.2 MG/DL (ref 8.4–10.2)
CHLORIDE SERPL-SCNC: 86 MMOL/L (ref 96–112)
CO2 SERPL-SCNC: 41 MMOL/L (ref 20–33)
CREAT SERPL-MCNC: 0.91 MG/DL (ref 0.5–1.4)
EOSINOPHIL # BLD AUTO: 0 K/UL (ref 0–0.51)
EOSINOPHIL NFR BLD: 0 % (ref 0–6.9)
ERYTHROCYTE [DISTWIDTH] IN BLOOD BY AUTOMATED COUNT: 40.9 FL (ref 35.9–50)
GLOBULIN SER CALC-MCNC: 3.4 G/DL (ref 1.9–3.5)
GLUCOSE SERPL-MCNC: 131 MG/DL (ref 65–99)
HCT VFR BLD AUTO: 45.6 % (ref 37–47)
HGB BLD-MCNC: 14.2 G/DL (ref 12–16)
IMM GRANULOCYTES # BLD AUTO: 0.1 K/UL (ref 0–0.11)
IMM GRANULOCYTES NFR BLD AUTO: 0.9 % (ref 0–0.9)
LYMPHOCYTES # BLD AUTO: 1.01 K/UL (ref 1–4.8)
LYMPHOCYTES NFR BLD: 9.1 % (ref 22–41)
MCH RBC QN AUTO: 29.2 PG (ref 27–33)
MCHC RBC AUTO-ENTMCNC: 31.1 G/DL (ref 33.6–35)
MCV RBC AUTO: 93.8 FL (ref 81.4–97.8)
MONOCYTES # BLD AUTO: 0.81 K/UL (ref 0–0.85)
MONOCYTES NFR BLD AUTO: 7.3 % (ref 0–13.4)
NEUTROPHILS # BLD AUTO: 9.15 K/UL (ref 2–7.15)
NEUTROPHILS NFR BLD: 82.5 % (ref 44–72)
NRBC # BLD AUTO: 0 K/UL
NRBC BLD-RTO: 0 /100 WBC
PLATELET # BLD AUTO: 149 K/UL (ref 164–446)
PMV BLD AUTO: 13.6 FL (ref 9–12.9)
POTASSIUM SERPL-SCNC: 5.1 MMOL/L (ref 3.6–5.5)
PROT SERPL-MCNC: 6.6 G/DL (ref 6–8.2)
RBC # BLD AUTO: 4.86 M/UL (ref 4.2–5.4)
SODIUM SERPL-SCNC: 134 MMOL/L (ref 135–145)
WBC # BLD AUTO: 11.1 K/UL (ref 4.8–10.8)

## 2021-11-18 PROCEDURE — 700105 HCHG RX REV CODE 258: Performed by: INTERNAL MEDICINE

## 2021-11-18 PROCEDURE — 80053 COMPREHEN METABOLIC PANEL: CPT

## 2021-11-18 PROCEDURE — 94640 AIRWAY INHALATION TREATMENT: CPT

## 2021-11-18 PROCEDURE — 700102 HCHG RX REV CODE 250 W/ 637 OVERRIDE(OP): Performed by: INTERNAL MEDICINE

## 2021-11-18 PROCEDURE — 92526 ORAL FUNCTION THERAPY: CPT

## 2021-11-18 PROCEDURE — 700102 HCHG RX REV CODE 250 W/ 637 OVERRIDE(OP): Performed by: HOSPITALIST

## 2021-11-18 PROCEDURE — A9270 NON-COVERED ITEM OR SERVICE: HCPCS | Performed by: HOSPITALIST

## 2021-11-18 PROCEDURE — 770022 HCHG ROOM/CARE - ICU (200)

## 2021-11-18 PROCEDURE — 700101 HCHG RX REV CODE 250: Performed by: INTERNAL MEDICINE

## 2021-11-18 PROCEDURE — A9270 NON-COVERED ITEM OR SERVICE: HCPCS | Performed by: INTERNAL MEDICINE

## 2021-11-18 PROCEDURE — 99291 CRITICAL CARE FIRST HOUR: CPT | Performed by: INTERNAL MEDICINE

## 2021-11-18 PROCEDURE — 700111 HCHG RX REV CODE 636 W/ 250 OVERRIDE (IP): Performed by: INTERNAL MEDICINE

## 2021-11-18 PROCEDURE — 94760 N-INVAS EAR/PLS OXIMETRY 1: CPT

## 2021-11-18 PROCEDURE — 99292 CRITICAL CARE ADDL 30 MIN: CPT | Performed by: INTERNAL MEDICINE

## 2021-11-18 PROCEDURE — 71045 X-RAY EXAM CHEST 1 VIEW: CPT

## 2021-11-18 PROCEDURE — 85025 COMPLETE CBC W/AUTO DIFF WBC: CPT

## 2021-11-18 PROCEDURE — 94660 CPAP INITIATION&MGMT: CPT

## 2021-11-18 RX ORDER — FUROSEMIDE 10 MG/ML
40 INJECTION INTRAMUSCULAR; INTRAVENOUS ONCE
Status: COMPLETED | OUTPATIENT
Start: 2021-11-18 | End: 2021-11-18

## 2021-11-18 RX ORDER — GUAIFENESIN 600 MG/1
600 TABLET, EXTENDED RELEASE ORAL EVERY 12 HOURS
Status: DISCONTINUED | OUTPATIENT
Start: 2021-11-18 | End: 2021-11-18

## 2021-11-18 RX ORDER — LORAZEPAM 2 MG/ML
1 INJECTION INTRAMUSCULAR
Status: DISCONTINUED | OUTPATIENT
Start: 2021-11-18 | End: 2021-11-19

## 2021-11-18 RX ORDER — MORPHINE SULFATE 4 MG/ML
1 INJECTION, SOLUTION INTRAMUSCULAR; INTRAVENOUS
Status: DISCONTINUED | OUTPATIENT
Start: 2021-11-18 | End: 2021-11-24 | Stop reason: HOSPADM

## 2021-11-18 RX ADMIN — Medication 5 MG: at 20:13

## 2021-11-18 RX ADMIN — DEXMEDETOMIDINE HYDROCHLORIDE 0.2 MCG/KG/HR: 100 INJECTION, SOLUTION, CONCENTRATE INTRAVENOUS at 17:12

## 2021-11-18 RX ADMIN — FAMOTIDINE 20 MG: 20 TABLET ORAL at 05:34

## 2021-11-18 RX ADMIN — AMIODARONE HYDROCHLORIDE 200 MG: 200 TABLET ORAL at 05:34

## 2021-11-18 RX ADMIN — SENNOSIDES AND DOCUSATE SODIUM 2 TABLET: 50; 8.6 TABLET ORAL at 05:34

## 2021-11-18 RX ADMIN — SENNOSIDES AND DOCUSATE SODIUM 2 TABLET: 50; 8.6 TABLET ORAL at 17:13

## 2021-11-18 RX ADMIN — METOPROLOL TARTRATE 12.5 MG: 25 TABLET, FILM COATED ORAL at 17:13

## 2021-11-18 RX ADMIN — DEXAMETHASONE SODIUM PHOSPHATE 6 MG: 4 INJECTION, SOLUTION INTRA-ARTICULAR; INTRALESIONAL; INTRAMUSCULAR; INTRAVENOUS; SOFT TISSUE at 05:35

## 2021-11-18 RX ADMIN — METOPROLOL TARTRATE 12.5 MG: 25 TABLET, FILM COATED ORAL at 05:34

## 2021-11-18 RX ADMIN — GUAIFENESIN 200 MG: 100 SOLUTION ORAL at 14:43

## 2021-11-18 RX ADMIN — ENOXAPARIN SODIUM 60 MG: 60 INJECTION SUBCUTANEOUS at 17:13

## 2021-11-18 RX ADMIN — FAMOTIDINE 20 MG: 20 TABLET ORAL at 17:13

## 2021-11-18 RX ADMIN — DIGOXIN 250 MCG: 250 TABLET ORAL at 17:13

## 2021-11-18 RX ADMIN — FUROSEMIDE 40 MG: 10 INJECTION, SOLUTION INTRAMUSCULAR; INTRAVENOUS at 11:57

## 2021-11-18 RX ADMIN — ENOXAPARIN SODIUM 60 MG: 60 INJECTION SUBCUTANEOUS at 05:35

## 2021-11-18 RX ADMIN — BARICITINIB 4 MG: 2 TABLET, FILM COATED ORAL at 17:13

## 2021-11-18 RX ADMIN — TRAZODONE HYDROCHLORIDE 100 MG: 50 TABLET ORAL at 20:13

## 2021-11-18 ASSESSMENT — PULMONARY FUNCTION TESTS
EPAP_CMH2O: 10

## 2021-11-18 ASSESSMENT — PAIN DESCRIPTION - PAIN TYPE
TYPE: ACUTE PAIN
TYPE: ACUTE PAIN
TYPE: ACUTE PAIN;CHRONIC PAIN
TYPE: ACUTE PAIN
TYPE: ACUTE PAIN;CHRONIC PAIN

## 2021-11-18 ASSESSMENT — ENCOUNTER SYMPTOMS
DIZZINESS: 0
HEADACHES: 0
SHORTNESS OF BREATH: 1
MYALGIAS: 0
NERVOUS/ANXIOUS: 1
COUGH: 0
PALPITATIONS: 0

## 2021-11-18 NOTE — CARE PLAN
The patient is Watcher - Medium risk of patient condition declining or worsening    Shift Goals  Clinical Goals: Improve oxygen stability, turning in bed   Patient Goals: Mobility, up to chair  Family Goals: Family not present at this time    Progress made toward(s) clinical / shift goals:      Problem: Knowledge Deficit - Standard  Goal: Patient and family/care givers will demonstrate understanding of plan of care, disease process/condition, diagnostic tests and medications, explained the dangers of transferring to chair r/t desaturation in the 40s  Outcome: Progressing     Problem: Respiratory  Goal: Patient will achieve/maintain optimum respiratory ventilation and gas exchange, continue on BIPAP but with goal to get onto HFNC   Outcome: Progressing     Problem: Nutrition  Goal: Patient's nutritional and fluid intake will be adequate or improve    Outcome: Progressing

## 2021-11-18 NOTE — PROGRESS NOTES
"Critical Care Progress Note    Date of admission  11/9/2021    Chief Complaint  61 y.o. female admitted 11/9/2021 with acute on chronic mixed hypercapnic and hypoxemic respiratory failure due to COVID-19    Hospital Course  61 y.o. female with past medical history of atrial fibrillation, dilated cardiomyopathy, moderate pulmonary hypertension on digoxin and amiodarone, Body mass index is 61.42 kg/m².,  JUAN CARLOS/OHS on BIPAP and chronic hypoxemic respiratory failure on 3-4L at baseline who recently had a prolonged hospitalization for acute on chronic congestive heart failure and possible COPD exacerbation which improved with BIPAP and diuresis.  Echo on 10/30/2021 showed grossly normal LVEF, 55%, severe biatrial enlargement , RV not well visualized nor were the IVC, tricuspid or pulmonic valves. During her hospitalization, palliative care was consulted and CODE STATUS was changed to DNR/DNI and she was discharged to a skilled nursing facility for PT/OT on 11/9.     She was readmitted the same day 11/9/2021 as she tested positive for Covid 19.  Reportedly had no changes symptomatically since discharge aside from mildly increased shortness of breath increased fatigue.  Not vaccinated.  CXR unchanged.  ABG on admission 7.3 3/87/60.  Started on Decadron.  She was admitted to the floor, overnight RRT was called as patient became increasingly confused/disoriented.  Repeat ABG 7.34/97/73 on 100% FiO2.  Transferred to the ICU for rescue BiPAP.  Of note, BCx 10/28 + staph hominus and epidermis. Repeat BCx 10/30 negative. 1/2 bottles 11/9 + again, prelim staph. Negative MRSA, possible contaminant.     11/12: Family meeting held at bedside with patient and son (via phone, son sick) Code status changed back to DNR/DNI, in line with POLST    11/16: Patient expressing wishes to be intubated if needed.  She wants to \"try everything\". I had an extensive discussion with her today. I was very honest with her about her poor chances of " survival and the high risk nature of intubating her. She seems understand these risks and wants to proceed with intubation. I also explained that intubation in these circumstances would be done when I deem it is necessary and safety do so and it would likely take away valuable time that she could spend with her family well on BiPAP. I explained to her that once she is intubated she will not be communicative and therefore will likely not speak to her family again unless she is able to improve and be extubated. She gave me permission to speak to her daughter-in-law, Sherron. I called Sherron and explained the conversation to her as well. As the patient is currently alert and oriented x4 she is making her own decision and her CODE STATUS has been changed to full code to reflect this conversation.    Interval Problem Update  Chart review from the past 24 hours includes imaging, laboratory studies, vital signs and notes available.  Pertinent data for today's visit includes    ICU CHECKLIST:  Neuro: Stable, alert and oriented  Cardiac: Afib rate controlled. On Amio and dig. TTE: grossly normal LVEF, 55%, severe biatrial enlargement, RV not well visualized nor was IVC, tricuspid or pulmonic valves. BNP previously 900, repeat pending  Pulmonary: High flow during the day, BIPAP at night  Heme: Stable thrombocytopenia. Leukocytosis today   DVT Prophylaxis: Lovenox prophy  /Renal: DIANE improving today with holding diuresis   I/O: -[id number]   Mckeon: D/C'd, urinating well  ID:  Decadron 11/11-, baricitinib 11/11- .BCx 10/28 + staph hominus and epidermis. 1/2 bottles 11/9 + again, staph hominus. On vanco/zosyn 11/9-11/11. BCx 11/12 remain NGTD.  Skin: intact  GI/Nutrition: Seen by speech yesterday, Minced diet ordered   Prophylaxis: Pepcid 2/2 steroids  Endo: Stable  Lines/Tubes: PIV      Review of Systems  Review of Systems   Constitutional: Positive for malaise/fatigue.        Hungry  Anxious  Requesting water   Respiratory:  Positive for shortness of breath. Negative for cough.    Cardiovascular: Negative for chest pain, palpitations and leg swelling.   Musculoskeletal: Negative for myalgias.   Neurological: Negative for dizziness and headaches.   Psychiatric/Behavioral: The patient is nervous/anxious.      Vital Signs for last 24 hours   Temp:  [35.9 °C (96.7 °F)-36.5 °C (97.7 °F)] 36.3 °C (97.4 °F)  Pulse:  [] 76  Resp:  [11-41] 28  BP: ()/(56-90) 121/71  SpO2:  [79 %-94 %] 91 %    Physical Exam   Physical Exam  Vitals and nursing note reviewed.   Constitutional:       General: She is not in acute distress.     Appearance: Normal appearance. She is well-developed. She is obese. She is ill-appearing. She is not diaphoretic.      Comments: Fatigued, lethargic  Voice difficult to hear over bipap mask   Eyes:      General: No scleral icterus.        Right eye: No discharge.         Left eye: No discharge.      Conjunctiva/sclera: Conjunctivae normal.      Pupils: Pupils are equal, round, and reactive to light.   Neck:      Thyroid: No thyromegaly.      Vascular: No JVD.   Cardiovascular:      Rate and Rhythm: Normal rate and regular rhythm.      Heart sounds: Normal heart sounds. No murmur heard.  No gallop.    Pulmonary:      Effort: No respiratory distress.      Breath sounds: Normal breath sounds. No wheezing or rales.      Comments: Distant breath sounds diffusely  Abdominal:      General: There is no distension.      Palpations: Abdomen is soft.      Tenderness: There is no abdominal tenderness. There is no guarding.   Musculoskeletal:         General: No tenderness.      Right lower leg: Edema ( improving) present.      Left lower leg: Edema ( improving) present.   Lymphadenopathy:      Cervical: No cervical adenopathy.   Skin:     General: Skin is warm.      Capillary Refill: Capillary refill takes less than 2 seconds.      Coloration: Skin is pale.      Findings: No erythema or rash.   Neurological:      Mental Status:  She is oriented to person, place, and time.      Cranial Nerves: No cranial nerve deficit.      Sensory: No sensory deficit.      Motor: No abnormal muscle tone.   Psychiatric:      Comments: anxious       Medications  Current Facility-Administered Medications   Medication Dose Route Frequency Provider Last Rate Last Admin   • guaiFENesin (ROBITUSSIN) 100 MG/5ML solution 200 mg  10 mL Oral Q4HRS PRN Glenny Mcgarry M.D.   200 mg at 11/18/21 1443   • dexmedetomidine (Precedex) 400 mcg in  mL infusion  0.2 mcg/kg/hr Intravenous Continuous Glenny Mcgarry M.D.   Dose not Required at 11/18/21 1500   • LORazepam (ATIVAN) injection 1 mg  1 mg Intravenous Q HOUR PRN Glenny Mcgarry M.D.       • morphine (pf) 4 mg/mL injection 1 mg  1 mg Intravenous Q30 MIN PRN Glenny Mcgarry M.D.       • baricitinib (Olumiant) tablet 4 mg  4 mg Oral DAILY AT 1800 Glenny Mcgarry M.D.   4 mg at 11/17/21 1755   • famotidine (PEPCID) tablet 20 mg  20 mg Oral BID Glenny Mcgarry M.D.   20 mg at 11/18/21 0534   • hydrALAZINE (APRESOLINE) injection 10 mg  10 mg Intravenous Q6HRS PRN Abdiel Camarillo M.D.   10 mg at 11/15/21 1826   • melatonin tablet 5 mg  5 mg Oral Nightly Nisa Bustos M.D.   5 mg at 11/17/21 2147   • traZODone (DESYREL) tablet 100 mg  100 mg Oral HS PRN Abdiel Camarillo M.D.   100 mg at 11/15/21 2013   • metoprolol tartrate (LOPRESSOR) tablet 12.5 mg  12.5 mg Oral TWICE DAILY Abdiel Camarillo M.D.   12.5 mg at 11/18/21 0534   • digoxin (LANOXIN) tablet 250 mcg  250 mcg Oral DAILY AT 1800 Abdiel Camarillo M.D.   250 mcg at 11/17/21 1755   • dexamethasone (DECADRON) injection 6 mg  6 mg Intravenous DAILY Glenny Mcgarry M.D.   6 mg at 11/18/21 0535   • Metoprolol Tartrate (LOPRESSOR) injection 5 mg  5 mg Intravenous Q HOUR PRN Abdiel Camarillo M.D.   5 mg at 11/14/21 1845   • enoxaparin (LOVENOX) inj 60 mg  60 mg Subcutaneous Q12HRS Abdiel Camarillo M.D.   60 mg at 11/18/21 0535   •  ipratropium-albuterol (DUONEB) nebulizer solution  3 mL Nebulization Q4H PRN (RT) Abdiel Camarillo M.D.       • amiodarone (Cordarone) tablet 200 mg  200 mg Oral Q DAY Abdiel Camarillo M.D.   200 mg at 11/18/21 0534   • acetaminophen (Tylenol) tablet 650 mg  650 mg Oral Q6HRS PRN Asem CHADD Woody M.D.   650 mg at 11/17/21 1411   • senna-docusate (PERICOLACE or SENOKOT S) 8.6-50 MG per tablet 2 Tablet  2 Tablet Oral BID Asem CHADD Woody M.D.   2 Tablet at 11/18/21 0534    And   • polyethylene glycol/lytes (MIRALAX) PACKET 1 Packet  1 Packet Oral QDAY PRN Asem CHADD Woody M.D.        And   • magnesium hydroxide (MILK OF MAGNESIA) suspension 30 mL  30 mL Oral QDAY PRN Asem CHADD Woody M.D.   30 mL at 11/13/21 1658    And   • bisacodyl (DULCOLAX) suppository 10 mg  10 mg Rectal QDAY PRN Asem CHADD Woody M.D.   10 mg at 11/14/21 1723   • Respiratory Therapy Consult   Nebulization Continuous RT Asem CHADD Woody M.D.       • ondansetron (ZOFRAN) syringe/vial injection 4 mg  4 mg Intravenous Q4HRS PRN Asem CHADD Woody M.D.   4 mg at 11/17/21 1735   • ondansetron (ZOFRAN ODT) dispertab 4 mg  4 mg Oral Q4HRS PRN Asem CHADD Woody M.D.   4 mg at 11/16/21 0027   • promethazine (PHENERGAN) tablet 12.5-25 mg  12.5-25 mg Oral Q4HRS PRN Asem CHADD Woody M.D.       • promethazine (PHENERGAN) suppository 12.5-25 mg  12.5-25 mg Rectal Q4HRS PRN Asem CHADD Woody M.D.       • prochlorperazine (COMPAZINE) injection 5-10 mg  5-10 mg Intravenous Q4HRS PRN Asem CHADD Woody, M.D.       • guaiFENesin dextromethorphan (ROBITUSSIN DM) 100-10 MG/5ML syrup 10 mL  10 mL Oral Q6HRS PRN Mario Woody M.D.   10 mL at 11/15/21 2230     Fluids    Intake/Output Summary (Last 24 hours) at 11/18/2021 1626  Last data filed at 11/18/2021 1600  Gross per 24 hour   Intake 1538 ml   Output 1685 ml   Net -147 ml       Laboratory          Recent Labs     11/16/21  0415 11/17/21  0347 11/18/21  0337   SODIUM 137 138 134*   POTASSIUM 5.1 5.2 5.1   CHLORIDE 88*  90* 86*   CO2 44* 42* 41*   BUN 49* 49* 54*   CREATININE 1.09 0.90 0.91   MAGNESIUM 2.7*  --   --    PHOSPHORUS 4.8*  --   --    CALCIUM 9.7 9.4 9.2     Recent Labs     11/16/21 0415 11/17/21  0347 11/18/21  0337   ALTSGPT 24 24 24   ASTSGOT 19 18 15   ALKPHOSPHAT 52 50 48   TBILIRUBIN 0.7 0.6 0.6   GLUCOSE 121* 120* 131*     Recent Labs     11/16/21  0415 11/17/21  0347 11/18/21  0337   WBC 9.2 8.8 11.1*   NEUTSPOLYS 82.00* 81.80* 82.50*   LYMPHOCYTES 9.90* 9.30* 9.10*   MONOCYTES 7.20 7.90 7.30   EOSINOPHILS 0.00 0.00 0.00   BASOPHILS 0.10 0.10 0.20   ASTSGOT 19 18 15   ALTSGPT 24 24 24   ALKPHOSPHAT 52 50 48   TBILIRUBIN 0.7 0.6 0.6     Recent Labs     11/16/21 0415 11/17/21  0347 11/18/21  0337   RBC 4.85 4.74 4.86   HEMOGLOBIN 14.4 13.7 14.2   HEMATOCRIT 46.8 45.6 45.6   PLATELETCT 136* 147* 149*     Imaging  XR today reviewed, no pneumothorax, worsening infiltrates per my read  CXR 11/9: cardiomegaly, interstitial basilar infiltrates L > R, unchanged    Assessment/Plan    * Acute on chronic respiratory failure with hypoxia and hypercapnia (HCC)- (present on admission)  Assessment & Plan  COVID pneumonia with acute hypoxic respiratory failure  -- Date of initial positive test: 11/9/2021  -- Risk factors for severe covid - Morbid obesity, unvaccinated, CHF, possible PH, afib  -- Current COVID complications include DIANE, BIPAP dependent respiratory failure.   -- Covid specific therapies: Decadron (11/11-11/21)  -- Patient is a candidate for Baricitinib and started on 11/11  -- Oxygen support: BIPAP at night 70%, tolerating max high flow now during the day  -- No ABG today  -- Patient in appropriate isolation with careful gowning and hand hygiene upon entrance and exit of room  -- Rescue maneuvers: Proning - encouraging patient to self-prone, ECMO - not an ecmo candidate due to super obesity.   -- Prophylaxis with lovenox  -- Patient and family decided again on DNAR today as she was declining this morning and the  time to intubate would have been today.  She will start on precedex for anxiety and has morphine and ativan pushes for comfort.  -- If respiratory distress develops a morphine drip should be started.  -- The patient is not able to make decisions independently and should she attempt to reverse her code status while in extremis this decision needs to be made in concert with her son and daughter-in-law.       Metabolic alkalosis with respiratory acidosis  Assessment & Plan  Chronic resp acidosis with compensatory metabolic alkalosis + contraction alkalosis  Now alkalemic  Cont spot dose diamox PRN  Cont BIPAP    Chronic heart failure with preserved ejection fraction (HCC)  Assessment & Plan  TTE 10/30/2021: grossly normal LVEF, 55%, severe biatrial enlargement, RV not well visualized nor were the IVC, tricuspid or pulmonic valves.  Reported history of dilated cardiomyopathy and moderate pulmonary hypertension  BNP chronically elevated, currently ~900 -> 458  Procal today 0.03  Trace edema  Clinically slightly overloaded  Lasix 40 given earlier today due to increased respiratory distress    Pneumonia due to COVID-19 virus- (present on admission)  Assessment & Plan  See plan for respiratory failure    Bacteremia, coagulase-negative staphylococcal  Assessment & Plan  BCx 10/28 + staph hominus and epidermis. Repeat BCx 10/30 negative. 1/2 bottles 11/9 + again, + staph hominus  Negative MRSA, suspected contaminant.   vanco/zosyn 11/9-11/11.  Repeated blood cultures  11/12 NGTD, afebrile, procal < 0.05    Advanced care planning/counseling discussion  Assessment & Plan  Patient has been thinking about her CODE STATUS for the last few days and has decided she wants to try intubation.   On 11/16 we discussed the high risk nature of her intubation and the likely poor prognosis on a ventilator.  She states she understands.   See ACP note from today 11/18    AF (atrial fibrillation) (HCC)- (present on admission)  Assessment &  Plan  Rate currently controlled  TTE 10/30/2021 showed grossly normal LVEF, 55%, severe biatrial, RV not well visualized nor were the IVC, tricuspid or pulmonic valves.  HD stable  Cont amiodarone and digoxin PO  Cont lopressor 12.5mg PO BID  MTP 5mg IVP PRN HR > 120     VTE:  Lovenox  Ulcer: H2 Antagonist  Lines: Mckeon Catheter  Ongoing indication addressed    I have performed a physical exam and reviewed and updated ROS and Plan today (11/18/2021). In review of yesterday's note (11/17/2021), there are no changes except as documented above.     Discussed patient condition and risk of morbidity and/or mortality with Family, RN, RT, Pharmacy, Code status disscussed and Patient. Spoke with Jin (son) over phone and Sherron (Jin's wife) in person and updated on condition.    The patient remains critically ill.  Critical care time = 120 minutes in directly providing and coordinating critical care and extensive data review.  No time overlap and excludes procedures.    Glenny Mcgarry MD RD  Pulmonary and Critical Care    Available on Voalte

## 2021-11-18 NOTE — CARE PLAN
The patient is Unstable - High likelihood or risk of patient condition declining or worsening    Shift Goals  Clinical Goals: maintan oxygenation   Patient Goals: Mobility, up to chair  Family Goals: Family not present at this time    Progress made toward(s) clinical / shift goals:      Problem: Fall Risk  Goal: Patient will remain free from falls  Outcome: Progressing  Pt uses call light appropriately.      Problem: Respiratory  Goal: Patient will achieve/maintain optimum respiratory ventilation and gas exchange  Outcome: Progressing  Pt on heated high flow nasal cannula with oxymask.        Patient is not progressing towards the following goals:

## 2021-11-18 NOTE — THERAPY
Speech Language Pathology  Daily Treatment     Patient Name: Cheryl D Blakemore  Age:  61 y.o., Sex:  female  Medical Record #: [id number]  Today's Date: 11/18/2021     Precautions  Precautions: (P) Swallow Precautions ( See Comments)    Assessment    Pt seen on this date for dysphagia therapy. Per RN, pt had a difficult time maintain oxygen saturations earlier this morning but currently remaining around 90% on 60L HFNC with 15L oxymask. Pt endorsed minimal PO intake but agreeable to trials of lunch. Pt laying on side with HOB elevated to ~40%. She consumed minimal bites of MM5/PU4 textures and ~4-5oz of thin liquids with no overt s/sx of aspiration. Mastication and swallow trigger timely and vocal quality clear after the swallow. She declined further PO trials despite encouragement. Pt's DIL present at bedside at end of session who stated that baseline diet is likely minced and moist texture given mostly edentulous state. She also endorsed some increased confusion and cognitive changes while admitted, but pt denied difficulty. RN had endorsed that pt did not appear to understand instructions earlier today. Given cognitive changes per family report, pt would benefit from a cognitive-linguistic evaluation; RN to contact MD regarding order. At this time, continue minced and moist (MM5)/thin liquid diet with implementation of swallowing compensatory strategies (small bites/sips, up as tall as possible during meals, slow rate, straws okay).    Plan    1) continue minced and moist (MM5)/thin liquid diet  2) recommend a cognitive-linguistic evaluation given family report of change from baseline    Continue current treatment plan.    Discharge Recommendations: (P) Anticipate that the patient will have no further speech therapy needs after discharge from the hospital (for dysphagia)       Objective       11/18/21 1345   Vitals   O2 (LPM) 60   O2 Delivery Device Heated High Flow Nasal Cannula   Pain 0 - 10 Group   Therapist  Pain Assessment Post Activity Pain Same as Prior to Activity;Nurse Notified;0   Cognitive-Linguistic   Level of Consciousness Alert   Dysphagia    Dysphagia X   Positioning / Behavior Modification Modulate Rate or Bite Size;Self Monitoring   Other Treatments trials of MM5/TN0 lunch   Diet / Liquid Recommendation Thin (0);Minced & Moist (5) - (Dysphagia II)   Nutritional Liquid Intake Rating Scale Non thickened beverages   Nutritional Food Intake Rating Scale Total oral diet with multiple consistencies but requiring special preparation or compensations   Skilled Intervention Compensatory Strategies;Verbal Cueing   Recommended Route of Medication Administration   Medication Administration  Crush all Medications in Puree;Float Whole with Puree   Short Term Goals   Short Term Goal # 1 The patient will consume MM5/TN0 meal with no overt s/sx of aspiration    Goal Outcome # 1 Progressing as expected

## 2021-11-18 NOTE — PROGRESS NOTES
Patient requests to be turned on her right side. Prior to turn, patient not having increased work of breathing and saturating 93% on high flow. Post-turning to the right side, patient desaturates to the 70's without improving after rest. Oximask placed and call placed to RT. Comfort given to patient with oxygen saturation increasing to 85%. RT comes and places patient on Bipap 16/10 and 100% with patient saturating 90% and no signs of distress. Patient educated on situation. Continue plan of care. Intensivist made aware.

## 2021-11-18 NOTE — RESPIRATORY CARE
"   COPD EDUCATION by COPD CLINICAL EDUCATOR  11/18/2021 at 7:41 AM by Tori Boland, RRT     Patient reviewed by COPD education team. Patient does not have a formal history or diagnosis of COPD and is a former smoker.  Patient does not qualify for the COPD program.   Per Dr. Mcgarry note patient has poor chances of survival and the high risk nature of intubation. Patient in ICU status pending. No education at this time. Patient not a candidate for [facility] Medicaid HMO.    COPD Screen       COPD Assessment  COPD Clinical Specialists ONLY  COPD Education Initiated: No--Quick Screen (pt was seen and educatied on11/3/21, pt is non compliant, does not use any inhalers, has never had a PFT, was not sure what COPD is but is always short of breath, BMI 57.6, not active, has COVID was DC to SNF came back is now in ICU COVID HF 60/100%)  Is this a COPD exacerbation patient?: No    Meds to Beds  Would the patient like to opt in for Bedside Medication Delivery at Discharge?: No     MY COPD ACTION PLAN     It is recommended that patients and physicians /healthcare providers complete this action plan together. This plan should be discussed at each physician visit and updated as needed.    The green, yellow and red zones show groups of symptoms of COPD. This list of symptoms is not comprehensive, and you may experience other symptoms. In the \"Actions\" column, your healthcare provider has recommended actions for you to take based on your symptoms.    Patient Name: Cheryl D Blakemore   YOB: 1960   Last Updated on:     Green Zone:  I am doing well today Actions   •  Usual activitiy and exercise level •  Take daily medications   •  Usual amounts of cough and phlegm/mucus •  Use oxygen as prescribed   •  Sleep well at night •  Continue regular exercise/diet plan   •  Appetite is good •  At all times avoid cigarette smoke, inhaled irritants     Daily Medications (these medications are taken every day):            " "    Yellow Zone:  I am having a bad day or a COPD flare Actions   •  More breathless than usual •  Continue daily medications   •  I have less energy for my daily activities •  Use quick relief inhaler as ordered   •  Increased or thicker phlegm/mucus •  Use oxygen as prescribed   •  Using quick relief inhaler/nebulizer more often •  Get plenty of rest   •  Swelling of ankles more than usual •  Use pursed lip breathing   •  More coughing than usual •  At all times avoid cigarette smoke, inhaled irritants   •  I feel like I have a \"chest cold\"   •  Poor sleep and my symptoms woke me up   •  My appetite is not good   •  My medicine is not helping    •  Call provider immediately if symptoms don’t improve     Continue daily medications, add rescue medications:               Medications to be used during a flare up, (as Discussed with Provider):              Red Zone:  I need urgent medical care Actions   •  Severe shortness of breath even at rest •  Call 911 or seek medical care immediately   •  Not able to do any activity because of breathing    •  Fever or shaking chills    •  Feeling confused or very drowsy     •  Chest pains    •  Coughing up blood              "

## 2021-11-18 NOTE — CARE PLAN
The patient is Unstable - High likelihood or risk of patient condition declining or worsening    Shift Goals  Clinical Goals: Maintain o2 on high flow/BiPAP  Patient Goals: Mobility  Family Goals: No family present at this time     Progress made toward(s) clinical / shift goals:  Able to go on high flow for a few hours  Patient cannot tolerate turning side-to-side and required bipap for o2 desaturation. Is now on Bipap 100% and saturating 91%.     Patient is not progressing towards the following goals:  Mobility  Maintaining O2 saturation

## 2021-11-18 NOTE — PROGRESS NOTES
Pt verbalizes needs to staff. Pt on continuous cardiac monitor. Pt on BIPAP when this RN arrived and RT transitioned pt onto heated high flow nasal cannula with oxymask. Pt educated on importance and benefits of turning to side and laying on stomach. Tolerating diet. Education provided as needed to patient. Dr. Mcgarry also spoke with pt at bedside and family members via phone call regarding plan of care.

## 2021-11-19 LAB
ALBUMIN SERPL BCP-MCNC: 3.1 G/DL (ref 3.2–4.9)
ALBUMIN/GLOB SERPL: 1 G/DL
ALP SERPL-CCNC: 44 U/L (ref 30–99)
ALT SERPL-CCNC: 29 U/L (ref 2–50)
ANION GAP SERPL CALC-SCNC: 6 MMOL/L (ref 7–16)
AST SERPL-CCNC: 13 U/L (ref 12–45)
BASOPHILS # BLD AUTO: 0.1 % (ref 0–1.8)
BASOPHILS # BLD: 0.01 K/UL (ref 0–0.12)
BILIRUB SERPL-MCNC: 0.7 MG/DL (ref 0.1–1.5)
BUN SERPL-MCNC: 59 MG/DL (ref 8–22)
CALCIUM SERPL-MCNC: 9 MG/DL (ref 8.4–10.2)
CHLORIDE SERPL-SCNC: 87 MMOL/L (ref 96–112)
CO2 SERPL-SCNC: 39 MMOL/L (ref 20–33)
CREAT SERPL-MCNC: 0.95 MG/DL (ref 0.5–1.4)
EOSINOPHIL # BLD AUTO: 0 K/UL (ref 0–0.51)
EOSINOPHIL NFR BLD: 0 % (ref 0–6.9)
ERYTHROCYTE [DISTWIDTH] IN BLOOD BY AUTOMATED COUNT: 41.1 FL (ref 35.9–50)
GLOBULIN SER CALC-MCNC: 3.2 G/DL (ref 1.9–3.5)
GLUCOSE SERPL-MCNC: 170 MG/DL (ref 65–99)
HCT VFR BLD AUTO: 43 % (ref 37–47)
HGB BLD-MCNC: 13.4 G/DL (ref 12–16)
IMM GRANULOCYTES # BLD AUTO: 0.13 K/UL (ref 0–0.11)
IMM GRANULOCYTES NFR BLD AUTO: 1.5 % (ref 0–0.9)
LYMPHOCYTES # BLD AUTO: 1.15 K/UL (ref 1–4.8)
LYMPHOCYTES NFR BLD: 12.9 % (ref 22–41)
MCH RBC QN AUTO: 29.2 PG (ref 27–33)
MCHC RBC AUTO-ENTMCNC: 31.2 G/DL (ref 33.6–35)
MCV RBC AUTO: 93.7 FL (ref 81.4–97.8)
MONOCYTES # BLD AUTO: 0.64 K/UL (ref 0–0.85)
MONOCYTES NFR BLD AUTO: 7.2 % (ref 0–13.4)
NEUTROPHILS # BLD AUTO: 7 K/UL (ref 2–7.15)
NEUTROPHILS NFR BLD: 78.3 % (ref 44–72)
NRBC # BLD AUTO: 0 K/UL
NRBC BLD-RTO: 0 /100 WBC
PLATELET # BLD AUTO: 143 K/UL (ref 164–446)
PMV BLD AUTO: 13.2 FL (ref 9–12.9)
POTASSIUM SERPL-SCNC: 5.2 MMOL/L (ref 3.6–5.5)
PROT SERPL-MCNC: 6.3 G/DL (ref 6–8.2)
RBC # BLD AUTO: 4.59 M/UL (ref 4.2–5.4)
SODIUM SERPL-SCNC: 132 MMOL/L (ref 135–145)
WBC # BLD AUTO: 8.9 K/UL (ref 4.8–10.8)

## 2021-11-19 PROCEDURE — A9270 NON-COVERED ITEM OR SERVICE: HCPCS | Performed by: HOSPITALIST

## 2021-11-19 PROCEDURE — 700102 HCHG RX REV CODE 250 W/ 637 OVERRIDE(OP): Performed by: HOSPITALIST

## 2021-11-19 PROCEDURE — 700102 HCHG RX REV CODE 250 W/ 637 OVERRIDE(OP): Performed by: INTERNAL MEDICINE

## 2021-11-19 PROCEDURE — 92526 ORAL FUNCTION THERAPY: CPT

## 2021-11-19 PROCEDURE — A9270 NON-COVERED ITEM OR SERVICE: HCPCS | Performed by: INTERNAL MEDICINE

## 2021-11-19 PROCEDURE — 85025 COMPLETE CBC W/AUTO DIFF WBC: CPT

## 2021-11-19 PROCEDURE — 700101 HCHG RX REV CODE 250: Performed by: INTERNAL MEDICINE

## 2021-11-19 PROCEDURE — 99291 CRITICAL CARE FIRST HOUR: CPT | Performed by: INTERNAL MEDICINE

## 2021-11-19 PROCEDURE — 700105 HCHG RX REV CODE 258: Performed by: INTERNAL MEDICINE

## 2021-11-19 PROCEDURE — 86480 TB TEST CELL IMMUN MEASURE: CPT

## 2021-11-19 PROCEDURE — 770022 HCHG ROOM/CARE - ICU (200)

## 2021-11-19 PROCEDURE — 94640 AIRWAY INHALATION TREATMENT: CPT

## 2021-11-19 PROCEDURE — 94760 N-INVAS EAR/PLS OXIMETRY 1: CPT

## 2021-11-19 PROCEDURE — 700111 HCHG RX REV CODE 636 W/ 250 OVERRIDE (IP): Performed by: INTERNAL MEDICINE

## 2021-11-19 PROCEDURE — 80053 COMPREHEN METABOLIC PANEL: CPT

## 2021-11-19 PROCEDURE — 94660 CPAP INITIATION&MGMT: CPT

## 2021-11-19 RX ORDER — MIDODRINE HYDROCHLORIDE 5 MG/1
5 TABLET ORAL
Status: DISCONTINUED | OUTPATIENT
Start: 2021-11-19 | End: 2021-11-24 | Stop reason: HOSPADM

## 2021-11-19 RX ORDER — ALPRAZOLAM 0.25 MG/1
0.25 TABLET ORAL 2 TIMES DAILY PRN
Status: DISCONTINUED | OUTPATIENT
Start: 2021-11-19 | End: 2021-11-24 | Stop reason: HOSPADM

## 2021-11-19 RX ORDER — DEXAMETHASONE 4 MG/1
6 TABLET ORAL DAILY
Status: COMPLETED | OUTPATIENT
Start: 2021-11-20 | End: 2021-11-20

## 2021-11-19 RX ORDER — LORAZEPAM 2 MG/ML
1 INJECTION INTRAMUSCULAR
Status: DISCONTINUED | OUTPATIENT
Start: 2021-11-19 | End: 2021-11-24 | Stop reason: HOSPADM

## 2021-11-19 RX ADMIN — AMIODARONE HYDROCHLORIDE 200 MG: 200 TABLET ORAL at 06:05

## 2021-11-19 RX ADMIN — DEXMEDETOMIDINE HYDROCHLORIDE 0.2 MCG/KG/HR: 100 INJECTION, SOLUTION, CONCENTRATE INTRAVENOUS at 05:27

## 2021-11-19 RX ADMIN — Medication 5 MG: at 20:34

## 2021-11-19 RX ADMIN — FAMOTIDINE 20 MG: 20 TABLET ORAL at 17:53

## 2021-11-19 RX ADMIN — ENOXAPARIN SODIUM 60 MG: 60 INJECTION SUBCUTANEOUS at 17:53

## 2021-11-19 RX ADMIN — DIGOXIN 250 MCG: 250 TABLET ORAL at 17:53

## 2021-11-19 RX ADMIN — SENNOSIDES AND DOCUSATE SODIUM 2 TABLET: 50; 8.6 TABLET ORAL at 06:05

## 2021-11-19 RX ADMIN — SENNOSIDES AND DOCUSATE SODIUM 2 TABLET: 50; 8.6 TABLET ORAL at 17:53

## 2021-11-19 RX ADMIN — TRAZODONE HYDROCHLORIDE 100 MG: 50 TABLET ORAL at 20:33

## 2021-11-19 RX ADMIN — BARICITINIB 4 MG: 2 TABLET, FILM COATED ORAL at 17:54

## 2021-11-19 RX ADMIN — METOPROLOL TARTRATE 12.5 MG: 25 TABLET, FILM COATED ORAL at 17:52

## 2021-11-19 RX ADMIN — FAMOTIDINE 20 MG: 20 TABLET ORAL at 06:05

## 2021-11-19 RX ADMIN — DEXAMETHASONE SODIUM PHOSPHATE 6 MG: 4 INJECTION, SOLUTION INTRA-ARTICULAR; INTRALESIONAL; INTRAMUSCULAR; INTRAVENOUS; SOFT TISSUE at 05:27

## 2021-11-19 RX ADMIN — ALPRAZOLAM 0.25 MG: 0.25 TABLET ORAL at 23:43

## 2021-11-19 RX ADMIN — ENOXAPARIN SODIUM 60 MG: 60 INJECTION SUBCUTANEOUS at 05:27

## 2021-11-19 ASSESSMENT — PAIN DESCRIPTION - PAIN TYPE
TYPE: ACUTE PAIN
TYPE: ACUTE PAIN;CHRONIC PAIN
TYPE: ACUTE PAIN
TYPE: ACUTE PAIN;CHRONIC PAIN
TYPE: ACUTE PAIN;CHRONIC PAIN

## 2021-11-19 ASSESSMENT — ENCOUNTER SYMPTOMS
COUGH: 0
PALPITATIONS: 0
NERVOUS/ANXIOUS: 0
DIZZINESS: 0
SHORTNESS OF BREATH: 1
HEADACHES: 0
MYALGIAS: 0

## 2021-11-19 ASSESSMENT — PULMONARY FUNCTION TESTS
EPAP_CMH2O: 10
EPAP_CMH2O: 10

## 2021-11-19 NOTE — CARE PLAN
Problem: Nutritional:  Goal: Achieve adequate nutritional intake  Description: Advance diet as tolerated. Patient will consume >50% of meals.  Outcome: Progressing   Pt receiving a Level 5- minced and moist diet with thin liquids. Recorded PO intake of most meals remains <50%, though intake of some Boost has been 50-75% and %. Continue to offer supplements and encourage PO intake. RD following.

## 2021-11-19 NOTE — FLOWSHEET NOTE
11/19/21 1500   Vital Signs   Pulse 81   Respiration (!) 24   Pulse Oximetry (!) 85 %   $ Pulse Oximetry (Spot Check) Yes   Oxygen   O2 (LPM) 60   FiO2% 100 %   O2 Delivery Device Heated High Flow Nasal Cannula   Aerosols   $ Aerosol Delivery Device Heated High Flow Nasal Cannula   Aerosol Temperature 31 °C (87.8 °F)

## 2021-11-19 NOTE — CARE PLAN
The patient is Watcher - Medium risk of patient condition declining or worsening    Shift Goals  Clinical Goals: maintain oxygenation, prevent skin breakdown  Patient Goals: rest comfortably  Family Goals: Family not present at this time    Progress made toward(s) clinical / shift goals:        Problem: Fall Risk  Goal: Patient will remain free from falls  Outcome: Progressing   No falls this shift  Problem: Dysphagia  Goal: Dysphagia will improve  Outcome: Progressing   Pt states no difficulty with current diet  Problem: Pain - Standard  Goal: Alleviation of pain or a reduction in pain to the patient’s comfort goal  Outcome: Progressing   No c/o pain this shift    Patient is not progressing towards the following goals:      Problem: Self Care  Goal: Patient will have the ability to perform ADLs independently or with assistance (bathe, groom, dress, toilet and feed)  Outcome: Not Progressing   Pt still requires much assistance with ADLs.

## 2021-11-19 NOTE — PROGRESS NOTES
Dr. Mcgarry notified of pt persistent hypotension this AM. Precedex stopped per provider order. Informed the RN she would adjust meds accordingly. Provider also notified of abnormal AM lab values.

## 2021-11-19 NOTE — THERAPY
"Speech Language Pathology  Daily Treatment     Patient Name: Cheryl D Blakemore  Age:  61 y.o., Sex:  female  Medical Record #: [id number]  Today's Date: 11/19/2021     Precautions  Precautions: Swallow Precautions ( See Comments)    Assessment    Pertinent Information    Respiratory status: Oxygen: 60 liters/minute via high flow nasal cannula at 100% FiO2 with 15L oxymask  PO trials: thin liquids and soft and bite sized (lean cuisine mac and cheese)    Treatment:    The patient was seen on this date for a dysphagia treatment. Upon arrival, patient noted to have choppy verbal expression and speaking in agrammatical shortened sentences such as \"fruit good, bowl yuck\". Patient able to use complete sentences when prompted by the speech therapist, but the patient was noted to fluctuate between fluent and grammatically correct sentences to choppy shortened sentences. The patient was able to consume mac and cheese meal with no overt s/sx of aspiration. Bite and mastication was adequate and swallow trigger was timely across consistencies. Coughing x1 noted with sequential sips of thin liquids, but consumed single sips without difficulty. Patient appears to be at her baseline regarding PO intake and is consuming diet with no overt s/sx of aspiration.     Recommendation:    Continuation of minced and moist with thin liquids (mac and cheese is OK)    Plan    Continue current treatment plan.    Discharge Recommendations: Anticipate that the patient will have no further speech therapy needs after discharge from the hospital    Objective       11/19/21 1111   Dysphagia    Positioning / Behavior Modification Self Monitoring;Modulate Rate or Bite Size   Other Treatments trials of mac and cheese and thin liquids    Diet / Liquid Recommendation Thin (0);Minced & Moist (5) - (Dysphagia II)   Nutritional Liquid Intake Rating Scale Non thickened beverages   Nutritional Food Intake Rating Scale Total oral diet with multiple consistencies " "but requiring special preparation or compensations   Skilled Intervention Compensatory Strategies   Recommended Route of Medication Administration   Medication Administration  Crush all Medications in Puree;Float Whole with Puree   Patient / Family Goals   Patient / Family Goal #1 \"this tastes so good\"   Goal #1 Outcome Progressing as expected   Short Term Goals   Short Term Goal # 1 The patient will consume MM5/TN0 meal with no overt s/sx of aspiration    Goal Outcome # 1 Progressing as expected         "

## 2021-11-19 NOTE — PROGRESS NOTES
"Critical Care Progress Note    Date of admission  11/9/2021    Chief Complaint  61 y.o. female admitted 11/9/2021 with acute on chronic mixed hypercapnic and hypoxemic respiratory failure due to COVID-19    Hospital Course  61 y.o. female with past medical history of atrial fibrillation, dilated cardiomyopathy, moderate pulmonary hypertension on digoxin and amiodarone, Body mass index is 61.42 kg/m².,  JUAN CARLOS/OHS on BIPAP and chronic hypoxemic respiratory failure on 3-4L at baseline who recently had a prolonged hospitalization for acute on chronic congestive heart failure and possible COPD exacerbation which improved with BIPAP and diuresis.  Echo on 10/30/2021 showed grossly normal LVEF, 55%, severe biatrial enlargement , RV not well visualized nor were the IVC, tricuspid or pulmonic valves. During her hospitalization, palliative care was consulted and CODE STATUS was changed to DNR/DNI and she was discharged to a skilled nursing facility for PT/OT on 11/9.     She was readmitted the same day 11/9/2021 as she tested positive for Covid 19.  Reportedly had no changes symptomatically since discharge aside from mildly increased shortness of breath increased fatigue.  Not vaccinated.  CXR unchanged.  ABG on admission 7.3 3/87/60.  Started on Decadron.  She was admitted to the floor, overnight RRT was called as patient became increasingly confused/disoriented.  Repeat ABG 7.34/97/73 on 100% FiO2.  Transferred to the ICU for rescue BiPAP.  Of note, BCx 10/28 + staph hominus and epidermis. Repeat BCx 10/30 negative. 1/2 bottles 11/9 + again, prelim staph. Negative MRSA, possible contaminant.     11/12: Family meeting held at bedside with patient and son (via phone, son sick) Code status changed back to DNR/DNI, in line with POLST    11/16: Patient expressing wishes to be intubated if needed.  She wants to \"try everything\". I had an extensive discussion with her today. I was very honest with her about her poor chances of " survival and the high risk nature of intubating her. She seems understand these risks and wants to proceed with intubation. I also explained that intubation in these circumstances would be done when I deem it is necessary and safety do so and it would likely take away valuable time that she could spend with her family well on BiPAP. I explained to her that once she is intubated she will not be communicative and therefore will likely not speak to her family again unless she is able to improve and be extubated. She gave me permission to speak to her daughter-in-law, Sherron. I called Sherron and explained the conversation to her as well. As the patient is currently alert and oriented x4 she is making her own decision and her CODE STATUS has been changed to full code to reflect this conversation.    Interval Problem Update  Chart review from the past 24 hours includes imaging, laboratory studies, vital signs and notes available.  Pertinent data for today's visit includes    ICU CHECKLIST:  Neuro: Stable, alert and oriented  Cardiac: Afib rate controlled. On Amio and dig. TTE: grossly normal LVEF, 55%, severe biatrial enlargement, RV not well visualized nor was IVC, tricuspid or pulmonic valves. BNP trending down.  Doesn't tolerate lasix well with renal function.  Hypotension today resolved after D/C precedex.   Pulmonary: High flow during the day, BIPAP at night  Heme: Stable thrombocytopenia. Leukocytosis improved today   DVT Prophylaxis: Lovenox prophy  /Renal: DIANE improving today with holding diuresis   I/O: -33054   Mckeon: D/C'd, urinating well  ID:  Decadron 11/11-, baricitinib 11/11- .BCx 10/28 + staph hominus and epidermis. 1/2 bottles 11/9 + again, staph hominus. On vanco/zosyn 11/9-11/11. BCx 11/12 remain NGTD.  Skin: intact  GI/Nutrition: Seen by speech yesterday, Minced diet ordered   Prophylaxis: Pepcid 2/2 steroids  Endo: Stable  Lines/Tubes: PIV      Review of Systems  Review of Systems    Constitutional: Positive for malaise/fatigue.        Hungry  Anxious  Requesting water   Respiratory: Positive for shortness of breath. Negative for cough.    Cardiovascular: Negative for chest pain, palpitations and leg swelling.   Musculoskeletal: Negative for myalgias.   Neurological: Negative for dizziness and headaches.   Psychiatric/Behavioral: The patient is not nervous/anxious.    All other systems reviewed and are negative.    Vital Signs for last 24 hours   Temp:  [35.8 °C (96.5 °F)-36.3 °C (97.4 °F)] 35.9 °C (96.7 °F)  Pulse:  [45-94] 84  Resp:  [17-32] 17  BP: ()/(42-72) 117/68  SpO2:  [85 %-97 %] 89 %    Physical Exam   Physical Exam  Vitals and nursing note reviewed.   Constitutional:       General: She is not in acute distress.     Appearance: Normal appearance. She is well-developed. She is obese. She is not diaphoretic.   Eyes:      General: No scleral icterus.        Right eye: No discharge.         Left eye: No discharge.      Conjunctiva/sclera: Conjunctivae normal.      Pupils: Pupils are equal, round, and reactive to light.   Neck:      Thyroid: No thyromegaly.      Vascular: No JVD.   Cardiovascular:      Rate and Rhythm: Normal rate and regular rhythm.      Heart sounds: Normal heart sounds. No murmur heard.  No gallop.    Pulmonary:      Effort: No respiratory distress.      Breath sounds: Normal breath sounds. No wheezing or rales.      Comments: Distant breath sounds diffusely  Abdominal:      General: There is no distension.      Palpations: Abdomen is soft.      Tenderness: There is no abdominal tenderness. There is no guarding.   Musculoskeletal:         General: No tenderness.      Right lower leg: Edema ( improving) present.      Left lower leg: Edema ( improving) present.   Lymphadenopathy:      Cervical: No cervical adenopathy.   Skin:     General: Skin is warm.      Capillary Refill: Capillary refill takes less than 2 seconds.      Coloration: Skin is pale.      Findings:  No erythema or rash.   Neurological:      Mental Status: She is oriented to person, place, and time.      Cranial Nerves: No cranial nerve deficit.      Sensory: No sensory deficit.      Motor: No abnormal muscle tone.   Psychiatric:      Comments: anxious       Medications  Current Facility-Administered Medications   Medication Dose Route Frequency Provider Last Rate Last Admin   • midodrine (PROAMATINE) tablet 5 mg  5 mg Oral TID WITH MEALS Glenny Mcgarry M.D.       • [START ON 11/20/2021] dexamethasone (DECADRON) tablet 6 mg  6 mg Oral DAILY Glenny Mcgarry M.D.       • ALPRAZolam (XANAX) tablet 0.25 mg  0.25 mg Oral BID PRN Glenny Mcgarry M.D.       • dexmedetomidine (Precedex) 400 mcg in  mL infusion  0.2 mcg/kg/hr Intravenous Continuous Glenny Mcgarry M.D.   Stopped at 11/19/21 0845   • LORazepam (ATIVAN) injection 1 mg  1 mg Intravenous Q HOUR PRN Glenny Mcgarry M.D.       • morphine (pf) 4 mg/mL injection 1 mg  1 mg Intravenous Q30 MIN PRN Glenny Mcgarry M.D.       • baricitinib (Olumiant) tablet 4 mg  4 mg Oral DAILY AT 1800 Glenny Mcgarry M.D.   4 mg at 11/18/21 1713   • famotidine (PEPCID) tablet 20 mg  20 mg Oral BID Glenny Mcgarry M.D.   20 mg at 11/19/21 0605   • hydrALAZINE (APRESOLINE) injection 10 mg  10 mg Intravenous Q6HRS PRN Abdiel Camarillo M.D.   10 mg at 11/15/21 1826   • melatonin tablet 5 mg  5 mg Oral Nightly Nisa Bustos M.D.   5 mg at 11/18/21 2013   • traZODone (DESYREL) tablet 100 mg  100 mg Oral HS PRN Abdiel Camarillo M.D.   100 mg at 11/18/21 2013   • metoprolol tartrate (LOPRESSOR) tablet 12.5 mg  12.5 mg Oral TWICE DAILY Abdiel Camarillo M.D.   12.5 mg at 11/18/21 1713   • digoxin (LANOXIN) tablet 250 mcg  250 mcg Oral DAILY AT 1800 Abdiel Camarillo M.D.   250 mcg at 11/18/21 1713   • Metoprolol Tartrate (LOPRESSOR) injection 5 mg  5 mg Intravenous Q HOUR PRN Abdiel Camarillo M.D.   5 mg at 11/14/21 1845   • enoxaparin (LOVENOX) inj 60  mg  60 mg Subcutaneous Q12HRS Abdiel Camarillo M.D.   60 mg at 11/19/21 0527   • ipratropium-albuterol (DUONEB) nebulizer solution  3 mL Nebulization Q4H PRN (RT) Abdiel Camarillo M.D.       • amiodarone (Cordarone) tablet 200 mg  200 mg Oral Q DAY Abdiel Camarillo M.D.   200 mg at 11/19/21 0605   • acetaminophen (Tylenol) tablet 650 mg  650 mg Oral Q6HRS PRN Asem CHADD Woody M.D.   650 mg at 11/17/21 1411   • senna-docusate (PERICOLACE or SENOKOT S) 8.6-50 MG per tablet 2 Tablet  2 Tablet Oral BID Asem CHADD Woody M.D.   2 Tablet at 11/19/21 0605    And   • polyethylene glycol/lytes (MIRALAX) PACKET 1 Packet  1 Packet Oral QDAY PRN Asem CHADD Woody M.D.        And   • magnesium hydroxide (MILK OF MAGNESIA) suspension 30 mL  30 mL Oral QDAY PRN Asem CHADD Woody M.D.   30 mL at 11/13/21 1658    And   • bisacodyl (DULCOLAX) suppository 10 mg  10 mg Rectal QDAY PRN Asem CHADD Woody M.D.   10 mg at 11/14/21 1723   • Respiratory Therapy Consult   Nebulization Continuous RT Asem CHADD Woody M.D.       • ondansetron (ZOFRAN) syringe/vial injection 4 mg  4 mg Intravenous Q4HRS PRN Asem CHADD Woody M.D.   4 mg at 11/17/21 1735   • ondansetron (ZOFRAN ODT) dispertab 4 mg  4 mg Oral Q4HRS PRN Asem CHADD Woody M.D.   4 mg at 11/16/21 0027   • promethazine (PHENERGAN) tablet 12.5-25 mg  12.5-25 mg Oral Q4HRS PRN Asem CHADD Woody M.D.       • promethazine (PHENERGAN) suppository 12.5-25 mg  12.5-25 mg Rectal Q4HRS PRN Asem CHADD Woody M.D.       • prochlorperazine (COMPAZINE) injection 5-10 mg  5-10 mg Intravenous Q4HRS PRN Mario Woody M.D.       • guaiFENesin dextromethorphan (ROBITUSSIN DM) 100-10 MG/5ML syrup 10 mL  10 mL Oral Q6HRS PRN Mario Woody M.D.   10 mL at 11/15/21 2230     Fluids    Intake/Output Summary (Last 24 hours) at 11/19/2021 1219  Last data filed at 11/19/2021 1200  Gross per 24 hour   Intake 1216.18 ml   Output 1650 ml   Net -433.82 ml       Laboratory          Recent Labs     11/17/21  6384  11/18/21  0337 11/19/21  0350   SODIUM 138 134* 132*   POTASSIUM 5.2 5.1 5.2   CHLORIDE 90* 86* 87*   CO2 42* 41* 39*   BUN 49* 54* 59*   CREATININE 0.90 0.91 0.95   CALCIUM 9.4 9.2 9.0     Recent Labs     11/17/21  0347 11/18/21  0337 11/19/21  0350   ALTSGPT 24 24 29   ASTSGOT 18 15 13   ALKPHOSPHAT 50 48 44   TBILIRUBIN 0.6 0.6 0.7   GLUCOSE 120* 131* 170*     Recent Labs     11/17/21  0347 11/18/21  0337 11/19/21  0350   WBC 8.8 11.1* 8.9   NEUTSPOLYS 81.80* 82.50* 78.30*   LYMPHOCYTES 9.30* 9.10* 12.90*   MONOCYTES 7.90 7.30 7.20   EOSINOPHILS 0.00 0.00 0.00   BASOPHILS 0.10 0.20 0.10   ASTSGOT 18 15 13   ALTSGPT 24 24 29   ALKPHOSPHAT 50 48 44   TBILIRUBIN 0.6 0.6 0.7     Recent Labs     11/17/21  0347 11/18/21  0337 11/19/21  0350   RBC 4.74 4.86 4.59   HEMOGLOBIN 13.7 14.2 13.4   HEMATOCRIT 45.6 45.6 43.0   PLATELETCT 147* 149* 143*     Imaging  Most recent CXR 11/18 reviewed, no pneumothorax, worsening infiltrates per my read        Assessment/Plan    * Acute on chronic respiratory failure with hypoxia and hypercapnia (HCC)- (present on admission)  Assessment & Plan  COVID pneumonia with acute hypoxic respiratory failure  -- Date of initial positive test: 11/9/2021  -- Risk factors for severe covid - Morbid obesity, unvaccinated, CHF, possible PH, afib  -- Current COVID complications include DIANE, BIPAP dependent respiratory failure.   -- Covid specific therapies: Decadron (11/11-11/21)  -- Patient is a candidate for Baricitinib and started on 11/11  -- Oxygen support: BIPAP at night 70%, tolerating max high flow now during the day  -- No ABG today  -- Patient in appropriate isolation with careful gowning and hand hygiene upon entrance and exit of room  -- Rescue maneuvers: Proning - encouraging patient to self-prone, ECMO - not an ecmo candidate due to super obesity.   -- Prophylaxis with lovenox  -- Patient and family decided again on DNAR 11/18.    -- Precedex held due to bradycardia and hypotension,  Xanax added  -- If respiratory distress develops a morphine drip should be started.  -- The patient is not able to make decisions independently and should she attempt to reverse her code status while in extremis this decision needs to be made in concert with her son and daughter-in-law.       Metabolic alkalosis with respiratory acidosis  Assessment & Plan  Chronic resp acidosis with compensatory metabolic alkalosis + contraction alkalosis  Now alkalemic  Cont spot dose diamox PRN  Cont BIPAP    Chronic heart failure with preserved ejection fraction (HCC)  Assessment & Plan  TTE 10/30/2021: grossly normal LVEF, 55%, severe biatrial enlargement, RV not well visualized nor were the IVC, tricuspid or pulmonic valves.  Reported history of dilated cardiomyopathy and moderate pulmonary hypertension  BNP chronically elevated, currently ~900 -> 458  Procal today 0.03  Trace edema  Clinically slightly overloaded  Spot dosing lasix, however patient's renal function declines each time lasix is given    Pneumonia due to COVID-19 virus- (present on admission)  Assessment & Plan  See plan for respiratory failure    Bacteremia, coagulase-negative staphylococcal  Assessment & Plan  BCx 10/28 + staph hominus and epidermis. Repeat BCx 10/30 negative. 1/2 bottles 11/9 + again, + staph hominus  Negative MRSA, suspected contaminant.   vanco/zosyn 11/9-11/11.  Repeated blood cultures  11/12 NGTD, afebrile, procal < 0.05    Advanced care planning/counseling discussion  Assessment & Plan  Patient has been thinking about her CODE STATUS for the last few days and has decided she wants to try intubation.   On 11/16 we discussed the high risk nature of her intubation and the likely poor prognosis on a ventilator.  She states she understands.   See ACP note from today 11/18    AF (atrial fibrillation) (HCC)- (present on admission)  Assessment & Plan  Rate currently controlled  TTE 10/30/2021 showed grossly normal LVEF, 55%, severe biatrial, RV  not well visualized nor were the IVC, tricuspid or pulmonic valves.  HD stable  Cont amiodarone and digoxin PO  Cont lopressor 12.5mg PO BID  MTP 5mg IVP PRN HR > 120     VTE:  Lovenox  Ulcer: H2 Antagonist  Lines: Mckeon Catheter  Ongoing indication addressed    I have performed a physical exam and reviewed and updated ROS and Plan today (11/19/2021). In review of yesterday's note (11/18/2021), there are no changes except as documented above.     Discussed patient condition and risk of morbidity and/or mortality with Family, RN, RT, Pharmacy, Code status disscussed and Patient. Spoke with Jin (son) over phone and Sherron (Jin's wife) in person and updated on condition.    The patient remains critically ill.  Critical care time = 60 minutes in directly providing and coordinating critical care and extensive data review.  No time overlap and excludes procedures.    Glenny Mcgarry MD RD  Pulmonary and Critical Care    Available on Voalte

## 2021-11-19 NOTE — PROGRESS NOTES
Pt is asleep in bed. Awakes to voice. Oriented x4. /65. HR:52. Pt has no c/o pain, n/v or SOB. Verified precedex drip. Pt is requesting to rest at this time. Fall precautions in place.

## 2021-11-19 NOTE — DISCHARGE PLANNING
Anticipated Discharge Disposition:   TBD, pending medical team collaboration     Action:   Chart review complete.     Discussed patient's plan of care and plans for discharge during rounds. Per MD, patient remains on a HFNC during the day and BIPAP at night. Patient being medicated for pain and anxiety.     MD would like to hold off on LTAC choice and referral at this time due to instability of patient.     RN CM will continue to follow.     Barriers to Discharge:   Medical Clearance    Plan:   Hospital care management will continue to assist with discharge planning needs.

## 2021-11-20 LAB
ALBUMIN SERPL BCP-MCNC: 3.1 G/DL (ref 3.2–4.9)
ALBUMIN/GLOB SERPL: 0.8 G/DL
ALP SERPL-CCNC: 47 U/L (ref 30–99)
ALT SERPL-CCNC: 39 U/L (ref 2–50)
ANION GAP SERPL CALC-SCNC: 5 MMOL/L (ref 7–16)
AST SERPL-CCNC: 21 U/L (ref 12–45)
BASOPHILS # BLD AUTO: 0.2 % (ref 0–1.8)
BASOPHILS # BLD: 0.02 K/UL (ref 0–0.12)
BILIRUB SERPL-MCNC: 0.8 MG/DL (ref 0.1–1.5)
BUN SERPL-MCNC: 53 MG/DL (ref 8–22)
CALCIUM SERPL-MCNC: 9.3 MG/DL (ref 8.4–10.2)
CHLORIDE SERPL-SCNC: 87 MMOL/L (ref 96–112)
CO2 SERPL-SCNC: 38 MMOL/L (ref 20–33)
CREAT SERPL-MCNC: 0.82 MG/DL (ref 0.5–1.4)
EOSINOPHIL # BLD AUTO: 0 K/UL (ref 0–0.51)
EOSINOPHIL NFR BLD: 0 % (ref 0–6.9)
ERYTHROCYTE [DISTWIDTH] IN BLOOD BY AUTOMATED COUNT: 41.2 FL (ref 35.9–50)
GLOBULIN SER CALC-MCNC: 4 G/DL (ref 1.9–3.5)
GLUCOSE SERPL-MCNC: 126 MG/DL (ref 65–99)
HCT VFR BLD AUTO: 46.5 % (ref 37–47)
HGB BLD-MCNC: 14.7 G/DL (ref 12–16)
IMM GRANULOCYTES # BLD AUTO: 0.19 K/UL (ref 0–0.11)
IMM GRANULOCYTES NFR BLD AUTO: 1.5 % (ref 0–0.9)
LYMPHOCYTES # BLD AUTO: 1.29 K/UL (ref 1–4.8)
LYMPHOCYTES NFR BLD: 10.3 % (ref 22–41)
MCH RBC QN AUTO: 29.6 PG (ref 27–33)
MCHC RBC AUTO-ENTMCNC: 31.6 G/DL (ref 33.6–35)
MCV RBC AUTO: 93.6 FL (ref 81.4–97.8)
MONOCYTES # BLD AUTO: 1.07 K/UL (ref 0–0.85)
MONOCYTES NFR BLD AUTO: 8.6 % (ref 0–13.4)
NEUTROPHILS # BLD AUTO: 9.92 K/UL (ref 2–7.15)
NEUTROPHILS NFR BLD: 79.4 % (ref 44–72)
NRBC # BLD AUTO: 0 K/UL
NRBC BLD-RTO: 0 /100 WBC
PLATELET # BLD AUTO: 150 K/UL (ref 164–446)
PMV BLD AUTO: 13.4 FL (ref 9–12.9)
POTASSIUM SERPL-SCNC: 5.3 MMOL/L (ref 3.6–5.5)
PROT SERPL-MCNC: 7.1 G/DL (ref 6–8.2)
RBC # BLD AUTO: 4.97 M/UL (ref 4.2–5.4)
SODIUM SERPL-SCNC: 130 MMOL/L (ref 135–145)
WBC # BLD AUTO: 12.5 K/UL (ref 4.8–10.8)

## 2021-11-20 PROCEDURE — 700111 HCHG RX REV CODE 636 W/ 250 OVERRIDE (IP): Performed by: INTERNAL MEDICINE

## 2021-11-20 PROCEDURE — 770022 HCHG ROOM/CARE - ICU (200)

## 2021-11-20 PROCEDURE — 700102 HCHG RX REV CODE 250 W/ 637 OVERRIDE(OP): Performed by: HOSPITALIST

## 2021-11-20 PROCEDURE — A9270 NON-COVERED ITEM OR SERVICE: HCPCS | Performed by: INTERNAL MEDICINE

## 2021-11-20 PROCEDURE — A9270 NON-COVERED ITEM OR SERVICE: HCPCS | Performed by: HOSPITALIST

## 2021-11-20 PROCEDURE — 80053 COMPREHEN METABOLIC PANEL: CPT

## 2021-11-20 PROCEDURE — 99291 CRITICAL CARE FIRST HOUR: CPT | Performed by: INTERNAL MEDICINE

## 2021-11-20 PROCEDURE — 94760 N-INVAS EAR/PLS OXIMETRY 1: CPT

## 2021-11-20 PROCEDURE — 700102 HCHG RX REV CODE 250 W/ 637 OVERRIDE(OP): Performed by: INTERNAL MEDICINE

## 2021-11-20 PROCEDURE — 94640 AIRWAY INHALATION TREATMENT: CPT

## 2021-11-20 PROCEDURE — 94660 CPAP INITIATION&MGMT: CPT

## 2021-11-20 PROCEDURE — 85025 COMPLETE CBC W/AUTO DIFF WBC: CPT

## 2021-11-20 RX ADMIN — TRAZODONE HYDROCHLORIDE 100 MG: 50 TABLET ORAL at 20:27

## 2021-11-20 RX ADMIN — ENOXAPARIN SODIUM 60 MG: 60 INJECTION SUBCUTANEOUS at 06:15

## 2021-11-20 RX ADMIN — BARICITINIB 4 MG: 2 TABLET, FILM COATED ORAL at 18:23

## 2021-11-20 RX ADMIN — FAMOTIDINE 20 MG: 20 TABLET ORAL at 18:24

## 2021-11-20 RX ADMIN — DIGOXIN 250 MCG: 250 TABLET ORAL at 18:24

## 2021-11-20 RX ADMIN — METOPROLOL TARTRATE 12.5 MG: 25 TABLET, FILM COATED ORAL at 06:15

## 2021-11-20 RX ADMIN — POLYETHYLENE GLYCOL 3350 1 PACKET: 17 POWDER, FOR SOLUTION ORAL at 18:24

## 2021-11-20 RX ADMIN — SENNOSIDES AND DOCUSATE SODIUM 2 TABLET: 50; 8.6 TABLET ORAL at 06:15

## 2021-11-20 RX ADMIN — SENNOSIDES AND DOCUSATE SODIUM 2 TABLET: 50; 8.6 TABLET ORAL at 18:24

## 2021-11-20 RX ADMIN — AMIODARONE HYDROCHLORIDE 200 MG: 200 TABLET ORAL at 06:15

## 2021-11-20 RX ADMIN — METOPROLOL TARTRATE 12.5 MG: 25 TABLET, FILM COATED ORAL at 18:24

## 2021-11-20 RX ADMIN — Medication 5 MG: at 20:27

## 2021-11-20 RX ADMIN — DEXAMETHASONE 6 MG: 4 TABLET ORAL at 06:15

## 2021-11-20 RX ADMIN — ENOXAPARIN SODIUM 60 MG: 60 INJECTION SUBCUTANEOUS at 18:23

## 2021-11-20 RX ADMIN — FAMOTIDINE 20 MG: 20 TABLET ORAL at 06:15

## 2021-11-20 ASSESSMENT — ENCOUNTER SYMPTOMS
DIZZINESS: 0
MYALGIAS: 0
COUGH: 0
PALPITATIONS: 0
HEADACHES: 0
SHORTNESS OF BREATH: 1
NERVOUS/ANXIOUS: 0

## 2021-11-20 ASSESSMENT — PAIN DESCRIPTION - PAIN TYPE
TYPE: ACUTE PAIN
TYPE: ACUTE PAIN;CHRONIC PAIN

## 2021-11-20 ASSESSMENT — PULMONARY FUNCTION TESTS
EPAP_CMH2O: 10
EPAP_CMH2O: 10

## 2021-11-20 ASSESSMENT — FIBROSIS 4 INDEX: FIB4 SCORE: 1.39

## 2021-11-20 NOTE — ACP (ADVANCE CARE PLANNING)
"Advance Care Planning Note    Discussion date:  11/19/2021  Discussion participants:  patient and son (Jin), Daughter-in-law (Sherron), informational conversations were also had with her mother and her three sisters.     The patient wishes to discuss Advanced Care Planning today and the following is a brief summary of our discussion.    Patient does not have  capacity to make their own medical decisions.  Health Care Agent/Surrogate Decision Maker documented in chart.    Documents of Advance Directives:  DPOA paperwork has not been provided but Jin has been designated as her decisionmaker by the patient    Communication of relevant diagnoses: Covid 19    Communication of prognosis: poor    Communication of treatment goals/options: change of code status    Treatment decisions: Any goals of care discussions have been had with Anne and her family throughout the course of her hospital stay.  She has changed her mind several times from DNR DNI to full code and back again.  She does have a POLST from prior which states that she is DNR/DNI however she wished to change that on Monday, 11/16/2021 and it was changed to full code to reflect that decision.  At that time the patient was instructed to have conversations with her family regarding what she would want in the event of prolonged intubation and she was also asked to participate in her care or actively.  On Thursday, 11/18/2021 patient decompensated and it became apparent that intubation was probably the next step.  I discussed this plan of care with the patient again.  She did not appear to grasp the gravity of her situation.  She is unable to describe to me what the consequences are of her decisions at this moment.  She is in denial about the severity of her illness and the likely outcomes.  I asked her if she has discussed the things we spoke about on Monday with her family and she responded \"no because it is too hard\".  I described to her honestly the " situation she is in.  I described to her again the poor prognosis she carries with her comorbidities if she is to be intubated for COVID-19.  I again explained to her the process of intubation and what would occur in the days following.  I asked her again to have a conversation with her family about these things and she gave me permission to talk to her family as well.  I spoke with Jin and Sherron on the phone and we spoke about the same topics including the process of intubation, tracheostomy and G-tube placement, the extremely difficult process of rehabilitation for someone who is already in her deconditioned state, and her understandable but significant lack of motivation to participate in her care and care for herself.  We discussed how difficult it would be for her to undergo rehabilitation and that the chance of survival after ventilation would be minuscule.  We then met together, the 4 of us at bedside, Jin Rodríguez (on the phone), Sherron and myself.  Sherron and Jin had had a chance to speak with Anne at that time and they had agreed together as a family that intubation was not indeed what Anne wanted.  It was at this time that Sherron informed me that Anne is not acting like herself, or mentating normally at baseline.  Anne agreed with all of the decisions made and was able to reiterate to me what those were.  We discussed comfort care and what would happen if she went into respiratory distress and we came up with a plan including Precedex, Ativan and morphine.     On the same day at the patient's request I also spoke with her 3 sisters and her mother who were in [place].  I answered all of their questions and addressed all of their concerns regarding her care.  At the end of the conversation we all understood with the plan of care would be.  I explained how important it is for her to have family support and not received mixed messages about intubation and her chances thereafter as we have  changed her CODE STATUS now on 3 separate occasions and I believe it is causing her a significant amount of duress to continue to have these conversations over and over.    Code status:  do not attempt resuscitation (DNAR)    The patient, DPOA and patient's family would like:   Life-sustaining antibiotics: antibiotics by IV as necessary   Artificially administered fluids: long-term IV fluids   Artificially administered nutrition: no feeding tube   Other limitations of medical interventions: Full care at this time     Time statement:  I discussed advance care planning with the patient, patient's family and patient's DPOA for at least 90 minutes, including diagnosis, prognosis, plan of care, risks and benefits of any therapies that could be offered, as well as alternatives including palliation and hospice, as appropriate, exclusive of evaluation and management or other separately billable procedures.    Glenny Mcgarry M.D.

## 2021-11-20 NOTE — PROGRESS NOTES
Provider notified during AM rounds of patient potassium lab trends. Informed RN she would view orders & adjust.

## 2021-11-20 NOTE — FLOWSHEET NOTE
11/20/21 1206   Events/Summary/Plan   Events/Summary/Plan H[facility]NC 50 lpm @ 90 %   Vital Signs   Pulse 76   Respiration (!) 29   Pulse Oximetry 94 %   $ Pulse Oximetry (Spot Check) Yes   Respiratory Assessment   Level of Consciousness Alert   Oxygen   O2 (LPM) 50   FiO2% 90 %   O2 Delivery Device Heated High Flow Nasal Cannula   Aerosols   $ Aerosol Delivery Device Heated High Flow Nasal Cannula   Aerosol Temperature 31 °C (87.8 °F)

## 2021-11-20 NOTE — PROGRESS NOTES
"Critical Care Progress Note    Date of admission  11/9/2021    Chief Complaint  61 y.o. female admitted 11/9/2021 with acute on chronic mixed hypercapnic and hypoxemic respiratory failure due to COVID-19    Hospital Course  61 y.o. female with past medical history of atrial fibrillation, dilated cardiomyopathy, moderate pulmonary hypertension on digoxin and amiodarone, Body mass index is 61.42 kg/m².,  JUAN CARLOS/OHS on BIPAP and chronic hypoxemic respiratory failure on 3-4L at baseline who recently had a prolonged hospitalization for acute on chronic congestive heart failure and possible COPD exacerbation which improved with BIPAP and diuresis.  Echo on 10/30/2021 showed grossly normal LVEF, 55%, severe biatrial enlargement , RV not well visualized nor were the IVC, tricuspid or pulmonic valves. During her hospitalization, palliative care was consulted and CODE STATUS was changed to DNR/DNI and she was discharged to a skilled nursing facility for PT/OT on 11/9.     She was readmitted the same day 11/9/2021 as she tested positive for Covid 19.  Reportedly had no changes symptomatically since discharge aside from mildly increased shortness of breath increased fatigue.  Not vaccinated.  CXR unchanged.  ABG on admission 7.3 3/87/60.  Started on Decadron.  She was admitted to the floor, overnight RRT was called as patient became increasingly confused/disoriented.  Repeat ABG 7.34/97/73 on 100% FiO2.  Transferred to the ICU for rescue BiPAP.  Of note, BCx 10/28 + staph hominus and epidermis. Repeat BCx 10/30 negative. 1/2 bottles 11/9 + again, prelim staph. Negative MRSA, possible contaminant.     11/12: Family meeting held at bedside with patient and son (via phone, son sick) Code status changed back to DNR/DNI, in line with POLST    11/16: Patient expressing wishes to be intubated if needed.  She wants to \"try everything\". I had an extensive discussion with her today. I was very honest with her about her poor chances of " survival and the high risk nature of intubating her. She seems understand these risks and wants to proceed with intubation. I also explained that intubation in these circumstances would be done when I deem it is necessary and safety do so and it would likely take away valuable time that she could spend with her family well on BiPAP. I explained to her that once she is intubated she will not be communicative and therefore will likely not speak to her family again unless she is able to improve and be extubated. She gave me permission to speak to her daughter-in-law, Sherron. I called Sherron and explained the conversation to her as well. As the patient is currently alert and oriented x4 she is making her own decision and her CODE STATUS has been changed to full code to reflect this conversation.    11/20: Patient has been telling her family she is coming home next week for Thanksgiving.  She is having a good day today but still does not grasp the severity of her hypoxia.     Interval Problem Update  Chart review from the past 24 hours includes imaging, laboratory studies, vital signs and notes available.  Pertinent data for today's visit includes    ICU CHECKLIST:  Neuro: Stable, alert and oriented  Cardiac: Afib rate controlled. On Amio and dig. TTE: grossly normal LVEF, 55%, severe biatrial enlargement, RV not well visualized nor was IVC, tricuspid or pulmonic valves. BNP trending down.  Doesn't tolerate lasix well with renal function.  Hypotension resolved after D/C precedex.   Pulmonary: High flow during the day, BIPAP at night  Heme: Stable thrombocytopenia. Leukocytosis fluctuating, no other infectious s/s   DVT Prophylaxis: Lovenox prophy  /Renal: DIANE improving today with holding diuresis   I/O: -52160   Mckeon: in place due to immobility, UOP low  ID:  Decadron 11/11-, baricitinib 11/11- .BCx 10/28 + staph hominus and epidermis. 1/2 bottles 11/9 + again, staph hominus. On vanco/zosyn 11/9-11/11. BCx 11/12  remain NGTD.  Skin: intact  GI/Nutrition: Seen by speech yesterday, Minced diet ordered   Prophylaxis: Pepcid 2/2 steroids  Endo: Stable  Lines/Tubes: PIV      Review of Systems  Review of Systems   Constitutional: Positive for malaise/fatigue.        Hungry  Anxious  Requesting water   Respiratory: Positive for shortness of breath. Negative for cough.    Cardiovascular: Negative for chest pain, palpitations and leg swelling.   Musculoskeletal: Negative for myalgias.   Neurological: Negative for dizziness and headaches.   Psychiatric/Behavioral: The patient is not nervous/anxious.    All other systems reviewed and are negative.    Vital Signs for last 24 hours   Temp:  [36.2 °C (97.2 °F)-36.8 °C (98.2 °F)] 36.2 °C (97.2 °F)  Pulse:  [54-92] 76  Resp:  [19-42] 29  BP: ()/(52-94) 91/52  SpO2:  [85 %-97 %] 94 %    Physical Exam   Physical Exam  Vitals and nursing note reviewed.   Constitutional:       General: She is not in acute distress.     Appearance: Normal appearance. She is well-developed. She is obese. She is not diaphoretic.   Eyes:      General: No scleral icterus.        Right eye: No discharge.         Left eye: No discharge.      Conjunctiva/sclera: Conjunctivae normal.      Pupils: Pupils are equal, round, and reactive to light.   Neck:      Thyroid: No thyromegaly.      Vascular: No JVD.   Cardiovascular:      Rate and Rhythm: Normal rate and regular rhythm.      Heart sounds: Normal heart sounds. No murmur heard.  No gallop.    Pulmonary:      Effort: No respiratory distress.      Breath sounds: Normal breath sounds. No wheezing or rales.      Comments: Distant breath sounds diffusely  Abdominal:      General: There is no distension.      Palpations: Abdomen is soft.      Tenderness: There is no abdominal tenderness. There is no guarding.   Musculoskeletal:         General: No tenderness.      Right lower leg: Edema ( improving) present.      Left lower leg: Edema ( improving) present.    Lymphadenopathy:      Cervical: No cervical adenopathy.   Skin:     General: Skin is warm.      Capillary Refill: Capillary refill takes less than 2 seconds.      Coloration: Skin is pale.      Findings: No erythema or rash.   Neurological:      Mental Status: She is oriented to person, place, and time.      Cranial Nerves: No cranial nerve deficit.      Sensory: No sensory deficit.      Motor: No abnormal muscle tone.   Psychiatric:      Comments: anxious       Medications  Current Facility-Administered Medications   Medication Dose Route Frequency Provider Last Rate Last Admin   • midodrine (PROAMATINE) tablet 5 mg  5 mg Oral TID WITH MEALS Glenny Mcgarry M.D.       • ALPRAZolam (XANAX) tablet 0.25 mg  0.25 mg Oral BID PRN Glenny Mcgarry M.D.   0.25 mg at 11/19/21 2343   • LORazepam (ATIVAN) injection 1 mg  1 mg Intravenous Q HOUR PRN Glenny Mcgrary M.D.       • morphine (pf) 4 mg/mL injection 1 mg  1 mg Intravenous Q30 MIN PRN Glenny Mcgarry M.D.       • baricitinib (Olumiant) tablet 4 mg  4 mg Oral DAILY AT 1800 Glenny Mcgarry M.D.   4 mg at 11/19/21 1754   • famotidine (PEPCID) tablet 20 mg  20 mg Oral BID Glenny Mcgarry M.D.   20 mg at 11/20/21 0615   • hydrALAZINE (APRESOLINE) injection 10 mg  10 mg Intravenous Q6HRS PRN Abdiel Camarillo M.D.   10 mg at 11/15/21 1826   • melatonin tablet 5 mg  5 mg Oral Nightly Nisa Bustos M.D.   5 mg at 11/19/21 2034   • traZODone (DESYREL) tablet 100 mg  100 mg Oral HS PRN Abdiel Camarillo M.D.   100 mg at 11/19/21 2033   • metoprolol tartrate (LOPRESSOR) tablet 12.5 mg  12.5 mg Oral TWICE DAILY Abdiel Camarillo M.D.   12.5 mg at 11/20/21 0615   • digoxin (LANOXIN) tablet 250 mcg  250 mcg Oral DAILY AT 1800 Abdiel Camarillo M.D.   250 mcg at 11/19/21 1753   • Metoprolol Tartrate (LOPRESSOR) injection 5 mg  5 mg Intravenous Q HOUR PRN Abdiel Camarillo M.D.   5 mg at 11/14/21 1845   • enoxaparin (LOVENOX) inj 60 mg  60 mg Subcutaneous  Q12HRS Abdiel Camarillo M.D.   60 mg at 11/20/21 0615   • ipratropium-albuterol (DUONEB) nebulizer solution  3 mL Nebulization Q4H PRN (RT) Abdiel Camarillo M.D.       • amiodarone (Cordarone) tablet 200 mg  200 mg Oral Q DAY Abdiel Camarillo M.D.   200 mg at 11/20/21 0615   • acetaminophen (Tylenol) tablet 650 mg  650 mg Oral Q6HRS PRN Asem CHADD Woody M.D.   650 mg at 11/17/21 1411   • senna-docusate (PERICOLACE or SENOKOT S) 8.6-50 MG per tablet 2 Tablet  2 Tablet Oral BID Asem CHADD Woody M.D.   2 Tablet at 11/20/21 0615    And   • polyethylene glycol/lytes (MIRALAX) PACKET 1 Packet  1 Packet Oral QDAY PRN Asem CHADD Woody M.D.        And   • magnesium hydroxide (MILK OF MAGNESIA) suspension 30 mL  30 mL Oral QDAY PRN Asem CHADD Woody M.D.   30 mL at 11/13/21 1658    And   • bisacodyl (DULCOLAX) suppository 10 mg  10 mg Rectal QDAY PRN Asem CHADD Woody M.D.   10 mg at 11/14/21 1723   • Respiratory Therapy Consult   Nebulization Continuous RT Asem CHADD Woody M.D.       • ondansetron (ZOFRAN) syringe/vial injection 4 mg  4 mg Intravenous Q4HRS PRN Asem CHADD Woody M.D.   4 mg at 11/17/21 1735   • ondansetron (ZOFRAN ODT) dispertab 4 mg  4 mg Oral Q4HRS PRN Asem CHADD Woody M.D.   4 mg at 11/16/21 0027   • promethazine (PHENERGAN) tablet 12.5-25 mg  12.5-25 mg Oral Q4HRS PRN Asem CHADD Woody M.D.       • promethazine (PHENERGAN) suppository 12.5-25 mg  12.5-25 mg Rectal Q4HRS PRN Asem A Mutasher, M.D.       • prochlorperazine (COMPAZINE) injection 5-10 mg  5-10 mg Intravenous Q4HRS PRN Mario Woody M.D.       • guaiFENesin dextromethorphan (ROBITUSSIN DM) 100-10 MG/5ML syrup 10 mL  10 mL Oral Q6HRS PRN Mario Woody M.D.   10 mL at 11/15/21 2230     Fluids    Intake/Output Summary (Last 24 hours) at 11/20/2021 1248  Last data filed at 11/20/2021 1200  Gross per 24 hour   Intake 1120 ml   Output 1440 ml   Net -320 ml       Laboratory          Recent Labs     11/18/21  0337 11/19/21  0350 11/20/21  1133    SODIUM 134* 132* 130*   POTASSIUM 5.1 5.2 5.3   CHLORIDE 86* 87* 87*   CO2 41* 39* 38*   BUN 54* 59* 53*   CREATININE 0.91 0.95 0.82   CALCIUM 9.2 9.0 9.3     Recent Labs     11/18/21  0337 11/19/21  0350 11/20/21  0425   ALTSGPT 24 29 39   ASTSGOT 15 13 21   ALKPHOSPHAT 48 44 47   TBILIRUBIN 0.6 0.7 0.8   GLUCOSE 131* 170* 126*     Recent Labs     11/18/21  0337 11/19/21  0350 11/20/21  0425   WBC 11.1* 8.9 12.5*   NEUTSPOLYS 82.50* 78.30* 79.40*   LYMPHOCYTES 9.10* 12.90* 10.30*   MONOCYTES 7.30 7.20 8.60   EOSINOPHILS 0.00 0.00 0.00   BASOPHILS 0.20 0.10 0.20   ASTSGOT 15 13 21   ALTSGPT 24 29 39   ALKPHOSPHAT 48 44 47   TBILIRUBIN 0.6 0.7 0.8     Recent Labs     11/18/21  0337 11/19/21  0350 11/20/21  0425   RBC 4.86 4.59 4.97   HEMOGLOBIN 14.2 13.4 14.7   HEMATOCRIT 45.6 43.0 46.5   PLATELETCT 149* 143* 150*     Imaging  Most recent CXR 11/18 reviewed, no pneumothorax, worsening infiltrates per my read        Assessment/Plan    * Acute on chronic respiratory failure with hypoxia and hypercapnia (HCC)- (present on admission)  Assessment & Plan  COVID pneumonia with acute hypoxic respiratory failure  -- Date of initial positive test: 11/9/2021  -- Risk factors for severe covid - Morbid obesity, unvaccinated, CHF, possible PH, afib  -- Current COVID complications include DIANE, BIPAP dependent respiratory failure.   -- Covid specific therapies: Decadron (11/11-11/21)  -- Patient is a candidate for Baricitinib and started on 11/11  -- Oxygen support: BIPAP at night 70%, tolerating max high flow now during the day  -- No ABG today  -- Patient in appropriate isolation with careful gowning and hand hygiene upon entrance and exit of room  -- Rescue maneuvers: Proning - encouraging patient to self-prone, ECMO - not an ecmo candidate due to super obesity.   -- Prophylaxis with lovenox  -- Patient and family decided again on DNAR 11/18.    -- Precedex held due to bradycardia and hypotension, Xanax added  -- If respiratory  distress develops a morphine drip should be started.  -- The patient is not able to make decisions independently and should she attempt to reverse her code status while in extremis this decision needs to be made in concert with her son and daughter-in-law.       Metabolic alkalosis with respiratory acidosis  Assessment & Plan  Chronic resp acidosis with compensatory metabolic alkalosis + contraction alkalosis  Now alkalemic  Cont spot dose diamox PRN  Cont BIPAP    Chronic heart failure with preserved ejection fraction (HCC)  Assessment & Plan  TTE 10/30/2021: grossly normal LVEF, 55%, severe biatrial enlargement, RV not well visualized nor were the IVC, tricuspid or pulmonic valves.  Reported history of dilated cardiomyopathy and moderate pulmonary hypertension  BNP chronically elevated, currently ~900 -> 458  Latest Procal 0.03  Trace edema  Clinically slightly overloaded  Spot dosing lasix, however patient's renal function declines each time lasix is given    Pneumonia due to COVID-19 virus- (present on admission)  Assessment & Plan  See plan for respiratory failure    Bacteremia, coagulase-negative staphylococcal  Assessment & Plan  BCx 10/28 + staph hominus and epidermis. Repeat BCx 10/30 negative. 1/2 bottles 11/9 + again, + staph hominus  Negative MRSA, suspected contaminant.   vanco/zosyn 11/9-11/11.  Repeated blood cultures  11/12 NGTD, afebrile, procal < 0.05    Advanced care planning/counseling discussion  Assessment & Plan  DNAR  See ACP note from today 11/18    AF (atrial fibrillation) (formerly [facility])- (present on admission)  Assessment & Plan  Rate currently controlled  TTE 10/30/2021 showed grossly normal LVEF, 55%, severe biatrial, RV not well visualized nor were the IVC, tricuspid or pulmonic valves.  HD stable  Cont amiodarone and digoxin PO  Cont lopressor 12.5mg PO BID  MTP 5mg IVP PRN HR > 120     VTE:  Lovenox  Ulcer: H2 Antagonist  Lines: Mckeon Catheter  Ongoing indication addressed    I have performed  a physical exam and reviewed and updated ROS and Plan today (11/20/2021). In review of yesterday's note (11/19/2021), there are no changes except as documented above.     Discussed patient condition and risk of morbidity and/or mortality with Family, RN, RT, Pharmacy, Code status disscussed and Patient. Spoke with Jin (son) over phone and Sherron (Jin's wife) in person and updated on condition.    The patient remains critically ill.  Critical care time = 45 minutes in directly providing and coordinating critical care and extensive data review.  No time overlap and excludes procedures.    Glenny Mcgarry MD RD  Pulmonary and Critical Care    Available on Voalte

## 2021-11-21 LAB
ALBUMIN SERPL BCP-MCNC: 3.1 G/DL (ref 3.2–4.9)
ALBUMIN/GLOB SERPL: 0.9 G/DL
ALP SERPL-CCNC: 46 U/L (ref 30–99)
ALT SERPL-CCNC: 108 U/L (ref 2–50)
ANION GAP SERPL CALC-SCNC: 3 MMOL/L (ref 7–16)
AST SERPL-CCNC: 44 U/L (ref 12–45)
BASOPHILS # BLD AUTO: 0.1 % (ref 0–1.8)
BASOPHILS # BLD: 0.02 K/UL (ref 0–0.12)
BILIRUB SERPL-MCNC: 0.6 MG/DL (ref 0.1–1.5)
BUN SERPL-MCNC: 37 MG/DL (ref 8–22)
CALCIUM SERPL-MCNC: 9 MG/DL (ref 8.4–10.2)
CHLORIDE SERPL-SCNC: 87 MMOL/L (ref 96–112)
CO2 SERPL-SCNC: 40 MMOL/L (ref 20–33)
CREAT SERPL-MCNC: 0.77 MG/DL (ref 0.5–1.4)
EOSINOPHIL # BLD AUTO: 0.01 K/UL (ref 0–0.51)
EOSINOPHIL NFR BLD: 0.1 % (ref 0–6.9)
ERYTHROCYTE [DISTWIDTH] IN BLOOD BY AUTOMATED COUNT: 41.1 FL (ref 35.9–50)
GLOBULIN SER CALC-MCNC: 3.3 G/DL (ref 1.9–3.5)
GLUCOSE SERPL-MCNC: 140 MG/DL (ref 65–99)
HCT VFR BLD AUTO: 41.5 % (ref 37–47)
HGB BLD-MCNC: 13.3 G/DL (ref 12–16)
IMM GRANULOCYTES # BLD AUTO: 0.2 K/UL (ref 0–0.11)
IMM GRANULOCYTES NFR BLD AUTO: 1.4 % (ref 0–0.9)
LYMPHOCYTES # BLD AUTO: 1.36 K/UL (ref 1–4.8)
LYMPHOCYTES NFR BLD: 9.4 % (ref 22–41)
MCH RBC QN AUTO: 29.9 PG (ref 27–33)
MCHC RBC AUTO-ENTMCNC: 32 G/DL (ref 33.6–35)
MCV RBC AUTO: 93.3 FL (ref 81.4–97.8)
MONOCYTES # BLD AUTO: 1.22 K/UL (ref 0–0.85)
MONOCYTES NFR BLD AUTO: 8.4 % (ref 0–13.4)
NEUTROPHILS # BLD AUTO: 11.7 K/UL (ref 2–7.15)
NEUTROPHILS NFR BLD: 80.6 % (ref 44–72)
NRBC # BLD AUTO: 0 K/UL
NRBC BLD-RTO: 0 /100 WBC
PLATELET # BLD AUTO: 176 K/UL (ref 164–446)
PMV BLD AUTO: 13.2 FL (ref 9–12.9)
POTASSIUM SERPL-SCNC: 5.3 MMOL/L (ref 3.6–5.5)
PROT SERPL-MCNC: 6.4 G/DL (ref 6–8.2)
RBC # BLD AUTO: 4.45 M/UL (ref 4.2–5.4)
SODIUM SERPL-SCNC: 130 MMOL/L (ref 135–145)
WBC # BLD AUTO: 14.5 K/UL (ref 4.8–10.8)

## 2021-11-21 PROCEDURE — 700111 HCHG RX REV CODE 636 W/ 250 OVERRIDE (IP): Performed by: INTERNAL MEDICINE

## 2021-11-21 PROCEDURE — 770022 HCHG ROOM/CARE - ICU (200)

## 2021-11-21 PROCEDURE — 80053 COMPREHEN METABOLIC PANEL: CPT

## 2021-11-21 PROCEDURE — 85025 COMPLETE CBC W/AUTO DIFF WBC: CPT

## 2021-11-21 PROCEDURE — 700102 HCHG RX REV CODE 250 W/ 637 OVERRIDE(OP): Performed by: HOSPITALIST

## 2021-11-21 PROCEDURE — 99291 CRITICAL CARE FIRST HOUR: CPT | Performed by: INTERNAL MEDICINE

## 2021-11-21 PROCEDURE — 700102 HCHG RX REV CODE 250 W/ 637 OVERRIDE(OP): Performed by: INTERNAL MEDICINE

## 2021-11-21 PROCEDURE — A9270 NON-COVERED ITEM OR SERVICE: HCPCS | Performed by: HOSPITALIST

## 2021-11-21 PROCEDURE — 94760 N-INVAS EAR/PLS OXIMETRY 1: CPT

## 2021-11-21 PROCEDURE — 94640 AIRWAY INHALATION TREATMENT: CPT

## 2021-11-21 PROCEDURE — A9270 NON-COVERED ITEM OR SERVICE: HCPCS | Performed by: INTERNAL MEDICINE

## 2021-11-21 PROCEDURE — 94660 CPAP INITIATION&MGMT: CPT

## 2021-11-21 RX ORDER — FUROSEMIDE 10 MG/ML
20 INJECTION INTRAMUSCULAR; INTRAVENOUS ONCE
Status: COMPLETED | OUTPATIENT
Start: 2021-11-21 | End: 2021-11-21

## 2021-11-21 RX ORDER — LACTULOSE 20 G/30ML
30 SOLUTION ORAL ONCE
Status: COMPLETED | OUTPATIENT
Start: 2021-11-21 | End: 2021-11-21

## 2021-11-21 RX ADMIN — ENOXAPARIN SODIUM 60 MG: 60 INJECTION SUBCUTANEOUS at 19:53

## 2021-11-21 RX ADMIN — DIGOXIN 250 MCG: 250 TABLET ORAL at 19:52

## 2021-11-21 RX ADMIN — ENOXAPARIN SODIUM 60 MG: 60 INJECTION SUBCUTANEOUS at 05:34

## 2021-11-21 RX ADMIN — SENNOSIDES AND DOCUSATE SODIUM 2 TABLET: 50; 8.6 TABLET ORAL at 05:34

## 2021-11-21 RX ADMIN — LACTULOSE 30 ML: 20 SOLUTION ORAL at 19:52

## 2021-11-21 RX ADMIN — BARICITINIB 4 MG: 2 TABLET, FILM COATED ORAL at 19:52

## 2021-11-21 RX ADMIN — FAMOTIDINE 20 MG: 20 TABLET ORAL at 05:34

## 2021-11-21 RX ADMIN — ACETAMINOPHEN 650 MG: 325 TABLET ORAL at 09:00

## 2021-11-21 RX ADMIN — Medication 5 MG: at 21:02

## 2021-11-21 RX ADMIN — ALPRAZOLAM 0.25 MG: 0.25 TABLET ORAL at 19:53

## 2021-11-21 RX ADMIN — AMIODARONE HYDROCHLORIDE 200 MG: 200 TABLET ORAL at 05:34

## 2021-11-21 RX ADMIN — METOPROLOL TARTRATE 12.5 MG: 25 TABLET, FILM COATED ORAL at 05:34

## 2021-11-21 RX ADMIN — ALPRAZOLAM 0.25 MG: 0.25 TABLET ORAL at 08:15

## 2021-11-21 RX ADMIN — FUROSEMIDE 20 MG: 10 INJECTION, SOLUTION INTRAMUSCULAR; INTRAVENOUS at 19:52

## 2021-11-21 RX ADMIN — SENNOSIDES AND DOCUSATE SODIUM 2 TABLET: 50; 8.6 TABLET ORAL at 19:52

## 2021-11-21 RX ADMIN — MIDODRINE HYDROCHLORIDE 5 MG: 5 TABLET ORAL at 19:52

## 2021-11-21 RX ADMIN — MIDODRINE HYDROCHLORIDE 5 MG: 5 TABLET ORAL at 08:15

## 2021-11-21 RX ADMIN — FAMOTIDINE 20 MG: 20 TABLET ORAL at 19:52

## 2021-11-21 ASSESSMENT — PAIN DESCRIPTION - PAIN TYPE
TYPE: ACUTE PAIN
TYPE: ACUTE PAIN;CHRONIC PAIN

## 2021-11-21 ASSESSMENT — ENCOUNTER SYMPTOMS
COUGH: 0
NERVOUS/ANXIOUS: 0
PALPITATIONS: 0
DIZZINESS: 0
HEADACHES: 0
SHORTNESS OF BREATH: 1
MYALGIAS: 0

## 2021-11-21 ASSESSMENT — FIBROSIS 4 INDEX: FIB4 SCORE: 1.49

## 2021-11-21 NOTE — FLOWSHEET NOTE
11/21/21 0624   Vital Signs   Pulse 61   Respiration 20   Pulse Oximetry 97 %   $ Pulse Oximetry (Spot Check) Yes   Respiratory Assessment   Level of Consciousness Alert   Chest Exam   Work Of Breathing / Effort Moderate   Breath Sounds   RUL Breath Sounds Clear   RML Breath Sounds Diminished   RLL Breath Sounds Diminished   CECILE Breath Sounds Clear   LLL Breath Sounds Diminished   Oxygen   O2 (LPM) 50   FiO2% 80 %   O2 Delivery Device Heated High Flow Nasal Cannula   Aerosols   $ Aerosol Delivery Device Heated High Flow Nasal Cannula

## 2021-11-21 NOTE — FLOWSHEET NOTE
11/21/21 1436   Vital Signs   Pulse (!) 51   Respiration (!) 23   Pulse Oximetry 95 %   $ Pulse Oximetry (Spot Check) Yes   Respiratory Assessment   Level of Consciousness Alert   Oxygen   O2 (LPM) 50   FiO2% 65 %   O2 Delivery Device Heated High Flow Nasal Cannula   Aerosols   $ Aerosol Delivery Device Heated High Flow Nasal Cannula   Aerosol Temperature 31 °C (87.8 °F)

## 2021-11-21 NOTE — FLOWSHEET NOTE
11/21/21 1000   Vital Signs   Pulse 66   Respiration (!) 34   Pulse Oximetry 95 %   $ Pulse Oximetry (Spot Check) Yes   Respiratory Assessment   Level of Consciousness Alert   Oxygen   O2 (LPM) 50   FiO2% 80 %   O2 Delivery Device Heated High Flow Nasal Cannula   Aerosols   $ Aerosol Delivery Device Heated High Flow Nasal Cannula   Aerosol Temperature 31 °C (87.8 °F)

## 2021-11-22 LAB
ALBUMIN SERPL BCP-MCNC: 3 G/DL (ref 3.2–4.9)
ALBUMIN/GLOB SERPL: 1.1 G/DL
ALP SERPL-CCNC: 49 U/L (ref 30–99)
ALT SERPL-CCNC: 134 U/L (ref 2–50)
ANION GAP SERPL CALC-SCNC: 9 MMOL/L (ref 7–16)
AST SERPL-CCNC: 43 U/L (ref 12–45)
BASOPHILS # BLD AUTO: 0.1 % (ref 0–1.8)
BASOPHILS # BLD: 0.02 K/UL (ref 0–0.12)
BILIRUB SERPL-MCNC: 0.7 MG/DL (ref 0.1–1.5)
BUN SERPL-MCNC: 34 MG/DL (ref 8–22)
CALCIUM SERPL-MCNC: 8.7 MG/DL (ref 8.4–10.2)
CHLORIDE SERPL-SCNC: 89 MMOL/L (ref 96–112)
CO2 SERPL-SCNC: 35 MMOL/L (ref 20–33)
CREAT SERPL-MCNC: 0.91 MG/DL (ref 0.5–1.4)
EOSINOPHIL # BLD AUTO: 0.04 K/UL (ref 0–0.51)
EOSINOPHIL NFR BLD: 0.3 % (ref 0–6.9)
ERYTHROCYTE [DISTWIDTH] IN BLOOD BY AUTOMATED COUNT: 41.6 FL (ref 35.9–50)
GAMMA INTERFERON BACKGROUND BLD IA-ACNC: 0.03 IU/ML
GLOBULIN SER CALC-MCNC: 2.8 G/DL (ref 1.9–3.5)
GLUCOSE SERPL-MCNC: 179 MG/DL (ref 65–99)
HCT VFR BLD AUTO: 40.8 % (ref 37–47)
HGB BLD-MCNC: 13 G/DL (ref 12–16)
IMM GRANULOCYTES # BLD AUTO: 0.13 K/UL (ref 0–0.11)
IMM GRANULOCYTES NFR BLD AUTO: 1 % (ref 0–0.9)
LYMPHOCYTES # BLD AUTO: 2.15 K/UL (ref 1–4.8)
LYMPHOCYTES NFR BLD: 15.8 % (ref 22–41)
M TB IFN-G BLD-IMP: ABNORMAL
M TB IFN-G CD4+ BCKGRND COR BLD-ACNC: 0 IU/ML
MAGNESIUM SERPL-MCNC: 2.2 MG/DL (ref 1.5–2.5)
MCH RBC QN AUTO: 29.8 PG (ref 27–33)
MCHC RBC AUTO-ENTMCNC: 31.9 G/DL (ref 33.6–35)
MCV RBC AUTO: 93.6 FL (ref 81.4–97.8)
MITOGEN IGNF BCKGRD COR BLD-ACNC: 0.09 IU/ML
MONOCYTES # BLD AUTO: 1.13 K/UL (ref 0–0.85)
MONOCYTES NFR BLD AUTO: 8.3 % (ref 0–13.4)
NEUTROPHILS # BLD AUTO: 10.16 K/UL (ref 2–7.15)
NEUTROPHILS NFR BLD: 74.5 % (ref 44–72)
NRBC # BLD AUTO: 0 K/UL
NRBC BLD-RTO: 0 /100 WBC
PLATELET # BLD AUTO: 184 K/UL (ref 164–446)
PMV BLD AUTO: 13.4 FL (ref 9–12.9)
POTASSIUM SERPL-SCNC: 4.9 MMOL/L (ref 3.6–5.5)
PROT SERPL-MCNC: 5.8 G/DL (ref 6–8.2)
QFT TB2 - NIL TBQ2: 0 IU/ML
RBC # BLD AUTO: 4.36 M/UL (ref 4.2–5.4)
SODIUM SERPL-SCNC: 133 MMOL/L (ref 135–145)
WBC # BLD AUTO: 13.6 K/UL (ref 4.8–10.8)

## 2021-11-22 PROCEDURE — 83735 ASSAY OF MAGNESIUM: CPT

## 2021-11-22 PROCEDURE — 94640 AIRWAY INHALATION TREATMENT: CPT

## 2021-11-22 PROCEDURE — A9270 NON-COVERED ITEM OR SERVICE: HCPCS | Performed by: INTERNAL MEDICINE

## 2021-11-22 PROCEDURE — 700111 HCHG RX REV CODE 636 W/ 250 OVERRIDE (IP): Performed by: INTERNAL MEDICINE

## 2021-11-22 PROCEDURE — 700102 HCHG RX REV CODE 250 W/ 637 OVERRIDE(OP): Performed by: INTERNAL MEDICINE

## 2021-11-22 PROCEDURE — 80053 COMPREHEN METABOLIC PANEL: CPT

## 2021-11-22 PROCEDURE — 94760 N-INVAS EAR/PLS OXIMETRY 1: CPT

## 2021-11-22 PROCEDURE — 700102 HCHG RX REV CODE 250 W/ 637 OVERRIDE(OP): Performed by: HOSPITALIST

## 2021-11-22 PROCEDURE — 99291 CRITICAL CARE FIRST HOUR: CPT | Performed by: STUDENT IN AN ORGANIZED HEALTH CARE EDUCATION/TRAINING PROGRAM

## 2021-11-22 PROCEDURE — 770020 HCHG ROOM/CARE - TELE (206)

## 2021-11-22 PROCEDURE — A9270 NON-COVERED ITEM OR SERVICE: HCPCS | Performed by: HOSPITALIST

## 2021-11-22 PROCEDURE — 92526 ORAL FUNCTION THERAPY: CPT

## 2021-11-22 PROCEDURE — 85025 COMPLETE CBC W/AUTO DIFF WBC: CPT

## 2021-11-22 RX ADMIN — Medication 5 MG: at 21:57

## 2021-11-22 RX ADMIN — ALPRAZOLAM 0.25 MG: 0.25 TABLET ORAL at 17:19

## 2021-11-22 RX ADMIN — DIGOXIN 250 MCG: 250 TABLET ORAL at 17:19

## 2021-11-22 RX ADMIN — ACETAMINOPHEN 650 MG: 325 TABLET ORAL at 04:07

## 2021-11-22 RX ADMIN — BARICITINIB 4 MG: 2 TABLET, FILM COATED ORAL at 17:20

## 2021-11-22 RX ADMIN — FAMOTIDINE 20 MG: 20 TABLET ORAL at 05:37

## 2021-11-22 RX ADMIN — FAMOTIDINE 20 MG: 20 TABLET ORAL at 17:20

## 2021-11-22 RX ADMIN — MIDODRINE HYDROCHLORIDE 5 MG: 5 TABLET ORAL at 17:20

## 2021-11-22 RX ADMIN — METOPROLOL TARTRATE 12.5 MG: 25 TABLET, FILM COATED ORAL at 05:37

## 2021-11-22 RX ADMIN — AMIODARONE HYDROCHLORIDE 200 MG: 200 TABLET ORAL at 05:36

## 2021-11-22 RX ADMIN — ENOXAPARIN SODIUM 60 MG: 60 INJECTION SUBCUTANEOUS at 05:36

## 2021-11-22 RX ADMIN — ENOXAPARIN SODIUM 60 MG: 60 INJECTION SUBCUTANEOUS at 17:19

## 2021-11-22 RX ADMIN — MIDODRINE HYDROCHLORIDE 5 MG: 5 TABLET ORAL at 09:22

## 2021-11-22 RX ADMIN — SENNOSIDES AND DOCUSATE SODIUM 2 TABLET: 50; 8.6 TABLET ORAL at 05:36

## 2021-11-22 RX ADMIN — SENNOSIDES AND DOCUSATE SODIUM 2 TABLET: 50; 8.6 TABLET ORAL at 17:20

## 2021-11-22 ASSESSMENT — PAIN DESCRIPTION - PAIN TYPE
TYPE: ACUTE PAIN

## 2021-11-22 ASSESSMENT — ENCOUNTER SYMPTOMS
HEADACHES: 0
DIZZINESS: 0
NERVOUS/ANXIOUS: 0
SHORTNESS OF BREATH: 1
MYALGIAS: 0
PALPITATIONS: 0
COUGH: 0

## 2021-11-22 ASSESSMENT — FIBROSIS 4 INDEX: FIB4 SCORE: 1.43

## 2021-11-22 NOTE — FLOWSHEET NOTE
11/22/21 0704   Vital Signs   Pulse 69   Respiration 18   Pulse Oximetry 92 %   $ Pulse Oximetry (Spot Check) Yes   Oxygen   O2 (LPM) 50   FiO2% 50 %   O2 Delivery Device Heated High Flow Nasal Cannula

## 2021-11-22 NOTE — DISCHARGE PLANNING
Anticipated discharge disposition: pending medical team collaboration.     Action: discussed this pt in IDT rounds, per MD pt will be transferring out of ICU today. Per bedside RN pt is not mobile and requires max assist. Asked MD to place PT/OT orders in anticipation of placement upon discharge.     Barriers to discharge: medical clearance, PT/OT eval    Plan: care coordination will continue to follow with care team for DC needs.

## 2021-11-22 NOTE — PROGRESS NOTES
"Critical Care Progress Note    Date of admission  11/9/2021    Chief Complaint  61 y.o. female admitted 11/9/2021 with acute on chronic mixed hypercapnic and hypoxemic respiratory failure due to COVID-19    Hospital Course  61 y.o. female with past medical history of atrial fibrillation, dilated cardiomyopathy, moderate pulmonary hypertension on digoxin and amiodarone, Body mass index is 61.42 kg/m².,  JUAN CARLSO/OHS on BIPAP and chronic hypoxemic respiratory failure on 3-4L at baseline who recently had a prolonged hospitalization for acute on chronic congestive heart failure and possible COPD exacerbation which improved with BIPAP and diuresis.  Echo on 10/30/2021 showed grossly normal LVEF, 55%, severe biatrial enlargement , RV not well visualized nor were the IVC, tricuspid or pulmonic valves. During her hospitalization, palliative care was consulted and CODE STATUS was changed to DNR/DNI and she was discharged to a skilled nursing facility for PT/OT on 11/9.     She was readmitted the same day 11/9/2021 as she tested positive for Covid 19.  Reportedly had no changes symptomatically since discharge aside from mildly increased shortness of breath increased fatigue.  Not vaccinated.  CXR unchanged.  ABG on admission 7.3 3/87/60.  Started on Decadron.  She was admitted to the floor, overnight RRT was called as patient became increasingly confused/disoriented.  Repeat ABG 7.34/97/73 on 100% FiO2.  Transferred to the ICU for rescue BiPAP.  Of note, BCx 10/28 + staph hominus and epidermis. Repeat BCx 10/30 negative. 1/2 bottles 11/9 + again, prelim staph. Negative MRSA, possible contaminant.     11/12: Family meeting held at bedside with patient and son (via phone, son sick) Code status changed back to DNR/DNI, in line with POLST    11/16: Patient expressing wishes to be intubated if needed.  She wants to \"try everything\". I had an extensive discussion with her today. I was very honest with her about her poor chances of " survival and the high risk nature of intubating her. She seems understand these risks and wants to proceed with intubation. I also explained that intubation in these circumstances would be done when I deem it is necessary and safety do so and it would likely take away valuable time that she could spend with her family well on BiPAP. I explained to her that once she is intubated she will not be communicative and therefore will likely not speak to her family again unless she is able to improve and be extubated. She gave me permission to speak to her daughter-in-law, Sherron. I called Sherron and explained the conversation to her as well. As the patient is currently alert and oriented x4 she is making her own decision and her CODE STATUS has been changed to full code to reflect this conversation.    11/20: Patient has been telling her family she is coming home next week for Thanksgiving.  She is having a good day today but still does not grasp the severity of her hypoxia.     Interval Problem Update  Chart review from the past 24 hours includes imaging, laboratory studies, vital signs and notes available.  Pertinent data for today's visit includes    ICU CHECKLIST:  Neuro: Stable, alert and oriented  Cardiac: Afib rate controlled. On Amio and dig. TTE: grossly normal LVEF, 55%, severe biatrial enlargement, RV not well visualized nor was IVC, tricuspid or pulmonic valves. BNP trending down.  Doesn't tolerate standing lasix well with renal function.  Hypotension with precedex.  Pulmonary: High flow during the day, BIPAP at night  Heme: Stable thrombocytopenia. Leukocytosis fluctuating, no other infectious s/s   DVT Prophylaxis: Lovenox prophy  /Renal: DIANE improving today with holding diuresis   I/O: -[id number], spot dose lasix today   [place]: in place due to immobility, UOP low  ID:  Decadron 11/11-11/21, baricitinib 11/11- .BCx 10/28 + staph hominus and epidermis. 1/2 bottles 11/9 + again, staph hominus. On vanco/zosyn  11/9-11/11. BCx 11/12 remain NGTD.  Skin: intact  GI/Nutrition: Seen by speech yesterday, Minced diet ordered   Prophylaxis: Pepcid 2/2 steroids  Endo: Stable  Lines/Tubes: PIV      Review of Systems  Review of Systems   Constitutional: Positive for malaise/fatigue.        Hungry  Anxious  Requesting water   Respiratory: Positive for shortness of breath. Negative for cough.    Cardiovascular: Negative for chest pain, palpitations and leg swelling.   Musculoskeletal: Negative for myalgias.   Neurological: Negative for dizziness and headaches.   Psychiatric/Behavioral: The patient is not nervous/anxious.    All other systems reviewed and are negative.    Vital Signs for last 24 hours   Temp:  [36.3 °C (97.3 °F)-36.8 °C (98.3 °F)] 36.7 °C (98 °F)  Pulse:  [51-94] 79  Resp:  [10-83] 28  BP: ()/(48-75) 99/60  SpO2:  [91 %-98 %] 93 %    Physical Exam   Physical Exam  Vitals and nursing note reviewed.   Constitutional:       General: She is not in acute distress.     Appearance: Normal appearance. She is well-developed. She is obese. She is not diaphoretic.   Eyes:      General: No scleral icterus.        Right eye: No discharge.         Left eye: No discharge.      Conjunctiva/sclera: Conjunctivae normal.      Pupils: Pupils are equal, round, and reactive to light.   Neck:      Thyroid: No thyromegaly.      Vascular: No JVD.   Cardiovascular:      Rate and Rhythm: Normal rate and regular rhythm.      Heart sounds: Normal heart sounds. No murmur heard.  No gallop.    Pulmonary:      Effort: No respiratory distress.      Breath sounds: Normal breath sounds. No wheezing or rales.      Comments: Distant breath sounds diffusely  Abdominal:      General: There is no distension.      Palpations: Abdomen is soft.      Tenderness: There is no abdominal tenderness. There is no guarding.   Musculoskeletal:         General: No tenderness.      Right lower leg: Edema ( improving) present.      Left lower leg: Edema ( improving)  present.   Lymphadenopathy:      Cervical: No cervical adenopathy.   Skin:     General: Skin is warm.      Capillary Refill: Capillary refill takes less than 2 seconds.      Coloration: Skin is pale.      Findings: No erythema or rash.   Neurological:      Mental Status: She is oriented to person, place, and time.      Cranial Nerves: No cranial nerve deficit.      Sensory: No sensory deficit.      Motor: No abnormal muscle tone.   Psychiatric:      Comments: anxious       Medications  Current Facility-Administered Medications   Medication Dose Route Frequency Provider Last Rate Last Admin   • furosemide (LASIX) injection 20 mg  20 mg Intravenous Once Glenny Mcgarry M.D.       • lactulose 20 GM/30ML solution 30 mL  30 mL Oral Once Glenny Mcgarry M.D.       • midodrine (PROAMATINE) tablet 5 mg  5 mg Oral TID WITH MEALS Glenny Mcgarry M.D.   5 mg at 11/21/21 0815   • ALPRAZolam (XANAX) tablet 0.25 mg  0.25 mg Oral BID PRN Glenny Mcgarry M.D.   0.25 mg at 11/21/21 0815   • LORazepam (ATIVAN) injection 1 mg  1 mg Intravenous Q HOUR PRN Glenny Mcgarry M.D.       • morphine (pf) 4 mg/mL injection 1 mg  1 mg Intravenous Q30 MIN PRN Glenny Mcgarry M.D.       • baricitinib (Olumiant) tablet 4 mg  4 mg Oral DAILY AT 1800 Glenny Mcgarry M.D.   4 mg at 11/20/21 1823   • famotidine (PEPCID) tablet 20 mg  20 mg Oral BID Glenny Mcgarry M.D.   20 mg at 11/21/21 0534   • hydrALAZINE (APRESOLINE) injection 10 mg  10 mg Intravenous Q6HRS PRN Abdiel Camarillo M.D.   10 mg at 11/15/21 1826   • melatonin tablet 5 mg  5 mg Oral Nightly Nisa Bustos M.D.   5 mg at 11/20/21 2027   • traZODone (DESYREL) tablet 100 mg  100 mg Oral HS PRN Abdiel Camarillo M.D.   100 mg at 11/20/21 2027   • metoprolol tartrate (LOPRESSOR) tablet 12.5 mg  12.5 mg Oral TWICE DAILY Abdiel Camarillo M.D.   12.5 mg at 11/21/21 0534   • digoxin (LANOXIN) tablet 250 mcg  250 mcg Oral DAILY AT 1800 Abdiel Camarillo M.D.   250 mcg  at 11/20/21 1824   • Metoprolol Tartrate (LOPRESSOR) injection 5 mg  5 mg Intravenous Q HOUR PRN Abdiel Camarillo M.D.   5 mg at 11/14/21 1845   • enoxaparin (LOVENOX) inj 60 mg  60 mg Subcutaneous Q12HRS Abdiel Camarillo M.D.   60 mg at 11/21/21 0534   • ipratropium-albuterol (DUONEB) nebulizer solution  3 mL Nebulization Q4H PRN (RT) Abdiel Camarillo M.D.       • amiodarone (Cordarone) tablet 200 mg  200 mg Oral Q DAY Abdiel Camarillo M.D.   200 mg at 11/21/21 0534   • acetaminophen (Tylenol) tablet 650 mg  650 mg Oral Q6HRS PRN Asem CHADD Woody M.D.   650 mg at 11/21/21 0900   • senna-docusate (PERICOLACE or SENOKOT S) 8.6-50 MG per tablet 2 Tablet  2 Tablet Oral BID Asem CHADD Woody M.D.   2 Tablet at 11/21/21 0534    And   • polyethylene glycol/lytes (MIRALAX) PACKET 1 Packet  1 Packet Oral QDAY PRN Asem CHADD Woody M.D.   1 Packet at 11/20/21 1824    And   • magnesium hydroxide (MILK OF MAGNESIA) suspension 30 mL  30 mL Oral QDAY PRN Asem CHADD Woody M.D.   30 mL at 11/13/21 1658    And   • bisacodyl (DULCOLAX) suppository 10 mg  10 mg Rectal QDAY PRN Asem CHADD Woody M.D.   10 mg at 11/14/21 1723   • Respiratory Therapy Consult   Nebulization Continuous RT Asem CHADD Woody M.D.       • ondansetron (ZOFRAN) syringe/vial injection 4 mg  4 mg Intravenous Q4HRS PRN Asem CHADD Woody M.D.   4 mg at 11/17/21 1735   • ondansetron (ZOFRAN ODT) dispertab 4 mg  4 mg Oral Q4HRS PRN Asem CHADD Woody M.D.   4 mg at 11/16/21 0027   • promethazine (PHENERGAN) tablet 12.5-25 mg  12.5-25 mg Oral Q4HRS PRN Aseapril Woody M.D.       • promethazine (PHENERGAN) suppository 12.5-25 mg  12.5-25 mg Rectal Q4HRS PRN Aseapril Woody M.D.       • prochlorperazine (COMPAZINE) injection 5-10 mg  5-10 mg Intravenous Q4HRS PRN Aseapril Woody M.D.       • guaiFENesin dextromethorphan (ROBITUSSIN DM) 100-10 MG/5ML syrup 10 mL  10 mL Oral Q6HRS PRN Aseapril Woody M.D.   10 mL at 11/15/21 2230     Fluids    Intake/Output Summary  (Last 24 hours) at 11/21/2021 1657  Last data filed at 11/21/2021 1400  Gross per 24 hour   Intake 1310 ml   Output 2350 ml   Net -1040 ml       Laboratory          Recent Labs     11/19/21  0350 11/20/21  0425 11/21/21  0535   SODIUM 132* 130* 130*   POTASSIUM 5.2 5.3 5.3   CHLORIDE 87* 87* 87*   CO2 39* 38* 40*   BUN 59* 53* 37*   CREATININE 0.95 0.82 0.77   CALCIUM 9.0 9.3 9.0     Recent Labs     11/19/21  0350 11/20/21  0425 11/21/21  0535   ALTSGPT 29 39 108*   ASTSGOT 13 21 44   ALKPHOSPHAT 44 47 46   TBILIRUBIN 0.7 0.8 0.6   GLUCOSE 170* 126* 140*     Recent Labs     11/19/21  0350 11/20/21 0425 11/21/21  0535   WBC 8.9 12.5* 14.5*   NEUTSPOLYS 78.30* 79.40* 80.60*   LYMPHOCYTES 12.90* 10.30* 9.40*   MONOCYTES 7.20 8.60 8.40   EOSINOPHILS 0.00 0.00 0.10   BASOPHILS 0.10 0.20 0.10   ASTSGOT 13 21 44   ALTSGPT 29 39 108*   ALKPHOSPHAT 44 47 46   TBILIRUBIN 0.7 0.8 0.6     Recent Labs     11/19/21  0350 11/20/21  0425 11/21/21  0535   RBC 4.59 4.97 4.45   HEMOGLOBIN 13.4 14.7 13.3   HEMATOCRIT 43.0 46.5 41.5   PLATELETCT 143* 150* 176     Imaging  Most recent CXR 11/18 reviewed, no pneumothorax, worsening infiltrates per my read        Assessment/Plan    * Acute on chronic respiratory failure with hypoxia and hypercapnia (HCC)- (present on admission)  Assessment & Plan  COVID pneumonia with acute hypoxic respiratory failure  -- Date of initial positive test: 11/9/2021  -- Risk factors for severe covid - Morbid obesity, unvaccinated, CHF, possible PH, afib  -- Current COVID complications include DIANE, BIPAP dependent respiratory failure.   -- Covid specific therapies: Decadron (11/11-11/21)  -- Patient is a candidate for Baricitinib and started on 11/11  -- Oxygen support: BIPAP at night 70%, tolerating max high flow now during the day  -- No ABG today  -- Patient in appropriate isolation with careful gowning and hand hygiene upon entrance and exit of room  -- Rescue maneuvers: Proning - encouraging patient to  self-prone, ECMO - not an ecmo candidate due to super obesity.   -- Prophylaxis with lovenox  -- Patient and family decided again on DNAR 11/18.    -- Precedex held due to bradycardia and hypotension, Xanax added  -- If respiratory distress develops a morphine drip should be started.  -- The patient is not able to make decisions independently and should she attempt to reverse her code status while in extremis this decision needs to be made in concert with her son and daughter-in-law.       Metabolic alkalosis with respiratory acidosis  Assessment & Plan  Chronic resp acidosis with compensatory metabolic alkalosis + contraction alkalosis  Now alkalemic  Cont spot dose diamox PRN  Cont BIPAP    Chronic heart failure with preserved ejection fraction (HCC)  Assessment & Plan  TTE 10/30/2021: grossly normal LVEF, 55%, severe biatrial enlargement, RV not well visualized nor were the IVC, tricuspid or pulmonic valves.  Reported history of dilated cardiomyopathy and moderate pulmonary hypertension  BNP chronically elevated, currently ~900 -> 458  Latest Procal 0.03  Trace edema  Clinically slightly overloaded  Spot dosing lasix, however patient's renal function declines each time lasix is given    Pneumonia due to COVID-19 virus- (present on admission)  Assessment & Plan  See plan for respiratory failure    Bacteremia, coagulase-negative staphylococcal  Assessment & Plan  BCx 10/28 + staph hominus and epidermis. Repeat BCx 10/30 negative. 1/2 bottles 11/9 + again, + staph hominus  Negative MRSA, suspected contaminant.   vanco/zosyn 11/9-11/11.  Repeated blood cultures  11/12 NGTD, afebrile, procal < 0.05    Advanced care planning/counseling discussion  Assessment & Plan  DNAR  See ACP note from today 11/18    AF (atrial fibrillation) (HCC)- (present on admission)  Assessment & Plan  Rate currently controlled  TTE 10/30/2021 showed grossly normal LVEF, 55%, severe biatrial, RV not well visualized nor were the IVC, tricuspid  or pulmonic valves.  HD stable  Cont amiodarone and digoxin PO  Cont lopressor 12.5mg PO BID  MTP 5mg IVP PRN HR > 120     VTE:  Lovenox  Ulcer: H2 Antagonist  Lines: Mckeon Catheter  Ongoing indication addressed    I have performed a physical exam and reviewed and updated ROS and Plan today (11/21/2021). In review of yesterday's note (11/20/2021), there are no changes except as documented above.     Discussed patient condition and risk of morbidity and/or mortality with Family, RN, RT, Pharmacy, Code status disscussed and Patient.     The patient remains critically ill.  Critical care time = 45 minutes in directly providing and coordinating critical care and extensive data review.  No time overlap and excludes procedures.    Glenny Mcgarry MD RD  Pulmonary and Critical Care    Available on Voalte

## 2021-11-22 NOTE — PROGRESS NOTES
"Critical Care Progress Note    Date of admission  11/9/2021    Chief Complaint  61 y.o. female admitted 11/9/2021 with acute on chronic mixed hypercapnic and hypoxemic respiratory failure due to COVID-19    Hospital Course  61 y.o. female with past medical history of atrial fibrillation, dilated cardiomyopathy, moderate pulmonary hypertension on digoxin and amiodarone, Body mass index is 61.42 kg/m².,  JUAN CARLOS/OHS on BIPAP and chronic hypoxemic respiratory failure on 3-4L at baseline who recently had a prolonged hospitalization for acute on chronic congestive heart failure and possible COPD exacerbation which improved with BIPAP and diuresis.  Echo on 10/30/2021 showed grossly normal LVEF, 55%, severe biatrial enlargement , RV not well visualized nor were the IVC, tricuspid or pulmonic valves. During her hospitalization, palliative care was consulted and CODE STATUS was changed to DNR/DNI and she was discharged to a skilled nursing facility for PT/OT on 11/9.     She was readmitted the same day 11/9/2021 as she tested positive for Covid 19.  Reportedly had no changes symptomatically since discharge aside from mildly increased shortness of breath increased fatigue.  Not vaccinated.  CXR unchanged.  ABG on admission 7.3 3/87/60.  Started on Decadron.  She was admitted to the floor, overnight RRT was called as patient became increasingly confused/disoriented.  Repeat ABG 7.34/97/73 on 100% FiO2.  Transferred to the ICU for rescue BiPAP.  Of note, BCx 10/28 + staph hominus and epidermis. Repeat BCx 10/30 negative. 1/2 bottles 11/9 + again, prelim staph. Negative MRSA, possible contaminant.     11/12: Family meeting held at bedside with patient and son (via phone, son sick) Code status changed back to DNR/DNI, in line with POLST    11/16: Patient expressing wishes to be intubated if needed.  She wants to \"try everything\". I had an extensive discussion with her today. I was very honest with her about her poor chances of " survival and the high risk nature of intubating her. She seems understand these risks and wants to proceed with intubation. I also explained that intubation in these circumstances would be done when I deem it is necessary and safety do so and it would likely take away valuable time that she could spend with her family well on BiPAP. I explained to her that once she is intubated she will not be communicative and therefore will likely not speak to her family again unless she is able to improve and be extubated. She gave me permission to speak to her daughter-in-law, Sherron. I called Sherron and explained the conversation to her as well. As the patient is currently alert and oriented x4 she is making her own decision and her CODE STATUS has been changed to full code to reflect this conversation.    11/18: Family and patient decision to change code status back to DNR/DNI.     Interval Problem Update  Chart review from the past 24 hours includes imaging, laboratory studies, vital signs and notes available.  Pertinent data for today's visit includes    Cardiac: VSS - amio/dig/metop  Pulm: hfnc 50% 50L,   Heme: stable, plt improving  /renal: Cr mildly uptrending 0.91  ID:  WBC downtrending   I/O: -855ml  GI/endo: po  Additional labs/Imaging: hypochloremic met acidosis (improving).   Additional information: DNAR on 11/18/21        Review of Systems  Review of Systems   Constitutional: Positive for malaise/fatigue.        Hungry  Anxious  Requesting water   Respiratory: Positive for shortness of breath. Negative for cough.    Cardiovascular: Negative for chest pain, palpitations and leg swelling.   Musculoskeletal: Negative for myalgias.   Neurological: Negative for dizziness and headaches.   Psychiatric/Behavioral: The patient is not nervous/anxious.    All other systems reviewed and are negative.    Vital Signs for last 24 hours   Temp:  [36.3 °C (97.3 °F)-36.7 °C (98 °F)] 36.7 °C (98 °F)  Pulse:  [] 76  Resp:   [10-35] 26  BP: ()/(52-73) 72/55  SpO2:  [87 %-98 %] 94 %    Physical Exam   Physical Exam  Vitals and nursing note reviewed.   Constitutional:       General: She is not in acute distress.     Appearance: Normal appearance. She is well-developed. She is obese. She is not diaphoretic.   Eyes:      General: No scleral icterus.        Right eye: No discharge.         Left eye: No discharge.      Conjunctiva/sclera: Conjunctivae normal.      Pupils: Pupils are equal, round, and reactive to light.   Neck:      Thyroid: No thyromegaly.      Vascular: No JVD.   Cardiovascular:      Rate and Rhythm: Normal rate and regular rhythm.      Heart sounds: Normal heart sounds. No murmur heard.  No gallop.    Pulmonary:      Effort: No respiratory distress.      Breath sounds: Normal breath sounds. No wheezing or rales.      Comments: Distant breath sounds diffusely  Abdominal:      General: There is no distension.      Palpations: Abdomen is soft.      Tenderness: There is no abdominal tenderness. There is no guarding.   Musculoskeletal:         General: No tenderness.      Right lower leg: Edema ( improving) present.      Left lower leg: Edema ( improving) present.   Lymphadenopathy:      Cervical: No cervical adenopathy.   Skin:     General: Skin is warm.      Capillary Refill: Capillary refill takes less than 2 seconds.      Coloration: Skin is pale.      Findings: No erythema or rash.   Neurological:      Mental Status: She is oriented to person, place, and time.      Cranial Nerves: No cranial nerve deficit.      Sensory: No sensory deficit.      Motor: No abnormal muscle tone.   Psychiatric:      Comments: anxious       Medications  Current Facility-Administered Medications   Medication Dose Route Frequency Provider Last Rate Last Admin   • midodrine (PROAMATINE) tablet 5 mg  5 mg Oral TID WITH MEALS Glenny Mcgarry M.D.   5 mg at 11/22/21 0922   • ALPRAZolam (XANAX) tablet 0.25 mg  0.25 mg Oral BID PRN Glenny REVELES  ALONDRA Mcgarry   0.25 mg at 11/21/21 1953   • LORazepam (ATIVAN) injection 1 mg  1 mg Intravenous Q HOUR PRN Glenny Mcgarry M.D.       • morphine (pf) 4 mg/mL injection 1 mg  1 mg Intravenous Q30 MIN PRN Glenny Mcgarry M.D.       • baricitinib (Olumiant) tablet 4 mg  4 mg Oral DAILY AT 1800 Glenny Mcgarry M.D.   4 mg at 11/21/21 1952   • famotidine (PEPCID) tablet 20 mg  20 mg Oral BID Glenny Mcgarry M.D.   20 mg at 11/22/21 0537   • hydrALAZINE (APRESOLINE) injection 10 mg  10 mg Intravenous Q6HRS PRN Abdiel Camarillo M.D.   10 mg at 11/15/21 1826   • melatonin tablet 5 mg  5 mg Oral Nightly Nisa Bustos M.D.   5 mg at 11/21/21 2102   • traZODone (DESYREL) tablet 100 mg  100 mg Oral HS PRN Abdiel Camarillo M.D.   100 mg at 11/20/21 2027   • metoprolol tartrate (LOPRESSOR) tablet 12.5 mg  12.5 mg Oral TWICE DAILY Abdiel Camarillo M.D.   12.5 mg at 11/22/21 0537   • digoxin (LANOXIN) tablet 250 mcg  250 mcg Oral DAILY AT 1800 Abdiel Camarillo M.D.   250 mcg at 11/21/21 1952   • Metoprolol Tartrate (LOPRESSOR) injection 5 mg  5 mg Intravenous Q HOUR PRN Abdiel Camarillo M.D.   5 mg at 11/14/21 1845   • enoxaparin (LOVENOX) inj 60 mg  60 mg Subcutaneous Q12HRS Abdiel Camarillo M.D.   60 mg at 11/22/21 0536   • ipratropium-albuterol (DUONEB) nebulizer solution  3 mL Nebulization Q4H PRN (RT) Abdiel Camarillo M.D.       • amiodarone (Cordarone) tablet 200 mg  200 mg Oral Q DAY Abdiel Camarillo M.D.   200 mg at 11/22/21 0536   • acetaminophen (Tylenol) tablet 650 mg  650 mg Oral Q6HRS PRN Mario Woody M.D.   650 mg at 11/22/21 0407   • senna-docusate (PERICOLACE or SENOKOT S) 8.6-50 MG per tablet 2 Tablet  2 Tablet Oral BID Mario Woody M.D.   2 Tablet at 11/22/21 0536    And   • polyethylene glycol/lytes (MIRALAX) PACKET 1 Packet  1 Packet Oral QDAY PRN Mario Woody M.D.   1 Packet at 11/20/21 1824    And   • magnesium hydroxide (MILK OF MAGNESIA) suspension 30 mL  30 mL Oral QDAY  PRN Asem CHADD Woody M.D.   30 mL at 11/13/21 1658    And   • bisacodyl (DULCOLAX) suppository 10 mg  10 mg Rectal QDAY PRN Asem CHADD Woody M.D.   10 mg at 11/14/21 1723   • Respiratory Therapy Consult   Nebulization Continuous RT Asem CHADD Woody M.D.       • ondansetron (ZOFRAN) syringe/vial injection 4 mg  4 mg Intravenous Q4HRS PRN Asem CHADD Woody M.D.   4 mg at 11/17/21 1735   • ondansetron (ZOFRAN ODT) dispertab 4 mg  4 mg Oral Q4HRS PRN Asem CHADD Woody M.D.   4 mg at 11/16/21 0027   • promethazine (PHENERGAN) tablet 12.5-25 mg  12.5-25 mg Oral Q4HRS PRN Asem CHADD Woody M.D.       • promethazine (PHENERGAN) suppository 12.5-25 mg  12.5-25 mg Rectal Q4HRS PRN Asem CHADD Woody M.D.       • prochlorperazine (COMPAZINE) injection 5-10 mg  5-10 mg Intravenous Q4HRS PRN Asem CHADD Woody M.D.       • guaiFENesin dextromethorphan (ROBITUSSIN DM) 100-10 MG/5ML syrup 10 mL  10 mL Oral Q6HRS PRN Asem CHADD Woody M.D.   10 mL at 11/15/21 2230     Fluids    Intake/Output Summary (Last 24 hours) at 11/22/2021 0939  Last data filed at 11/22/2021 0830  Gross per 24 hour   Intake 1180 ml   Output 2400 ml   Net -1220 ml       Laboratory          Recent Labs     11/20/21  0425 11/21/21  0535 11/22/21  0300   SODIUM 130* 130* 133*   POTASSIUM 5.3 5.3 4.9   CHLORIDE 87* 87* 89*   CO2 38* 40* 35*   BUN 53* 37* 34*   CREATININE 0.82 0.77 0.91   MAGNESIUM  --   --  2.2   CALCIUM 9.3 9.0 8.7     Recent Labs     11/20/21  0425 11/21/21  0535 11/22/21  0300   ALTSGPT 39 108* 134*   ASTSGOT 21 44 43   ALKPHOSPHAT 47 46 49   TBILIRUBIN 0.8 0.6 0.7   GLUCOSE 126* 140* 179*     Recent Labs     11/20/21  0425 11/21/21  0535 11/22/21  0300   WBC 12.5* 14.5* 13.6*   NEUTSPOLYS 79.40* 80.60* 74.50*   LYMPHOCYTES 10.30* 9.40* 15.80*   MONOCYTES 8.60 8.40 8.30   EOSINOPHILS 0.00 0.10 0.30   BASOPHILS 0.20 0.10 0.10   ASTSGOT 21 44 43   ALTSGPT 39 108* 134*   ALKPHOSPHAT 47 46 49   TBILIRUBIN 0.8 0.6 0.7     Recent Labs     11/20/21  0425  11/21/21  0535 11/22/21  0300   RBC 4.97 4.45 4.36   HEMOGLOBIN 14.7 13.3 13.0   HEMATOCRIT 46.5 41.5 40.8   PLATELETCT 150* 176 184     Imaging  Reviewed     Assessment/Plan    Covid pneumonia with acute hypoxic respiratory failure  Acute on chronic respiratory failure with hypoxia/hypercapnia  -Decadron 11/11 - 11/21  -Baricitinib started on 11/11  -Continue high flow nasal cannula-titrate as tolerated to SPO2 of 90 to 92%  -After extensive family/patient discussion , they have decided to change code status to DNR/DNI on 11/18  -The patient is not able to make decisions independently and should she attempt to reverse her code status while in extremis this decision needs to be made in concert with her son and daughter-in-law.   -Precedex held due to bradycardia and hypotension, Xanax added  -If respiratory distress develops a morphine drip should be started    Metabolic alkalosis with respiratory acidosis   Chronic respiratory acidosis with compensatory metabolic alkalosis plus contraction alkalosis  -Lasix changed to as needed due to contraction alkalosis and DIANE  -Continue respiratory support as above    CHF with preserved EF  TTE on 10/30/2021 showed grossly normal EF of 55%, severe biatrial enlargement.  Patient has reported history of dilated cardiomyopathy and moderate pulmonary hypertension.  -Lasix as needed  -Monitor renal function and fluid status    Atrial fibrillation  Currently rate controlled on amiodarone and digoxin.  -Continue amiodarone and digoxin p.o.  -Continue metoprolol      VTE:  Lovenox  Ulcer: H2 Antagonist  Lines: Mckeon Catheter  Ongoing indication addressed    I have performed a physical exam and reviewed and updated ROS and Plan today (11/22/2021). In review of yesterday's note (11/21/2021), there are no changes except as documented above.     Discussed patient condition and risk of morbidity and/or mortality with Family, RN, RT, Pharmacy, Code status disscussed and Patient.     The  patient remains critically ill.  Critical care time = 45 minutes in directly providing and coordinating critical care and extensive data review.  No time overlap and excludes procedures.        Veronica Dempsey MD  Pulmonary and Critical Care Medicine  [facility]

## 2021-11-22 NOTE — FLOWSHEET NOTE
11/22/21 1057   Vital Signs   Pulse 67   Respiration (!) 26   Pulse Oximetry 91 %   $ Pulse Oximetry (Spot Check) Yes   Oxygen   O2 (LPM) 50   FiO2% 50 %   O2 Delivery Device Heated High Flow Nasal Cannula   Aerosols   $ Aerosol Delivery Device Heated High Flow Nasal Cannula   Aerosol Temperature 31 °C (87.8 °F)

## 2021-11-22 NOTE — CARE PLAN
Problem: Nutritional:  Goal: Achieve adequate nutritional intake  Description: Advance diet as tolerated. Patient will consume >50% of meals.  Outcome: Met     PO intake adequate at % of meals and supplements. RD will continue to follow weekly or PRN.

## 2021-11-22 NOTE — PROGRESS NOTES
"Critical Care Progress Note    Date of admission  11/9/2021    Chief Complaint  61 y.o. female admitted 11/9/2021 with acute on chronic mixed hypercapnic and hypoxemic respiratory failure due to COVID-19    Hospital Course  61 y.o. female with past medical history of atrial fibrillation, dilated cardiomyopathy, moderate pulmonary hypertension on digoxin and amiodarone, Body mass index is 61.42 kg/m².,  JUAN CARLOS/OHS on BIPAP and chronic hypoxemic respiratory failure on 3-4L at baseline who recently had a prolonged hospitalization for acute on chronic congestive heart failure and possible COPD exacerbation which improved with BIPAP and diuresis.  Echo on 10/30/2021 showed grossly normal LVEF, 55%, severe biatrial enlargement , RV not well visualized nor were the IVC, tricuspid or pulmonic valves. During her hospitalization, palliative care was consulted and CODE STATUS was changed to DNR/DNI and she was discharged to a skilled nursing facility for PT/OT on 11/9.     She was readmitted the same day 11/9/2021 as she tested positive for Covid 19.  Reportedly had no changes symptomatically since discharge aside from mildly increased shortness of breath increased fatigue.  Not vaccinated.  CXR unchanged.  ABG on admission 7.3 3/87/60.  Started on Decadron.  She was admitted to the floor, overnight RRT was called as patient became increasingly confused/disoriented.  Repeat ABG 7.34/97/73 on 100% FiO2.  Transferred to the ICU for rescue BiPAP.  Of note, BCx 10/28 + staph hominus and epidermis. Repeat BCx 10/30 negative. 1/2 bottles 11/9 + again, prelim staph. Negative MRSA, possible contaminant.     11/12: Family meeting held at bedside with patient and son (via phone, son sick) Code status changed back to DNR/DNI, in line with POLST    11/16: Patient expressing wishes to be intubated if needed.  She wants to \"try everything\". I had an extensive discussion with her today. I was very honest with her about her poor chances of " survival and the high risk nature of intubating her. She seems understand these risks and wants to proceed with intubation. I also explained that intubation in these circumstances would be done when I deem it is necessary and safety do so and it would likely take away valuable time that she could spend with her family well on BiPAP. I explained to her that once she is intubated she will not be communicative and therefore will likely not speak to her family again unless she is able to improve and be extubated. She gave me permission to speak to her daughter-in-law, Sherron. I called Sherron and explained the conversation to her as well. As the patient is currently alert and oriented x4 she is making her own decision and her CODE STATUS has been changed to full code to reflect this conversation.    11/20: Patient has been telling her family she is coming home next week for Thanksgiving.  She is having a good day today but still does not grasp the severity of her hypoxia.     Interval Problem Update  Chart review from the past 24 hours includes imaging, laboratory studies, vital signs and notes available.  Pertinent data for today's visit includes    ICU CHECKLIST:  Neuro: Stable, alert and oriented  Cardiac: Afib rate controlled. On Amio and dig. TTE: grossly normal LVEF, 55%, severe biatrial enlargement, RV not well visualized nor was IVC, tricuspid or pulmonic valves. BNP trending down.  Doesn't tolerate lasix well with renal function.  Hypotension resolved after D/C precedex.   Pulmonary: High flow during the day, BIPAP at night  Heme: Stable thrombocytopenia. Leukocytosis fluctuating, no other infectious s/s   DVT Prophylaxis: Lovenox prophy  /Renal: DIANE improving today with holding diuresis   I/O: [id number]   Mckeon: in place due to immobility, UOP low  ID:  Decadron 11/11-, baricitinib 11/11- .BCx 10/28 + staph hominus and epidermis. 1/2 bottles 11/9 + again, staph hominus. On vanco/zosyn 11/9-11/11. BCx 11/12  remain NGTD.  Skin: intact  GI/Nutrition: Seen by speech yesterday, Minced diet ordered   Prophylaxis: Pepcid 2/2 steroids  Endo: Stable  Lines/Tubes: PIV      Review of Systems  Review of Systems   Constitutional: Positive for malaise/fatigue.        Hungry  Anxious  Requesting water   Respiratory: Positive for shortness of breath. Negative for cough.    Cardiovascular: Negative for chest pain, palpitations and leg swelling.   Musculoskeletal: Negative for myalgias.   Neurological: Negative for dizziness and headaches.   Psychiatric/Behavioral: The patient is not nervous/anxious.    All other systems reviewed and are negative.    Vital Signs for last 24 hours   Temp:  [36.3 °C (97.3 °F)-36.8 °C (98.3 °F)] 36.7 °C (98 °F)  Pulse:  [51-94] 79  Resp:  [10-83] 28  BP: ()/(48-75) 99/60  SpO2:  [91 %-98 %] 93 %    Physical Exam   Physical Exam  Vitals and nursing note reviewed.   Constitutional:       General: She is not in acute distress.     Appearance: Normal appearance. She is well-developed. She is obese. She is not diaphoretic.   Eyes:      General: No scleral icterus.        Right eye: No discharge.         Left eye: No discharge.      Conjunctiva/sclera: Conjunctivae normal.      Pupils: Pupils are equal, round, and reactive to light.   Neck:      Thyroid: No thyromegaly.      Vascular: No JVD.   Cardiovascular:      Rate and Rhythm: Normal rate and regular rhythm.      Heart sounds: Normal heart sounds. No murmur heard.  No gallop.    Pulmonary:      Effort: No respiratory distress.      Breath sounds: Normal breath sounds. No wheezing or rales.      Comments: Distant breath sounds diffusely  Abdominal:      General: There is no distension.      Palpations: Abdomen is soft.      Tenderness: There is no abdominal tenderness. There is no guarding.   Musculoskeletal:         General: No tenderness.      Right lower leg: Edema ( improving) present.      Left lower leg: Edema ( improving) present.    Lymphadenopathy:      Cervical: No cervical adenopathy.   Skin:     General: Skin is warm.      Capillary Refill: Capillary refill takes less than 2 seconds.      Coloration: Skin is pale.      Findings: No erythema or rash.   Neurological:      Mental Status: She is oriented to person, place, and time.      Cranial Nerves: No cranial nerve deficit.      Sensory: No sensory deficit.      Motor: No abnormal muscle tone.   Psychiatric:      Comments: anxious       Medications  Current Facility-Administered Medications   Medication Dose Route Frequency Provider Last Rate Last Admin   • furosemide (LASIX) injection 20 mg  20 mg Intravenous Once Glenny Mcgarry M.D.       • lactulose 20 GM/30ML solution 30 mL  30 mL Oral Once Glenny Mcgarry M.D.       • midodrine (PROAMATINE) tablet 5 mg  5 mg Oral TID WITH MEALS Glenny Mcgarry M.D.   5 mg at 11/21/21 0815   • ALPRAZolam (XANAX) tablet 0.25 mg  0.25 mg Oral BID PRN Glenny Mcgarry M.D.   0.25 mg at 11/21/21 0815   • LORazepam (ATIVAN) injection 1 mg  1 mg Intravenous Q HOUR PRN Glenny Mcgarry M.D.       • morphine (pf) 4 mg/mL injection 1 mg  1 mg Intravenous Q30 MIN PRN Glenny Mcgarry M.D.       • baricitinib (Olumiant) tablet 4 mg  4 mg Oral DAILY AT 1800 Glenny Mcgarry M.D.   4 mg at 11/20/21 1823   • famotidine (PEPCID) tablet 20 mg  20 mg Oral BID Glenny Mcgarry M.D.   20 mg at 11/21/21 0534   • hydrALAZINE (APRESOLINE) injection 10 mg  10 mg Intravenous Q6HRS PRN Abdiel Camarillo M.D.   10 mg at 11/15/21 1826   • melatonin tablet 5 mg  5 mg Oral Nightly Nisa Bustos M.D.   5 mg at 11/20/21 2027   • traZODone (DESYREL) tablet 100 mg  100 mg Oral HS PRN Abdiel Camarillo M.D.   100 mg at 11/20/21 2027   • metoprolol tartrate (LOPRESSOR) tablet 12.5 mg  12.5 mg Oral TWICE DAILY Abdiel Camarillo M.D.   12.5 mg at 11/21/21 0534   • digoxin (LANOXIN) tablet 250 mcg  250 mcg Oral DAILY AT 1800 Abdiel Camarillo M.D.   250 mcg at  11/20/21 1824   • Metoprolol Tartrate (LOPRESSOR) injection 5 mg  5 mg Intravenous Q HOUR PRN Abdiel Camarillo M.D.   5 mg at 11/14/21 1845   • enoxaparin (LOVENOX) inj 60 mg  60 mg Subcutaneous Q12HRS Abdiel Camarillo M.D.   60 mg at 11/21/21 0534   • ipratropium-albuterol (DUONEB) nebulizer solution  3 mL Nebulization Q4H PRN (RT) Abdiel Camarillo M.D.       • amiodarone (Cordarone) tablet 200 mg  200 mg Oral Q DAY Abdiel Camarillo M.D.   200 mg at 11/21/21 0534   • acetaminophen (Tylenol) tablet 650 mg  650 mg Oral Q6HRS PRN Asem CHDAD Woody M.D.   650 mg at 11/21/21 0900   • senna-docusate (PERICOLACE or SENOKOT S) 8.6-50 MG per tablet 2 Tablet  2 Tablet Oral BID Asem CHADD Woody M.D.   2 Tablet at 11/21/21 0534    And   • polyethylene glycol/lytes (MIRALAX) PACKET 1 Packet  1 Packet Oral QDAY PRN Asem CHADD Woody M.D.   1 Packet at 11/20/21 1824    And   • magnesium hydroxide (MILK OF MAGNESIA) suspension 30 mL  30 mL Oral QDAY PRN Asem HCADD Woody M.D.   30 mL at 11/13/21 1658    And   • bisacodyl (DULCOLAX) suppository 10 mg  10 mg Rectal QDAY PRN Asem CHADD Woody M.D.   10 mg at 11/14/21 1723   • Respiratory Therapy Consult   Nebulization Continuous RT Asem CHADD Woody M.D.       • ondansetron (ZOFRAN) syringe/vial injection 4 mg  4 mg Intravenous Q4HRS PRN Asem CHADD Woody M.D.   4 mg at 11/17/21 1735   • ondansetron (ZOFRAN ODT) dispertab 4 mg  4 mg Oral Q4HRS PRN Asem CHADD Woody M.D.   4 mg at 11/16/21 0027   • promethazine (PHENERGAN) tablet 12.5-25 mg  12.5-25 mg Oral Q4HRS PRN Aseapril Woody M.D.       • promethazine (PHENERGAN) suppository 12.5-25 mg  12.5-25 mg Rectal Q4HRS PRN Aseapril Woody M.D.       • prochlorperazine (COMPAZINE) injection 5-10 mg  5-10 mg Intravenous Q4HRS PRN Aseapril Woody M.D.       • guaiFENesin dextromethorphan (ROBITUSSIN DM) 100-10 MG/5ML syrup 10 mL  10 mL Oral Q6HRS PRN Mario Woody M.D.   10 mL at 11/15/21 2230     Fluids    Intake/Output Summary (Last  24 hours) at 11/21/2021 1609  Last data filed at 11/21/2021 1400  Gross per 24 hour   Intake 1310 ml   Output 2350 ml   Net -1040 ml       Laboratory          Recent Labs     11/19/21  0350 11/20/21  0425 11/21/21  0535   SODIUM 132* 130* 130*   POTASSIUM 5.2 5.3 5.3   CHLORIDE 87* 87* 87*   CO2 39* 38* 40*   BUN 59* 53* 37*   CREATININE 0.95 0.82 0.77   CALCIUM 9.0 9.3 9.0     Recent Labs     11/19/21  0350 11/20/21  0425 11/21/21  0535   ALTSGPT 29 39 108*   ASTSGOT 13 21 44   ALKPHOSPHAT 44 47 46   TBILIRUBIN 0.7 0.8 0.6   GLUCOSE 170* 126* 140*     Recent Labs     11/19/21  0350 11/20/21 0425 11/21/21  0535   WBC 8.9 12.5* 14.5*   NEUTSPOLYS 78.30* 79.40* 80.60*   LYMPHOCYTES 12.90* 10.30* 9.40*   MONOCYTES 7.20 8.60 8.40   EOSINOPHILS 0.00 0.00 0.10   BASOPHILS 0.10 0.20 0.10   ASTSGOT 13 21 44   ALTSGPT 29 39 108*   ALKPHOSPHAT 44 47 46   TBILIRUBIN 0.7 0.8 0.6     Recent Labs     11/19/21  0350 11/20/21  0425 11/21/21  0535   RBC 4.59 4.97 4.45   HEMOGLOBIN 13.4 14.7 13.3   HEMATOCRIT 43.0 46.5 41.5   PLATELETCT 143* 150* 176     Imaging  Most recent CXR 11/18 reviewed, no pneumothorax, worsening infiltrates per my read        Assessment/Plan    * Acute on chronic respiratory failure with hypoxia and hypercapnia (HCC)- (present on admission)  Assessment & Plan  COVID pneumonia with acute hypoxic respiratory failure  -- Date of initial positive test: 11/9/2021  -- Risk factors for severe covid - Morbid obesity, unvaccinated, CHF, possible PH, afib  -- Current COVID complications include DIANE, BIPAP dependent respiratory failure.   -- Covid specific therapies: Decadron (11/11-11/21)  -- Patient is a candidate for Baricitinib and started on 11/11  -- Oxygen support: BIPAP at night 70%, tolerating max high flow now during the day  -- No ABG today  -- Patient in appropriate isolation with careful gowning and hand hygiene upon entrance and exit of room  -- Rescue maneuvers: Proning - encouraging patient to  self-prone, ECMO - not an ecmo candidate due to super obesity.   -- Prophylaxis with lovenox  -- Patient and family decided again on DNAR 11/18.    -- Precedex held due to bradycardia and hypotension, Xanax added  -- If respiratory distress develops a morphine drip should be started.  -- The patient is not able to make decisions independently and should she attempt to reverse her code status while in extremis this decision needs to be made in concert with her son and daughter-in-law.       Metabolic alkalosis with respiratory acidosis  Assessment & Plan  Chronic resp acidosis with compensatory metabolic alkalosis + contraction alkalosis  Now alkalemic  Cont spot dose diamox PRN  Cont BIPAP    Chronic heart failure with preserved ejection fraction (HCC)  Assessment & Plan  TTE 10/30/2021: grossly normal LVEF, 55%, severe biatrial enlargement, RV not well visualized nor were the IVC, tricuspid or pulmonic valves.  Reported history of dilated cardiomyopathy and moderate pulmonary hypertension  BNP chronically elevated, currently ~900 -> 458  Latest Procal 0.03  Trace edema  Clinically slightly overloaded  Spot dosing lasix, however patient's renal function declines each time lasix is given    Pneumonia due to COVID-19 virus- (present on admission)  Assessment & Plan  See plan for respiratory failure    Bacteremia, coagulase-negative staphylococcal  Assessment & Plan  BCx 10/28 + staph hominus and epidermis. Repeat BCx 10/30 negative. 1/2 bottles 11/9 + again, + staph hominus  Negative MRSA, suspected contaminant.   vanco/zosyn 11/9-11/11.  Repeated blood cultures  11/12 NGTD, afebrile, procal < 0.05    Advanced care planning/counseling discussion  Assessment & Plan  DNAR  See ACP note from today 11/18    AF (atrial fibrillation) (HCC)- (present on admission)  Assessment & Plan  Rate currently controlled  TTE 10/30/2021 showed grossly normal LVEF, 55%, severe biatrial, RV not well visualized nor were the IVC, tricuspid  or pulmonic valves.  HD stable  Cont amiodarone and digoxin PO  Cont lopressor 12.5mg PO BID  MTP 5mg IVP PRN HR > 120     VTE:  Lovenox  Ulcer: H2 Antagonist  Lines: Mckeon Catheter  Ongoing indication addressed    I have performed a physical exam and reviewed and updated ROS and Plan today (11/21/2021). In review of yesterday's note (11/20/2021), there are no changes except as documented above.     Discussed patient condition and risk of morbidity and/or mortality with Family, RN, RT, Pharmacy, Code status disscussed and Patient. Spoke with Jin (son) over phone and Sherron (Jin's wife) in person and updated on condition.    The patient remains critically ill.  Critical care time = 45 minutes in directly providing and coordinating critical care and extensive data review.  No time overlap and excludes procedures.    Glenny Mcgarry MD RD  Pulmonary and Critical Care    Available on Voalte

## 2021-11-22 NOTE — FLOWSHEET NOTE
11/22/21 0640   Vital Signs   Pulse 62   Respiration 20   Pulse Oximetry 95 %   $ Pulse Oximetry (Spot Check) Yes   Respiratory Assessment   Level of Consciousness Alert   Chest Exam   Work Of Breathing / Effort Within Normal Limits   Oxygen   O2 (LPM) 50   FiO2% 65 %  (titrated to 50)   O2 Delivery Device Heated High Flow Nasal Cannula   Aerosols   $ Aerosol Delivery Device Heated High Flow Nasal Cannula   Aerosol Temperature 31 °C (87.8 °F)

## 2021-11-22 NOTE — FLOWSHEET NOTE
11/22/21 1427   Vital Signs   Pulse 69   Respiration (!) 22   Pulse Oximetry 91 %   $ Pulse Oximetry (Spot Check) Yes   Respiratory Assessment   Level of Consciousness Alert   Oxygen   O2 (LPM) 50   FiO2% 50 %   O2 Delivery Device Heated High Flow Nasal Cannula   Aerosols   $ Aerosol Delivery Device Heated High Flow Nasal Cannula   Aerosol Temperature 31 °C (87.8 °F)

## 2021-11-22 NOTE — THERAPY
Speech Language Pathology  Daily Treatment     Patient Name: Cheryl D Blakemore  Age:  61 y.o., Sex:  female  Medical Record #: [id number]  Today's Date: 11/22/2021     Precautions  Precautions: Swallow Precautions ( See Comments)    Assessment    Pt seen on this date for dysphagia therapy. Pt currently on 50L at 50% Fi02 via HFNC. Pt endorsed minimal PO intake and reduced appetite. HOB was raised and pt laying on side for PO trials. Throat clear x1 appreciated at end of PO trials otherwise no other overt s/sx of aspiration appreciated with trials of minced and moist/thin liquid lunch. She independently fed self although required assistance with opening some packaging. She denied difficulty chewing mac and cheese and endorsed that this current diet was close to baseline diet. No significant change in 02 saturations or respiratory rate during session. She consumed <25% before declined further PO despite encouragement. At this time, recommend continuation of minced and moist/thin liquid diet with implementation of swallowing compensatory strategies (small bites/sips, up as high as possible during meals, slow rate, straws okay). As pt at baseline diet, no further acute SLP services are recommended. Please re-consult SLP with any change in status, s/sx of aspiration, or difficulty. Thank you.    Plan    1) continue minced and moist (MM5)/thin liquid diet  2) no further acute SLP needs as pt on baseline diet  3) please re-consult with any change in status, s/sx of aspiration, or difficulty    Continue current treatment plan.    Discharge Recommendations: (P) Anticipate that the patient will have no further speech therapy needs after discharge from the hospital       Objective       11/22/21 1315   Vitals   O2 (LPM) 50   O2 Delivery Device Heated High Flow Nasal Cannula   Pain 0 - 10 Group   Therapist Pain Assessment Post Activity Pain Same as Prior to Activity;Nurse Notified;0   Cognitive-Linguistic   Level of Consciousness  Alert   Dysphagia    Dysphagia X   Positioning / Behavior Modification Modulate Rate or Bite Size;Self Monitoring   Other Treatments trials of MM5/TN0 lunch and mac and cheese   Diet / Liquid Recommendation Thin (0);Minced & Moist (5) - (Dysphagia II)   Nutritional Liquid Intake Rating Scale Non thickened beverages   Nutritional Food Intake Rating Scale Total oral diet with multiple consistencies but requiring special preparation or compensations   Skilled Intervention Compensatory Strategies;Verbal Cueing   Recommended Route of Medication Administration   Medication Administration  Crush all Medications in Puree;Float Whole with Puree   Short Term Goals   Short Term Goal # 1 The patient will consume MM5/TN0 meal with no overt s/sx of aspiration    Goal Outcome # 1 Goal met

## 2021-11-22 NOTE — CARE PLAN
The patient is Watcher - Medium risk of patient condition declining or worsening    Shift Goals  Clinical Goals: maintain O2 sats above 88%  Patient Goals: sleep  Family Goals: Family not present at this time    Progress made toward(s) clinical / shift goals:    Problem: Skin Integrity  Goal: Skin integrity is maintained or improved  Outcome: Not Progressing     Problem: Knowledge Deficit - Standard  Goal: Patient and family/care givers will demonstrate understanding of plan of care, disease process/condition, diagnostic tests and medications  Outcome: Progressing     Problem: Psychosocial  Goal: Patient's ability to verbalize feelings about condition will improve  Outcome: Progressing     Problem: Communication  Goal: The ability to communicate needs accurately and effectively will improve  Outcome: Progressing     Problem: Hemodynamics  Goal: Patient's hemodynamics, fluid balance and neurologic status will be stable or improve  Outcome: Progressing     Problem: Respiratory  Goal: Patient will achieve/maintain optimum respiratory ventilation and gas exchange  Outcome: Progressing       Patient is not progressing towards the following goals:      Problem: Skin Integrity  Goal: Skin integrity is maintained or improved  Outcome: Not Progressing

## 2021-11-23 PROBLEM — I95.9 HYPOTENSION: Status: ACTIVE | Noted: 2021-11-23

## 2021-11-23 PROBLEM — I16.0 HYPERTENSIVE URGENCY: Status: RESOLVED | Noted: 2021-11-15 | Resolved: 2021-11-23

## 2021-11-23 LAB
ALBUMIN SERPL BCP-MCNC: 3.1 G/DL (ref 3.2–4.9)
ALBUMIN/GLOB SERPL: 1.1 G/DL
ALP SERPL-CCNC: 45 U/L (ref 30–99)
ALT SERPL-CCNC: 93 U/L (ref 2–50)
ANION GAP SERPL CALC-SCNC: 5 MMOL/L (ref 7–16)
AST SERPL-CCNC: 19 U/L (ref 12–45)
BASOPHILS # BLD AUTO: 0.1 % (ref 0–1.8)
BASOPHILS # BLD: 0.01 K/UL (ref 0–0.12)
BILIRUB SERPL-MCNC: 0.6 MG/DL (ref 0.1–1.5)
BUN SERPL-MCNC: 25 MG/DL (ref 8–22)
CALCIUM SERPL-MCNC: 8.9 MG/DL (ref 8.4–10.2)
CHLORIDE SERPL-SCNC: 91 MMOL/L (ref 96–112)
CO2 SERPL-SCNC: 39 MMOL/L (ref 20–33)
CREAT SERPL-MCNC: 0.72 MG/DL (ref 0.5–1.4)
EOSINOPHIL # BLD AUTO: 0.13 K/UL (ref 0–0.51)
EOSINOPHIL NFR BLD: 1.2 % (ref 0–6.9)
ERYTHROCYTE [DISTWIDTH] IN BLOOD BY AUTOMATED COUNT: 42.3 FL (ref 35.9–50)
GLOBULIN SER CALC-MCNC: 2.9 G/DL (ref 1.9–3.5)
GLUCOSE SERPL-MCNC: 108 MG/DL (ref 65–99)
HCT VFR BLD AUTO: 40.5 % (ref 37–47)
HGB BLD-MCNC: 12.7 G/DL (ref 12–16)
IMM GRANULOCYTES # BLD AUTO: 0.11 K/UL (ref 0–0.11)
IMM GRANULOCYTES NFR BLD AUTO: 1.1 % (ref 0–0.9)
LYMPHOCYTES # BLD AUTO: 2.83 K/UL (ref 1–4.8)
LYMPHOCYTES NFR BLD: 27 % (ref 22–41)
MCH RBC QN AUTO: 29.4 PG (ref 27–33)
MCHC RBC AUTO-ENTMCNC: 31.4 G/DL (ref 33.6–35)
MCV RBC AUTO: 93.8 FL (ref 81.4–97.8)
MONOCYTES # BLD AUTO: 0.85 K/UL (ref 0–0.85)
MONOCYTES NFR BLD AUTO: 8.1 % (ref 0–13.4)
NEUTROPHILS # BLD AUTO: 6.54 K/UL (ref 2–7.15)
NEUTROPHILS NFR BLD: 62.5 % (ref 44–72)
NRBC # BLD AUTO: 0 K/UL
NRBC BLD-RTO: 0 /100 WBC
PLATELET # BLD AUTO: 180 K/UL (ref 164–446)
PMV BLD AUTO: 13.3 FL (ref 9–12.9)
POTASSIUM SERPL-SCNC: 4.4 MMOL/L (ref 3.6–5.5)
PROT SERPL-MCNC: 6 G/DL (ref 6–8.2)
RBC # BLD AUTO: 4.32 M/UL (ref 4.2–5.4)
SODIUM SERPL-SCNC: 135 MMOL/L (ref 135–145)
WBC # BLD AUTO: 10.5 K/UL (ref 4.8–10.8)

## 2021-11-23 PROCEDURE — A9270 NON-COVERED ITEM OR SERVICE: HCPCS | Performed by: FAMILY MEDICINE

## 2021-11-23 PROCEDURE — 94640 AIRWAY INHALATION TREATMENT: CPT

## 2021-11-23 PROCEDURE — A9270 NON-COVERED ITEM OR SERVICE: HCPCS | Performed by: HOSPITALIST

## 2021-11-23 PROCEDURE — 770020 HCHG ROOM/CARE - TELE (206)

## 2021-11-23 PROCEDURE — 700102 HCHG RX REV CODE 250 W/ 637 OVERRIDE(OP): Performed by: FAMILY MEDICINE

## 2021-11-23 PROCEDURE — A9270 NON-COVERED ITEM OR SERVICE: HCPCS | Performed by: INTERNAL MEDICINE

## 2021-11-23 PROCEDURE — 94760 N-INVAS EAR/PLS OXIMETRY 1: CPT

## 2021-11-23 PROCEDURE — 700111 HCHG RX REV CODE 636 W/ 250 OVERRIDE (IP): Performed by: INTERNAL MEDICINE

## 2021-11-23 PROCEDURE — 99233 SBSQ HOSP IP/OBS HIGH 50: CPT | Performed by: FAMILY MEDICINE

## 2021-11-23 PROCEDURE — 85025 COMPLETE CBC W/AUTO DIFF WBC: CPT

## 2021-11-23 PROCEDURE — 700102 HCHG RX REV CODE 250 W/ 637 OVERRIDE(OP): Performed by: HOSPITALIST

## 2021-11-23 PROCEDURE — 80053 COMPREHEN METABOLIC PANEL: CPT

## 2021-11-23 PROCEDURE — 700102 HCHG RX REV CODE 250 W/ 637 OVERRIDE(OP): Performed by: INTERNAL MEDICINE

## 2021-11-23 PROCEDURE — 97166 OT EVAL MOD COMPLEX 45 MIN: CPT

## 2021-11-23 PROCEDURE — 97162 PT EVAL MOD COMPLEX 30 MIN: CPT

## 2021-11-23 RX ORDER — NYSTATIN 100000 [USP'U]/G
POWDER TOPICAL 2 TIMES DAILY
Status: DISCONTINUED | OUTPATIENT
Start: 2021-11-23 | End: 2021-11-24 | Stop reason: HOSPADM

## 2021-11-23 RX ADMIN — ENOXAPARIN SODIUM 60 MG: 60 INJECTION SUBCUTANEOUS at 05:25

## 2021-11-23 RX ADMIN — METOPROLOL TARTRATE 12.5 MG: 25 TABLET, FILM COATED ORAL at 05:26

## 2021-11-23 RX ADMIN — Medication 5 MG: at 20:31

## 2021-11-23 RX ADMIN — FAMOTIDINE 20 MG: 20 TABLET ORAL at 18:20

## 2021-11-23 RX ADMIN — AMIODARONE HYDROCHLORIDE 200 MG: 200 TABLET ORAL at 05:26

## 2021-11-23 RX ADMIN — SENNOSIDES AND DOCUSATE SODIUM 2 TABLET: 50; 8.6 TABLET ORAL at 05:26

## 2021-11-23 RX ADMIN — NYSTATIN: 100000 POWDER TOPICAL at 18:24

## 2021-11-23 RX ADMIN — METOPROLOL TARTRATE 12.5 MG: 25 TABLET, FILM COATED ORAL at 18:22

## 2021-11-23 RX ADMIN — DIGOXIN 250 MCG: 250 TABLET ORAL at 18:18

## 2021-11-23 RX ADMIN — BARICITINIB 4 MG: 2 TABLET, FILM COATED ORAL at 18:17

## 2021-11-23 RX ADMIN — ACETAMINOPHEN 650 MG: 325 TABLET ORAL at 05:29

## 2021-11-23 RX ADMIN — FAMOTIDINE 20 MG: 20 TABLET ORAL at 05:26

## 2021-11-23 RX ADMIN — MIDODRINE HYDROCHLORIDE 5 MG: 5 TABLET ORAL at 08:33

## 2021-11-23 RX ADMIN — ENOXAPARIN SODIUM 60 MG: 60 INJECTION SUBCUTANEOUS at 18:20

## 2021-11-23 RX ADMIN — SENNOSIDES AND DOCUSATE SODIUM 2 TABLET: 50; 8.6 TABLET ORAL at 18:19

## 2021-11-23 RX ADMIN — ACETAMINOPHEN 650 MG: 325 TABLET ORAL at 15:32

## 2021-11-23 ASSESSMENT — ACTIVITIES OF DAILY LIVING (ADL): TOILETING: INDEPENDENT

## 2021-11-23 ASSESSMENT — COGNITIVE AND FUNCTIONAL STATUS - GENERAL
EATING MEALS: A LOT
SUGGESTED CMS G CODE MODIFIER MOBILITY: CL
TOILETING: A LOT
MOVING TO AND FROM BED TO CHAIR: A LOT
PERSONAL GROOMING: A LOT
TURNING FROM BACK TO SIDE WHILE IN FLAT BAD: A LITTLE
STANDING UP FROM CHAIR USING ARMS: TOTAL
WALKING IN HOSPITAL ROOM: TOTAL
MOVING FROM LYING ON BACK TO SITTING ON SIDE OF FLAT BED: A LOT
HELP NEEDED FOR BATHING: A LOT
DAILY ACTIVITIY SCORE: 12
CLIMB 3 TO 5 STEPS WITH RAILING: TOTAL
MOBILITY SCORE: 10
DRESSING REGULAR LOWER BODY CLOTHING: A LOT
SUGGESTED CMS G CODE MODIFIER DAILY ACTIVITY: CL
DRESSING REGULAR UPPER BODY CLOTHING: A LOT

## 2021-11-23 ASSESSMENT — ENCOUNTER SYMPTOMS
VOMITING: 0
FEVER: 0
ABDOMINAL PAIN: 0
SPUTUM PRODUCTION: 0
SHORTNESS OF BREATH: 1
COUGH: 1
CHILLS: 0
NAUSEA: 0

## 2021-11-23 ASSESSMENT — PAIN DESCRIPTION - PAIN TYPE: TYPE: CHRONIC PAIN

## 2021-11-23 ASSESSMENT — GAIT ASSESSMENTS: GAIT LEVEL OF ASSIST: UNABLE TO PARTICIPATE

## 2021-11-23 NOTE — DISCHARGE PLANNING
Anticipated discharge disposition: [facility]    Action: discussed pt in IDT rounds, per MD pt is stable to transfer to LTACH. Referral placed by MD.     Attempted to call pts phone number listed on facesheet to discuss LTACH and complete choice. No answer, voicemail box full.   Attempted to call pts room phone no answer  Sent voalte message to bedside RN to have pt call this [facility]    Addendum @[phone]  Received call from pt, discussed LTACH. Choice form completed and faxed to [facility]    Barriers to discharge: LTACH    Plan: will attempt to call pt again for LTACH choice

## 2021-11-23 NOTE — THERAPY
"Physical Therapy   Initial Evaluation     Patient Name: Cheryl D Blakemore  Age:  61 y.o., Sex:  female  Medical Record #: [id number]  Today's Date: 11/23/2021     Precautions  Precautions: Fall Risk  Comments: desats easily; HFNC     Assessment  Patient is 61 y.o. female who was recently admitted from SNF for acute respiratory failure and PNA secondary to COVID. Pt presented to PT with impaired balance, impaired gait, weakness, and poor upright activity tolerance. Pt was primarily limited due to dizziness and lightheadedness. Pt was able to demonstrate Min A for bed mobility and maintain upright sitting balance with no physical assist. After sitting for about 10 mins and participating in ADL's pt reported of increased dizziness and stated, \" I feel like I am going to pass out \". Pt was immediately assisted back to supine and assisted up HOB. Pt reported of improved symptoms once supine. Unable to determine SpO2 due to poor pulse oximeter reading. RN was aware of patient symptoms. Recommend post-acute placement for continued physical therapy services prior to discharge home.       Plan    Recommend Physical Therapy 3 times per week until therapy goals are met for the following treatments:  Bed Mobility, Community Re-integration, Equipment, Gait Training, Manual Therapy, Neuro Re-Education / Balance, Self Care/Home Evaluation, Stair Training, Therapeutic Activities and Therapeutic Exercises    DC Equipment Recommendations: (P) Unable to determine at this time  Discharge Recommendations: (P) Recommend post-acute placement for additional physical therapy services prior to discharge home     Objective       11/23/21 1220   Initial Contact Note    Initial Contact Note Order Received and Verified, Physical Therapy Evaluation in Progress with Full Report to Follow.   Precautions   Precautions Fall Risk   Comments desats easily; HFNC    Vitals   O2 (LPM) 50   O2 Delivery Device Heated High Flow Nasal Cannula   Pain 0 - 10 " "Group   Location Knee   Location Orientation Right;Left   Description Aching   Therapist Pain Assessment Prior to Activity;During Activity;Post Activity;Nurse Notified  (c/o moderate pain with mobility; not rated )   Prior Living Situation   Prior Services None   Housing / Facility 1 Story House   Steps Into Home   (ramp into home )   Steps In Home 0   Equipment Owned Front-Wheel Walker;Single Point Cane;Wheelchair   Lives with - Patient's Self Care Capacity Adult Children   Comments pt states her dtr will be able to assist upon d/c to home    Prior Level of Functional Mobility   Bed Mobility Independent   Transfer Status Independent   Ambulation Independent   Distance Ambulation (Feet)   (household with FWW)   Assistive Devices Used Front-Wheel Walker   Comments pt states she primarily uses a FWW for household ambulation and w/c for community level distances    Cognition    Cognition / Consciousness WDL   Level of Consciousness Alert   Comments pleasant/cooperative   Passive ROM Lower Body   Passive ROM Lower Body WDL   Active ROM Lower Body    Active ROM Lower Body  WDL   Strength Lower Body   Lower Body Strength  X   Gross Strength Generalized Weakness, Equal Bilaterally   Comments presents with gross strength of 3/5 in B LE; appears functional for standing    Sensation Lower Body   Lower Extremity Sensation   Not Tested   Lower Body Muscle Tone   Lower Body Muscle Tone  WDL   Strength Upper Body   Upper Body Strength  WDL   Upper Body Muscle Tone   Upper Body Muscle Tone  WDL   Neurological Concerns   Neurological Concerns No   Coordination Lower Body    Coordination Lower Body  Not Tested   Balance Assessment   Sitting Balance (Static) Fair   Sitting Balance (Dynamic) Fair -   Weight Shift Sitting Fair   Comments deferred standing due to reports of increased dizziness and pt stating, \" I feel like I am going to pass out\"    Gait Analysis   Gait Level Of Assist Unable to Participate   Weight Bearing Status fwb "   Bed Mobility    Supine to Sit Minimal Assist   Sit to Supine Minimal Assist   Scooting Minimal Assist   Comments HOB elevated and rails up; after sitting up for 10 mins pt reported of increased dizziness and required to go back to supine    Functional Mobility   Sit to Stand Unable to Participate   Bed, Chair, Wheelchair Transfer Unable to Participate   Mobility EOB only    How much difficulty does the patient currently have...   Turning over in bed (including adjusting bedclothes, sheets and blankets)? 3   Sitting down on and standing up from a chair with arms (e.g., wheelchair, bedside commode, etc.) 2   Moving from lying on back to sitting on the side of the bed? 2   How much help from another person does the patient currently need...   Moving to and from a bed to a chair (including a wheelchair)? 1   Need to walk in a hospital room? 1   Climbing 3-5 steps with a railing? 1   6 clicks Mobility Score 10   Activity Tolerance   Sitting in Chair NT   Sitting Edge of Bed 10 mins   Standing NT   Comments limtied due to increasing dizziness    Edema / Skin Assessment   Edema / Skin  Not Assessed   Patient / Family Goals    Patient / Family Goal #1 to go home    Short Term Goals    Short Term Goal # 1 pt will go supine<>sit w/hob flat and rails down w/spv in 6tx    Short Term Goal # 2 pt will go sit<>stand w/fww w/spv in 6tx    Short Term Goal # 3 pt will transfer to chair w/fww w/spv in 6tx    Short Term Goal # 4 pt will ambulate for 50ft w/fww w/spv in 6tx    Education Group   Education Provided Role of Physical Therapist   Role of Physical Therapist Patient Response Patient;Acceptance;Explanation;Demonstration;Verbal Demonstration;Action Demonstration   Problem List    Problems Impaired Bed Mobility;Impaired Transfers;Impaired Ambulation;Functional Strength Deficit;Impaired Balance;Decreased Activity Tolerance   Anticipated Discharge Equipment and Recommendations   DC Equipment Recommendations Unable to determine at  this time   Discharge Recommendations Recommend post-acute placement for additional physical therapy services prior to discharge home   Interdisciplinary Plan of Care Collaboration   IDT Collaboration with  Nursing;Occupational Therapist   Patient Position at End of Therapy In Bed;Call Light within Reach;Tray Table within Reach;Phone within Reach;Bed Alarm On   Collaboration Comments aware of visit and recs    Session Information   Date / Session Number  11/23-1 (1/3, 11/29)

## 2021-11-23 NOTE — PROGRESS NOTES
Hospital Medicine Daily Progress Note    Date of Service  11/23/2021    Chief Complaint  Cheryl D Blakemore is a 61 y.o. female admitted 11/9/2021 with acute respiratory failure     Hospital Course  61 y.o. female with past medical history of atrial fibrillation, dilated cardiomyopathy, moderate pulmonary hypertension on digoxin and amiodarone, Body mass index is 61.42 kg/m².,  JUAN CARLOS/OHS on BIPAP and chronic hypoxemic respiratory failure on 3-4L at baseline who recently had a prolonged hospitalization for acute on chronic congestive heart failure and possible COPD exacerbation which improved with BIPAP and diuresis.  Echo on 10/30/2021 showed grossly normal LVEF, 55%, severe biatrial enlargement , RV not well visualized nor were the IVC, tricuspid or pulmonic valves. During her hospitalization, palliative care was consulted and CODE STATUS was changed to DNR/DNI and she was discharged to a skilled nursing facility for PT/OT on 11/9.     She was readmitted the same day 11/9/2021 as she tested positive for Covid 19.  Reportedly had no changes symptomatically since discharge aside from mildly increased shortness of breath increased fatigue.  Not vaccinated.  CXR unchanged.  ABG on admission 7.3 3/87/60.  Started on Decadron.  She was admitted to the floor, overnight RRT was called as patient became increasingly confused/disoriented.  Repeat ABG 7.34/97/73 on 100% FiO2.  Transferred to the ICU for rescue BiPAP.    She has changed her mind regarding code status multiple times, but is currently DNR/DNI.  Due to concern regarding capacity, decisions are being made in concert with family.     Interval Problem Update  11/23 - Pt transferred out of the ICU - had one low BP of 89/49 this morning, but otherwise stable from previous - consistent with Bps from yesterday. If consistently low, can consider increasing Midodrine dosing. White count is down, metabolic alkalosis is stable, procal has been consistently normal while here.   Initial blood culture on 11/9 with staph hominis (1/2), repeats are negative, presumed contaminant. Pt currently 15L net negative for this stay. Pt without any complaints today.     I have personally seen and examined the patient at bedside. I discussed the plan of care with patient and bedside RN.    Consultants/Specialty  critical care    Code Status  DNAR/DNI    Disposition  Patient is not medically cleared.   Anticipate discharge to to a long-term acute care hospital.  I have placed the appropriate orders for post-discharge needs.    Review of Systems  Review of Systems   Constitutional: Negative for chills and fever.   Respiratory: Positive for cough and shortness of breath. Negative for sputum production.    Cardiovascular: Negative for chest pain and leg swelling.   Gastrointestinal: Negative for abdominal pain, nausea and vomiting.   Skin: Positive for rash.   All other systems reviewed and are negative.       Physical Exam  Temp:  [36.4 °C (97.5 °F)-37 °C (98.6 °F)] 36.4 °C (97.5 °F)  Pulse:  [59-83] 83  Resp:  [15-37] 20  BP: ()/(49-70) 89/49  SpO2:  [90 %-93 %] 91 %    Physical Exam  Vitals and nursing note reviewed.   Constitutional:       Appearance: She is ill-appearing. She is not toxic-appearing.   HENT:      Head: Normocephalic and atraumatic.      Mouth/Throat:      Mouth: Mucous membranes are moist.   Cardiovascular:      Rate and Rhythm: Normal rate. Rhythm irregular.   Pulmonary:      Effort: Pulmonary effort is normal.      Comments: Diminished bilaterally    Neurological:      Mental Status: She is alert.         Fluids    Intake/Output Summary (Last 24 hours) at 11/23/2021 0922  Last data filed at 11/23/2021 0834  Gross per 24 hour   Intake 520 ml   Output 1800 ml   Net -1280 ml       Laboratory  Recent Labs     11/21/21  0535 11/22/21  0300 11/23/21  0445   WBC 14.5* 13.6* 10.5   RBC 4.45 4.36 4.32   HEMOGLOBIN 13.3 13.0 12.7   HEMATOCRIT 41.5 40.8 40.5   MCV 93.3 93.6 93.8   MCH 29.9  29.8 29.4   MCHC 32.0* 31.9* 31.4*   RDW 41.1 41.6 42.3   PLATELETCT 176 184 180   MPV 13.2* 13.4* 13.3*     Recent Labs     11/21/21  0535 11/22/21  0300 11/23/21  0445   SODIUM 130* 133* 135   POTASSIUM 5.3 4.9 4.4   CHLORIDE 87* 89* 91*   CO2 40* 35* 39*   GLUCOSE 140* 179* 108*   BUN 37* 34* 25*   CREATININE 0.77 0.91 0.72   CALCIUM 9.0 8.7 8.9                   Imaging  DX-CHEST-PORTABLE (1 VIEW)   Final Result      Diffuse bilateral pulmonary infiltrates, worsened from comparison.      DX-CHEST-PORTABLE (1 VIEW)   Final Result         1.  Pulmonary edema and/or infiltrates are identified, which are stable since the prior exam.   2.  Small bilateral pleural effusions   3.  Cardiomegaly           Assessment/Plan  * Acute on chronic respiratory failure with hypoxia and hypercapnia due to COVID 19- (present on admission)  Assessment & Plan  Secondary to COVID on top of COPD, JUAN CARLOS and CHF   Currently on Baricitinib, has completed Decadron course   Encourage prone positioning.  Procalcitonin negative we will hold off on antibiotics  Currently on 50L/50%  D-dimer <3    Hypotension  Assessment & Plan  - Likely secondary to sepsis and acute illness from COVID  - Increase midodrine if persistent recurrence   - Continue Metoprolol for afib rate control as RVR could be disastrous with her respiratory compromise     History of COPD  Assessment & Plan  Uses 3-4L at her baseline    BMI 50.0-59.9, adult ([facility])  Assessment & Plan  - Aware     Metabolic alkalosis with respiratory acidosis  Assessment & Plan  Currently stable with PRN Lasix     Chronic heart failure with preserved ejection fraction (HCC)  Assessment & Plan  - Now on PRN Lasix for contraction alkalosis   - TTE on 10/30/2021 showed grossly normal EF of 55%, severe biatrial enlargement.  Patient has reported history of dilated cardiomyopathy and moderate pulmonary hypertension.  -Lasix as needed  -Monitor renal function and fluid status    Elevated CO2 level-  (present on admission)  Assessment & Plan  Likely multifactorial, respiratory acidosis from hypercapnia and and metabolic alkalosis from diuresing.  PRN Lasix    Pneumonia due to COVID-19 virus- (present on admission)  Assessment & Plan  Completed Decadron, started Baricitinib 11/11, completes course on 11/24  On prophylactic dosing of Lovenox for her weight  Encourage prone positioning. Oxygen as needed, Incentive spirometry     Bacteremia, coagulase-negative staphylococcal  Assessment & Plan  1/2 BC positive on 11/9, likely a contaminant as repeat BC were negative     JUAN CARLOS treated with BiPAP  Assessment & Plan  - Currently on HFNC    Advanced care planning/counseling discussion  Assessment & Plan  - Pt is DNR/DNI, ok for fluids and antibiotics, no tube feeds    Essential hypertension- (present on admission)  Assessment & Plan  Remains on Metoprolol for rate control, home Aldactone held for hypotension     AF (atrial fibrillation) (HCC)- (present on admission)  Assessment & Plan  Resumed home amiodarone, metoprolol and digoxin  Med list does not indicate pt being on an anticoagulant at home, previous clinic note from 2020 indicates at one point she was on Xarelto.  Hospital notes since then question whether she is no longer on it due to fall risk.  Pt cannot tell me why it was stopped.  Currently on prophylactic Lovenox dosing.       VTE prophylaxis: enoxaparin ppx    I have performed a physical exam and reviewed and updated ROS and Plan today (11/23/2021). In review of yesterday's note (11/22/2021), there are no changes except as documented above.

## 2021-11-23 NOTE — ASSESSMENT & PLAN NOTE
- Likely secondary to sepsis and acute illness from COVID  - Increase midodrine if persistent recurrence - however, Bps significantly better this am.   - Continue Metoprolol for afib rate control as RVR could be disastrous with her respiratory compromise

## 2021-11-23 NOTE — ASSESSMENT & PLAN NOTE
- Lasix previously held for contraction alkalosis and rising BUN, both of which have significantly improved   - TTE on 10/30/2021 showed grossly normal EF of 55%, severe biatrial enlargement.  Patient has reported history of dilated cardiomyopathy and moderate pulmonary hypertension.  - Pt is on 20mg Lasix BID at home - will start 20mg daily while here given her metabolic alkalosis (now resolved) and mild hypotension, titrate back to home dose as tolerated   -Monitor renal function and fluid status  - Is net -18L this stay  - Daily weights ordered

## 2021-11-23 NOTE — PROGRESS NOTES
Telemetry Shift Summary     RHYTHM: Afib/Aflutter  HR RANGE: 66-79  ECTOPY: r PVC  MEASUREMENTS: n/a/0.08/n/a  Measurements for strip printed on 11/23/21 at 02:00:53am  Normal Measurements  Rhythm: SR  HR RANGE:   Measurements: 0.12-0.20/0.06-0.10/0.30-0.52      Tele strips reviewed and placed in chart

## 2021-11-23 NOTE — PROGRESS NOTES
Telemetry Strip     Strip printed at 1734  Measurements/rhythm from strip were as follows:  Rhythm: a fib  HR: 96  Measurements: -/0.08/-  Ectopy: PVC              Normal Values  Rhythm SR  HR Range    Measurements 0.12-0.20 / 0.06-0.10  / 0.30-0.52

## 2021-11-23 NOTE — PROGRESS NOTES
Patients sister Monika called to receive updates. Anne gave permission to give updates to sister.

## 2021-11-23 NOTE — PROGRESS NOTES
Report received from Maggie PORTILLO. Pt resting at the time of report. Plan of care assumed discussed outside of room due to enhanced droplet precautions. White board has been updated. Safety precautions in place and call light within reach. No needs at this time.     COVID 19 surge in effect

## 2021-11-23 NOTE — CARE PLAN
The patient is Watcher - Medium risk of patient condition declining or worsening    Shift Goals  Clinical Goals: maintain oxygen saturation above 88%  Patient Goals: comfort and rest  Family Goals: Family not present at this time    Progress made toward(s) clinical / shift goals:        Problem: Knowledge Deficit - Standard  Goal: Patient and family/care givers will demonstrate understanding of plan of care, disease process/condition, diagnostic tests and medications  Outcome: Progressing     Problem: Skin Integrity  Goal: Skin integrity is maintained or improved  Outcome: Progressing     Problem: Fall Risk  Goal: Patient will remain free from falls  Outcome: Progressing   Bed is locked and in lowest position. Bed alarm on.   Problem: Communication  Goal: The ability to communicate needs accurately and effectively will improve  Outcome: Progressing     Problem: Respiratory  Goal: Patient will achieve/maintain optimum respiratory ventilation and gas exchange  Outcome: Progressing   Patient SPO2 currently between 88% and 91%.

## 2021-11-23 NOTE — DISCHARGE PLANNING
Received Choice form at 1440  Agency/Facility Name: [facility]  Referral sent per Choice form @ 1440      CM informed

## 2021-11-23 NOTE — PROGRESS NOTES
Patient arrived to unit in stable condition. On 50L O2 and 50% Fio2. Patient able to use call light. Denies needs at this time. Sitting up in bed, eating dinner. Continuous SpO2 monitor in place.

## 2021-11-23 NOTE — THERAPY
"Occupational Therapy   Initial Evaluation     Patient Name: Cheryl D Blakemore  Age:  61 y.o., Sex:  female  Medical Record #: [id number]  Today's Date: 11/23/2021     Precautions  Precautions: Fall Risk  Comments: desats easily; HFNC     Assessment    Patient is 61 y.o. female with a diagnosis of COVID-19 infection and acute hypoxemic respiratory failure. Additional factors influencing patient status / progress: a-fib, morbid obesity. Pt was living in a mobile home with a roommate, was recently hospitalized and subsequently discharged to SNF for rehab, however, was sent back here as she tested positive for COVID. Pt is planning on staying with son and DIL in the future. She currently presents with decreased activity tolerance, reduced balance, generalized weakness, and impaired safety awareness. She will benefit from further acute OT while in house, as well as post acute placement prior to dc to son's home.      Plan    Recommend Occupational Therapy 3 times per week until therapy goals are met for the following treatments:  Adaptive Equipment, Cognitive Skill Development, Community Re-integration, Neuro Re-Education / Balance, Self Care/Activities of Daily Living, Therapeutic Activities and Therapeutic Exercises.    DC Equipment Recommendations: Unable to determine at this time  Discharge Recommendations: Recommend post-acute placement for additional occupational therapy services prior to discharge home     Subjective    \"I don't wear socks or shoes.. even at home... don't like them.\"     Objective       11/23/21 1144   Prior Living Situation   Prior Services Continuous (24 Hour) Care Giving Per Service  (SNF)   Housing / Facility Mobile Home   Steps Into Home 0  (ramp into home)   Steps In Home 0   Bathroom Set up Bathtub / Shower Combination   Equipment Owned Front-Wheel Walker;Single Point Cane;Wheelchair   Lives with - Patient's Self Care Capacity Adult Children   Comments Pt was living with a roommate in a " mobile home, but states she will be staying with son and dtr in law, who will be helping her upon dc to home   Prior Level of ADL Function   Self Feeding Independent   Grooming / Hygiene Independent   Bathing Independent   Dressing Independent   Toileting Independent   Comments pt was apparently primarily I with ADLs prior to hospitalization in Oct   Prior Level of IADL Function   Medication Management Independent   Laundry Requires Assist   Kitchen Mobility Requires Assist   Finances Unable To Determine At This Time   Home Management Requires Assist   Shopping Requires Assist   Prior Level Of Mobility Independent With Device in Home;Supervision With Device in Community   Driving / Transportation Relatives / Others Provide Transportation   Occupation (Pre-Hospital Vocational) Retired Due To Disability   History of Falls   History of Falls Yes   Date of Last Fall 11/01/21  (fall during previous admission)   Precautions   Precautions Fall Risk   Comments poor activity tolerance   Pain   Pain Scales 0 to 10 Scale    Intervention Declines   Pain 0 - 10 Group   Location Knee   Location Orientation Right   Pain Rating Scale (NPRS) 4   Description Aching   Comfort Goal Sleep Comfortably   Therapist Pain Assessment Post Activity Pain Same as Prior to Activity   Non Verbal Descriptors   Non Verbal Scale  Calm;Unlabored Breathing   Cognition    Cognition / Consciousness WDL   Level of Consciousness Alert   Comments pleasant/cooperative, but does require encouragement to tolerate OOB activities   Active ROM Upper Body   Active ROM Upper Body  X   Gross Active ROM Gross Active Range of Motion Impaired, but Appears Adequate for Functional Activities.   Strength Upper Body   Upper Body Strength  X   Gross Strength Generalized Weakness, Equal Bilaterally.    Balance Assessment   Sitting Balance (Static) Fair   Sitting Balance (Dynamic) Fair -   Weight Shift Sitting Fair   Comments unable to tolerate standing activities   Bed  Mobility    Supine to Sit Minimal Assist   Sit to Supine Minimal Assist   Scooting Minimal Assist   Comments HOB slightly elevated, rails utilized   ADL Assessment   Grooming Seated;Maximal Assist   Upper Body Dressing Maximal Assist   Lower Body Dressing Maximal Assist   How much help from another person does the patient currently need...   Putting on and taking off regular lower body clothing? 2   Bathing (including washing, rinsing, and drying)? 2   Toileting, which includes using a toilet, bedpan, or urinal? 2   Putting on and taking off regular upper body clothing? 2   Taking care of personal grooming such as brushing teeth? 2   Eating meals? 2   6 Clicks Daily Activity Score 12   Functional Mobility   Sit to Stand Unable to Participate   Bed, Chair, Wheelchair Transfer Unable to Participate   Toilet Transfers Unable to Participate   Mobility supine <> sit, EOB only   Activity Tolerance   Sitting in Chair NT   Sitting Edge of Bed 10 mins   Standing unable   Comments limited by weakness, dizziness   Education Group   Education Provided Role of Occupational Therapist;Transfers;Pathology of bedrest   Role of Occupational Therapist Patient Response Patient;Acceptance;Explanation   Transfers Patient Response Patient;Acceptance;Explanation;Reinforcement Needed   Pathology of Bedrest Patient Response Patient;Acceptance;Explanation;Reinforcement Needed   Anticipated Discharge Equipment and Recommendations   DC Equipment Recommendations Unable to determine at this time   Discharge Recommendations Recommend post-acute placement for additional occupational therapy services prior to discharge home   Interdisciplinary Plan of Care Collaboration   IDT Collaboration with  Nursing;Physical Therapist   Patient Position at End of Therapy In Bed;Call Light within Reach;Bed Alarm On;Tray Table within Reach  (tablet within reach)   Collaboration Comments RN aware of OT session and dc recs   Session Information   Date / Session  Number  11/23 #1 (1/3, 11/29)   Priority 2

## 2021-11-24 VITALS
HEIGHT: 64 IN | HEART RATE: 65 BPM | BODY MASS INDEX: 50.02 KG/M2 | TEMPERATURE: 97.5 F | OXYGEN SATURATION: 94 % | RESPIRATION RATE: 18 BRPM | SYSTOLIC BLOOD PRESSURE: 124 MMHG | WEIGHT: 293 LBS | DIASTOLIC BLOOD PRESSURE: 52 MMHG

## 2021-11-24 LAB
ALBUMIN SERPL BCP-MCNC: 3 G/DL (ref 3.2–4.9)
ALBUMIN/GLOB SERPL: 1.3 G/DL
ALP SERPL-CCNC: 46 U/L (ref 30–99)
ALT SERPL-CCNC: 83 U/L (ref 2–50)
ANION GAP SERPL CALC-SCNC: 9 MMOL/L (ref 7–16)
AST SERPL-CCNC: 23 U/L (ref 12–45)
BASOPHILS # BLD AUTO: 0.1 % (ref 0–1.8)
BASOPHILS # BLD: 0.01 K/UL (ref 0–0.12)
BILIRUB SERPL-MCNC: 0.7 MG/DL (ref 0.1–1.5)
BUN SERPL-MCNC: 23 MG/DL (ref 8–22)
CALCIUM SERPL-MCNC: 8.7 MG/DL (ref 8.4–10.2)
CHLORIDE SERPL-SCNC: 92 MMOL/L (ref 96–112)
CO2 SERPL-SCNC: 33 MMOL/L (ref 20–33)
CREAT SERPL-MCNC: 0.85 MG/DL (ref 0.5–1.4)
D DIMER PPP IA.FEU-MCNC: 2.28 UG/ML (FEU) (ref 0–0.5)
EOSINOPHIL # BLD AUTO: 0.06 K/UL (ref 0–0.51)
EOSINOPHIL NFR BLD: 0.7 % (ref 0–6.9)
ERYTHROCYTE [DISTWIDTH] IN BLOOD BY AUTOMATED COUNT: 41.3 FL (ref 35.9–50)
GLOBULIN SER CALC-MCNC: 2.4 G/DL (ref 1.9–3.5)
GLUCOSE SERPL-MCNC: 106 MG/DL (ref 65–99)
HCT VFR BLD AUTO: 38.8 % (ref 37–47)
HGB BLD-MCNC: 12.4 G/DL (ref 12–16)
IMM GRANULOCYTES # BLD AUTO: 0.09 K/UL (ref 0–0.11)
IMM GRANULOCYTES NFR BLD AUTO: 1 % (ref 0–0.9)
LYMPHOCYTES # BLD AUTO: 2.53 K/UL (ref 1–4.8)
LYMPHOCYTES NFR BLD: 27.9 % (ref 22–41)
MCH RBC QN AUTO: 29.5 PG (ref 27–33)
MCHC RBC AUTO-ENTMCNC: 32 G/DL (ref 33.6–35)
MCV RBC AUTO: 92.4 FL (ref 81.4–97.8)
MONOCYTES # BLD AUTO: 0.89 K/UL (ref 0–0.85)
MONOCYTES NFR BLD AUTO: 9.8 % (ref 0–13.4)
NEUTROPHILS # BLD AUTO: 5.49 K/UL (ref 2–7.15)
NEUTROPHILS NFR BLD: 60.5 % (ref 44–72)
NRBC # BLD AUTO: 0 K/UL
NRBC BLD-RTO: 0 /100 WBC
PLATELET # BLD AUTO: 173 K/UL (ref 164–446)
PMV BLD AUTO: 12.6 FL (ref 9–12.9)
POTASSIUM SERPL-SCNC: 4.3 MMOL/L (ref 3.6–5.5)
PROCALCITONIN SERPL-MCNC: 0.05 NG/ML
PROT SERPL-MCNC: 5.4 G/DL (ref 6–8.2)
RBC # BLD AUTO: 4.2 M/UL (ref 4.2–5.4)
SODIUM SERPL-SCNC: 134 MMOL/L (ref 135–145)
WBC # BLD AUTO: 9.1 K/UL (ref 4.8–10.8)

## 2021-11-24 PROCEDURE — 700102 HCHG RX REV CODE 250 W/ 637 OVERRIDE(OP): Performed by: INTERNAL MEDICINE

## 2021-11-24 PROCEDURE — 94760 N-INVAS EAR/PLS OXIMETRY 1: CPT

## 2021-11-24 PROCEDURE — 85379 FIBRIN DEGRADATION QUANT: CPT

## 2021-11-24 PROCEDURE — 99239 HOSP IP/OBS DSCHRG MGMT >30: CPT | Performed by: FAMILY MEDICINE

## 2021-11-24 PROCEDURE — 700102 HCHG RX REV CODE 250 W/ 637 OVERRIDE(OP): Performed by: HOSPITALIST

## 2021-11-24 PROCEDURE — 85025 COMPLETE CBC W/AUTO DIFF WBC: CPT

## 2021-11-24 PROCEDURE — 84145 PROCALCITONIN (PCT): CPT

## 2021-11-24 PROCEDURE — A9270 NON-COVERED ITEM OR SERVICE: HCPCS | Performed by: FAMILY MEDICINE

## 2021-11-24 PROCEDURE — A9270 NON-COVERED ITEM OR SERVICE: HCPCS | Performed by: HOSPITALIST

## 2021-11-24 PROCEDURE — 700111 HCHG RX REV CODE 636 W/ 250 OVERRIDE (IP): Performed by: INTERNAL MEDICINE

## 2021-11-24 PROCEDURE — 94640 AIRWAY INHALATION TREATMENT: CPT

## 2021-11-24 PROCEDURE — 700102 HCHG RX REV CODE 250 W/ 637 OVERRIDE(OP): Performed by: FAMILY MEDICINE

## 2021-11-24 PROCEDURE — A9270 NON-COVERED ITEM OR SERVICE: HCPCS | Performed by: INTERNAL MEDICINE

## 2021-11-24 PROCEDURE — 80053 COMPREHEN METABOLIC PANEL: CPT

## 2021-11-24 RX ORDER — FUROSEMIDE 20 MG/1
20 TABLET ORAL
Status: DISCONTINUED | OUTPATIENT
Start: 2021-11-24 | End: 2021-11-24 | Stop reason: HOSPADM

## 2021-11-24 RX ORDER — MIDODRINE HYDROCHLORIDE 5 MG/1
5 TABLET ORAL
Qty: 60 TABLET | Refills: 0
Start: 2021-11-24

## 2021-11-24 RX ORDER — FAMOTIDINE 20 MG/1
20 TABLET, FILM COATED ORAL 2 TIMES DAILY
Qty: 60 TABLET | Refills: 0
Start: 2021-11-24

## 2021-11-24 RX ORDER — NYSTATIN 100000 [USP'U]/G
POWDER TOPICAL
Qty: 15 G | Refills: 0
Start: 2021-11-24

## 2021-11-24 RX ORDER — IPRATROPIUM BROMIDE AND ALBUTEROL SULFATE 2.5; .5 MG/3ML; MG/3ML
3 SOLUTION RESPIRATORY (INHALATION) 4 TIMES DAILY
Qty: 1 EACH | Refills: 0
Start: 2021-11-24

## 2021-11-24 RX ORDER — FUROSEMIDE 20 MG/1
20 TABLET ORAL DAILY
Qty: 30 TABLET | Refills: 0
Start: 2021-11-25

## 2021-11-24 RX ADMIN — FAMOTIDINE 20 MG: 20 TABLET ORAL at 05:43

## 2021-11-24 RX ADMIN — MIDODRINE HYDROCHLORIDE 5 MG: 5 TABLET ORAL at 11:30

## 2021-11-24 RX ADMIN — BARICITINIB 4 MG: 2 TABLET, FILM COATED ORAL at 13:52

## 2021-11-24 RX ADMIN — FUROSEMIDE 20 MG: 20 TABLET ORAL at 08:42

## 2021-11-24 RX ADMIN — SENNOSIDES AND DOCUSATE SODIUM 2 TABLET: 50; 8.6 TABLET ORAL at 05:43

## 2021-11-24 RX ADMIN — AMIODARONE HYDROCHLORIDE 200 MG: 200 TABLET ORAL at 05:43

## 2021-11-24 RX ADMIN — MIDODRINE HYDROCHLORIDE 5 MG: 5 TABLET ORAL at 07:30

## 2021-11-24 RX ADMIN — ENOXAPARIN SODIUM 60 MG: 60 INJECTION SUBCUTANEOUS at 05:43

## 2021-11-24 RX ADMIN — METOPROLOL TARTRATE 12.5 MG: 25 TABLET, FILM COATED ORAL at 05:43

## 2021-11-24 RX ADMIN — NYSTATIN: 100000 POWDER TOPICAL at 05:43

## 2021-11-24 ASSESSMENT — PAIN DESCRIPTION - PAIN TYPE: TYPE: ACUTE PAIN

## 2021-11-24 NOTE — DISCHARGE INSTRUCTIONS
Discharge Instructions    Discharged to other by ambulance with escort. Discharged via ambulance, hospital escort: Yes.  Special equipment needed: Oxygen    Be sure to schedule a follow-up appointment with your primary care doctor or any specialists as instructed.     Discharge Plan:   Diet Plan: Discussed  Activity Level: Discussed  Confirmed Follow up Appointment: Patient to Call and Schedule Appointment  Confirmed Symptoms Management: Discussed  Medication Reconciliation Updated: Yes  Influenza Vaccine Indication: Not indicated: Previously immunized this influenza season and > 8 years of age    I understand that a diet low in cholesterol, fat, and sodium is recommended for good health. Unless I have been given specific instructions below for another diet, I accept this instruction as my diet prescription.   Other diet: minced moist thin liquid    Special Instructions: None    · Is patient discharged on Warfarin / Coumadin?   No     Depression / Suicide Risk    As you are discharged from this [facility] facility, it is important to learn how to keep safe from harming yourself.    Recognize the warning signs:  · Abrupt changes in personality, positive or negative- including increase in energy   · Giving away possessions  · Change in eating patterns- significant weight changes-  positive or negative  · Change in sleeping patterns- unable to sleep or sleeping all the time   · Unwillingness or inability to communicate  · Depression  · Unusual sadness, discouragement and loneliness  · Talk of wanting to die  · Neglect of personal appearance   · Rebelliousness- reckless behavior  · Withdrawal from people/activities they love  · Confusion- inability to concentrate     If you or a loved one observes any of these behaviors or has concerns about self-harm, here's what you can do:  · Talk about it- your feelings and reasons for harming yourself  · Remove any means that you might use to hurt yourself (examples: pills,  rope, extension cords, firearm)  · Get professional help from the community (Mental Health, Substance Abuse, psychological counseling)  · Do not be alone:Call your Safe Contact- someone whom you trust who will be there for you.  · Call your local CRISIS HOTLINE 429-9913 or 697-014-5535  · Call your local Children's Mobile Crisis Response Team [place] (544) 229-1299 or www.[employer]  · Call the toll free National Suicide Prevention Hotlines   · National Suicide Prevention Lifeline 651-158-HZRM (6238)  · National Hope Line Network 800-SUICIDE (872-9352)

## 2021-11-24 NOTE — PROGRESS NOTES
Telemetry Shift Summary    Rhythm a fib  HR Range 80-97  Ectopy rPVC, coup  Measurements -/0.08/-        Normal Values  Rhythm SR  HR Range    Measurements 0.12-0.20 / 0.06-0.10  / 0.30-0.52

## 2021-11-24 NOTE — PROGRESS NOTES
Mother Jammie and sister Monika called to get updates on pt. All questions and concerns addressed.

## 2021-11-24 NOTE — PROGRESS NOTES
Monitor Summary     Rhythm:A Fib 66-82  Measurements: -/0.08/-  ECTOPIES: rPVC        Normal Values  Rhythm SR  HR Range    Measurements 0.12-0.20 / 0.06-0.10  / 0.30-0.52

## 2021-11-24 NOTE — DISCHARGE PLANNING
Anticipated discharge disposition: [facility]    Action: received phone call from Chula at [facility], per Chula pt has been accepted and there is potentially a bed available today. Chula will call back if bed available.     Addendum@1120  Received phone call from Chula at [facility], they have bed available and can take this pt today at 1500 if pt is medically clear to transfer.   Sent voalte message to MD Cabrera to verify clearance.   [id number]  Received voalte message from MD Cabrera pt is medically clear to transfer.   Notified Chula at [facility], requested transfer time of 1500    Faxed transport request to [facility]    Barriers to discharge: bed availability    Plan: care coordination will follow up with MD for medical clearance and [facility] for bed.

## 2021-11-24 NOTE — DISCHARGE PLANNING
DC Transport Scheduled    Received request at: 1234    Transport Company Scheduled:  [employer]  Spoke with Kirstie at [facility] to schedule transport.    Scheduled Date: 11/24/21  Scheduled Time: 1600    Destination: [facility]   [address]  Notified care team of scheduled transport via Voalte.     If there are any changes needed to the DC transportation scheduled, please contact [facility] at ext. [phone] between the hours of 8503-1628 Mon-Fri. If outside those hours, contact the ED Case Manager at ext. [phone].

## 2021-11-24 NOTE — DISCHARGE SUMMARY
Discharge Summary    CHIEF COMPLAINT ON ADMISSION  Chief Complaint   Patient presents with   • Other     pt was discharged from the floor today back to SNF and they decided they would not take her back because she had a positive covid test today. originally admitted for pna       Reason for Admission  EMS     Admission Date  11/9/2021    CODE STATUS  DNAR/DNI    HPI & HOSPITAL COURSE  This is a 61 y.o. female here with a history of chronic respiratory failure on 3-4L O2 at home, afib, dilated cardiomyopathy, moderate pHTN, JUAN CARLOS/OHS who was recently discharged from hospital to SNF, but was sent back due to a new COVID diagnosis. She did require BiPAP due to AMS with hypercarbia, but has since been weaned to 50L/50% HFNC which she has been stable on for a number of days now.  She has had intermittent diuresis due to contraction alkalosis and rising BUN, but she is eating well, and I have resumed 20mg daily Lasix - she on 20mg BID at home reportedly, if Bps tolerate, can titrate up at LTAC.  She has now completed both Decadron as well as Baricitinib. She has required midodrine for mild hypotension during her stay, but Bps have improved over the past 24 hours and she can likely be weaned off in the next day or so; her procalcitonin is normal and she has no signs of superimposed infectious processes other than COVID. She has been accepted at [facility] and is stable to transfer today; I have apprised family of the same. Pt will likely need to go to SNF after discharge from LTAC.      Therefore, she is discharged in fair and stable condition to a long-term acute care hospital.    The patient met 2-midnight criteria for an inpatient stay at the time of discharge.    Discharge Date  11/24/21    FOLLOW UP ITEMS POST DISCHARGE  None    DISCHARGE DIAGNOSES  Principal Problem:    Acute on chronic respiratory failure with hypoxia and hypercapnia due to COVID 19 POA: Yes  Active Problems:    AF (atrial fibrillation) (HCC) POA:  Yes    Essential hypertension POA: Yes    Advanced care planning/counseling discussion POA: Unknown      Overview: · Colonoscopy:      · Mammogram:      · Vaccines:       · PNA:      · TDAP:    JUAN CARLOS treated with BiPAP POA: Unknown    Bacteremia, coagulase-negative staphylococcal POA: Unknown    Pneumonia due to COVID-19 virus POA: Yes    Elevated CO2 level POA: Yes    Chronic heart failure with preserved ejection fraction (HCC) POA: Unknown    Metabolic alkalosis with respiratory acidosis POA: Unknown    BMI 50.0-59.9, adult (HCC) POA: Unknown    History of COPD POA: Unknown    Hypotension POA: Unknown  Resolved Problems:    Hypertensive urgency POA: Unknown      FOLLOW UP  No future appointments.  No follow-up provider specified.    MEDICATIONS ON DISCHARGE     Medication List      START taking these medications      Instructions   enoxaparin 60 MG/0.6ML Soln inj  Commonly known as: LOVENOX   Inject 60 mg under the skin every 12 hours.  Dose: 60 mg     famotidine 20 MG Tabs  Commonly known as: PEPCID   Take 1 Tablet by mouth 2 times a day.  Dose: 20 mg     ipratropium-albuterol 0.5-2.5 (3) MG/3ML nebulizer solution  Commonly known as: DUONEB   Take 3 mL by nebulization 4 times a day.  Dose: 3 mL     midodrine 5 MG Tabs  Commonly known as: PROAMATINE   Take 1 Tablet by mouth 3 times a day with meals.  Dose: 5 mg     nystatin powder  Commonly known as: MYCOSTATIN   Apply BID to skin folds        CHANGE how you take these medications      Instructions   furosemide 20 MG Tabs  Start taking on: November 25, 2021  What changed: when to take this  Commonly known as: LASIX   Take 1 Tablet by mouth every day.  Dose: 20 mg     metoprolol tartrate 25 MG Tabs  What changed:   · medication strength  · how much to take  · when to take this  Commonly known as: LOPRESSOR   Take 0.5 Tablets by mouth 2 times a day.  Dose: 12.5 mg        CONTINUE taking these medications      Instructions   amiodarone 200 MG Tabs  Commonly known as:  Cordarone   TAKE 1 TABLET BY MOUTH ONCE DAILY     benzonatate 100 MG Caps  Commonly known as: TESSALON   Take 1 Capsule by mouth 3 times a day as needed for Cough.  Dose: 100 mg     digoxin 250 MCG Tabs  Commonly known as: LANOXIN   TAKE 1 TABLET BY MOUTH ONCE DAILY     tamsulosin 0.4 MG capsule  Commonly known as: FLOMAX   Take 1 Capsule by mouth 1/2 hour after breakfast.  Dose: 0.4 mg     Tylenol 8 Hour 650 MG CR tablet  Generic drug: acetaminophen   Take 650 mg by mouth 2 times a day.  Dose: 650 mg        STOP taking these medications    gabapentin 300 MG Caps  Commonly known as: NEURONTIN     spironolactone 25 MG Tabs  Commonly known as: ALDACTONE            Allergies  No Known Allergies    DIET  Orders Placed This Encounter   Procedures   • Diet Order Diet: Level 5 - Minced and Moist (Mac and Cheese is OK); Liquid level: Level 0 - Thin     Standing Status:   Standing     Number of Occurrences:   1     Order Specific Question:   Diet:     Answer:   Level 5 - Minced and Moist [24]     Comments:   Mac and Cheese is OK     Order Specific Question:   Liquid level     Answer:   Level 0 - Thin       ACTIVITY  As tolerated.  Weight bearing as tolerated    CONSULTATIONS  Critical Care    PROCEDURES  None    LABORATORY  Lab Results   Component Value Date    SODIUM 134 (L) 11/24/2021    POTASSIUM 4.3 11/24/2021    CHLORIDE 92 (L) 11/24/2021    CO2 33 11/24/2021    GLUCOSE 106 (H) 11/24/2021    BUN 23 (H) 11/24/2021    CREATININE 0.85 11/24/2021        Lab Results   Component Value Date    WBC 9.1 11/24/2021    HEMOGLOBIN 12.4 11/24/2021    HEMATOCRIT 38.8 11/24/2021    PLATELETCT 173 11/24/2021        Total time of the discharge process exceeds 38 minutes.

## 2021-11-25 NOTE — PROGRESS NOTES
Patient discharged to [facility] in stable condition. Transported by [facility] on 50/50 hi flow oxygen. Pt with belongings and understands instructions.

## 2022-01-10 ENCOUNTER — HOSPITAL ENCOUNTER (OUTPATIENT)
Facility: MEDICAL CENTER | Age: 62
End: 2022-01-10
Payer: COMMERCIAL

## 2022-01-14 LAB
COVID ORDER STATUS COVID19: NORMAL
SARS-COV-2 RNA RESP QL NAA+PROBE: NOTDETECTED
SPECIMEN SOURCE: NORMAL

## 2022-05-31 ENCOUNTER — TELEPHONE (OUTPATIENT)
Dept: MEDICAL GROUP | Age: 62
End: 2022-05-31
Payer: MEDICARE

## 2022-05-31 ENCOUNTER — TELEPHONE (OUTPATIENT)
Dept: SCHEDULING | Facility: IMAGING CENTER | Age: 62
End: 2022-05-31

## 2022-05-31 NOTE — TELEPHONE ENCOUNTER
"VOICEMAIL  1. Caller Name: Cheryl D Blakemore                        Call Back Number: [phone] (home)       2. Message: I was bit by a spider, could you please order anti biotics, per home health nurse it look like a \"brown spider\"    3. Patient approves office to leave a detailed voicemail/MyChart message: N\A    Spoke with patient apologized about returning her call \"late\" as it was a holiday weekend. Patient  states a doctor went to her house to \"take care of it\" they informed her it is not a spider bite but a cyst, they are going back to her home to follow up today 5/31/2022. I also informed patient that the number we had on file is different than the one she gave on the voicemail. Asked if it was ok to update to the current number, she agreed.  "

## 2022-08-29 DIAGNOSIS — I48.91 ATRIAL FIBRILLATION, UNSPECIFIED TYPE (HCC): ICD-10-CM

## 2022-08-29 RX ORDER — CARVEDILOL 3.12 MG/1
TABLET ORAL
COMMUNITY
End: 2022-08-29 | Stop reason: SDUPTHER

## 2022-08-29 RX ORDER — LOSARTAN POTASSIUM 25 MG/1
TABLET ORAL
COMMUNITY
End: 2022-08-29 | Stop reason: SDUPTHER

## 2022-08-29 RX ORDER — GABAPENTIN 300 MG/1
CAPSULE ORAL
COMMUNITY
End: 2022-08-29 | Stop reason: SDUPTHER

## 2022-08-29 RX ORDER — POTASSIUM CHLORIDE 20 MEQ/1
TABLET, EXTENDED RELEASE ORAL
COMMUNITY

## 2022-08-29 RX ORDER — SPIRONOLACTONE 25 MG/1
TABLET ORAL
COMMUNITY

## 2022-08-29 NOTE — TELEPHONE ENCOUNTER
Received request via: Pharmacy    Was the patient seen in the last year in this department? Yes    Does the patient have an active prescription (recently filled or refills available) for medication(s) requested?  Pharmacy updated

## 2022-08-30 RX ORDER — AMIODARONE HYDROCHLORIDE 200 MG/1
200 TABLET ORAL DAILY
Qty: 30 TABLET | Refills: 0 | Status: SHIPPED | OUTPATIENT
Start: 2022-08-30

## 2022-08-30 RX ORDER — GABAPENTIN 300 MG/1
CAPSULE ORAL
Qty: 90 CAPSULE | Refills: 0 | Status: SHIPPED | OUTPATIENT
Start: 2022-08-30

## 2022-08-30 RX ORDER — LOSARTAN POTASSIUM 25 MG/1
TABLET ORAL
Qty: 30 TABLET | Refills: 0 | Status: SHIPPED | OUTPATIENT
Start: 2022-08-30

## 2022-08-30 RX ORDER — CARVEDILOL 3.12 MG/1
TABLET ORAL
Qty: 60 TABLET | Refills: 0 | Status: SHIPPED | OUTPATIENT
Start: 2022-08-30

## 2022-08-30 RX ORDER — DIGOXIN 250 MCG
250 TABLET ORAL DAILY
Qty: 30 TABLET | Refills: 0 | Status: SHIPPED | OUTPATIENT
Start: 2022-08-30

## 2022-11-07 ENCOUNTER — PATIENT MESSAGE (OUTPATIENT)
Dept: HEALTH INFORMATION MANAGEMENT | Facility: OTHER | Age: 62
End: 2022-11-07

## 2022-12-08 NOTE — PROGRESS NOTES
Hospital Medicine Daily Progress Note    Date of Service  11/7/2021    Chief Complaint  Cheryl D Blakemore is a 61 y.o. female admitted 10/28/2021 with shortness of breath    Hospital Cours      Interval Problem Update  10/30: we will get PT/OT and concerns for patient significant weakness.  Is wheelchair-bound at home.  Blood cultures +2/2, repeat cultures today.  Discussed with ID.     10/31: PT/OT recommending postacute care placement.  Blood cultures appear to be a contaminant 2 out of 2 with staph epidermidis, repeat cultures negative.  ID has signed off, no need for antibiotics.  Patient is medically cleared for SNF, discussed with case management in IDT rounds.    11/1: No complaints at time of examination.  Medically cleared, pending placement, discussed with case management.  Palliative care met with patient, CODE STATUS adjusted.    I have personally seen and examined the patient at bedside. I discussed the plan of care with patient, bedside RN and charge RN.    11/02:  No acute events overnight, laying in a bed ,d enied any complain  Medically cleared to be discharged  Pending placement    11/03:  No acute events overnight, laying in bed, denies any complaint.  Patient is medically cleared and she has an occipital heart discharge pending their ability.    --Vital signs has been stable  --Patient is noted to have elevated bicarbonate 43, likely 2/2 contraction alkalosis . Hold lasix  This is likely secondary to obesity hypoventilation syndrome  Plan of care has been explained to the patient, bedside nurse, charge nurse,  and pharmacy     11/04: N no acute events overnight, laying in bed, denies any complaint. Patient noted to have elevated HCO3, likely secondary to contraction alkalosis  -- mild liane   -- will stop fluid restriction   Repeat bmp at am  Pt recs post-scute       11/05:  No acute events overnight, laying in bed, denies any complaint.  Patient has been medically cleared to be  discharged, pending insurance Auth    11/06:  --Patient vital sign has been stable, laying in bed.  Did need to be out of bed 3 times daily.  --She has been medically cleared, discussed care with the nursing staff, case management, charge nurse.      11/07:  No acute events were evaluated for any complaint.  Patient oxygen demand has increased to 6 L.  Patient with producing oxygen upon my evaluation in the morning.  Her oxygen is noted to be on the side.  Her sodium noted to be 121, urine studies ordered, pending.  Repeat BMP was ordered at 5 PM, discussed with the nurse Jerrica to call the lab and get the study done.  Provide a one-time dose of Lasix     consultants/Specialty  critical care and infectious disease    Code Status  DNAR/DNI    Disposition  Patient is medically cleared.   Anticipate discharge to to skilled nursing facility.  I have placed the appropriate orders for post-discharge needs.    Review of Systems  Review of Systems   Constitutional: Positive for malaise/fatigue. Negative for weight loss.   HENT: Negative for congestion and sore throat.    Eyes: Negative for blurred vision and photophobia.   Respiratory: Positive for cough and shortness of breath. Negative for hemoptysis.    Cardiovascular: Positive for leg swelling. Negative for chest pain, palpitations and orthopnea.   Gastrointestinal: Negative for abdominal pain, blood in stool, heartburn, nausea and vomiting.   Genitourinary: Negative for dysuria and urgency.   Musculoskeletal: Positive for myalgias.   Neurological: Positive for weakness (generalized ). Negative for headaches.   Psychiatric/Behavioral: The patient is nervous/anxious.         Physical Exam  Temp:  [36.7 °C (98 °F)-36.9 °C (98.5 °F)] 36.7 °C (98.1 °F)  Pulse:  [73-78] 75  Resp:  [18-19] 19  BP: (101-136)/(49-55) 101/49  SpO2:  [83 %-98 %] 93 %    Physical Exam  Vitals and nursing note reviewed.   Constitutional:       Appearance: She is obese. She is not ill-appearing.    HENT:      Head: Normocephalic and atraumatic.   Eyes:      Extraocular Movements: Extraocular movements intact.      Pupils: Pupils are equal, round, and reactive to light.   Cardiovascular:      Rate and Rhythm: Normal rate.   Pulmonary:      Effort: Pulmonary effort is normal. No respiratory distress.      Comments: Decreased breath sound at the bases  Abdominal:      General: Bowel sounds are normal. There is no distension.      Palpations: Abdomen is soft. There is no mass.   Musculoskeletal:      Cervical back: Neck supple.      Right lower leg: No edema.      Left lower leg: No edema.   Neurological:      Mental Status: She is alert and oriented to person, place, and time.      Cranial Nerves: No cranial nerve deficit.   Psychiatric:         Mood and Affect: Mood normal.         Fluids    Intake/Output Summary (Last 24 hours) at 11/7/2021 1655  Last data filed at 11/7/2021 1030  Gross per 24 hour   Intake 120 ml   Output 500 ml   Net -380 ml       Laboratory      Recent Labs     11/05/21  0138 11/07/21  0420 11/07/21  1039   SODIUM 132* 121* 121*   POTASSIUM 4.8 4.3 4.5   CHLORIDE 86* 81* 80*   CO2 36* 31 35*   GLUCOSE 93 101* 101*   BUN 20 18 16   CREATININE 1.23 1.00 0.86   CALCIUM 8.6 8.2* 8.5                   Imaging  DX-CHEST-LIMITED (1 VIEW)   Final Result      Worsening moderate consolidation and possible pleural fluid with marked cardiac silhouette enlargement. Edema and/or infection      EC-ECHOCARDIOGRAM COMPLETE W/O CONT   Final Result      DX-CHEST-PORTABLE (1 VIEW)   Final Result         1.  Pulmonary edema and/or infiltrates.   2.  Cardiomegaly           Assessment/Plan  Bacteremia due to Staphylococcus- (present on admission)  Assessment & Plan  Likely contamination , echocardiogram showed EF of 45%, no vegetation noted    Leucocytosis  Assessment & Plan  resolved    Hyperglycemia- (present on admission)  Assessment & Plan  Continue insulin sliding scale, hypoglycemic  Protocol , accucheck  ac and hs    Hyponatremia- (present on admission)  Assessment & Plan  At 121, urine study peding   Repeat bmp at 5 pm    Acute on chronic respiratory failure with hypoxia and hypercapnia (HCC)- (present on admission)  Assessment & Plan  -Multifactorial in patient with acute on chronic systolic heart failure as well as COPD exacerbation  -Covid was negative  -Patient is requiring BiPAP, she does have acute encephalopathy when not on BiPAP, continue BiPAP overnight, reevaluate in the morning  Echocardiogram reviewed, EF is noted to be 55% no mention of diastolic function.  We will hold the Lasix as patient noted to have contraction alkalosis    Chronic obstructive pulmonary disease with acute exacerbation (HCC)- (present on admission)  Assessment & Plan  -Continue RT protocol, breathing treatment.  She is currently requiring 4 L of oxygen    Essential hypertension- (present on admission)  Assessment & Plan  -Continue home metoprolol  -Start as needed labetalol, clonidine and enalapril  -Adjust as needed    AF (atrial fibrillation) (HCC)- (present on admission)  Assessment & Plan  -Currently rate controlled   --home amiodarone, digoxin and metoprolol      Morbid obesity with BMI of 60.0-69.9, adult (HCC)- (present on admission)  Assessment & Plan  -Chronic, would certainly benefit from diet and exercise adjustment at discharge       VTE prophylaxis: enoxaparin ppx    I have performed a physical exam and reviewed and updated ROS and Plan today (11/7/2021). In review of yesterday's note (11/6/2021), there are no changes except as documented above.      Skin Substitute Text: The defect edges were debeveled with a #15 scalpel blade.  Given the location of the defect, shape of the defect and the proximity to free margins a skin substitute graft was deemed most appropriate.  The graft material was trimmed to fit the size of the defect. The graft was then placed in the primary defect and oriented appropriately.

## 2023-10-16 ENCOUNTER — APPOINTMENT (OUTPATIENT)
Dept: RADIOLOGY | Facility: MEDICAL CENTER | Age: 63
End: 2023-10-16
Attending: STUDENT IN AN ORGANIZED HEALTH CARE EDUCATION/TRAINING PROGRAM
Payer: MEDICARE

## 2023-10-16 ENCOUNTER — HOSPITAL ENCOUNTER (EMERGENCY)
Facility: MEDICAL CENTER | Age: 63
End: 2023-10-16
Attending: STUDENT IN AN ORGANIZED HEALTH CARE EDUCATION/TRAINING PROGRAM
Payer: MEDICARE

## 2023-10-16 VITALS
BODY MASS INDEX: 50.02 KG/M2 | WEIGHT: 293 LBS | SYSTOLIC BLOOD PRESSURE: 110 MMHG | DIASTOLIC BLOOD PRESSURE: 90 MMHG | HEART RATE: 60 BPM | RESPIRATION RATE: 17 BRPM | TEMPERATURE: 98.4 F | HEIGHT: 64 IN | OXYGEN SATURATION: 90 %

## 2023-10-16 DIAGNOSIS — R09.02 HYPOXIA: ICD-10-CM

## 2023-10-16 DIAGNOSIS — R07.9 CHEST PAIN, UNSPECIFIED TYPE: ICD-10-CM

## 2023-10-16 DIAGNOSIS — I48.91 ATRIAL FIBRILLATION WITH RVR (HCC): ICD-10-CM

## 2023-10-16 LAB
ALBUMIN SERPL BCP-MCNC: 4.4 G/DL (ref 3.2–4.9)
ALBUMIN/GLOB SERPL: 1.3 G/DL
ALP SERPL-CCNC: 62 U/L (ref 30–99)
ALT SERPL-CCNC: 23 U/L (ref 2–50)
ANION GAP SERPL CALC-SCNC: 11 MMOL/L (ref 7–16)
AST SERPL-CCNC: 18 U/L (ref 12–45)
BASOPHILS # BLD AUTO: 0.7 % (ref 0–1.8)
BASOPHILS # BLD: 0.07 K/UL (ref 0–0.12)
BILIRUB SERPL-MCNC: 0.4 MG/DL (ref 0.1–1.5)
BUN SERPL-MCNC: 17 MG/DL (ref 8–22)
CALCIUM ALBUM COR SERPL-MCNC: 9.9 MG/DL (ref 8.5–10.5)
CALCIUM SERPL-MCNC: 10.2 MG/DL (ref 8.5–10.5)
CHLORIDE SERPL-SCNC: 99 MMOL/L (ref 96–112)
CO2 SERPL-SCNC: 30 MMOL/L (ref 20–33)
CREAT SERPL-MCNC: 0.97 MG/DL (ref 0.5–1.4)
EKG IMPRESSION: NORMAL
EOSINOPHIL # BLD AUTO: 0.16 K/UL (ref 0–0.51)
EOSINOPHIL NFR BLD: 1.6 % (ref 0–6.9)
ERYTHROCYTE [DISTWIDTH] IN BLOOD BY AUTOMATED COUNT: 43.7 FL (ref 35.9–50)
GFR SERPLBLD CREATININE-BSD FMLA CKD-EPI: 65 ML/MIN/1.73 M 2
GLOBULIN SER CALC-MCNC: 3.3 G/DL (ref 1.9–3.5)
GLUCOSE SERPL-MCNC: 123 MG/DL (ref 65–99)
HCT VFR BLD AUTO: 42.5 % (ref 37–47)
HGB BLD-MCNC: 14.2 G/DL (ref 12–16)
IMM GRANULOCYTES # BLD AUTO: 0.02 K/UL (ref 0–0.11)
IMM GRANULOCYTES NFR BLD AUTO: 0.2 % (ref 0–0.9)
LYMPHOCYTES # BLD AUTO: 3.66 K/UL (ref 1–4.8)
LYMPHOCYTES NFR BLD: 36.9 % (ref 22–41)
MCH RBC QN AUTO: 30.9 PG (ref 27–33)
MCHC RBC AUTO-ENTMCNC: 33.4 G/DL (ref 32.2–35.5)
MCV RBC AUTO: 92.6 FL (ref 81.4–97.8)
MONOCYTES # BLD AUTO: 0.96 K/UL (ref 0–0.85)
MONOCYTES NFR BLD AUTO: 9.7 % (ref 0–13.4)
NEUTROPHILS # BLD AUTO: 5.05 K/UL (ref 1.82–7.42)
NEUTROPHILS NFR BLD: 50.9 % (ref 44–72)
NRBC # BLD AUTO: 0 K/UL
NRBC BLD-RTO: 0 /100 WBC (ref 0–0.2)
PLATELET # BLD AUTO: 172 K/UL (ref 164–446)
PMV BLD AUTO: 12.4 FL (ref 9–12.9)
POTASSIUM SERPL-SCNC: 3.9 MMOL/L (ref 3.6–5.5)
PROT SERPL-MCNC: 7.7 G/DL (ref 6–8.2)
RBC # BLD AUTO: 4.59 M/UL (ref 4.2–5.4)
SODIUM SERPL-SCNC: 140 MMOL/L (ref 135–145)
TROPONIN T SERPL-MCNC: 17 NG/L (ref 6–19)
TROPONIN T SERPL-MCNC: 21 NG/L (ref 6–19)
WBC # BLD AUTO: 9.9 K/UL (ref 4.8–10.8)

## 2023-10-16 PROCEDURE — 700102 HCHG RX REV CODE 250 W/ 637 OVERRIDE(OP): Performed by: STUDENT IN AN ORGANIZED HEALTH CARE EDUCATION/TRAINING PROGRAM

## 2023-10-16 PROCEDURE — 99285 EMERGENCY DEPT VISIT HI MDM: CPT

## 2023-10-16 PROCEDURE — 36415 COLL VENOUS BLD VENIPUNCTURE: CPT

## 2023-10-16 PROCEDURE — 85025 COMPLETE CBC W/AUTO DIFF WBC: CPT

## 2023-10-16 PROCEDURE — A9270 NON-COVERED ITEM OR SERVICE: HCPCS | Performed by: STUDENT IN AN ORGANIZED HEALTH CARE EDUCATION/TRAINING PROGRAM

## 2023-10-16 PROCEDURE — 700101 HCHG RX REV CODE 250: Performed by: STUDENT IN AN ORGANIZED HEALTH CARE EDUCATION/TRAINING PROGRAM

## 2023-10-16 PROCEDURE — 84484 ASSAY OF TROPONIN QUANT: CPT

## 2023-10-16 PROCEDURE — 96374 THER/PROPH/DIAG INJ IV PUSH: CPT

## 2023-10-16 PROCEDURE — 71045 X-RAY EXAM CHEST 1 VIEW: CPT

## 2023-10-16 PROCEDURE — 93005 ELECTROCARDIOGRAM TRACING: CPT

## 2023-10-16 PROCEDURE — 80053 COMPREHEN METABOLIC PANEL: CPT

## 2023-10-16 PROCEDURE — 93005 ELECTROCARDIOGRAM TRACING: CPT | Performed by: STUDENT IN AN ORGANIZED HEALTH CARE EDUCATION/TRAINING PROGRAM

## 2023-10-16 RX ORDER — METOPROLOL TARTRATE 1 MG/ML
5 INJECTION, SOLUTION INTRAVENOUS ONCE
Status: COMPLETED | OUTPATIENT
Start: 2023-10-16 | End: 2023-10-16

## 2023-10-16 RX ADMIN — METOPROLOL TARTRATE 25 MG: 25 TABLET, FILM COATED ORAL at 20:45

## 2023-10-16 RX ADMIN — METOPROLOL TARTRATE 5 MG: 5 INJECTION INTRAVENOUS at 19:49

## 2023-10-16 ASSESSMENT — LIFESTYLE VARIABLES: DO YOU DRINK ALCOHOL: NO

## 2023-10-16 ASSESSMENT — PAIN DESCRIPTION - PAIN TYPE: TYPE: ACUTE PAIN

## 2023-10-16 ASSESSMENT — FIBROSIS 4 INDEX: FIB4 SCORE: 1.06

## 2023-10-17 NOTE — ED PROVIDER NOTES
ED Provider Note    CHIEF COMPLAINT  Chief Complaint   Patient presents with    Other     Patient reports on 3.5L oxygen at home and electricity was shut off today at 10am. Patient reports she is out of bottles of oxygen and can't use concentrator due to electricity being shut off due to son not able to pay bill.        EXTERNAL RECORDS REVIEWED  Patient was hospitalized in 2021 for covid.  She has a history of chronic respiratory failure on 3 to 4 L of home oxygen, atrial fibrillation, dilated cardiomyopathy, pulmonary hypertension, JUAN CARLOS.    HPI/ROS  LIMITATION TO HISTORY   None  OUTSIDE HISTORIAN(S):  None    Cheryl D Blakemore is a 63 y.o. female who presents with chest pain and shortness of breath.  Patient states that her electricity is being cut off at home from not paying bills and her oxygen was shut off around 10 AM this morning.  She says that she did not have oxygen for about an hour and a half.  She says that about an hour later she started to develop chest pain.  She denies any pressure or sharp sensation and says it ' roams around'.  She denies any pleuritic component.  No significant back pain or radiation into the back.  She endorses associated nausea but no vomiting or diaphoresis.  No history of PE, DVT, hemoptysis or recent hospitalization.  She has a history of A-fib and is compliant with her anticoagulation.  No recent illness with cough or cold symptoms.      PAST MEDICAL HISTORY   has a past medical history of Arthritis, Chronic pain, COPD (chronic obstructive pulmonary disease) (HCC), Hypertension, and Migraine.    SURGICAL HISTORY   has a past surgical history that includes primary c section; tracheostomy (2/4/2013); and gastrostomy laparoscopic (2/4/2013).    FAMILY HISTORY  Family History   Problem Relation Age of Onset    Cancer Father         lung, smoker    Diabetes Mother     Hypertension Mother     Hyperlipidemia Mother     Diabetes Maternal Aunt         x4    Heart Disease Sister      Hypertension Sister     Hyperlipidemia Sister     Thyroid Sister     Psychiatric Illness Neg Hx     Stroke Neg Hx        SOCIAL HISTORY  Social History     Tobacco Use    Smoking status: Former     Current packs/day: 0.00     Average packs/day: 1 pack/day for 20.0 years (20.0 ttl pk-yrs)     Types: Cigarettes     Start date: 1/17/1993     Quit date: 1/17/2013     Years since quitting: 10.7    Smokeless tobacco: Never    Tobacco comments:     1 ppd   Vaping Use    Vaping Use: Never used   Substance and Sexual Activity    Alcohol use: No     Alcohol/week: 0.0 oz    Drug use: Yes     Types: Marijuana, Inhaled, Oral    Sexual activity: Not on file       CURRENT MEDICATIONS  Home Medications       Reviewed by Kiki Davis R.N. (Registered Nurse) on 10/16/23 at 1742  Med List Status: Partial     Medication Last Dose Status   acetaminophen (TYLENOL 8 HOUR) 650 MG CR tablet  Active   amiodarone (CORDARONE) 200 MG Tab  Active   apixaban (ELIQUIS) 5mg Tab  Active   ASPIRIN LOW DOSE 81 MG EC tablet  Active   atorvastatin (LIPITOR) 40 MG Tab  Active   benzonatate (TESSALON) 100 MG Cap  Active   carvedilol (COREG) 3.125 MG Tab  Active   cefTRIAXone (ROCEPHIN) 250 MG Recon Soln  Active   celecoxib (CELEBREX) 100 MG Cap  Active   diclofenac sodium (VOLTAREN) 1 % Gel  Active   diclofenac sodium (VOLTAREN) 1 % Gel  Active   digoxin (LANOXIN) 125 MCG Tab  Active   digoxin (LANOXIN) 125 MCG Tab  Active   digoxin (LANOXIN) 250 MCG Tab  Active   enoxaparin (LOVENOX) 60 MG/0.6ML Solution inj  Active   famotidine (PEPCID) 20 MG Tab  Active   Fluticasone Furoate-Vilanterol (BREO ELLIPTA INH)  Active   furosemide (LASIX) 20 MG Tab  Active   furosemide (LASIX) 40 MG Tab  Active   furosemide (LASIX) 40 MG Tab  Active   gabapentin (NEURONTIN) 300 MG Cap  Active   ipratropium-albuterol (DUONEB) 0.5-2.5 (3) MG/3ML nebulizer solution  Active   losartan (COZAAR) 25 MG Tab  Active   metoprolol tartrate (LOPRESSOR) 25 MG Tab  Active   midodrine  "(PROAMATINE) 5 MG Tab  Active   nystatin (MYCOSTATIN) powder  Active   ondansetron (ZOFRAN ODT) 4 MG TABLET DISPERSIBLE  Active   oxyCODONE-acetaminophen (PERCOCET) 5-325 MG Tab  Active   potassium Chloride ER (K-TAB) 20 MEQ Tab CR tablet  Active   potassium chloride SA (KDUR) 20 MEQ Tab CR  Active   SPIRIVA HANDIHALER 18 MCG Cap  Active   spironolactone (ALDACTONE) 25 MG Tab  Active   sulfamethoxazole-trimethoprim (BACTRIM DS) 800-160 MG tablet  Active   tamsulosin (FLOMAX) 0.4 MG capsule  Active                    ALLERGIES  No Known Allergies    PHYSICAL EXAM  VITAL SIGNS: BP (!) 110/90   Pulse 60   Temp 36.9 °C (98.4 °F)   Resp 17   Ht 1.626 m (5' 4\")   Wt (!) 155 kg (342 lb)   SpO2 90%   BMI 58.70 kg/m²    Constitutional: Awake and alert . Non toxic  HENT: Normal inspection  mucous membranes  Eyes: Normal inspection  Neck: Grossly normal range of motion.  Cardiovascular:  Normal heart rate, Normal rhythm.  Symmetric peripheral pulses.   Thorax & Lungs:  On 3L NC. No respiratory distress, No wheezing, No rales, No rhonchi, No chest tenderness.   Abdomen: Soft, non-distended, nontender to palpation in all 4 quadrants, no mass  Skin: No obvious rash.  Extremities: Warm, well perfused. No clubbing, cyanosis, edema   Neurologic: Grossly normal   Psychiatric: Normal for situation      DIAGNOSTIC STUDIES / PROCEDURES  EKG  I have independently interpreted this EKG  Results for orders placed or performed during the hospital encounter of 10/16/23   EKG   Result Value Ref Range    Report       [facility] Emergency Dept.    Test Date:  2023-10-16  Pt Name:    CHERYL BLAKEMORE             Department: ER  MRN:        [id number]                      Room:  Gender:     Female                       Technician: [id number]  :        1960                   Requested By:ER TRIAGE PROTOCOL  Order #:    [id number]                    Konrad MD: Pat Carcamo    Measurements  Intervals                         "        Axis  Rate:       130                          P:          0  CO:         0                            QRS:        -45  QRSD:       93                           T:          66  QT:         321  QTc:        472    Interpretive Statements  Atrial fibrillation  Paired ventricular premature complexes  Abnormal R-wave progression, late transition  Inferior infarct, old  Compared to ECG 10/28/2021 21:41:53  Ventricular premature complex(es) now present  Myocardial infarct finding still present  Electronically Signed On 10- 22:06:58 PDT by Pat Carcamo           LABS  Results for orders placed or performed during the hospital encounter of 10/16/23   CBC with Differential   Result Value Ref Range    WBC 9.9 4.8 - 10.8 K/uL    RBC 4.59 4.20 - 5.40 M/uL    Hemoglobin 14.2 12.0 - 16.0 g/dL    Hematocrit 42.5 37.0 - 47.0 %    MCV 92.6 81.4 - 97.8 fL    MCH 30.9 27.0 - 33.0 pg    MCHC 33.4 32.2 - 35.5 g/dL    RDW 43.7 35.9 - 50.0 fL    Platelet Count 172 164 - 446 K/uL    MPV 12.4 9.0 - 12.9 fL    Neutrophils-Polys 50.90 44.00 - 72.00 %    Lymphocytes 36.90 22.00 - 41.00 %    Monocytes 9.70 0.00 - 13.40 %    Eosinophils 1.60 0.00 - 6.90 %    Basophils 0.70 0.00 - 1.80 %    Immature Granulocytes 0.20 0.00 - 0.90 %    Nucleated RBC 0.00 0.00 - 0.20 /100 WBC    Neutrophils (Absolute) 5.05 1.82 - 7.42 K/uL    Lymphs (Absolute) 3.66 1.00 - 4.80 K/uL    Monos (Absolute) 0.96 (H) 0.00 - 0.85 K/uL    Eos (Absolute) 0.16 0.00 - 0.51 K/uL    Baso (Absolute) 0.07 0.00 - 0.12 K/uL    Immature Granulocytes (abs) 0.02 0.00 - 0.11 K/uL    NRBC (Absolute) 0.00 K/uL   Complete Metabolic Panel (CMP)   Result Value Ref Range    Sodium 140 135 - 145 mmol/L    Potassium 3.9 3.6 - 5.5 mmol/L    Chloride 99 96 - 112 mmol/L    Co2 30 20 - 33 mmol/L    Anion Gap 11.0 7.0 - 16.0    Glucose 123 (H) 65 - 99 mg/dL    Bun 17 8 - 22 mg/dL    Creatinine 0.97 0.50 - 1.40 mg/dL    Calcium 10.2 8.5 - 10.5 mg/dL    Correct Calcium 9.9 8.5 - 10.5 mg/dL     AST(SGOT) 18 12 - 45 U/L    ALT(SGPT) 23 2 - 50 U/L    Alkaline Phosphatase 62 30 - 99 U/L    Total Bilirubin 0.4 0.1 - 1.5 mg/dL    Albumin 4.4 3.2 - 4.9 g/dL    Total Protein 7.7 6.0 - 8.2 g/dL    Globulin 3.3 1.9 - 3.5 g/dL    A-G Ratio 1.3 g/dL   Troponins NOW   Result Value Ref Range    Troponin T 21 (H) 6 - 19 ng/L   ESTIMATED GFR   Result Value Ref Range    GFR (CKD-EPI) 65 >60 mL/min/1.73 m 2   TROPONIN   Result Value Ref Range    Troponin T 17 6 - 19 ng/L   EKG   Result Value Ref Range    Report       [facility] Emergency Dept.    Test Date:  2023-10-16  Pt Name:    CHERYL BLAKEMORE             Department: ER  MRN:        [id number]                      Room:  Gender:     Female                       Technician: [id number]  :        1960                   Requested By:ER TRIAGE PROTOCOL  Order #:    [id number]                    Reading MD: Pat Carcamo    Measurements  Intervals                                Axis  Rate:       130                          P:          0  OK:         0                            QRS:        -45  QRSD:       93                           T:          66  QT:         321  QTc:        472    Interpretive Statements  Atrial fibrillation  Paired ventricular premature complexes  Abnormal R-wave progression, late transition  Inferior infarct, old  Compared to ECG 10/28/2021 21:41:53  Ventricular premature complex(es) now present  Myocardial infarct finding still present  Electronically Signed On 10- 22:06:58 PDT by Pat Carcamo         RADIOLOGY  I have independently interpreted the diagnostic imaging associated with this visit and am waiting the final reading from the radiologist.   My preliminary interpretation is as follows: No focal infiltrate  Radiologist interpretation:   DX-CHEST-PORTABLE (1 VIEW)   Final Result      1. No acute findings.   2. Stable cardiomegaly, atherosclerosis and left hemidiaphragm eventration.   3. Bibasal subsegmental  atelectasis.            COURSE & MEDICAL DECISION MAKING    ED Observation Status? Yes; I am placing the patient in to an observation status due to a diagnostic uncertainty as well as therapeutic intensity. Patient placed in observation status at 18.50 PM, 10/16/2023.     Observation plan is as follows: IV,  Trop x 2, Labs , CXR    Upon Reevaluation, the patient's condition has: Improved; and will be discharged.    Patient discharged from ED Observation status at 22.07 PM 10/16/2023    INITIAL ASSESSMENT, COURSE AND PLAN  Care Narrative: This is a 63-year-old female with chronic hypoxemic respiratory failure on 3 L of oxygen who presents with chest pain and shortness of breath.  Patient had her electricity shut off earlier this morning and her symptoms developed shortly after that.  On arrival she is hemodynamically normal, no signs of respiratory distress, oxygenating well on her baseline 3 L. She does not appear grossly volume overloaded  EKG shows A-fib, no ischemia.  Labs grossly unremarkable she is not anemic.  Troponin only mildly elevated at 21, repeat is downtrending.  Chest x-ray with stable cardiomegaly, no focal infiltrate to suggest pneumonia or pulmonary edema.   I doubt ACS, EKG non ischemic, troponin only slightly elevated.  Much more likely due to demand in the setting of oxygen deprivation rather than cardiac ischemia.   I also considered PE, however patient with no pleuritic pain, no risk factors and she is compliant with anticoagulation and this seems very unlikely.  She has no significant back pain, neurological deficit and no widened mediastinum on x-ray and doubt aortic dissection.  Patient did go into rapid A-fib during her time in the ER she was given 1 dose of IV metoprolol followed by her home dose of oral metoprolol with adequate rate control.  She is denying any ongoing symptoms.  Our  Ashley was able to meet with the patient and assist with ensuring her home oxygen would be  functioning.  At this point I do not think the patient requires admission to the hospital she is not having any ongoing symptoms and continues to look well on her home 3 L.  I advised close PCP follow-up and to return if worsening and she expresses understanding.        Critical care time : Anne presents with an acute critical illness and in my judgement had significant potential for imminent deterioration. I provided 32 minutes of Critical Care time to this patient, exclusive of any separately billable procedures. This included bedside direction of care, frequent re-evaluations, time spent coordinating ongoing consultant care and time of medical documentation.      DISPOSITION AND DISCUSSIONS  I have discussed management of the patient with the following physicians and NIC's:  None    Discussion of management with other QHP or appropriate source(s): Social Work Ashley, able to ensure patient had electricity at home and able to use home oxygen       Escalation of care considered, and ultimately not performed:acute inpatient care management, however at this time, the patient is most appropriate for outpatient management    Barriers to care at this time, including but not limited to:  None .     Decision tools and prescription drugs considered including, but not limited to: HEART Score 4 (mostly due to chronic risk factors), doubt ACS  .    FINAL DIAGNOSIS  1. Chest pain, unspecified type Acute   2. Hypoxia Acute   3. Atrial fibrillation with RVR (HCC)           Electronically signed by: Pat Carcamo M.D., 10/16/2023 7:14 PM

## 2023-10-17 NOTE — DISCHARGE INSTRUCTIONS
Return to the Emergency Department immediately should you become worse in any way (i.e. difficulty breathing, new or recurrent or worsening pain, new arm, jaw or back pain, sweatiness, nausea/ vomiting, development of back pain) or if you have any new or acute concerns.

## 2023-10-17 NOTE — ED NOTES
"Patient to R10 via wheelchair, O2 in place at 4L for SpO2 >92%.  Patient tachycardic, reports intermittent CP today \"since morning\", radiating to L neck.  Pritocol initiated, PIV placed, blood sent to lab.  "

## 2023-10-17 NOTE — DISCHARGE PLANNING
LAMONT met with Dr. Carcamo and was told that the patients electric was shut off today and she requires 24 hours home O2 3-4 liters.  Her home oxygen concentrator is inoperable without electricity.  LAMONT called [employer] to confirm the electricity was disconnected today.  LAMONT met with anne and asked for permission to provided [employer] the name of on the [employer] account statement and last 4 of the SS# to assist with getting the electricity turned back on as a curiosity.  LAMONT spoke with Janes-with [employer], and he confirmed the electricity was disconnected and that they have not completed the [employer] paperwork to avoid this disconnection after several attempts to get them to complete this.  Janes stated he will turn the electricity back on this one last time.  LAMONT met again with Anne and instructed her to complete the paperwork that [employer] has sent to them, have her MD sign it and hand deliver it to [employer] office.  She verbalized she will do so.  LAMONT called Jin-patients son to confirm the electricity has been turned back on.  Jin confirmed and LAMONT requested he come  Anne as she is cleared to DC.  LAMONT updated both the RN and Dr. Carcamo to the above.

## 2023-10-17 NOTE — ED NOTES
Pt discharged to home. Discharge paperwork provided. Education provided by ERP. Reinforced discharge instructions.  Pt was given follow up instructions   Pt verbalized understanding of all instructions for discharge.   Patient went out of the ER per WC, alert and oriented x 4, with all belongings.

## 2023-10-17 NOTE — ED TRIAGE NOTES
Chief Complaint   Patient presents with    Other     Patient reports on 3.5L oxygen at home and electricity was shut off today at 10am. Patient reports she is out of bottles of oxygen and can't use concentrator due to electricity being shut off due to son not able to pay bill.

## 2023-10-17 NOTE — ED NOTES
Bedside report received from off going RN Mara, assumed care of patient.  POC discussed with patient. Call light within reach, all needs addressed at this time.       Fall risk interventions in place: Move the patient closer to the nurse's station, Patient's personal possessions are with in their safe reach, Place socks on patient, Place fall risk sign on patient's door, Keep floor surfaces clean and dry, and Accompanied to restroom (all applicable per [place] Fall risk assessment)   Continuous monitoring: Cardiac Leads, Pulse Ox, or Blood Pressure  IVF/IV medications: Not Applicable   Oxygen: How many liters 4L  Bedside sitter: Not Applicable   Isolation: Not Applicable    [id number]

## 2023-10-17 NOTE — ED NOTES
Called cab for patient; ETA 10-15 mins, cab voucher no. [id number]  Asked patient if she can tolerate without oxygen for a short period of time and states yes; like she came here to ED due to power outage in their apartment.

## 2023-10-17 NOTE — ED NOTES
Patient tried calling family but nobody is available to come and pick her up; Son will come pick her up 1-- or 12 MN after work

## 2023-10-17 NOTE — DISCHARGE PLANNING
Medical Social Work    MSW spoke with bedside RN that pt is in need of a ride home as she would have to wait several hours for her son to pick her up.  Pt is normally on oxygen but is requesting to return home without it at this time.  Pt does have oxygen at home.  Cab voucher (# [id number]) was provided to RN after RN verified pt's address with pt.

## 2023-10-17 NOTE — ED NOTES
Patient states she feels like she is about to pass out and having chest pain. New orders placed. EKG ordered.

## 2023-10-17 NOTE — ED NOTES
Received a call from lab; 2nd trop sample was hemolyzed; tomas another sample and sent to lab; ERP informed

## 2023-10-17 NOTE — ED NOTES
Patient high risk for fall, educated on use of call light, verbalized understanding, bed alarm in place.